# Patient Record
Sex: FEMALE | Race: WHITE | NOT HISPANIC OR LATINO
[De-identification: names, ages, dates, MRNs, and addresses within clinical notes are randomized per-mention and may not be internally consistent; named-entity substitution may affect disease eponyms.]

---

## 2020-04-03 ENCOUNTER — TRANSCRIPTION ENCOUNTER (OUTPATIENT)
Age: 70
End: 2020-04-03

## 2023-04-06 ENCOUNTER — NON-APPOINTMENT (OUTPATIENT)
Age: 73
End: 2023-04-06

## 2023-04-06 ENCOUNTER — FORM ENCOUNTER (OUTPATIENT)
Age: 73
End: 2023-04-06

## 2023-04-07 ENCOUNTER — INPATIENT (INPATIENT)
Facility: HOSPITAL | Age: 73
LOS: 18 days | Discharge: HOME | DRG: 744 | End: 2023-04-26
Attending: INTERNAL MEDICINE | Admitting: INTERNAL MEDICINE
Payer: MEDICARE

## 2023-04-07 ENCOUNTER — FORM ENCOUNTER (OUTPATIENT)
Age: 73
End: 2023-04-07

## 2023-04-07 VITALS — HEIGHT: 65 IN | WEIGHT: 225.09 LBS

## 2023-04-07 DIAGNOSIS — R18.8 OTHER ASCITES: ICD-10-CM

## 2023-04-07 LAB
ALBUMIN SERPL ELPH-MCNC: 2.3 G/DL — LOW (ref 3.3–5)
ALP SERPL-CCNC: 159 U/L — HIGH (ref 40–120)
ALT FLD-CCNC: 21 U/L — SIGNIFICANT CHANGE UP (ref 12–78)
ANION GAP SERPL CALC-SCNC: 7 MMOL/L — SIGNIFICANT CHANGE UP (ref 5–17)
APPEARANCE UR: ABNORMAL
APTT BLD: 25.6 SEC — LOW (ref 27.5–35.5)
AST SERPL-CCNC: 33 U/L — SIGNIFICANT CHANGE UP (ref 15–37)
BACTERIA # UR AUTO: ABNORMAL
BASOPHILS # BLD AUTO: 0 K/UL — SIGNIFICANT CHANGE UP (ref 0–0.2)
BASOPHILS NFR BLD AUTO: 0 % — SIGNIFICANT CHANGE UP (ref 0–2)
BILIRUB SERPL-MCNC: 2.1 MG/DL — HIGH (ref 0.2–1.2)
BILIRUB UR-MCNC: ABNORMAL
BUN SERPL-MCNC: 21 MG/DL — SIGNIFICANT CHANGE UP (ref 7–23)
CALCIUM SERPL-MCNC: 9.2 MG/DL — SIGNIFICANT CHANGE UP (ref 8.5–10.1)
CHLORIDE SERPL-SCNC: 96 MMOL/L — SIGNIFICANT CHANGE UP (ref 96–108)
CO2 SERPL-SCNC: 26 MMOL/L — SIGNIFICANT CHANGE UP (ref 22–31)
COD CRY URNS QL: ABNORMAL
COLOR SPEC: ABNORMAL
COMMENT - URINE: SIGNIFICANT CHANGE UP
CREAT SERPL-MCNC: 0.91 MG/DL — SIGNIFICANT CHANGE UP (ref 0.5–1.3)
DIFF PNL FLD: ABNORMAL
EGFR: 67 ML/MIN/1.73M2 — SIGNIFICANT CHANGE UP
EOSINOPHIL # BLD AUTO: 0 K/UL — SIGNIFICANT CHANGE UP (ref 0–0.5)
EOSINOPHIL NFR BLD AUTO: 0 % — SIGNIFICANT CHANGE UP (ref 0–6)
EPI CELLS # UR: SIGNIFICANT CHANGE UP
FLUAV AG NPH QL: SIGNIFICANT CHANGE UP
FLUBV AG NPH QL: SIGNIFICANT CHANGE UP
GLUCOSE SERPL-MCNC: 106 MG/DL — HIGH (ref 70–99)
GLUCOSE UR QL: NEGATIVE — SIGNIFICANT CHANGE UP
GRAN CASTS # UR COMP ASSIST: ABNORMAL /LPF
HCT VFR BLD CALC: 35.9 % — SIGNIFICANT CHANGE UP (ref 34.5–45)
HGB BLD-MCNC: 10.9 G/DL — LOW (ref 11.5–15.5)
HYALINE CASTS # UR AUTO: ABNORMAL /LPF
INR BLD: 1.54 RATIO — HIGH (ref 0.88–1.16)
KETONES UR-MCNC: ABNORMAL
LEUKOCYTE ESTERASE UR-ACNC: ABNORMAL
LYMPHOCYTES # BLD AUTO: 14 % — SIGNIFICANT CHANGE UP (ref 13–44)
LYMPHOCYTES # BLD AUTO: 2.11 K/UL — SIGNIFICANT CHANGE UP (ref 1–3.3)
MAGNESIUM SERPL-MCNC: 2.2 MG/DL — SIGNIFICANT CHANGE UP (ref 1.6–2.6)
MANUAL SMEAR VERIFICATION: SIGNIFICANT CHANGE UP
MCHC RBC-ENTMCNC: 22.3 PG — LOW (ref 27–34)
MCHC RBC-ENTMCNC: 30.4 GM/DL — LOW (ref 32–36)
MCV RBC AUTO: 73.4 FL — LOW (ref 80–100)
MONOCYTES # BLD AUTO: 1.66 K/UL — HIGH (ref 0–0.9)
MONOCYTES NFR BLD AUTO: 11 % — SIGNIFICANT CHANGE UP (ref 2–14)
NEUTROPHILS # BLD AUTO: 11.33 K/UL — HIGH (ref 1.8–7.4)
NEUTROPHILS NFR BLD AUTO: 75 % — SIGNIFICANT CHANGE UP (ref 43–77)
NITRITE UR-MCNC: POSITIVE
NRBC # BLD: 0 /100 — SIGNIFICANT CHANGE UP (ref 0–0)
NRBC # BLD: SIGNIFICANT CHANGE UP /100 WBCS (ref 0–0)
NT-PROBNP SERPL-SCNC: 421 PG/ML — HIGH (ref 0–125)
PH UR: 5 — SIGNIFICANT CHANGE UP (ref 5–8)
PLAT MORPH BLD: NORMAL — SIGNIFICANT CHANGE UP
PLATELET # BLD AUTO: 581 K/UL — HIGH (ref 150–400)
POTASSIUM SERPL-MCNC: 4.2 MMOL/L — SIGNIFICANT CHANGE UP (ref 3.5–5.3)
POTASSIUM SERPL-SCNC: 4.2 MMOL/L — SIGNIFICANT CHANGE UP (ref 3.5–5.3)
PROT SERPL-MCNC: 8 GM/DL — SIGNIFICANT CHANGE UP (ref 6–8.3)
PROT UR-MCNC: 30 MG/DL
PROTHROM AB SERPL-ACNC: 17.9 SEC — HIGH (ref 10.5–13.4)
RBC # BLD: 4.89 M/UL — SIGNIFICANT CHANGE UP (ref 3.8–5.2)
RBC # FLD: 17.4 % — HIGH (ref 10.3–14.5)
RBC BLD AUTO: NORMAL — SIGNIFICANT CHANGE UP
RBC CASTS # UR COMP ASSIST: SIGNIFICANT CHANGE UP /HPF (ref 0–4)
RSV RNA NPH QL NAA+NON-PROBE: SIGNIFICANT CHANGE UP
SARS-COV-2 RNA SPEC QL NAA+PROBE: SIGNIFICANT CHANGE UP
SODIUM SERPL-SCNC: 129 MMOL/L — LOW (ref 135–145)
SP GR SPEC: 1.02 — SIGNIFICANT CHANGE UP (ref 1.01–1.02)
TROPONIN I, HIGH SENSITIVITY RESULT: 7.27 NG/L — SIGNIFICANT CHANGE UP
UROBILINOGEN FLD QL: 8
WBC # BLD: 15.1 K/UL — HIGH (ref 3.8–10.5)
WBC # FLD AUTO: 15.1 K/UL — HIGH (ref 3.8–10.5)
WBC UR QL: SIGNIFICANT CHANGE UP /HPF (ref 0–5)

## 2023-04-07 PROCEDURE — 86706 HEP B SURFACE ANTIBODY: CPT

## 2023-04-07 PROCEDURE — C9399: CPT

## 2023-04-07 PROCEDURE — 89051 BODY FLUID CELL COUNT: CPT

## 2023-04-07 PROCEDURE — 88341 IMHCHEM/IMCYTCHM EA ADD ANTB: CPT

## 2023-04-07 PROCEDURE — 82570 ASSAY OF URINE CREATININE: CPT

## 2023-04-07 PROCEDURE — 86709 HEPATITIS A IGM ANTIBODY: CPT

## 2023-04-07 PROCEDURE — 85027 COMPLETE CBC AUTOMATED: CPT

## 2023-04-07 PROCEDURE — 74177 CT ABD & PELVIS W/CONTRAST: CPT

## 2023-04-07 PROCEDURE — 87350 HEPATITIS BE AG IA: CPT

## 2023-04-07 PROCEDURE — 83615 LACTATE (LD) (LDH) ENZYME: CPT

## 2023-04-07 PROCEDURE — 82728 ASSAY OF FERRITIN: CPT

## 2023-04-07 PROCEDURE — 84550 ASSAY OF BLOOD/URIC ACID: CPT

## 2023-04-07 PROCEDURE — 93306 TTE W/DOPPLER COMPLETE: CPT

## 2023-04-07 PROCEDURE — 76856 US EXAM PELVIC COMPLETE: CPT

## 2023-04-07 PROCEDURE — 80053 COMPREHEN METABOLIC PANEL: CPT

## 2023-04-07 PROCEDURE — 84156 ASSAY OF PROTEIN URINE: CPT

## 2023-04-07 PROCEDURE — 88360 TUMOR IMMUNOHISTOCHEM/MANUAL: CPT

## 2023-04-07 PROCEDURE — 93970 EXTREMITY STUDY: CPT

## 2023-04-07 PROCEDURE — 82945 GLUCOSE OTHER FLUID: CPT

## 2023-04-07 PROCEDURE — 86301 IMMUNOASSAY TUMOR CA 19-9: CPT

## 2023-04-07 PROCEDURE — 99222 1ST HOSP IP/OBS MODERATE 55: CPT

## 2023-04-07 PROCEDURE — 71045 X-RAY EXAM CHEST 1 VIEW: CPT | Mod: 26

## 2023-04-07 PROCEDURE — 87340 HEPATITIS B SURFACE AG IA: CPT

## 2023-04-07 PROCEDURE — 97162 PT EVAL MOD COMPLEX 30 MIN: CPT | Mod: GP

## 2023-04-07 PROCEDURE — 76705 ECHO EXAM OF ABDOMEN: CPT

## 2023-04-07 PROCEDURE — 86304 IMMUNOASSAY TUMOR CA 125: CPT

## 2023-04-07 PROCEDURE — 84157 ASSAY OF PROTEIN OTHER: CPT

## 2023-04-07 PROCEDURE — 84443 ASSAY THYROID STIM HORMONE: CPT

## 2023-04-07 PROCEDURE — 88108 CYTOPATH CONCENTRATE TECH: CPT

## 2023-04-07 PROCEDURE — 82105 ALPHA-FETOPROTEIN SERUM: CPT

## 2023-04-07 PROCEDURE — 93971 EXTREMITY STUDY: CPT | Mod: RT

## 2023-04-07 PROCEDURE — 83550 IRON BINDING TEST: CPT

## 2023-04-07 PROCEDURE — 99285 EMERGENCY DEPT VISIT HI MDM: CPT

## 2023-04-07 PROCEDURE — 86900 BLOOD TYPING SEROLOGIC ABO: CPT

## 2023-04-07 PROCEDURE — 85610 PROTHROMBIN TIME: CPT

## 2023-04-07 PROCEDURE — 83540 ASSAY OF IRON: CPT

## 2023-04-07 PROCEDURE — 87070 CULTURE OTHR SPECIMN AEROBIC: CPT

## 2023-04-07 PROCEDURE — 81001 URINALYSIS AUTO W/SCOPE: CPT

## 2023-04-07 PROCEDURE — 80076 HEPATIC FUNCTION PANEL: CPT

## 2023-04-07 PROCEDURE — 83036 HEMOGLOBIN GLYCOSYLATED A1C: CPT

## 2023-04-07 PROCEDURE — 87075 CULTR BACTERIA EXCEPT BLOOD: CPT

## 2023-04-07 PROCEDURE — 85025 COMPLETE CBC W/AUTO DIFF WBC: CPT

## 2023-04-07 PROCEDURE — 93975 VASCULAR STUDY: CPT

## 2023-04-07 PROCEDURE — 36415 COLL VENOUS BLD VENIPUNCTURE: CPT

## 2023-04-07 PROCEDURE — 82042 OTHER SOURCE ALBUMIN QUAN EA: CPT

## 2023-04-07 PROCEDURE — 88342 IMHCHEM/IMCYTCHM 1ST ANTB: CPT

## 2023-04-07 PROCEDURE — 80048 BASIC METABOLIC PNL TOTAL CA: CPT

## 2023-04-07 PROCEDURE — 86707 HEPATITIS BE ANTIBODY: CPT

## 2023-04-07 PROCEDURE — 83735 ASSAY OF MAGNESIUM: CPT

## 2023-04-07 PROCEDURE — C1729: CPT

## 2023-04-07 PROCEDURE — 86708 HEPATITIS A ANTIBODY: CPT

## 2023-04-07 PROCEDURE — 86803 HEPATITIS C AB TEST: CPT

## 2023-04-07 PROCEDURE — 86850 RBC ANTIBODY SCREEN: CPT

## 2023-04-07 PROCEDURE — 49083 ABD PARACENTESIS W/IMAGING: CPT

## 2023-04-07 PROCEDURE — 86704 HEP B CORE ANTIBODY TOTAL: CPT

## 2023-04-07 PROCEDURE — 86901 BLOOD TYPING SEROLOGIC RH(D): CPT

## 2023-04-07 PROCEDURE — 71250 CT THORAX DX C-: CPT

## 2023-04-07 PROCEDURE — 82378 CARCINOEMBRYONIC ANTIGEN: CPT

## 2023-04-07 PROCEDURE — 87102 FUNGUS ISOLATION CULTURE: CPT

## 2023-04-07 PROCEDURE — 93005 ELECTROCARDIOGRAM TRACING: CPT

## 2023-04-07 PROCEDURE — 88305 TISSUE EXAM BY PATHOLOGIST: CPT

## 2023-04-07 PROCEDURE — 93010 ELECTROCARDIOGRAM REPORT: CPT

## 2023-04-07 PROCEDURE — 83930 ASSAY OF BLOOD OSMOLALITY: CPT

## 2023-04-07 PROCEDURE — 85379 FIBRIN DEGRADATION QUANT: CPT

## 2023-04-07 PROCEDURE — 85730 THROMBOPLASTIN TIME PARTIAL: CPT

## 2023-04-07 PROCEDURE — 87635 SARS-COV-2 COVID-19 AMP PRB: CPT

## 2023-04-07 PROCEDURE — 83935 ASSAY OF URINE OSMOLALITY: CPT

## 2023-04-07 PROCEDURE — 97116 GAIT TRAINING THERAPY: CPT | Mod: GP

## 2023-04-07 PROCEDURE — 84300 ASSAY OF URINE SODIUM: CPT

## 2023-04-07 PROCEDURE — 74177 CT ABD & PELVIS W/CONTRAST: CPT | Mod: 26,MA

## 2023-04-07 PROCEDURE — 84466 ASSAY OF TRANSFERRIN: CPT

## 2023-04-07 RX ORDER — LANOLIN ALCOHOL/MO/W.PET/CERES
3 CREAM (GRAM) TOPICAL AT BEDTIME
Refills: 0 | Status: DISCONTINUED | OUTPATIENT
Start: 2023-04-07 | End: 2023-04-26

## 2023-04-07 RX ORDER — CEFTRIAXONE 500 MG/1
1000 INJECTION, POWDER, FOR SOLUTION INTRAMUSCULAR; INTRAVENOUS ONCE
Refills: 0 | Status: DISCONTINUED | OUTPATIENT
Start: 2023-04-07 | End: 2023-04-07

## 2023-04-07 RX ORDER — CEFTRIAXONE 500 MG/1
1000 INJECTION, POWDER, FOR SOLUTION INTRAMUSCULAR; INTRAVENOUS EVERY 24 HOURS
Refills: 0 | Status: DISCONTINUED | OUTPATIENT
Start: 2023-04-07 | End: 2023-04-11

## 2023-04-07 RX ORDER — CEFTRIAXONE 500 MG/1
1000 INJECTION, POWDER, FOR SOLUTION INTRAMUSCULAR; INTRAVENOUS ONCE
Refills: 0 | Status: COMPLETED | OUTPATIENT
Start: 2023-04-07 | End: 2023-04-07

## 2023-04-07 RX ORDER — FUROSEMIDE 40 MG
40 TABLET ORAL ONCE
Refills: 0 | Status: COMPLETED | OUTPATIENT
Start: 2023-04-07 | End: 2023-04-08

## 2023-04-07 RX ORDER — ONDANSETRON 8 MG/1
4 TABLET, FILM COATED ORAL EVERY 8 HOURS
Refills: 0 | Status: DISCONTINUED | OUTPATIENT
Start: 2023-04-07 | End: 2023-04-26

## 2023-04-07 RX ORDER — ACETAMINOPHEN 500 MG
650 TABLET ORAL EVERY 6 HOURS
Refills: 0 | Status: DISCONTINUED | OUTPATIENT
Start: 2023-04-07 | End: 2023-04-26

## 2023-04-07 RX ADMIN — CEFTRIAXONE 1000 MILLIGRAM(S): 500 INJECTION, POWDER, FOR SOLUTION INTRAMUSCULAR; INTRAVENOUS at 19:33

## 2023-04-07 NOTE — ED ADULT TRIAGE NOTE - CHIEF COMPLAINT QUOTE
Pt presents to ED c/o VINES. pt states she gets very SOB when she walks. +lower leg swelling. pt also states she has decreased appetite can only drink/eat small amounts at a time. increased lethargy

## 2023-04-07 NOTE — ED ADULT NURSE NOTE - OBJECTIVE STATEMENT
Pt is a 73yr old female, A&OX4, c/o VINES, b/l lower extremity swelling, abdominal distention, vomiting, no appetite, and fatigue x 1 week. No hx of abdominal surgeries. Resp. even and unlabored. Denies chest pain, HA, dizziness, fevers, and cough. Labs sent. EKG completed. Placed on cardiac monitor. In NAD.

## 2023-04-07 NOTE — ED ADULT NURSE REASSESSMENT NOTE - NS ED NURSE REASSESS COMMENT FT1
Bladder scan shows 1589mL. Dr. Lucio made aware. Awaiting indwelling dos santos catheter order.
Pt initial output after dos santos catheter placement was 200mL despite bladder scan amount. Dr. Lucio made aware.

## 2023-04-07 NOTE — H&P ADULT - NSHPREVIEWOFSYSTEMS_GEN_ALL_CORE
Gen: No fever, chills, weakness  ENT: No visual changes or throat pain  Neck: No pain or stiffness  Respiratory: No cough or wheezing  Cardiovascular: No chest pain or palpitations  Gastrointestinal: No abdominal pain, ++nausea,++ vomiting, No constipation, or diarrhea  Hematologic: No easy bleeding or bruising  Neurologic: No numbness or focal weakness  Psych: No depression or insomnia  Skin: No rash or itching

## 2023-04-07 NOTE — H&P ADULT - HISTORY OF PRESENT ILLNESS
73 year old Female presented to the ED complaining of Dyspnea on exertion for more than 6 weeks. Patient started to have some shortness of breath with exertion and mild abdominal swelling more than 6 weeks ago. Then she went to [place] with these symptoms to visit. She came back couple of weeks ago. But her abdomen continue to swell and also got significant edema.  She also has decreased appetite and nausea, vomiting for few days. Last BM, 2 days ago. She has no fever or diarrhea.

## 2023-04-07 NOTE — ED PROVIDER NOTE - MUSCULOSKELETAL, MLM
Spine appears normal, range of motion is not limited, no muscle or joint tenderness. 2+ edema b/l LEs.

## 2023-04-07 NOTE — H&P ADULT - NSHPPHYSICALEXAM_GEN_ALL_CORE
T(C): 36.7 (04-07-23 @ 22:30), Max: 36.8 (04-07-23 @ 14:11)  HR: 89 (04-07-23 @ 22:30) (74 - 89)  BP: 142/65 (04-07-23 @ 22:30) (142/65 - 156/70)  RR: 20 (04-07-23 @ 22:30) (18 - 20)  SpO2: 98% (04-07-23 @ 22:30) (96% - 100%)    CONSTITUTIONAL: Well groomed, no apparent distress  EYES: PERRLA and symmetric, EOMI, No conjunctival or scleral injection, non-icteric  ENMT: Oral mucosa with moist membranes. Normal dentition; no pharyngeal injection or exudates             NECK: Supple, symmetric and without tracheal deviation   RESP: No respiratory distress, no use of accessory muscles; CTA b/l, no WRR  CV: RRR, +S1S2, no MRG; no JVD; +++peripheral edema  GI: Very distended abdomen, +Ascites, No abdominal tenderness.   LYMPH: No cervical LAD or tenderness;   MSK: Normal ROM without pain, no spinal tenderness, normal muscle strength/tone  SKIN: No rashes or ulcers noted; no subcutaneous nodules or induration palpable  NEURO: CN II-XII intact; normal reflexes in upper and lower extremities, sensation intact in upper and lower extremities b/l to light touch   PSYCH: Appropriate insight/judgment; A+O x 3, mood and affect appropriate, recent/remote memory intact

## 2023-04-07 NOTE — H&P ADULT - ASSESSMENT
A/P:    1.  Dyspnea on exertion  Ascites  Leg edema  -VINES most likely due to significant abdominal distension  -will give one  dose of IV lasix today  -follow CT abd report  -follow leg US report  --follow US pelvis report  -IR consult for diagnostic and therapeutic paracentesis  -follow GI consult  -follow Echo report    2.  UTI  -on IV Ceftriaxone  -follow cultures    3.  SCD for DVT ppx    4.  Code status: Full code.

## 2023-04-07 NOTE — ED STATDOCS - PROGRESS NOTE DETAILS
Jemima Wong for attending Dr. Thapa: 74 y/o female presents to the ED c/o VINES x6 weeks. +decreased appetite. Denies abd pain. No other complaints at this time. Will send pt to main ED for further evaluation.

## 2023-04-07 NOTE — PHARMACOTHERAPY INTERVENTION NOTE - COMMENTS
Medication history complete, reviewed medications with patient and confirmed with doctor first med hx. Patient is not on meds.

## 2023-04-07 NOTE — ED PROVIDER NOTE - OBJECTIVE STATEMENT
73 year old female with no PMHx presents to the ED complaining of VINES s/p coming back from [place] 10 days ago. notes decreased appetite and nausea, vomiting x a few days. Last BM, 2 days ago. no fever or diarrhea. also notes legs swollen.

## 2023-04-07 NOTE — ED PROVIDER NOTE - CLINICAL SUMMARY MEDICAL DECISION MAKING FREE TEXT BOX
Pt with vomiting and leg swelling, r/o obstruction vs ascites vs CHF vs urinary retention. Plan: labs, CT imaging, bladder scan, and admit.

## 2023-04-08 ENCOUNTER — FORM ENCOUNTER (OUTPATIENT)
Age: 73
End: 2023-04-08

## 2023-04-08 DIAGNOSIS — R60.1 GENERALIZED EDEMA: ICD-10-CM

## 2023-04-08 LAB
ADD ON TEST-SPECIMEN IN LAB: SIGNIFICANT CHANGE UP
ANION GAP SERPL CALC-SCNC: 8 MMOL/L — SIGNIFICANT CHANGE UP (ref 5–17)
BUN SERPL-MCNC: 18 MG/DL — SIGNIFICANT CHANGE UP (ref 7–23)
CALCIUM SERPL-MCNC: 8.9 MG/DL — SIGNIFICANT CHANGE UP (ref 8.5–10.1)
CHLORIDE SERPL-SCNC: 98 MMOL/L — SIGNIFICANT CHANGE UP (ref 96–108)
CO2 SERPL-SCNC: 25 MMOL/L — SIGNIFICANT CHANGE UP (ref 22–31)
CREAT SERPL-MCNC: 0.7 MG/DL — SIGNIFICANT CHANGE UP (ref 0.5–1.3)
CULTURE RESULTS: NO GROWTH — SIGNIFICANT CHANGE UP
EGFR: 91 ML/MIN/1.73M2 — SIGNIFICANT CHANGE UP
GLUCOSE SERPL-MCNC: 92 MG/DL — SIGNIFICANT CHANGE UP (ref 70–99)
HCT VFR BLD CALC: 32.7 % — LOW (ref 34.5–45)
HCV AB S/CO SERPL IA: 0.12 S/CO — SIGNIFICANT CHANGE UP (ref 0–0.99)
HCV AB SERPL-IMP: SIGNIFICANT CHANGE UP
HGB BLD-MCNC: 10.1 G/DL — LOW (ref 11.5–15.5)
INR BLD: 1.49 RATIO — HIGH (ref 0.88–1.16)
LDH SERPL L TO P-CCNC: 123 U/L — SIGNIFICANT CHANGE UP (ref 84–241)
MCHC RBC-ENTMCNC: 22.4 PG — LOW (ref 27–34)
MCHC RBC-ENTMCNC: 30.9 GM/DL — LOW (ref 32–36)
MCV RBC AUTO: 72.7 FL — LOW (ref 80–100)
PLATELET # BLD AUTO: 561 K/UL — HIGH (ref 150–400)
POTASSIUM SERPL-MCNC: 3.6 MMOL/L — SIGNIFICANT CHANGE UP (ref 3.5–5.3)
POTASSIUM SERPL-SCNC: 3.6 MMOL/L — SIGNIFICANT CHANGE UP (ref 3.5–5.3)
PROTHROM AB SERPL-ACNC: 17.4 SEC — HIGH (ref 10.5–13.4)
RBC # BLD: 4.5 M/UL — SIGNIFICANT CHANGE UP (ref 3.8–5.2)
RBC # FLD: 17.4 % — HIGH (ref 10.3–14.5)
SODIUM SERPL-SCNC: 131 MMOL/L — LOW (ref 135–145)
SPECIMEN SOURCE: SIGNIFICANT CHANGE UP
TSH SERPL-MCNC: 1.91 UU/ML — SIGNIFICANT CHANGE UP (ref 0.34–4.82)
WBC # BLD: 12.22 K/UL — HIGH (ref 3.8–10.5)
WBC # FLD AUTO: 12.22 K/UL — HIGH (ref 3.8–10.5)

## 2023-04-08 PROCEDURE — 93970 EXTREMITY STUDY: CPT | Mod: 26

## 2023-04-08 PROCEDURE — 76856 US EXAM PELVIC COMPLETE: CPT | Mod: 26

## 2023-04-08 PROCEDURE — 99233 SBSQ HOSP IP/OBS HIGH 50: CPT

## 2023-04-08 PROCEDURE — 99223 1ST HOSP IP/OBS HIGH 75: CPT

## 2023-04-08 PROCEDURE — 93306 TTE W/DOPPLER COMPLETE: CPT | Mod: 26

## 2023-04-08 RX ADMIN — Medication 40 MILLIGRAM(S): at 05:34

## 2023-04-08 RX ADMIN — CEFTRIAXONE 1000 MILLIGRAM(S): 500 INJECTION, POWDER, FOR SOLUTION INTRAMUSCULAR; INTRAVENOUS at 05:35

## 2023-04-08 NOTE — CONSULT NOTE ADULT - PROBLEM SELECTOR RECOMMENDATION 9
Pt. with anasarca, Echo with preserved LVEF, no effusions, showing large amounts of fluid in abdomen   pt. with distended abdomen due to abdominal and pelvic ascites, elevated LFTs and low albumin noted, Echo normal with results as above, CTA shows abdominal ascites and fibroid uterus, B/l LE US - no DVT, normal troponin, mildly elevated [id number]=[id number], no signs of CHF, UA suspicious for UTI, findings not suspicious for HF - pt. with no personal and/or family cardiac history, check EKG and D-Dimer although no DVT on US - recent travel to [place], GI consult recs noted - paracentesis under IR< hepatitis panel, pt. not aware of uterine fibroids, have not seen a GYN a very long time   Recommendation: tele for 24 hrs - if no events and EKG normal can dc, will followup D-Dimer to r/o PE although pt with dyspnea on exertion likely due to ascites, denies dyspnea at rest

## 2023-04-08 NOTE — CONSULT NOTE ADULT - SUBJECTIVE AND OBJECTIVE BOX
CHIEF COMPLAINT: Patient is a 73y old  Female who presents with a chief complaint of Abdominal distension and Ascites (08 Apr 2023 13:40)      HPI:  73 year old Female presented to the ED complaining of Dyspnea on exertion for more than 6 weeks. Patient started to have some shortness of breath with exertion and mild abdominal swelling more than 6 weeks ago. Then she went to [place] with these symptoms to visit. She came back couple of weeks ago. But her abdomen continue to swell and also got significant edema.  She also has decreased appetite and nausea, vomiting for few days. Last BM, 2 days ago. She has no fever or diarrhea.  (07 Apr 2023 23:06)      PAST MEDICAL / SURGICAL HISTORY:  PAST MEDICAL & SURGICAL HISTORY:  No pertinent past medical history      No significant past surgical history          SOCIAL HISTORY:   Alcohol: Denied  Smoking: Nonsmoker  Drug Use: Denied  Marital Status:     FAMILY HISTORY: FAMILY HISTORY:  No pertinent family history in first degree relatives        MEDICATIONS  (STANDING):  cefTRIAXone Injectable. 1000 milliGRAM(s) IV Push every 24 hours    MEDICATIONS  (PRN):  acetaminophen     Tablet .. 650 milliGRAM(s) Oral every 6 hours PRN Temp greater or equal to 38C (100.4F), Mild Pain (1 - 3)  aluminum hydroxide/magnesium hydroxide/simethicone Suspension 30 milliLiter(s) Oral every 4 hours PRN Dyspepsia  melatonin 3 milliGRAM(s) Oral at bedtime PRN Insomnia  ondansetron Injectable 4 milliGRAM(s) IV Push every 8 hours PRN Nausea and/or Vomiting      Allergies    No Known Allergies    Intolerances        REVIEW OF SYSTEMS:  CONSTITUTIONAL: No weakness, fevers or chills  Eyes: No visual changes  NECK: No pain or stiffness  RESPIRATORY: No cough, wheezing, hemoptysis; No shortness of breath  CARDIOVASCULAR: No chest pain or palpitations  GASTROINTESTINAL: No abdominal pain. No nausea, vomiting, or hematemesis; No diarrhea or constipation. No melena or hematochezia.  GENITOURINARY: No dysuria, frequency or hematuria  NEUROLOGICAL: No numbness.  SKIN: No itching or rash  All other review of systems is negative unless indicated above    VITAL SIGNS:   Vital Signs Last 24 Hrs  T(C): 36.9 (08 Apr 2023 08:05), Max: 36.9 (08 Apr 2023 08:05)  T(F): 98.4 (08 Apr 2023 08:05), Max: 98.4 (08 Apr 2023 08:05)  HR: 84 (08 Apr 2023 08:05) (74 - 89)  BP: 144/65 (08 Apr 2023 08:05) (142/65 - 156/70)  BP(mean): 96 (07 Apr 2023 19:05) (96 - 103)  RR: 18 (08 Apr 2023 08:05) (18 - 20)  SpO2: 96% (08 Apr 2023 08:05) (96% - 100%)    Parameters below as of 08 Apr 2023 08:05  Patient On (Oxygen Delivery Method): room air        I&O's Summary      PHYSICAL EXAM:  Constitutional: NAD, awake and alert  HEENT:  EOMI,  Pupils round, No oral cyanosis.  Pulmonary: Non-labored, breath sounds are clear bilaterally, No wheezing, rales or rhonchi  Cardiovascular: S1 and S2, regular rate and rhythm, no Murmurs, gallops or rubs  Gastrointestinal: Bowel Sounds present, soft, nontender.   Lymph: No peripheral edema. No cervical lymphadenopathy.  Neurological: Alert, no focal deficits  Skin: No rashes.  Psych:  Mood & affect appropriate    LABS:                        10.1   12.22 )-----------( 561      ( 08 Apr 2023 07:07 )             32.7                         10.9   15.10 )-----------( 581      ( 07 Apr 2023 15:08 )             35.9     08 Apr 2023 07:07    131    |  98     |  18     ----------------------------<  92     3.6     |  25     |  0.70   07 Apr 2023 15:08    129    |  96     |  21     ----------------------------<  106    4.2     |  26     |  0.91     Ca    8.9        08 Apr 2023 07:07  Ca    9.2        07 Apr 2023 15:08  Mg     2.2       07 Apr 2023 15:08    TPro  8.0    /  Alb  2.3    /  TBili  2.1    /  DBili  x      /  AST  33     /  ALT  21     /  AlkPhos  159    07 Apr 2023 15:08    PT/INR - ( 08 Apr 2023 07:07 )   PT: 17.4 sec;   INR: 1.49 ratio         PTT - ( 07 Apr 2023 15:08 )  PTT:25.6 sec        04-08 @ 07:07  TSH: 1.91       CHIEF COMPLAINT: Patient is a 73y old  Female who presents with a chief complaint of Abdominal distension and Ascites (08 Apr 2023 13:40)      HPI:  73 year old Female presented to the ED complaining of Dyspnea on exertion for more than 6 weeks. Patient started to have some shortness of breath with exertion and mild abdominal swelling more than 6 weeks ago. Then she went to [place] with these symptoms to visit. She came back couple of weeks ago. But her abdomen continue to swell and also got significant edema.  She also has decreased appetite and nausea, vomiting for few days. Last BM, 2 days ago. She has no fever or diarrhea.  (07 Apr 2023 23:06)    Cardiology consulted to evaluate for anasarca.       PAST MEDICAL / SURGICAL HISTORY:  PAST MEDICAL & SURGICAL HISTORY:  No pertinent past medical history      No significant past surgical history    SOCIAL HISTORY:   Alcohol: Denied  Smoking: Nonsmoker  Drug Use: Denied  Marital Status:     FAMILY HISTORY: FAMILY HISTORY:  No pertinent family history in first degree relatives    MEDICATIONS  (STANDING):  cefTRIAXone Injectable. 1000 milliGRAM(s) IV Push every 24 hours      MEDICATIONS  (PRN):  acetaminophen     Tablet .. 650 milliGRAM(s) Oral every 6 hours PRN Temp greater or equal to 38C (100.4F), Mild Pain (1 - 3)  aluminum hydroxide/magnesium hydroxide/simethicone Suspension 30 milliLiter(s) Oral every 4 hours PRN Dyspepsia  melatonin 3 milliGRAM(s) Oral at bedtime PRN Insomnia  ondansetron Injectable 4 milliGRAM(s) IV Push every 8 hours PRN Nausea and/or Vomiting      Allergies    No Known Allergies    Intolerances        REVIEW OF SYSTEMS:  CONSTITUTIONAL: No weakness, fevers or chills  Eyes: No visual changes  NECK: No pain or stiffness  RESPIRATORY: No cough, wheezing, hemoptysis; + shortness of breath omn exertion   CARDIOVASCULAR: No chest pain or palpitations  GASTROINTESTINAL: + abdominal pain. + nausea, +vomiting  No melena or hematochezia.  GENITOURINARY: No dysuria, frequency or hematuria  NEUROLOGICAL: No numbness.  SKIN: No itching or rash  All other review of systems is negative unless indicated above    VITAL SIGNS:   Vital Signs Last 24 Hrs  T(C): 36.9 (08 Apr 2023 08:05), Max: 36.9 (08 Apr 2023 08:05)  T(F): 98.4 (08 Apr 2023 08:05), Max: 98.4 (08 Apr 2023 08:05)  HR: 84 (08 Apr 2023 08:05) (74 - 89)  BP: 144/65 (08 Apr 2023 08:05) (142/65 - 156/70)  BP(mean): 96 (07 Apr 2023 19:05) (96 - 103)  RR: 18 (08 Apr 2023 08:05) (18 - 20)  SpO2: 96% (08 Apr 2023 08:05) (96% - 100%)    Parameters below as of 08 Apr 2023 08:05  Patient On (Oxygen Delivery Method): room air    I&O's Summary      PHYSICAL EXAM:  Constitutional: NAD, awake and alert  HEENT:  EOMI,  Pupils round, No oral cyanosis.  Pulmonary: Non-labored, breath sounds are clear bilaterally, No wheezing, rales or rhonchi  Cardiovascular: S1 and S2, regular rate and rhythm, no Murmurs, gallops or rubs  Gastrointestinal: Bowel Sounds present, distended   Lymph: No peripheral edema. No cervical lymphadenopathy.  Neurological: Alert, no focal deficits  Skin: No rashes.  [id number]: +2 B/L pedal edema   Psych:  Mood & affect appropriate    LABS:                        10.1   12.22 )-----------( 561      ( 08 Apr 2023 07:07 )             32.7                         10.9   15.10 )-----------( 581      ( 07 Apr 2023 15:08 )             35.9     08 Apr 2023 07:07    131    |  98     |  18     ----------------------------<  92     3.6     |  25     |  0.70   07 Apr 2023 15:08    129    |  96     |  21     ----------------------------<  106    4.2     |  26     |  0.91     Ca    8.9        08 Apr 2023 07:07  Ca    9.2        07 Apr 2023 15:08  Mg     2.2       07 Apr 2023 15:08    TPro  8.0    /  Alb  2.3    /  TBili  2.1    /  DBili  x      /  AST  33     /  ALT  21     /  AlkPhos  159    07 Apr 2023 15:08    PT/INR - ( 08 Apr 2023 07:07 )   PT: 17.4 sec;   INR: 1.49 ratio         PTT - ( 07 Apr 2023 15:08 )  PTT:25.6 sec        04-08 @ 07:07  TSH: 1.91    Radiology: reviewed    < from: TTE Echo Complete w/o Contrast w/ Doppler (04.08.23 @ 09:39) >   Impression     Summary     The mitral valve was well visualized.   The mitral valve leaflets appear thickened.   Trace mitral regurgitation is present.   EA reversal of the mitral inflow consistent with reduced compliance of   the   left ventricle.   The aortic valve is well visualized, appears mildly calcified. Valve   opening seems to be normal.   The tricuspid valve leaflets are thin and pliable; valve motion is   normal.   Trace tricuspid valve regurgitation is present.   The left ventricle is normal in size, wall thickness, wall motion and   contractility as seen in limited views.   Estimated left ventricular ejection fraction is 65-70 %.   Normal appearing right ventricle structure and function.   Mild dilatation of the ascending aortic segment.   The IVC not seen due to limited subcostal window.   There is a large amount of fluid seen in the abdominal region.   No evidence of pericardial effusion.     Signature     ----------------------------------------------------------------   Electronically signed by Tonny Perry MD(Interpreting   physician) on 04/08/2023 10:15 AM    < end of copied text >  < from: CT Abdomen and Pelvis w/ IV Cont (04.07.23 @ 17:11) >    ACC: [id number] EXAM:  CT ABDOMEN AND PELVIS IC   ORDERED BY: AKHIL SWIFT     PROCEDURE DATE:  04/07/2023          INTERPRETATION:  CLINICAL INFORMATION: Vomiting and abdominal pain with   shortness of breath. Recent travel. Decreased appetite. Swollen lower   extremities. Abdominal distention on physical exam    COMPARISON: None.    CONTRAST/COMPLICATIONS:  IV Contrast: Omnipaque 350  90 cc administered   10 cc discarded  Oral Contrast: NONE  Complications: None reported at time of study completion    PROCEDURE:  CT of the Abdomen and Pelvis was performed.  Sagittal and coronal reformats were performed.    FINDINGS:  LOWER CHEST: Within normal limits.    LIVER: Within normal limits.  BILE DUCTS: Normal caliber.  GALLBLADDER: Contracted  SPLEEN: Within normal limits.  PANCREAS: Atrophic  ADRENALS: Within normal limits.  KIDNEYS/URETERS: Within normal limits.    BLADDER: Mosley catheter.  REPRODUCTIVE ORGANS: Fibroid uterus    BOWEL: No bowel obstruction. Appendix is normal. Diverticulosis without   diverticulitis. Nonspecific thickened loops of jejunum in the left upper   quadrant  PERITONEUM: A very large amount of abdominal and pelvic ascites causing   centralization of bowel loops  VESSELS: Atherosclerotic changes.    RETROPERITONEUM/LYMPH NODES: No lymphadenopathy.  ABDOMINAL WALL: Within normal limits.  BONES: Degenerative changes. DISH is noted in the thoracic spine    IMPRESSION:  Very large amount of abdominal and pelvic ascites  Uterine fibroids    < end of copied text >  < from: US Duplex Venous Lower Ext Complete, Bilateral (04.08.23 @ 01:00) >    IMPRESSION:  No evidence of deep venous thrombosis in either lower extremity.    < end of copied text >  < from: Xray Chest 1 View- PORTABLE-Urgent (04.07.23 @ 15:02) >    ACC: [id number] EXAM:  XR CHEST PORTABLE URGENT 1V   ORDERED BY: NAKIA CAMARGO     PROCEDURE DATE:  04/07/2023          INTERPRETATION:  AP chest on April 7, 2023 at 2:51 PM. Patient has chest   pain.    COMPARISON: None available.    Heart suggest normal size. Lungs are grossly clear.    IMPRESSION: No acute finding.    < end of copied text >

## 2023-04-08 NOTE — CONSULT NOTE ADULT - ASSESSMENT
1. Large volume ascites. Needs tap to assess SAAG  2. Poor PO intake likely from [place] from tense ascites    Recommendation  1. IR consult for diagnostic/therapeutic paracentesis with cell count, albumin, protein and gram stain/culture  2. Check hepatitis serologies  3. Check iron studies  4. diet as tolerated

## 2023-04-08 NOTE — PROGRESS NOTE ADULT - SUBJECTIVE AND OBJECTIVE BOX
Chief Complaint: Anasarca, exertional dyspnea    Interval Hx: Patient reports slight improvement. Swelling is similar but she is able to tolerate PO now whereas upon presentation her abdominal swelling got so bad she was having trouble eating. No significant abdominal pain. No fever or chills. No new complaints.     ROS: Multi system review is comprehensively negative x 10 systems except as above    Vitals:  T(F): 98.4 (08 Apr 2023 08:05), Max: 98.4 (08 Apr 2023 08:05)  HR: 84 (08 Apr 2023 08:05) (74 - 89)  BP: 144/65 (08 Apr 2023 08:05) (142/65 - 156/70)  RR: 18 (08 Apr 2023 08:05) (18 - 20)  SpO2: 96% (08 Apr 2023 08:05) (96% - 100%) on room air    Exam:  Gen: No acute distress  HEENT: NCAT PERRL EOMI MMM clear oropharynx  Neck: Supple, no JVD, no thyromegaly  Chest: Normal resp effort at rest, trace end expiratory wheeze, slightly diminished breath sounds throughout  CVS: s1 s2 normal, RRR  Abd: +BS, soft, nontender, markedly distended, +ascites  Ext: +B/L LE edema, no tenderness, normal cap refill, no clubbing  Skin: Warm, dry  Mood: calm, pleasant  Neuro: A+OX3, no deficits, no asterixis    Labs:                    10.1   12.22 )--------( 561                  32.7       131  |  98  |  18  --------------------<  92  3.6   |  25  |  0.70    Ca 8.9  Mg 2.2    TPro  8.0  /  Alb  2.3  /  TBili  2.1  /  DBili  x   /  AST  33  /  ALT  21  /  AlkPhos  159    PT: 17.4 sec;   INR: 1.49 ratio    PTT: 25.6 sec    Troponin negative   ProBNP 421   TSH WNL       HCV Ab non reactive    Urinalysis 4/7: Essence, slightly tubid, trace ketone, mod bili, prot 30, N +, trace LE, RBC 0-2, WBC 0-2, bact few    Micro:  COVID19, flu, RSV PCR negative    Imaging:  US venous duplex B/L LEs 4/8: No evidence of deep venous thrombosis in either lower extremity.    CT abd/pelvis W/ 4/7: Very large amount of abdominal and pelvic ascites. Uterine fibroids.    CXR 4/7: Heart suggest normal size. Lungs are grossly clear.    Cardiac Testing:  TTE 4/8: Trace mitral regurgitation is present. EA reversal of the mitral inflow consistent with reduced compliance of the left ventricle. The aortic valve is well visualized, appears mildly calcified. Valve opening seems to be normal. The tricuspid valve leaflets are thin and pliable; valve motion is normal. Trace tricuspid valve regurgitation is present. The left ventricle is normal in size, wall thickness, wall motion and contractility as seen in limited views. Estimated left ventricular ejection fraction is 65-70 %. Normal appearing right ventricle structure and function. Mild dilatation of the ascending aortic segment. The IVC not seen due to limited subcostal window. There is a large amount of fluid seen in the abdominal region. No evidence of pericardial effusion.    EKG 4/7: Rate 85, NSR, L axis, LVH, no acute ischemic changes    Meds:  MEDICATIONS  (STANDING):  cefTRIAXone Injectable. 1000 milliGRAM(s) IV Push every 24 hours    MEDICATIONS  (PRN):  acetaminophen     Tablet .. 650 milliGRAM(s) Oral every 6 hours PRN Temp greater or equal to 38C (100.4F), Mild Pain (1 - 3)  aluminum hydroxide/magnesium hydroxide/simethicone Suspension 30 milliLiter(s) Oral every 4 hours PRN Dyspepsia  melatonin 3 milliGRAM(s) Oral at bedtime PRN Insomnia  ondansetron Injectable 4 milliGRAM(s) IV Push every 8 hours PRN Nausea and/or Vomiting

## 2023-04-08 NOTE — PROGRESS NOTE ADULT - ASSESSMENT
73 year old woman who no known PMHx, has not seen a doctor in many years, has not suffered any recent illness as far as she knows, takes no medications, only on tumeric for arthritis in her knee, presented for further evaluation of several weeks of progressively worsening abdominal distension and increased abdominal girth, associated decreased ability to tolerate PO, and symptom of exertional dyspnea. Denied fever, chills, change in bowel habits, blood in stool or constipation. Denies chronic alcohol use, illicit drugs or family history of GI disease. In the ED, vitals WNL, exam notable for distended abdomen, +BS, ascites. WBC 15. Hgb 10.9 Plt 581. INR 1.49. Na 129. Cr WNL. AST/ALT WNL. T bili 2.1. Alk phos 159. alb 2.1. Trop mininally elevated. . CT A/P showing large volume ascites and non-specific jejunal loop distension otherwise no overt GI pathology. Uterine fibroids present. COVID, flu RSV negative. UA suggestive of possible infection. Started on ceftriaxone and triaged to 3E.     Ascites, Anasarca, Exertional dyspnea  Etiology unclear. Appreciate input from Cardiology. Reviewed echo.  Normal EF normal LA no pulmonary hypertension no LVH. Doubt systolic or diastolic dysfunction is present. Appreciate input from Nephrology and GI as well.  - IR consult for diagnostic/therapeutic paracentesis with cell count, albumin, protein and gram stain/culture  - Check hepatitis serologies  - Check iron studies  - Tumor markers  - UPCR   73 year old woman who no known PMHx, has not seen a doctor in many years, has not suffered any recent illness as far as she knows, takes no medications, only on tumeric for arthritis in her knee, presented for further evaluation of several weeks of progressively worsening abdominal distension and increased abdominal girth, associated decreased ability to tolerate PO, and symptom of exertional dyspnea. Denied fever, chills, change in bowel habits, blood in stool or constipation. Denies chronic alcohol use, illicit drugs or family history of GI disease. In the ED, vitals WNL, exam notable for distended abdomen, +BS, ascites. WBC 15. Hgb 10.9 Plt 581. INR 1.49. Na 129. Cr WNL. AST/ALT WNL. T bili 2.1. Alk phos 159. alb 2.1. Trop mininally elevated. . CT A/P showing large volume ascites and non-specific jejunal loop distension otherwise no overt GI pathology. Uterine fibroids present. COVID, flu RSV negative. UA suggestive of possible infection. Started on ceftriaxone and triaged to 3E.     Ascites, Anasarca, Exertional dyspnea  Etiology unclear. Appreciate input from Cardiology. Reviewed echo.  Normal EF normal LA no pulmonary hypertension no LVH. Doubt systolic or diastolic dysfunction is present. Appreciate input from GI as well.  - IR consult for diagnostic/therapeutic paracentesis with cell count, albumin, protein and gram stain/culture  - Check hepatitis serologies  - Check iron studies  - Tumor markers  - UPCR   73 year old woman who no known PMHx, has not seen a doctor in many years, has not suffered any recent illness as far as she knows, takes no medications, only on tumeric for arthritis in her knee, presented for further evaluation of several weeks of progressively worsening abdominal distension and increased abdominal girth, associated decreased ability to tolerate PO, and symptom of exertional dyspnea. Denied fever, chills, change in bowel habits, blood in stool or constipation. Denies chronic alcohol use, illicit drugs or family history of GI disease. In the ED, vitals WNL, exam notable for distended abdomen, +BS, ascites. WBC 15. Hgb 10.9 Plt 581. INR 1.49. Na 129. Cr WNL. AST/ALT WNL. T bili 2.1. Alk phos 159. alb 2.1. Trop mininally elevated. . CT A/P showing large volume ascites and non-specific jejunal loop distension otherwise no overt GI pathology. Uterine fibroids present. COVID, flu RSV negative. UA suggestive of possible infection. Started on ceftriaxone and triaged to 3E.     Ascites, Anasarca, Exertional dyspnea  Etiology unclear. Appreciate input from Cardiology. Reviewed echo.  Normal EF normal LA no pulmonary hypertension no LVH. Doubt systolic or diastolic dysfunction is present. Appreciate input from GI as well.  - IR consult for diagnostic/therapeutic paracentesis with cell count, albumin, protein and gram stain/culture  - Check hepatitis serologies  - Check iron studies  - Tumor markers  - UPCR    Abnormal UA, unable to rule out UTI  Afebrile. Mosley in place here for Is and Os monitoring ? Retention ? Bit unclear from chart and patient is unsure  - F/u Ucx  - Continue ceftriaxone  - Plan to Dc [facility] for TOV soon

## 2023-04-09 ENCOUNTER — FORM ENCOUNTER (OUTPATIENT)
Age: 73
End: 2023-04-09

## 2023-04-09 LAB
A1C WITH ESTIMATED AVERAGE GLUCOSE RESULT: 5.6 % — SIGNIFICANT CHANGE UP (ref 4–5.6)
AFP-TM SERPL-MCNC: <1.8 NG/ML — SIGNIFICANT CHANGE UP
ALBUMIN SERPL ELPH-MCNC: 1.9 G/DL — LOW (ref 3.3–5)
ALP SERPL-CCNC: 130 U/L — HIGH (ref 40–120)
ALT FLD-CCNC: 15 U/L — SIGNIFICANT CHANGE UP (ref 12–78)
ANION GAP SERPL CALC-SCNC: 8 MMOL/L — SIGNIFICANT CHANGE UP (ref 5–17)
AST SERPL-CCNC: 19 U/L — SIGNIFICANT CHANGE UP (ref 15–37)
BASOPHILS # BLD AUTO: 0.04 K/UL — SIGNIFICANT CHANGE UP (ref 0–0.2)
BASOPHILS NFR BLD AUTO: 0.3 % — SIGNIFICANT CHANGE UP (ref 0–2)
BILIRUB DIRECT SERPL-MCNC: 0.5 MG/DL — HIGH (ref 0–0.3)
BILIRUB INDIRECT FLD-MCNC: 0.4 MG/DL — SIGNIFICANT CHANGE UP (ref 0.2–1)
BILIRUB SERPL-MCNC: 0.9 MG/DL — SIGNIFICANT CHANGE UP (ref 0.2–1.2)
BLD GP AB SCN SERPL QL: SIGNIFICANT CHANGE UP
BUN SERPL-MCNC: 16 MG/DL — SIGNIFICANT CHANGE UP (ref 7–23)
CALCIUM SERPL-MCNC: 8.6 MG/DL — SIGNIFICANT CHANGE UP (ref 8.5–10.1)
CANCER AG125 SERPL-ACNC: 113 U/ML — HIGH
CANCER AG19-9 SERPL-ACNC: 125 U/ML — HIGH
CEA SERPL-MCNC: 2.7 NG/ML — SIGNIFICANT CHANGE UP (ref 0–3.8)
CHLORIDE SERPL-SCNC: 97 MMOL/L — SIGNIFICANT CHANGE UP (ref 96–108)
CO2 SERPL-SCNC: 26 MMOL/L — SIGNIFICANT CHANGE UP (ref 22–31)
CREAT SERPL-MCNC: 0.59 MG/DL — SIGNIFICANT CHANGE UP (ref 0.5–1.3)
D DIMER BLD IA.RAPID-MCNC: 1243 NG/ML DDU — HIGH
EGFR: 95 ML/MIN/1.73M2 — SIGNIFICANT CHANGE UP
EOSINOPHIL # BLD AUTO: 0.14 K/UL — SIGNIFICANT CHANGE UP (ref 0–0.5)
EOSINOPHIL NFR BLD AUTO: 1 % — SIGNIFICANT CHANGE UP (ref 0–6)
ESTIMATED AVERAGE GLUCOSE: 114 MG/DL — SIGNIFICANT CHANGE UP (ref 68–114)
FERRITIN SERPL-MCNC: 320 NG/ML — HIGH (ref 15–150)
GLUCOSE SERPL-MCNC: 99 MG/DL — SIGNIFICANT CHANGE UP (ref 70–99)
HAV IGG SER QL IA: SIGNIFICANT CHANGE UP
HAV IGM SER-ACNC: SIGNIFICANT CHANGE UP
HBV CORE AB SER-ACNC: SIGNIFICANT CHANGE UP
HBV E AB SER-ACNC: SIGNIFICANT CHANGE UP
HBV E AG SER-ACNC: SIGNIFICANT CHANGE UP
HBV SURFACE AB SER-ACNC: SIGNIFICANT CHANGE UP
HBV SURFACE AG SER-ACNC: SIGNIFICANT CHANGE UP
HCT VFR BLD CALC: 33.4 % — LOW (ref 34.5–45)
HGB BLD-MCNC: 10.3 G/DL — LOW (ref 11.5–15.5)
IMM GRANULOCYTES NFR BLD AUTO: 0.5 % — SIGNIFICANT CHANGE UP (ref 0–0.9)
IRON SATN MFR SERPL: 12 UG/DL — LOW (ref 30–160)
IRON SATN MFR SERPL: 7 % — LOW (ref 14–50)
LYMPHOCYTES # BLD AUTO: 1.31 K/UL — SIGNIFICANT CHANGE UP (ref 1–3.3)
LYMPHOCYTES # BLD AUTO: 8.9 % — LOW (ref 13–44)
MCHC RBC-ENTMCNC: 22.6 PG — LOW (ref 27–34)
MCHC RBC-ENTMCNC: 30.8 GM/DL — LOW (ref 32–36)
MCV RBC AUTO: 73.2 FL — LOW (ref 80–100)
MONOCYTES # BLD AUTO: 1.99 K/UL — HIGH (ref 0–0.9)
MONOCYTES NFR BLD AUTO: 13.6 % — SIGNIFICANT CHANGE UP (ref 2–14)
NEUTROPHILS # BLD AUTO: 11.11 K/UL — HIGH (ref 1.8–7.4)
NEUTROPHILS NFR BLD AUTO: 75.7 % — SIGNIFICANT CHANGE UP (ref 43–77)
PLATELET # BLD AUTO: 571 K/UL — HIGH (ref 150–400)
POTASSIUM SERPL-MCNC: 3.5 MMOL/L — SIGNIFICANT CHANGE UP (ref 3.5–5.3)
POTASSIUM SERPL-SCNC: 3.5 MMOL/L — SIGNIFICANT CHANGE UP (ref 3.5–5.3)
PROT SERPL-MCNC: 6.7 GM/DL — SIGNIFICANT CHANGE UP (ref 6–8.3)
RBC # BLD: 4.56 M/UL — SIGNIFICANT CHANGE UP (ref 3.8–5.2)
RBC # FLD: 17.5 % — HIGH (ref 10.3–14.5)
SODIUM SERPL-SCNC: 131 MMOL/L — LOW (ref 135–145)
TIBC SERPL-MCNC: 180 UG/DL — LOW (ref 220–430)
TRANSFERRIN SERPL-MCNC: 137 MG/DL — LOW (ref 200–360)
UIBC SERPL-MCNC: 168 UG/DL — SIGNIFICANT CHANGE UP (ref 110–370)
WBC # BLD: 14.66 K/UL — HIGH (ref 3.8–10.5)
WBC # FLD AUTO: 14.66 K/UL — HIGH (ref 3.8–10.5)

## 2023-04-09 PROCEDURE — 99233 SBSQ HOSP IP/OBS HIGH 50: CPT

## 2023-04-09 PROCEDURE — 93975 VASCULAR STUDY: CPT | Mod: 26

## 2023-04-09 PROCEDURE — 99223 1ST HOSP IP/OBS HIGH 75: CPT

## 2023-04-09 PROCEDURE — 93010 ELECTROCARDIOGRAM REPORT: CPT

## 2023-04-09 RX ORDER — SODIUM CHLORIDE 9 MG/ML
1000 INJECTION INTRAMUSCULAR; INTRAVENOUS; SUBCUTANEOUS
Refills: 0 | Status: DISCONTINUED | OUTPATIENT
Start: 2023-04-09 | End: 2023-04-10

## 2023-04-09 RX ORDER — HEPARIN SODIUM 5000 [USP'U]/ML
5000 INJECTION INTRAVENOUS; SUBCUTANEOUS EVERY 8 HOURS
Refills: 0 | Status: DISCONTINUED | OUTPATIENT
Start: 2023-04-09 | End: 2023-04-16

## 2023-04-09 RX ADMIN — HEPARIN SODIUM 5000 UNIT(S): 5000 INJECTION INTRAVENOUS; SUBCUTANEOUS at 21:42

## 2023-04-09 RX ADMIN — CEFTRIAXONE 1000 MILLIGRAM(S): 500 INJECTION, POWDER, FOR SOLUTION INTRAMUSCULAR; INTRAVENOUS at 06:37

## 2023-04-09 NOTE — CONSULT NOTE ADULT - ATTENDING COMMENTS
In summary, this is a 72yo G0 with large ascites and thickened endometrial lining concerning for endometrial disease. Cardiac and hepatic source of ascites low suspicion. Additionally she endorses 1+y of postmenopausal bleeding. She declined exam or attempt at endometrial biopsy without anesthesia. Plan to add on for exam under anesthesia, diagnostic hysteroscopy, endometrial curettage. We are also able to perform paracentesis at time of surgery as well. Please obtain medical clearance and ensure active type and screen. Pt with poor health literacy, consider  consult as well    Discussed with Dr Jones

## 2023-04-09 NOTE — PROGRESS NOTE ADULT - SUBJECTIVE AND OBJECTIVE BOX
Chief Complaint: Anasarca, exertional dyspnea    Interval Hx: Patient relatively unchanged since admission. Has marked abdominal distension with large volume of ascites. She has some mild dyspnea due to the abdominal girth but no hypoxia. She had diminished Po intake due to the swelling. No emesis. No nausea. She had no significant abdominal pain. No fever or rigors. She has mild B/L LE swelling. No calf tenderness. No redness. No other complaints. No uterine bleeding.  reports slight improvement. Swelling is similar but she is able to tolerate PO now whereas upon presentation her abdominal swelling got so bad she was having trouble eating. No significant abdominal pain. No fever or chills. No new complaints.     ROS: Multi system review is comprehensively negative x 10 systems except as above    Vitals:  T(F): 98.4 (08 Apr 2023 08:05), Max: 98.4 (08 Apr 2023 08:05)  HR: 84 (08 Apr 2023 08:05) (74 - 89)  BP: 144/65 (08 Apr 2023 08:05) (142/65 - 156/70)  RR: 18 (08 Apr 2023 08:05) (18 - 20)  SpO2: 96% (08 Apr 2023 08:05) (96% - 100%) on room air    Exam:  Gen: No acute distress  HEENT: NCAT PERRL EOMI MMM clear oropharynx  Neck: Supple, no JVD, no thyromegaly  Chest: Normal resp effort at rest, trace end expiratory wheeze, slightly diminished breath sounds throughout  CVS: s1 s2 normal, RRR  Abd: +BS, soft, nontender, markedly distended, +ascites  Ext: +B/L LE edema, no tenderness, normal cap refill, no clubbing  Skin: Warm, dry  Mood: calm, pleasant  Neuro: A+OX3, no deficits, no asterixis    Labs:                    10.1   12.22 )--------( 561                  32.7       131  |  98  |  18  --------------------<  92  3.6   |  25  |  0.70    Ca 8.9  Mg 2.2    TPro  8.0  /  Alb  2.3  /  TBili  2.1  /  DBili  x   /  AST  33  /  ALT  21  /  AlkPhos  159    PT: 17.4 sec;   INR: 1.49 ratio    PTT: 25.6 sec    Troponin negative   ProBNP 421   TSH WNL       HCV Ab non reactive    Urinalysis 4/7: Essence, slightly tubid, trace ketone, mod bili, prot 30, N +, trace LE, RBC 0-2, WBC 0-2, bact few    Micro:  COVID19, flu, RSV PCR negative    Imaging:  US venous duplex B/L LEs 4/8: No evidence of deep venous thrombosis in either lower extremity.    CT abd/pelvis W/ 4/7: Very large amount of abdominal and pelvic ascites. Uterine fibroids.    CXR 4/7: Heart suggest normal size. Lungs are grossly clear.    Cardiac Testing:  TTE 4/8: Trace mitral regurgitation is present. EA reversal of the mitral inflow consistent with reduced compliance of the left ventricle. The aortic valve is well visualized, appears mildly calcified. Valve opening seems to be normal. The tricuspid valve leaflets are thin and pliable; valve motion is normal. Trace tricuspid valve regurgitation is present. The left ventricle is normal in size, wall thickness, wall motion and contractility as seen in limited views. Estimated left ventricular ejection fraction is 65-70 %. Normal appearing right ventricle structure and function. Mild dilatation of the ascending aortic segment. The IVC not seen due to limited subcostal window. There is a large amount of fluid seen in the abdominal region. No evidence of pericardial effusion.    EKG 4/7: Rate 85, NSR, L axis, LVH, no acute ischemic changes    Meds:  MEDICATIONS  (STANDING):  cefTRIAXone Injectable. 1000 milliGRAM(s) IV Push every 24 hours    MEDICATIONS  (PRN):  acetaminophen     Tablet .. 650 milliGRAM(s) Oral every 6 hours PRN Temp greater or equal to 38C (100.4F), Mild Pain (1 - 3)  aluminum hydroxide/magnesium hydroxide/simethicone Suspension 30 milliLiter(s) Oral every 4 hours PRN Dyspepsia  melatonin 3 milliGRAM(s) Oral at bedtime PRN Insomnia  ondansetron Injectable 4 milliGRAM(s) IV Push every 8 hours PRN Nausea and/or Vomiting           Chief Complaint: Anasarca, exertional dyspnea    Interval Hx: Patient relatively unchanged since admission. Has marked abdominal distension with large volume of ascites. She has some mild dyspnea due to the abdominal girth but no hypoxia. She had diminished PO intake due to the swelling. No emesis. No nausea. She had no significant abdominal pain. No fever or rigors. She has mild B/L LE swelling. No calf tenderness. No redness. No other acute complaints though on ROS she reports intermittent vaginal spotting for few  months. Appreciate input from GYN/GYN ONC, plan for hysteroscopic D+C and EMBx plus paracentesis tomorrow.     ROS: Multi system review is comprehensively negative x 10 systems except as above    Vitals:  T(F): 98.6 (09 Apr 2023 07:07), Max: 98.9 (08 Apr 2023 20:38)  HR: 86 (09 Apr 2023 07:07) (86 - 95)  BP: 110/89 (09 Apr 2023 07:07) (110/89 - 153/87)  RR: 18 (09 Apr 2023 07:07) (18 - 18)  SpO2: 97% (09 Apr 2023 08:59) (96% - 97%) on room air    Exam:  Gen: No acute distress  HEENT: NCAT PERRL EOMI MMM clear oropharynx  Neck: Supple, no JVD, no thyromegaly  Chest: Normal resp effort at rest, slightly diminished breath sounds throughout  CVS: s1 s2 normal, RRR  Abd: +BS, soft, nontender, markedly distended, +ascites  Ext: +B/L LE edema, no tenderness, normal cap refill, no clubbing  Skin: Warm, dry  Mood: calm, pleasant  Neuro: A+OX3, no deficits, no asterixis    Labs:                    10.1   14.7  )--------( 571                  33.4       MCV 73  RDW 17.5   Iron 12  Iron Sat 7%  TIBC 180  Ferritin 320  Transferrin 137      131  |  97  |  26  --------------------<  99  3.5   |  26  |  0.56    Ca 8.9  Mg 2.2    TPro  6.7  /  Alb  1.9  /  TBili  0.9  /  DBili  x   /  AST  19  /  ALT  15  /  AlkPhos  130    PT: 17.4 sec;   INR: 1.49 ratio    PTT: 25.6 sec    Troponin negative   ProBNP 421   DDimer 1243    TSH WNL       HepA neg  HepB neg, non immune either  HCV Ab non reactive    AFP tumor marker <1.8, neg  CEA 2.7, neg   113, elevated  CA 19-9 125, elevated    Urinalysis 4/7: Essence, slightly tubid, trace ketone, mod bili, prot 30, N +, trace LE, RBC 0-2, WBC 0-2, bact few    Micro:  COVID19, flu, RSV PCR negative    Imaging:  US pelvic 4/8: Limited pelvic ultrasound. The endometrium is incompletely assessed on this examination. The endometrium is either severely severely thickened to 6 cm or is distended with complex material to 6 cm.  There is a 2.5 x 1.6 x 1.7 cm shadowing echogenic focus with shadowing along the periphery of the endometrium, which may represent a calcified intramural versus submucosal fibroid, versus an endometrial lesion. The ovaries are not visualized. Large volume pelvic ascites.    US venous duplex B/L LEs 4/8: No evidence of deep venous thrombosis in either lower extremity.    CT abd/pelvis W/ 4/7: Very large amount of abdominal and pelvic ascites. Uterine fibroids.    CXR 4/7: Heart suggest normal size. Lungs are grossly clear.    Cardiac Testing:  TTE 4/8: Trace mitral regurgitation is present. EA reversal of the mitral inflow consistent with reduced compliance of the left ventricle. The aortic valve is well visualized, appears mildly calcified. Valve opening seems to be normal. The tricuspid valve leaflets are thin and pliable; valve motion is normal. Trace tricuspid valve regurgitation is present. The left ventricle is normal in size, wall thickness, wall motion and contractility as seen in limited views. Estimated left ventricular ejection fraction is 65-70 %. Normal appearing right ventricle structure and function. Mild dilatation of the ascending aortic segment. The IVC not seen due to limited subcostal window. There is a large amount of fluid seen in the abdominal region. No evidence of pericardial effusion.    EKG 4/7: Rate 85, NSR, L axis, LVH, no acute ischemic changes    Meds:  MEDICATIONS  (STANDING):  cefTRIAXone Injectable. 1000 milliGRAM(s) IV Push every 24 hours  sodium chloride 0.9%. 1000 milliLiter(s) (80 mL/Hr) IV Continuous <Continuous>    MEDICATIONS  (PRN):  acetaminophen     Tablet .. 650 milliGRAM(s) Oral every 6 hours PRN Temp greater or equal to 38C (100.4F), Mild Pain (1 - 3)  aluminum hydroxide/magnesium hydroxide/simethicone Suspension 30 milliLiter(s) Oral every 4 hours PRN Dyspepsia  melatonin 3 milliGRAM(s) Oral at bedtime PRN Insomnia  ondansetron Injectable 4 milliGRAM(s) IV Push every 8 hours PRN Nausea and/or Vomiting

## 2023-04-09 NOTE — PROGRESS NOTE ADULT - ASSESSMENT
73 year old woman who no known PMHx, has not seen a doctor in many years, has not suffered any recent illness as far as she knows, takes no medications, only on tumeric for arthritis in her knee, presented for further evaluation of several weeks of progressively worsening abdominal distension and increased abdominal girth, associated decreased ability to tolerate PO, and symptom of exertional dyspnea. Denied fever, chills, change in bowel habits, blood in stool or constipation. Denies chronic alcohol use, illicit drugs or family history of GI disease. In the ED, vitals WNL, exam notable for distended abdomen, +BS, ascites. WBC 15. Hgb 10.9 Plt 581. INR 1.49. Na 129. Cr WNL. AST/ALT WNL. T bili 2.1. Alk phos 159. alb 2.1. Trop mininally elevated. . CT A/P showing large volume ascites and non-specific jejunal loop distension otherwise no overt GI pathology. Uterine fibroids present. COVID, flu RSV negative. UA suggestive of possible infection. Started on ceftriaxone and triaged to 3E.     Ascites, Anasarca, Exertional dyspnea  Etiology unclear. Appreciate input from Cardiology. Reviewed echo.  Normal EF normal LA no pulmonary hypertension no LVH. Doubt systolic or diastolic dysfunction is present. Appreciate input from GI as well.  - IR consult for diagnostic/therapeutic paracentesis with cell count, albumin, protein and gram stain/culture  - Check hepatitis serologies  - Check iron studies  - Tumor markers  - UPCR    Abnormal UA, unable to rule out UTI  Afebrile. Mosley in place here for Is and Os monitoring ? Retention ? Bit unclear from chart and patient is unsure  - F/u Ucx  - Continue ceftriaxone  - Plan to Dc [facility] for TOV soon 73 year old woman who no known PMHx, has not seen a doctor in many years, has not suffered any recent illness as far as she knows, takes no medications, only on tumeric for arthritis in her knee, presented for further evaluation of several weeks of progressively worsening abdominal distension and increased abdominal girth, associated decreased ability to tolerate PO, and symptom of exertional dyspnea. Denied fever, chills, change in bowel habits, blood in stool or constipation. Denies chronic alcohol use, illicit drugs or family history of GI disease. In the ED, vitals WNL, exam notable for distended abdomen, +BS, ascites. WBC 15. Hgb 10.9 Plt 581. INR 1.49. Na 129. Cr WNL. AST/ALT WNL. T bili 2.1. Alk phos 159. alb 2.1. Trop mininally elevated. . CT A/P showing large volume ascites and non-specific jejunal loop distension otherwise no overt GI pathology. Uterine fibroids present. COVID, flu RSV negative. UA suggestive of possible infection. Started on ceftriaxone and triaged to 3E.     Ascites, Anasarca, Exertional dyspnea  Etiology unclear. Appreciate input from Cardiology. Reviewed echo.  Normal EF normal LA no pulmonary hypertension no LVH. Doubt systolic or diastolic dysfunction is present. Appreciate input from GI as well. Does  not appear to have liver ailment. Pelvis US done. Concern for endometrial thickening. Sent tumor markers. CA 19-9 and  elevated. Consulted GYN. Now planned for hystoscopic D+C, EMBx and paracentesis in OR tomorrow.   - Patient is in acceptable medical condition for planned procedure without the need for further cardiac testing. No angina. EKG without ischemic changes. Echo unremarkable.   - D+C, EMBx, paracentesis tomorrow    Abnormal UA, unable to rule out UTI  Afebrile. Mosley in place here for Is and Os monitoring ? Retention ? Bit unclear from chart and patient is unsure. Urine culture negative.   - On ceftriaxone, day 3 today, plan to stop thereafter  - Plan to Dc [facility] for TOV soon as well

## 2023-04-09 NOTE — CONSULT NOTE ADULT - ASSESSMENT
A/P:  A/P: Patient is a 74yo G0 LMP 59yo who is admitted for ascites work up. GYN ONC consulted for incidental finding within the uterus. Patient endorses intermittent vaginal spotting for the past couple of months. EMB at bedside versus EUA, D&C and hysteroscopy in OR recommended at this time given the TAUS findings and history of vaginal spotting. Ascites can be malignant in nature. Recommend cytology be performed during paracentesis. Further recommendations pending.     Discussed with Dr. Krishnan.  A/P: Patient is a 72yo G0 LMP 59yo who is admitted for ascites work up. GYN ONC consulted for incidental finding within the uterus. Patient endorses intermittent vaginal spotting for the past couple of months. EMB at bedside versus EUA, D&C and hysteroscopy in OR recommended at this time given the TAUS findings and history of vaginal spotting. Patient opted for  EUA, D&C and hysteroscopy. Added on for tomorrow with Dr. Jones. Paracentesis to be performed in OR as well. Pending medical clearance.     Discussed with Dr. Krishnan.

## 2023-04-09 NOTE — CONSULT NOTE ADULT - SUBJECTIVE AND OBJECTIVE BOX
GYNECOLOGIC ONCOLOGY CONSULT NOTE    Patient is a 72yo G0 LMP 59yo who is admitted for ascites work up. GYN consulted for incidental finding within the uterus. Patient endorses intermittent vaginal spotting for the last couple of months. A few weeks ago she noticed her abdomen became slightly distended. Within the last week she had sudden and rapid growth of her abdomen and decreased appetite. She denies changes in bowel and urinary habits, fevers, chills, nausea, vomiting, current vaginal bleeding. No other complaints at this time.     Last PAP smear: years ago, normal   Last mammo: years ago, normal   Last colonoscopy: never     POB: G0  PGYN: -fibroids, -cysts, denies STD hx, denies abnormal PAP smears   PMH: denies  PSH: ankle surgery   SH: Denies EtOH, tobacco and illicit drug use; lives at home alone, able to attend ADLs, strong support from sister   FH: no history of GYN or GI malignancy in her family   Meds: tumeric tablets   All: keflex (stomach upset)    OBJECTIVE:     VITALS:  T(F): 98.6 (23 @ 07:07), Max: 98.9 (23 @ 20:38)  HR: 86 (23 @ 07:07) (86 - 95)  BP: 110/89 (23 @ 07:07) (110/89 - 153/87)  RR: 18 (23 @ 07:07) (18 - 18)  SpO2: 97% (23 @ 08:59) (96% - 97%)    I&O's Summary    2023 07:01  -  2023 07:00  --------------------------------------------------------  IN: 0 mL / OUT: 1725 mL / NET: -1725 mL      MEDICATIONS  (STANDING):  cefTRIAXone Injectable. 1000 milliGRAM(s) IV Push every 24 hours    MEDICATIONS  (PRN):  acetaminophen     Tablet .. 650 milliGRAM(s) Oral every 6 hours PRN Temp greater or equal to 38C (100.4F), Mild Pain (1 - 3)  aluminum hydroxide/magnesium hydroxide/simethicone Suspension 30 milliLiter(s) Oral every 4 hours PRN Dyspepsia  melatonin 3 milliGRAM(s) Oral at bedtime PRN Insomnia  ondansetron Injectable 4 milliGRAM(s) IV Push every 8 hours PRN Nausea and/or Vomiting      Physical Exam:  Constitutional: NAD  Pulmonary: clear to auscultation bilaterally   Cardiovascular: Regular rate and rhythm   Abdomen: hard, grossly distended, dull to percussion, diffusely tender   Extremities: no lower extremity edema or calve tenderness, Aasd's sign negative.  Pelvic: Declines     LABS:                        10.3   14.66 )-----------( 571      ( 2023 05:32 )             33.4     04-09    131<L>  |  97  |  16  ----------------------------<  99  3.5   |  26  |  0.59    Ca    8.6      2023 05:32  Mg     2.2     04-07    TPro  6.7  /  Alb  1.9<L>  /  TBili  0.9  /  DBili  0.5<H>  /  AST  19  /  ALT  15  /  AlkPhos  130<H>  04-09    PT/INR - ( 2023 07:07 )   PT: 17.4 sec;   INR: 1.49 ratio         PTT - ( 2023 15:08 )  PTT:25.6 sec  Urinalysis Basic - ( 2023 16:45 )    Color: Essence / Appearance: Slightly Turbid / S.025 / pH: x  < from: US Pelvis Complete (US Pelvis Complete .) (23 @ 10:29) >    < end of copied text >  Gluc: x / Ketone: Trace  / Bili: Moderate / Urobili: 8   Blood: x / Protein: 30 mg/dL / Nitrite: Positive   Leuk Esterase: Trace / RBC: 0-2 /HPF / WBC 0-2 /HPF   Sq Epi: x / Non Sq Epi: x / Bacteria: Few    RADIOLOGY & ADDITIONAL TESTS:    < from: US Pelvis Complete (US Pelvis Complete .) (23 @ 10:29) >  INTERPRETATION:  CLINICAL INFORMATION: Postmenopausal patient with   ascites.  Rule out pelvic malignancy.    LMP: Postmenopausal.    COMPARISON: None available.    TECHNIQUE:  Transabdominal pelvic sonogram only.    FINDINGS:  Uterus: 12.5 cm x 8.3 cm x 10.4 cm. The endometrium is incompletely   assessed on this examination.  The endometrium is either severely   severely thickened to 6 cm or is distended with complex material to 6 cm.    There is a 2.5 x 1.6 x 1.7 cm shadowing echogenic focus with shadowing   along the periphery of endometrium.      Right ovary: Not visualized.  Left ovary: Not visualized.    Fluid: Large volume pelvic ascites    IMPRESSION:  Limited pelvic ultrasound.    The endometrium is incompletely assessed on this examination.  The   endometrium is either severely severely thickened to 6 cm or is distended   with complex material to 6 cm.  There is a 2.5 x1.6 x 1.7 cm shadowing   echogenic focus with shadowing along the periphery of the endometrium,   which may represent a calcified intramural versus submucosal fibroid,   versus an endometrial lesion.    The ovaries are not visualized.    Large volumepelvic ascites.    Pelvic MRI may be obtained for further evaluation.    < end of copied text >    < from: CT Abdomen and Pelvis w/ IV Cont (23 @ 17:11) >  INTERPRETATION:  CLINICAL INFORMATION: Vomiting and abdominal pain with   shortness of breath. Recent travel. Decreased appetite. Swollen lower   extremities. Abdominal distention on physical exam    COMPARISON: None.    CONTRAST/COMPLICATIONS:  IV Contrast: Omnipaque 350  90 cc administered   10 cc discarded  Oral Contrast: NONE  Complications: None reported at time of study completion    PROCEDURE:  CT of the Abdomen and Pelvis was performed.  Sagittal and coronal reformats were performed.    FINDINGS:  LOWER CHEST: Within normal limits.    LIVER: Within normal limits.  BILE DUCTS: Normal caliber.  GALLBLADDER: Contracted  SPLEEN: Within normal limits.  PANCREAS: Atrophic  ADRENALS: Within normal limits.  KIDNEYS/URETERS: Within normal limits.    BLADDER: Mosley catheter.  REPRODUCTIVE ORGANS: Fibroid uterus    BOWEL: No bowel obstruction. Appendix is normal. Diverticulosis without   diverticulitis. Nonspecific thickened loops of jejunum in the left upper   quadrant  PERITONEUM: A very large amount of abdominal and pelvic ascites causing   centralization of bowel loops  VESSELS: Atherosclerotic changes.    RETROPERITONEUM/LYMPH NODES: No lymphadenopathy.  ABDOMINAL WALL: Within normal limits.  BONES: Degenerative changes. DISH is noted in the thoracic spine    IMPRESSION:  Very large amount of abdominal and pelvic ascites  Uterine fibroids    < end of copied text >

## 2023-04-10 ENCOUNTER — RESULT REVIEW (OUTPATIENT)
Age: 73
End: 2023-04-10

## 2023-04-10 ENCOUNTER — TRANSCRIPTION ENCOUNTER (OUTPATIENT)
Age: 73
End: 2023-04-10

## 2023-04-10 LAB
ABO RH CONFIRMATION: SIGNIFICANT CHANGE UP
ADD ON TEST-SPECIMEN IN LAB: SIGNIFICANT CHANGE UP
ALBUMIN FLD-MCNC: 2.2 G/DL — SIGNIFICANT CHANGE UP
ANION GAP SERPL CALC-SCNC: 6 MMOL/L — SIGNIFICANT CHANGE UP (ref 5–17)
APPEARANCE UR: CLEAR — SIGNIFICANT CHANGE UP
APTT BLD: 29.7 SEC — SIGNIFICANT CHANGE UP (ref 27.5–35.5)
B PERT IGG+IGM PNL SER: ABNORMAL
BACTERIA # UR AUTO: NEGATIVE — SIGNIFICANT CHANGE UP
BASOPHILS # BLD AUTO: 0.04 K/UL — SIGNIFICANT CHANGE UP (ref 0–0.2)
BASOPHILS NFR BLD AUTO: 0.2 % — SIGNIFICANT CHANGE UP (ref 0–2)
BILIRUB UR-MCNC: NEGATIVE — SIGNIFICANT CHANGE UP
BUN SERPL-MCNC: 14 MG/DL — SIGNIFICANT CHANGE UP (ref 7–23)
CALCIUM SERPL-MCNC: 8.8 MG/DL — SIGNIFICANT CHANGE UP (ref 8.5–10.1)
CHLORIDE SERPL-SCNC: 98 MMOL/L — SIGNIFICANT CHANGE UP (ref 96–108)
CO2 SERPL-SCNC: 26 MMOL/L — SIGNIFICANT CHANGE UP (ref 22–31)
COLOR FLD: SIGNIFICANT CHANGE UP
COLOR SPEC: YELLOW — SIGNIFICANT CHANGE UP
CREAT SERPL-MCNC: 0.51 MG/DL — SIGNIFICANT CHANGE UP (ref 0.5–1.3)
DIFF PNL FLD: ABNORMAL
EGFR: 98 ML/MIN/1.73M2 — SIGNIFICANT CHANGE UP
EOSINOPHIL # BLD AUTO: 0.1 K/UL — SIGNIFICANT CHANGE UP (ref 0–0.5)
EOSINOPHIL # FLD: 1 % — SIGNIFICANT CHANGE UP
EOSINOPHIL NFR BLD AUTO: 0.6 % — SIGNIFICANT CHANGE UP (ref 0–6)
EPI CELLS # UR: SIGNIFICANT CHANGE UP
FLUID INTAKE SUBSTANCE CLASS: SIGNIFICANT CHANGE UP
GLUCOSE FLD-MCNC: 8 MG/DL — SIGNIFICANT CHANGE UP
GLUCOSE SERPL-MCNC: 87 MG/DL — SIGNIFICANT CHANGE UP (ref 70–99)
GLUCOSE UR QL: NEGATIVE — SIGNIFICANT CHANGE UP
GRAM STN FLD: SIGNIFICANT CHANGE UP
GRAN CASTS # UR COMP ASSIST: ABNORMAL /LPF
HCT VFR BLD CALC: 34.3 % — LOW (ref 34.5–45)
HGB BLD-MCNC: 10.7 G/DL — LOW (ref 11.5–15.5)
IMM GRANULOCYTES NFR BLD AUTO: 0.5 % — SIGNIFICANT CHANGE UP (ref 0–0.9)
INR BLD: 1.58 RATIO — HIGH (ref 0.88–1.16)
KETONES UR-MCNC: ABNORMAL
LDH SERPL L TO P-CCNC: 1465 U/L — SIGNIFICANT CHANGE UP
LEUKOCYTE ESTERASE UR-ACNC: NEGATIVE — SIGNIFICANT CHANGE UP
LYMPHOCYTES # BLD AUTO: 1.07 K/UL — SIGNIFICANT CHANGE UP (ref 1–3.3)
LYMPHOCYTES # BLD AUTO: 6.5 % — LOW (ref 13–44)
LYMPHOCYTES # FLD: 2 % — SIGNIFICANT CHANGE UP
MCHC RBC-ENTMCNC: 22.8 PG — LOW (ref 27–34)
MCHC RBC-ENTMCNC: 31.2 GM/DL — LOW (ref 32–36)
MCV RBC AUTO: 73 FL — LOW (ref 80–100)
MONOCYTES # BLD AUTO: 1.85 K/UL — HIGH (ref 0–0.9)
MONOCYTES NFR BLD AUTO: 11.2 % — SIGNIFICANT CHANGE UP (ref 2–14)
MONOS+MACROS # FLD: 24 % — SIGNIFICANT CHANGE UP
NEUTROPHILS # BLD AUTO: 13.33 K/UL — HIGH (ref 1.8–7.4)
NEUTROPHILS NFR BLD AUTO: 81 % — HIGH (ref 43–77)
NEUTROPHILS-BODY FLUID: 73 % — SIGNIFICANT CHANGE UP
NITRITE UR-MCNC: NEGATIVE — SIGNIFICANT CHANGE UP
PH UR: 6 — SIGNIFICANT CHANGE UP (ref 5–8)
PLATELET # BLD AUTO: 601 K/UL — HIGH (ref 150–400)
POTASSIUM SERPL-MCNC: 4.3 MMOL/L — SIGNIFICANT CHANGE UP (ref 3.5–5.3)
POTASSIUM SERPL-SCNC: 4.3 MMOL/L — SIGNIFICANT CHANGE UP (ref 3.5–5.3)
PROT FLD-MCNC: 4.7 G/DL — SIGNIFICANT CHANGE UP
PROT UR-MCNC: 30 MG/DL
PROTHROM AB SERPL-ACNC: 18.4 SEC — HIGH (ref 10.5–13.4)
RBC # BLD: 4.7 M/UL — SIGNIFICANT CHANGE UP (ref 3.8–5.2)
RBC # FLD: 17.6 % — HIGH (ref 10.3–14.5)
RBC CASTS # UR COMP ASSIST: ABNORMAL /HPF (ref 0–4)
RCV VOL RI: HIGH /UL (ref 0–0)
SODIUM SERPL-SCNC: 130 MMOL/L — LOW (ref 135–145)
SP GR SPEC: 1.01 — SIGNIFICANT CHANGE UP (ref 1.01–1.02)
SPECIMEN SOURCE: SIGNIFICANT CHANGE UP
TOTAL NUCLEATED CELL COUNT, BODY FLUID: 1857 /UL — SIGNIFICANT CHANGE UP
TUBE TYPE: SIGNIFICANT CHANGE UP
URATE SERPL-MCNC: 6.5 MG/DL — SIGNIFICANT CHANGE UP (ref 2.5–7)
UROBILINOGEN FLD QL: NEGATIVE — SIGNIFICANT CHANGE UP
WBC # BLD: 16.48 K/UL — HIGH (ref 3.8–10.5)
WBC # FLD AUTO: 16.48 K/UL — HIGH (ref 3.8–10.5)
WBC UR QL: SIGNIFICANT CHANGE UP /HPF (ref 0–5)

## 2023-04-10 PROCEDURE — 88342 IMHCHEM/IMCYTCHM 1ST ANTB: CPT | Mod: 26,59

## 2023-04-10 PROCEDURE — 49083 ABD PARACENTESIS W/IMAGING: CPT

## 2023-04-10 PROCEDURE — 88360 TUMOR IMMUNOHISTOCHEM/MANUAL: CPT | Mod: 26

## 2023-04-10 PROCEDURE — 88108 CYTOPATH CONCENTRATE TECH: CPT | Mod: 26

## 2023-04-10 PROCEDURE — 58558 HYSTEROSCOPY BIOPSY: CPT

## 2023-04-10 PROCEDURE — 99233 SBSQ HOSP IP/OBS HIGH 50: CPT

## 2023-04-10 PROCEDURE — 88305 TISSUE EXAM BY PATHOLOGIST: CPT | Mod: 26

## 2023-04-10 PROCEDURE — 88341 IMHCHEM/IMCYTCHM EA ADD ANTB: CPT | Mod: 26,59

## 2023-04-10 PROCEDURE — 71250 CT THORAX DX C-: CPT | Mod: 26

## 2023-04-10 RX ORDER — FAMOTIDINE 10 MG/ML
20 INJECTION INTRAVENOUS ONCE
Refills: 0 | Status: COMPLETED | OUTPATIENT
Start: 2023-04-10 | End: 2023-04-10

## 2023-04-10 RX ADMIN — SODIUM CHLORIDE 80 MILLILITER(S): 9 INJECTION INTRAMUSCULAR; INTRAVENOUS; SUBCUTANEOUS at 05:34

## 2023-04-10 RX ADMIN — HEPARIN SODIUM 5000 UNIT(S): 5000 INJECTION INTRAVENOUS; SUBCUTANEOUS at 22:38

## 2023-04-10 RX ADMIN — FAMOTIDINE 20 MILLIGRAM(S): 10 INJECTION INTRAVENOUS at 12:38

## 2023-04-10 RX ADMIN — CEFTRIAXONE 1000 MILLIGRAM(S): 500 INJECTION, POWDER, FOR SOLUTION INTRAMUSCULAR; INTRAVENOUS at 05:29

## 2023-04-10 RX ADMIN — HEPARIN SODIUM 5000 UNIT(S): 5000 INJECTION INTRAVENOUS; SUBCUTANEOUS at 05:29

## 2023-04-10 RX ADMIN — Medication 650 MILLIGRAM(S): at 17:03

## 2023-04-10 NOTE — PROGRESS NOTE ADULT - ASSESSMENT
1. Tense ascites of unclear etiology    Recommendation  1. Paracentesis today during procedure with cell count, gram stain, culture, albumin, protein, test for malignancy

## 2023-04-10 NOTE — CONSULT NOTE ADULT - SUBJECTIVE AND OBJECTIVE BOX
NEPHROLOGY CONSULT  HPI:  73 year old Female presented to the ED complaining of Dyspnea on exertion for more than 6 weeks. Patient started to have some shortness of breath with exertion and mild abdominal swelling more than 6 weeks ago. Then she went to [place] with these symptoms to visit. She came back couple of weeks ago. But her abdomen continue to swell and also got significant edema.  She also has decreased appetite and nausea, vomiting for few days. Last BM, 2 days ago. She has no fever or diarrhea.  (07 Apr 2023 23:06)    Nephrology:  Above info from chart  Patient w abd distention, ongoing for sometime, noted to have massive ascites and oliguria on imaging  Bladder is compressed in the lower pelvis, around dos santos  Creat 0.5 range  No cirrhosis on imaging      PAST MEDICAL & SURGICAL HISTORY:  No pertinent past medical history      No significant past surgical history          FAMILY HISTORY:  No pertinent family history in first degree relatives      MEDICATIONS  (STANDING):  cefTRIAXone Injectable. 1000 milliGRAM(s) IV Push every 24 hours  heparin   Injectable 5000 Unit(s) SubCutaneous every 8 hours    MEDICATIONS  (PRN):  acetaminophen     Tablet .. 650 milliGRAM(s) Oral every 6 hours PRN Temp greater or equal to 38C (100.4F), Mild Pain (1 - 3)  aluminum hydroxide/magnesium hydroxide/simethicone Suspension 30 milliLiter(s) Oral every 4 hours PRN Dyspepsia  melatonin 3 milliGRAM(s) Oral at bedtime PRN Insomnia  ondansetron Injectable 4 milliGRAM(s) IV Push every 8 hours PRN Nausea and/or Vomiting        Allergies    No Known Allergies    Intolerances        I&O's Summary    10 Apr 2023 07:01  -  11 Apr 2023 07:00  --------------------------------------------------------  IN: 250 mL / OUT: 470 mL / NET: -220 mL          REVIEW OF SYSTEMS:    CONSTITUTIONAL:  As per HPI.  CONSTITUTIONAL: No weakness, fevers or chills  EYES/ENT: No visual changes;  No vertigo or throat pain   NECK: No pain or stiffness  CARDIOVASCULAR: No chest pain or palpitations  GASTROINTESTINAL:++ distention  GENITOURINARY: No dysuria, frequency or hematuria  NEUROLOGICAL: No numbness or weakness  SKIN: No itching, burning, rashes, or lesions   All other review of systems is negative unless indicated above      T(F): 98.6 (09 Apr 2023 07:07), Max: 98.9 (08 Apr 2023 20:38)  HR: 86 (09 Apr 2023 07:07) (86 - 95)  BP: 110/89 (09 Apr 2023 07:07) (110/89 - 153/87)  RR: 18 (09 Apr 2023 07:07) (18 - 18)  SpO2: 97% (09 Apr 2023 08:59) (96% - 97%) on room ai    Daily     Daily     I&O's Summary      PHYSICAL EXAM:    General:  Alert, No acute distress.    Neuro:  Alert and oriented to person, place, and time. Able to communicate  well.      HEENT:  No JVD, no masses, Eyes anicteric, ,    Cardiovascular:  Regular rate and rhythm, with normal S1 and S2.    Lungs:  clear. no rales, no wheezing, .    Abdomen:  ++ distention, ascites    Skin:  Warm, dry    Extremities:  warm, + edema      LABS:                        10.3   14.66 )-----------( 571      ( 09 Apr 2023 05:32 )             33.4         131    |  97     |  16     ----------------------------<  99        09 Apr 2023 05:32  3.5     |  26     |  0.59     131    |  98     |  18     ----------------------------<  92        08 Apr 2023 07:07  3.6     |  25     |  0.70       Ca    8.6        09 Apr 2023 05:32  Ca    8.9        08 Apr 2023 07:07      Mg     2.2       07 Apr 2023 15:08

## 2023-04-10 NOTE — PROGRESS NOTE ADULT - SUBJECTIVE AND OBJECTIVE BOX
Chief Complaint: Anasarca, exertional dyspnea    Interval Hx: Patient reports feeling a bit improved today having received large volume paracentesis this AM by IR, 13,800cc murky dark brown ascites. Drainage had to be halted for safety reasons to avoid large volume depletion. There was still residual large volume ascites. Repeat paracentesis is planned for some time later this week for therapeutic affect. She is reporting some mild back discomfort and still has persisting but improved abdominal distension. Abdomen is now soft, nontender. She otherwise has no complaints. No longer dyspneic. No nausea. No significant abdominal pain. No fever or rigors. She is awaiting hysteroscopic D+C and EMBx this afteroon with Gyn-Onc.     ROS: Multi system review is comprehensively negative x 10 systems except as above    Vitals:  T(F): 99 (10 Apr 2023 15:30), Max: 99 (10 Apr 2023 15:30)  HR: 70 (10 Apr 2023 15:30) (70 - 92)  BP: 150/55 (10 Apr 2023 15:30) (119/55 - 150/55)  RR: 20 (10 Apr 2023 15:30) (16 - 23)  SpO2: 99% (10 Apr 2023 15:30) (93% - 100%) on 2L/min via NC    Exam:  Gen: No acute distress  HEENT: NCAT PERRL EOMI MMM clear oropharynx  Neck: Supple, no JVD, no thyromegaly  Chest: Normal resp effort at rest, slightly diminished breath sounds throughout  CVS: s1 s2 normal, RRR  Abd: +BS, soft, nontender, markedly distended, +ascites  : Indwelling Mosley, placed this admission for strict Is and Os  Ext: +B/L LE edema, no tenderness, normal cap refill, no clubbing  Skin: Warm, dry  Mood: calm, pleasant  Neuro: A+OX3, no deficits, no asterixis    Labs:                    10.7   16.48 )--------( 601                  34.3       MCV 73  RDW 17.5   Iron 12  Iron Sat 7%  TIBC 180  Ferritin 320  Transferrin 137      130  |  98  |  14  --------------------<  87  4.3   |  26  |  0.51    Ca 8.8  Mg 2.2    TPro  6.7  /  Alb  1.9  /  TBili  0.9  /  DBili  x   /  AST  19  /  ALT  15  /  AlkPhos  130    PT: 17.4 sec;   INR: 1.49 ratio    PTT: 25.6 sec    Troponin negative   ProBNP 421   DDimer 1243    TSH WNL       HepA neg  HepB neg, non immune either  HCV Ab non reactive    AFP tumor marker <1.8, neg  CEA 2.7, neg   113, elevated  CA 19-9 125, elevated    Urinalysis 4/7: Essence, slightly turbid, trace ketone, mod bili, prot 30, N +, trace LE, RBC 0-2, WBC 0-2, bact few    Peritoneal fluid albumin, protein, glucose, LDH in process 4/10  Peritoneal fluid cell count in process 4/10    Micro:  Pleural fluid culture 4/10: In process  Urine culture 4/7: Negative  COVID19, flu, RSV PCR 4/7: Negative    Imaging:  CT chest 4/10: No malignancy in the chest    US pelvic 4/8: Limited pelvic ultrasound. The endometrium is incompletely assessed on this examination. The endometrium is either severely thickened to 6 cm or is distended with complex material to 6 cm.  There is a 2.5 x 1.6 x 1.7 cm shadowing echogenic focus with shadowing along the periphery of the endometrium, which may represent a calcified intramural versus submucosal fibroid, versus an endometrial lesion. The ovaries are not visualized. Large volume pelvic ascites.    US venous duplex B/L LEs 4/8: No evidence of deep venous thrombosis in either lower extremity.    CT abd/pelvis W/ 4/7: Very large amount of abdominal and pelvic ascites. Uterine fibroids.    CXR 4/7: Heart suggest normal size. Lungs are grossly clear.    Cardiac Testing:  TTE 4/8: Trace mitral regurgitation is present. EA reversal of the mitral inflow consistent with reduced compliance of the left ventricle. The aortic valve is well visualized, appears mildly calcified. Valve opening seems to be normal. The tricuspid valve leaflets are thin and pliable; valve motion is normal. Trace tricuspid valve regurgitation is present. The left ventricle is normal in size, wall thickness, wall motion and contractility as seen in limited views. Estimated left ventricular ejection fraction is 65-70 %. Normal appearing right ventricle structure and function. Mild dilatation of the ascending aortic segment. The IVC not seen due to limited subcostal window. There is a large amount of fluid seen in the abdominal region. No evidence of pericardial effusion.    EKG 4/7: Rate 85, NSR, L axis, LVH, no acute ischemic changes    Procedures:  Hysteroscopic D+C of uterus 4/10: Uterus sounded to 10 wks size. Cervix grossly normal, endocervical curetting obtained. Hysteroscopy performed and thickened, fluffy endometrium observed, obscuring view of ostia. Adnexa non palpable. right abdominal mass palpated on exam. Fluids deficit 150cc normal saline.    Paracentesis 4/10: Large volume paracentesis, 13,800cc murky dark brown ascites removed    Meds:  MEDICATIONS  (STANDING):  cefTRIAXone Injectable. 1000 milliGRAM(s) IV Push every 24 hours  heparin   Injectable 5000 Unit(s) SubCutaneous every 8 hours    MEDICATIONS  (PRN):  acetaminophen     Tablet .. 650 milliGRAM(s) Oral every 6 hours PRN Temp greater or equal to 38C (100.4F), Mild Pain (1 - 3)  aluminum hydroxide/magnesium hydroxide/simethicone Suspension 30 milliLiter(s) Oral every 4 hours PRN Dyspepsia  melatonin 3 milliGRAM(s) Oral at bedtime PRN Insomnia  ondansetron Injectable 4 milliGRAM(s) IV Push every 8 hours PRN Nausea and/or Vomiting   Chief Complaint: Anasarca, exertional dyspnea    Interval Hx: Patient reports feeling a bit improved today having received large volume paracentesis this AM by IR, 13,800cc murky dark brown ascites drained. Drainage had to be halted for safety reasons to avoid large volume depletion. There was still residual large volume ascites. Repeat paracentesis is planned for some time later this week for therapeutic affect. She is reporting some mild back discomfort and still has persisting but improved abdominal distension. Abdomen is now soft, nontender. She otherwise has no complaints. No longer dyspneic. No nausea. No significant abdominal pain. No fever or rigors. She is awaiting hysteroscopic D+C and EMBx this afteroon with Gyn-Onc.     ROS: Multi system review is comprehensively negative x 10 systems except as above    Vitals:  T(F): 99 (10 Apr 2023 15:30), Max: 99 (10 Apr 2023 15:30)  HR: 70 (10 Apr 2023 15:30) (70 - 92)  BP: 150/55 (10 Apr 2023 15:30) (119/55 - 150/55)  RR: 20 (10 Apr 2023 15:30) (16 - 23)  SpO2: 99% (10 Apr 2023 15:30) (93% - 100%) on 2L/min via NC    Exam:  Gen: No acute distress  HEENT: NCAT PERRL EOMI MMM clear oropharynx  Neck: Supple, no JVD, no thyromegaly  Chest: Normal resp effort at rest, slightly diminished breath sounds throughout  CVS: s1 s2 normal, RRR  Abd: +BS, soft, nontender, markedly distended, +ascites  : Indwelling Mosley, placed this admission for strict Is and Os  Ext: +B/L LE edema, no tenderness, normal cap refill, no clubbing  Skin: Warm, dry  Mood: calm, pleasant  Neuro: A+OX3, no deficits, no asterixis    Labs:                    10.7   16.48 )--------( 601                  34.3       MCV 73  RDW 17.5   Iron 12  Iron Sat 7%  TIBC 180  Ferritin 320  Transferrin 137      130  |  98  |  14  --------------------<  87  4.3   |  26  |  0.51    Ca 8.8  Mg 2.2    TPro  6.7  /  Alb  1.9  /  TBili  0.9  /  DBili  x   /  AST  19  /  ALT  15  /  AlkPhos  130    PT: 17.4 sec;   INR: 1.49 ratio    PTT: 25.6 sec    Troponin negative   ProBNP 421   DDimer 1243    TSH WNL       HepA neg  HepB neg, non immune either  HCV Ab non reactive    AFP tumor marker <1.8, neg  CEA 2.7, neg   113, elevated  CA 19-9 125, elevated    Urinalysis 4/7: Essence, slightly turbid, trace ketone, mod bili, prot 30, N +, trace LE, RBC 0-2, WBC 0-2, bact few    Peritoneal fluid albumin, protein, glucose, LDH in process 4/10  Peritoneal fluid cell count in process 4/10    Micro:  Pleural fluid culture 4/10: In process  Urine culture 4/7: Negative  COVID19, flu, RSV PCR 4/7: Negative    Imaging:  CT chest 4/10: No malignancy in the chest    US pelvic 4/8: Limited pelvic ultrasound. The endometrium is incompletely assessed on this examination. The endometrium is either severely thickened to 6 cm or is distended with complex material to 6 cm.  There is a 2.5 x 1.6 x 1.7 cm shadowing echogenic focus with shadowing along the periphery of the endometrium, which may represent a calcified intramural versus submucosal fibroid, versus an endometrial lesion. The ovaries are not visualized. Large volume pelvic ascites.    US venous duplex B/L LEs 4/8: No evidence of deep venous thrombosis in either lower extremity.    CT abd/pelvis W/ 4/7: Very large amount of abdominal and pelvic ascites. Uterine fibroids.    CXR 4/7: Heart suggest normal size. Lungs are grossly clear.    Cardiac Testing:  TTE 4/8: Trace mitral regurgitation is present. EA reversal of the mitral inflow consistent with reduced compliance of the left ventricle. The aortic valve is well visualized, appears mildly calcified. Valve opening seems to be normal. The tricuspid valve leaflets are thin and pliable; valve motion is normal. Trace tricuspid valve regurgitation is present. The left ventricle is normal in size, wall thickness, wall motion and contractility as seen in limited views. Estimated left ventricular ejection fraction is 65-70 %. Normal appearing right ventricle structure and function. Mild dilatation of the ascending aortic segment. The IVC not seen due to limited subcostal window. There is a large amount of fluid seen in the abdominal region. No evidence of pericardial effusion.    EKG 4/7: Rate 85, NSR, L axis, LVH, no acute ischemic changes    Procedures:  Hysteroscopic D+C of uterus 4/10: Uterus sounded to 10 wks size. Cervix grossly normal, endocervical curetting obtained. Hysteroscopy performed and thickened, fluffy endometrium observed, obscuring view of ostia. Adnexa non palpable. right abdominal mass palpated on exam. Fluids deficit 150cc normal saline.    Paracentesis 4/10: Large volume paracentesis, 13,800cc murky dark brown ascites removed    Meds:  MEDICATIONS  (STANDING):  cefTRIAXone Injectable. 1000 milliGRAM(s) IV Push every 24 hours  heparin   Injectable 5000 Unit(s) SubCutaneous every 8 hours    MEDICATIONS  (PRN):  acetaminophen     Tablet .. 650 milliGRAM(s) Oral every 6 hours PRN Temp greater or equal to 38C (100.4F), Mild Pain (1 - 3)  aluminum hydroxide/magnesium hydroxide/simethicone Suspension 30 milliLiter(s) Oral every 4 hours PRN Dyspepsia  melatonin 3 milliGRAM(s) Oral at bedtime PRN Insomnia  ondansetron Injectable 4 milliGRAM(s) IV Push every 8 hours PRN Nausea and/or Vomiting   Chief Complaint: Anasarca, exertional dyspnea    Interval Hx: Patient reports feeling a bit improved today having received large volume paracentesis this AM by IR, 13,800cc murky dark brown ascites drained. Drainage had to be halted for safety reasons to avoid large volume depletion. There was still residual large volume ascites. Repeat paracentesis is planned for some time later this week for therapeutic affect. She is reporting some mild back discomfort and still has persisting but improved abdominal distension. Abdomen is now soft, nontender. She otherwise has no complaints. No longer dyspneic. No nausea. No significant abdominal pain. No fever or rigors. She is awaiting hysteroscopic D+C and EMBx this afteroon with Gyn-Onc.     ROS: Multi system review is comprehensively negative x 10 systems except as above    Vitals:  T(F): 99 (10 Apr 2023 15:30), Max: 99 (10 Apr 2023 15:30)  HR: 70 (10 Apr 2023 15:30) (70 - 92)  BP: 150/55 (10 Apr 2023 15:30) (119/55 - 150/55)  RR: 20 (10 Apr 2023 15:30) (16 - 23)  SpO2: 99% (10 Apr 2023 15:30) (93% - 100%) on 2L/min via NC    Exam:  Gen: No acute distress  HEENT: NCAT PERRL EOMI MMM clear oropharynx  Neck: Supple, no JVD, no thyromegaly  Chest: Normal resp effort at rest, slightly diminished breath sounds throughout  CVS: s1 s2 normal, RRR  Abd: +BS, soft, nontender, markedly distended, +ascites  : Indwelling Mosley, placed this admission for strict Is and Os  Ext: +B/L LE edema, no tenderness, normal cap refill, no clubbing  Skin: Warm, dry  Mood: calm, pleasant  Neuro: A+OX3, no deficits, no asterixis    Labs:                    10.7   16.48 )--------( 601                  34.3       MCV 73  RDW 17.5   Iron 12  Iron Sat 7%  TIBC 180  Ferritin 320  Transferrin 137      130  |  98  |  14  --------------------<  87  4.3   |  26  |  0.51    Ca 8.8  Mg 2.2    TPro  6.7  /  Alb  1.9  /  TBili  0.9  /  DBili  x   /  AST  19  /  ALT  15  /  AlkPhos  130    PT: 17.4 sec;   INR: 1.49 ratio    PTT: 25.6 sec    Troponin negative   ProBNP 421   DDimer 1243    TSH WNL       HepA neg  HepB neg, non immune either  HCV Ab non reactive    AFP tumor marker <1.8, neg  CEA 2.7, neg   113, elevated  CA 19-9 125, elevated    Urinalysis 4/7: Essence, slightly turbid, trace ketone, mod bili, prot 30, N +, trace LE, RBC 0-2, WBC 0-2, bact few    Peritoneal fluid albumin, protein, glucose, LDH in process 4/10  Peritoneal fluid cell count in process 4/10    Micro:  Peritoneal fluid culture 4/10: In process  Urine culture 4/7: Negative  COVID19, flu, RSV PCR 4/7: Negative    Imaging:  CT chest 4/10: No malignancy in the chest    US pelvic 4/8: Limited pelvic ultrasound. The endometrium is incompletely assessed on this examination. The endometrium is either severely thickened to 6 cm or is distended with complex material to 6 cm.  There is a 2.5 x 1.6 x 1.7 cm shadowing echogenic focus with shadowing along the periphery of the endometrium, which may represent a calcified intramural versus submucosal fibroid, versus an endometrial lesion. The ovaries are not visualized. Large volume pelvic ascites.    US venous duplex B/L LEs 4/8: No evidence of deep venous thrombosis in either lower extremity.    CT abd/pelvis W/ 4/7: Very large amount of abdominal and pelvic ascites. Uterine fibroids.    CXR 4/7: Heart suggest normal size. Lungs are grossly clear.    Cardiac Testing:  TTE 4/8: Trace mitral regurgitation is present. EA reversal of the mitral inflow consistent with reduced compliance of the left ventricle. The aortic valve is well visualized, appears mildly calcified. Valve opening seems to be normal. The tricuspid valve leaflets are thin and pliable; valve motion is normal. Trace tricuspid valve regurgitation is present. The left ventricle is normal in size, wall thickness, wall motion and contractility as seen in limited views. Estimated left ventricular ejection fraction is 65-70 %. Normal appearing right ventricle structure and function. Mild dilatation of the ascending aortic segment. The IVC not seen due to limited subcostal window. There is a large amount of fluid seen in the abdominal region. No evidence of pericardial effusion.    EKG 4/7: Rate 85, NSR, L axis, LVH, no acute ischemic changes    Procedures:  Hysteroscopic D+C of uterus 4/10: Uterus sounded to 10 wks size. Cervix grossly normal, endocervical curetting obtained. Hysteroscopy performed and thickened, fluffy endometrium observed, obscuring view of ostia. Adnexa non palpable. right abdominal mass palpated on exam. Fluids deficit 150cc normal saline.    Paracentesis 4/10: Large volume paracentesis, 13,800cc murky dark brown ascites removed    Meds:  MEDICATIONS  (STANDING):  cefTRIAXone Injectable. 1000 milliGRAM(s) IV Push every 24 hours  heparin   Injectable 5000 Unit(s) SubCutaneous every 8 hours    MEDICATIONS  (PRN):  acetaminophen     Tablet .. 650 milliGRAM(s) Oral every 6 hours PRN Temp greater or equal to 38C (100.4F), Mild Pain (1 - 3)  aluminum hydroxide/magnesium hydroxide/simethicone Suspension 30 milliLiter(s) Oral every 4 hours PRN Dyspepsia  melatonin 3 milliGRAM(s) Oral at bedtime PRN Insomnia  ondansetron Injectable 4 milliGRAM(s) IV Push every 8 hours PRN Nausea and/or Vomiting   Chief Complaint: Anasarca, exertional dyspnea    Interval Hx: Patient reports feeling a bit improved today having received large volume paracentesis this AM by IR, 13,800cc murky dark brown ascites drained. Drainage had to be halted for safety reasons to avoid large volume depletion. There was still residual large volume ascites. Repeat paracentesis is planned for some time later this week for therapeutic affect. She is reporting some mild back discomfort and still has persisting but improved abdominal distension. Abdomen is now soft, nontender. She otherwise has no complaints. No longer dyspneic. No nausea. No significant abdominal pain. No fever or rigors. She is awaiting hysteroscopic D+C and EMBx this afternoon with Gyn-Onc.     ROS: Multi system review is comprehensively negative x 10 systems except as above    Vitals:  T(F): 99 (10 Apr 2023 15:30), Max: 99 (10 Apr 2023 15:30)  HR: 70 (10 Apr 2023 15:30) (70 - 92)  BP: 150/55 (10 Apr 2023 15:30) (119/55 - 150/55)  RR: 20 (10 Apr 2023 15:30) (16 - 23)  SpO2: 99% (10 Apr 2023 15:30) (93% - 100%) on 2L/min via NC    Exam:  Gen: No acute distress  HEENT: NCAT PERRL EOMI MMM clear oropharynx  Neck: Supple, no JVD, no thyromegaly  Chest: Normal resp effort at rest, slightly diminished breath sounds throughout  CVS: s1 s2 normal, RRR  Abd: +BS, soft, nontender, markedly distended, +ascites  : Indwelling Mosley, placed this admission for strict Is and Os  Ext: +B/L LE edema, no tenderness, normal cap refill, no clubbing  Skin: Warm, dry  Mood: calm, pleasant  Neuro: A+OX3, no deficits, no asterixis    Labs:                    10.7   16.48 )--------( 601                  34.3       MCV 73  RDW 17.5   Iron 12  Iron Sat 7%  TIBC 180  Ferritin 320  Transferrin 137      130  |  98  |  14  --------------------<  87  4.3   |  26  |  0.51    Ca 8.8  Mg 2.2    TPro  6.7  /  Alb  1.9  /  TBili  0.9  /  DBili  x   /  AST  19  /  ALT  15  /  AlkPhos  130    PT: 17.4 sec;   INR: 1.49 ratio    PTT: 25.6 sec    Troponin negative   ProBNP 421   DDimer 1243    TSH WNL       HepA neg  HepB neg, non immune either  HCV Ab non reactive    AFP tumor marker <1.8, neg  CEA 2.7, neg   113, elevated  CA 19-9 125, elevated    Urinalysis 4/7: Essence, slightly turbid, trace ketone, mod bili, prot 30, N +, trace LE, RBC 0-2, WBC 0-2, bact few    Peritoneal fluid albumin, protein, glucose, LDH in process 4/10  Peritoneal fluid cell count in process 4/10    Micro:  Peritoneal fluid culture 4/10: In process  Urine culture 4/7: Negative  COVID19, flu, RSV PCR 4/7: Negative    Imaging:  CT chest 4/10: No malignancy in the chest    US pelvic 4/8: Limited pelvic ultrasound. The endometrium is incompletely assessed on this examination. The endometrium is either severely thickened to 6 cm or is distended with complex material to 6 cm.  There is a 2.5 x 1.6 x 1.7 cm shadowing echogenic focus with shadowing along the periphery of the endometrium, which may represent a calcified intramural versus submucosal fibroid, versus an endometrial lesion. The ovaries are not visualized. Large volume pelvic ascites.    US venous duplex B/L LEs 4/8: No evidence of deep venous thrombosis in either lower extremity.    CT abd/pelvis W/ 4/7: Very large amount of abdominal and pelvic ascites. Uterine fibroids.    CXR 4/7: Heart suggest normal size. Lungs are grossly clear.    Cardiac Testing:  TTE 4/8: Trace mitral regurgitation is present. EA reversal of the mitral inflow consistent with reduced compliance of the left ventricle. The aortic valve is well visualized, appears mildly calcified. Valve opening seems to be normal. The tricuspid valve leaflets are thin and pliable; valve motion is normal. Trace tricuspid valve regurgitation is present. The left ventricle is normal in size, wall thickness, wall motion and contractility as seen in limited views. Estimated left ventricular ejection fraction is 65-70 %. Normal appearing right ventricle structure and function. Mild dilatation of the ascending aortic segment. The IVC not seen due to limited subcostal window. There is a large amount of fluid seen in the abdominal region. No evidence of pericardial effusion.    EKG 4/7: Rate 85, NSR, L axis, LVH, no acute ischemic changes    Procedures:  Hysteroscopic D+C of uterus 4/10: Uterus sounded to 10 wks size. Cervix grossly normal, endocervical curetting obtained. Hysteroscopy performed and thickened, fluffy endometrium observed, obscuring view of ostia. Adnexa non palpable. right abdominal mass palpated on exam. Fluids deficit 150cc normal saline.    Paracentesis 4/10: Large volume paracentesis, 13,800cc murky dark brown ascites removed    Meds:  MEDICATIONS  (STANDING):  cefTRIAXone Injectable. 1000 milliGRAM(s) IV Push every 24 hours  heparin   Injectable 5000 Unit(s) SubCutaneous every 8 hours    MEDICATIONS  (PRN):  acetaminophen     Tablet .. 650 milliGRAM(s) Oral every 6 hours PRN Temp greater or equal to 38C (100.4F), Mild Pain (1 - 3)  aluminum hydroxide/magnesium hydroxide/simethicone Suspension 30 milliLiter(s) Oral every 4 hours PRN Dyspepsia  melatonin 3 milliGRAM(s) Oral at bedtime PRN Insomnia  ondansetron Injectable 4 milliGRAM(s) IV Push every 8 hours PRN Nausea and/or Vomiting   Chief Complaint: Abdominal distension with large-volume ascites    Interval Hx: Patient reports feeling a bit improved today having received large volume paracentesis this AM by IR, 13,800cc murky dark brown ascites drained. Drainage had to be halted for safety reasons to avoid large volume depletion. There was still residual large volume ascites. Repeat paracentesis is planned for some time later this week for therapeutic affect. She is reporting some mild back discomfort and still has persisting but improved abdominal distension. Abdomen is now soft, nontender. She otherwise has no complaints. No longer dyspneic. No nausea. No significant abdominal pain. No fever or rigors. She is awaiting hysteroscopic D+C and EMBx this afternoon with Gyn-Onc.     ROS: Multi system review is comprehensively negative x 10 systems except as above    Vitals:  T(F): 99 (10 Apr 2023 15:30), Max: 99 (10 Apr 2023 15:30)  HR: 70 (10 Apr 2023 15:30) (70 - 92)  BP: 150/55 (10 Apr 2023 15:30) (119/55 - 150/55)  RR: 20 (10 Apr 2023 15:30) (16 - 23)  SpO2: 99% (10 Apr 2023 15:30) (93% - 100%) on 2L/min via NC    Exam:  Gen: No acute distress  HEENT: NCAT PERRL EOMI MMM clear oropharynx  Neck: Supple, no JVD, no thyromegaly  Chest: Normal resp effort at rest, slightly diminished breath sounds throughout  CVS: s1 s2 normal, RRR  Abd: +BS, soft, nontender, markedly distended, +ascites  : Indwelling Mosley, placed this admission for strict Is and Os  Ext: +B/L LE edema, no tenderness, normal cap refill, no clubbing  Skin: Warm, dry  Mood: calm, pleasant  Neuro: A+OX3, no deficits, no asterixis    Labs:                    10.7   16.48 )--------( 601                  34.3       MCV 73  RDW 17.5   Iron 12  Iron Sat 7%  TIBC 180  Ferritin 320  Transferrin 137      130  |  98  |  14  --------------------<  87  4.3   |  26  |  0.51    Ca 8.8  Mg 2.2    TPro  6.7  /  Alb  1.9  /  TBili  0.9  /  DBili  x   /  AST  19  /  ALT  15  /  AlkPhos  130    PT: 17.4 sec;   INR: 1.49 ratio    PTT: 25.6 sec    Troponin negative   ProBNP 421   DDimer 1243    TSH WNL       UPCR 0.5  Urinalysis 4/7: Essence, slightly turbid, trace ketone, mod bili, prot 30, N +, trace LE, RBC 0-2, WBC 0-2, bact few    HepA neg  HepB neg, non immune either  HCV Ab non reactive    AFP tumor marker <1.8, neg  CEA 2.7, neg   113, elevated  CA 19-9 125, elevated    Peritoneal fluid chem 4/10: albumin 2.2, protein 4.7, glucose 8, LDH 1465  SAAG: Low gradient, portal HTN unlikely  Peritoneal fluid cell count 4/10: Turbid, brown, Total nucleated cells 1857 (ANC > 250), ,000     Micro:  Peritoneal fluid culture 4/10: Cheryl's stain negative, culture in-process  Urine culture 4/7: Negative  COVID19, flu, RSV PCR 4/7: Negative    Imaging:  CT chest 4/10: No malignancy in the chest    US pelvic 4/8: Limited pelvic ultrasound. The endometrium is incompletely assessed on this examination. The endometrium is either severely thickened to 6 cm or is distended with complex material to 6 cm.  There is a 2.5 x 1.6 x 1.7 cm shadowing echogenic focus with shadowing along the periphery of the endometrium, which may represent a calcified intramural versus submucosal fibroid, versus an endometrial lesion. The ovaries are not visualized. Large volume pelvic ascites.    US venous duplex B/L LEs 4/8: No evidence of deep venous thrombosis in either lower extremity.    CT abd/pelvis W/ 4/7: Very large amount of abdominal and pelvic ascites. Uterine fibroids.    CXR 4/7: Heart suggest normal size. Lungs are grossly clear.    Cardiac Testing:  TTE 4/8: Trace mitral regurgitation is present. EA reversal of the mitral inflow consistent with reduced compliance of the left ventricle. The aortic valve is well visualized, appears mildly calcified. Valve opening seems to be normal. The tricuspid valve leaflets are thin and pliable; valve motion is normal. Trace tricuspid valve regurgitation is present. The left ventricle is normal in size, wall thickness, wall motion and contractility as seen in limited views. Estimated left ventricular ejection fraction is 65-70 %. Normal appearing right ventricle structure and function. Mild dilatation of the ascending aortic segment. The IVC not seen due to limited subcostal window. There is a large amount of fluid seen in the abdominal region. No evidence of pericardial effusion.    EKG 4/7: Rate 85, NSR, L axis, LVH, no acute ischemic changes    Procedures:  Hysteroscopic D+C of uterus 4/10: Uterus sounded to 10 wks size. Cervix grossly normal, endocervical curetting obtained. Hysteroscopy performed and thickened, fluffy endometrium observed, obscuring view of ostia. Adnexa non palpable. right abdominal mass palpated on exam. Fluids deficit 150cc normal saline.    Paracentesis 4/10: Large volume paracentesis, 13,800cc murky dark brown ascites removed    Meds:  MEDICATIONS  (STANDING):  cefTRIAXone Injectable. 1000 milliGRAM(s) IV Push every 24 hours  heparin   Injectable 5000 Unit(s) SubCutaneous every 8 hours    MEDICATIONS  (PRN):  acetaminophen     Tablet .. 650 milliGRAM(s) Oral every 6 hours PRN Temp greater or equal to 38C (100.4F), Mild Pain (1 - 3)  aluminum hydroxide/magnesium hydroxide/simethicone Suspension 30 milliLiter(s) Oral every 4 hours PRN Dyspepsia  melatonin 3 milliGRAM(s) Oral at bedtime PRN Insomnia  ondansetron Injectable 4 milliGRAM(s) IV Push every 8 hours PRN Nausea and/or Vomiting

## 2023-04-10 NOTE — BRIEF OPERATIVE NOTE - NSICDXBRIEFPREOP_GEN_ALL_CORE_FT
PRE-OP DIAGNOSIS:  Abnormal uterine bleeding (AUB) 10-Apr-2023 14:57:42  Luis Paz  Thickened endometrium 10-Apr-2023 14:57:39  Luis Paz

## 2023-04-10 NOTE — PROGRESS NOTE ADULT - ASSESSMENT
73 year old woman who no known PMHx, has not seen a doctor in many years, has not suffered any recent illness as far as she knows, takes no medications, only on tumeric for arthritis in her knee, presented for further evaluation of several weeks of progressively worsening abdominal distension and increased abdominal girth, associated decreased ability to tolerate PO, and symptom of exertional dyspnea. Denied fever, chills, change in bowel habits, blood in stool or constipation. Denies chronic alcohol use, illicit drugs or family history of GI disease. In the ED, vitals WNL, exam notable for distended abdomen, +BS, ascites. WBC 15. Hgb 10.9 Plt 581. INR 1.49. Na 129. Cr WNL. AST/ALT WNL. T bili 2.1. Alk phos 159. alb 2.1. Trop mininally elevated. . CT A/P showing large volume ascites and non-specific jejunal loop distension otherwise no overt GI pathology. Uterine fibroids present. COVID, flu RSV negative. UA suggestive of possible infection. Started on ceftriaxone and triaged to 3E.     Large volume ascites   Etiology unclear but now appears as though could be related to a pelvic organ malignancy or primary peritoneal carcinomatosis. Appreciate input from Cardiology. Reviewed echo. Normal EF normal LA no pulmonary hypertension no LVH. Doubt systolic or diastolic dysfunction is present. Appreciate input from GI as well. Does not appear to have liver ailment. Pelvis US done. Concern for endometrial thickening. Sent tumor markers. CA 19-9 and  elevated. Consulted GYN. Underwent paracentesis today with removal of 13,800cc murky dark brown ascites, sent for chemistry analysis, cell count, Gram's stain, culture and cytology. Then, this afternoon, hystoscopic D+C, EMBx by Gyn-Onc. CT chest negative for malignancy.   - F/u in process peritoneal fluid studies  - F/u in process endometrial currettage / bx  - Monitor for post-procedure bleeding  - Will leave ceftriaxone on board for now given peritoneal fluid appearance pending Gram's stain and culture  - Remove Mosley (placed for Is and Os but can now be DC'd)  - Anticipate need for an additional therapeutic paracentesis in 48 hrs as per NAA Pratt    Abnormal UA  Afebrile. Urine culture negative.   - Had 3 days of empiric ceftriaxone, no need for further antibiotics from urinary standpoint but on antibiotics pending peritoneal fluid studies   73 year old woman who no known PMHx, has not seen a doctor in many years, has not suffered any recent illness as far as she knows, takes no medications, only on tumeric for arthritis in her knee, presented for further evaluation of several weeks of progressively worsening abdominal distension and increased abdominal girth, associated decreased ability to tolerate PO, and symptom of exertional dyspnea. Denied fever, chills, change in bowel habits, blood in stool or constipation. Denies chronic alcohol use, illicit drugs or family history of GI disease. In the ED, vitals WNL, exam notable for distended abdomen, +BS, ascites. WBC 15. Hgb 10.9 Plt 581. INR 1.49. Na 129. Cr WNL. AST/ALT WNL. T bili 2.1. Alk phos 159. alb 2.1. Trop mininally elevated. . CT A/P showing large volume ascites and non-specific jejunal loop distension otherwise no overt GI pathology. Uterine fibroids present. COVID, flu RSV negative. UA suggestive of possible infection. Started on ceftriaxone and triaged to 3E.     Large volume ascites   Etiology unclear but now appears as though could be related to a pelvic organ malignancy or primary peritoneal carcinomatosis. Appreciate input from Cardiology. Reviewed echo. Normal EF normal LA no pulmonary hypertension no LVH. Doubt systolic or diastolic dysfunction is present. Appreciate input from GI as well. Does not appear to have liver ailment. Pelvis US done. Concern for endometrial thickening. Sent tumor markers. CA 19-9 and  elevated. Consulted GYN. Underwent paracentesis today with removal of 13,800cc murky dark brown ascites, sent for chemistry analysis, cell count, Gram's stain, culture and cytology. Then, this afternoon, hysteroscopic D+C, EMBx by Gyn-Onc. CT chest negative for malignancy.   - F/u in process peritoneal fluid studies  - F/u in process endometrial currettage / bx  - Monitor for post-procedure bleeding  - Will leave ceftriaxone on board for now given peritoneal fluid appearance pending Gram's stain and culture  - Remove Mosley (placed for Is and Os but can now be DC'd)  - Anticipate need for an additional therapeutic paracentesis in 48 hrs as per ANA Pratt    Abnormal UA  Afebrile. Urine culture negative.   - Had 3 days of empiric ceftriaxone, no need for further antibiotics from urinary standpoint but on antibiotics pending peritoneal fluid studies   73 year old woman who no known PMHx, has not seen a doctor in many years, has not suffered any recent illness as far as she knows, takes no medications, only on tumeric for arthritis in her knee, presented for further evaluation of several weeks of progressively worsening abdominal distension and increased abdominal girth, associated decreased ability to tolerate PO, and symptom of exertional dyspnea. Denied fever, chills, change in bowel habits, blood in stool or constipation. Denies chronic alcohol use, illicit drugs or family history of GI disease/malignancy or GYN disease/malignancy. In the ED, vitals WNL, exam notable for distended abdomen w/ fairly tense ascites. WBC 15. Hgb 10.9 Plt 581. INR 1.49. Na 129. Cr WNL. AST/ALT WNL. T bili 2.1. Alk phos 159. alb 2.1. Trop mininally elevated. . CT A/P showed large volume ascites and non-specific jejunal loop distension, otherwise no overt GI pathology. Uterine fibroids present. COVID19, flu and RSV PCR negative. UA suggestive of possible infection. Started on ceftriaxone and admitted to Medicine.    Large volume ascites   Etiology unclear but now appears as though could be related to a pelvic organ malignancy or primary peritoneal carcinomatosis. Appreciate input from Cardiology. Reviewed echo done earlier this admission. Normal EF, normal LA, no pulmonary hypertension, no LVH. Doubt systolic or diastolic dysfunction is present. Appreciate input from GI as well, does not appear as though she has a significant liver ailment. Pelvis US done, concern for endometrial thickening. Sent tumor markers. CA 19-9 and  elevated. Consulted GYN. Underwent paracentesis by IR today with removal of 13,800cc murky dark brown ascites, sent for chemistry analysis, cell count, Gram's stain, culture and cytology. Then, this afternoon, hysteroscopic D+C, EMBx by Gyn-Onc. CT chest negative for malignancy.   - F/u in process peritoneal fluid studies  - F/u in process endometrial curettage / bx  - Monitor for post-procedure bleeding  - Will leave ceftriaxone on board for now given peritoneal fluid appearance pending Gram's stain and culture  - Remove Mosley (placed for Is and Os but can now be DC'd)  - Anticipate need for an additional therapeutic paracentesis in 48 hrs as per ANA Pratt    Abnormal UA  Afebrile. Urine culture negative.   - Had 3 days of empiric ceftriaxone, no need for further antibiotics from urinary standpoint but on antibiotics pending peritoneal fluid studies   73 year old woman who no known PMHx, has not seen a doctor in many years, has not suffered any recent illness as far as she knows, takes no medications, only on tumeric for arthritis in her knee, presented for further evaluation of several weeks of progressively worsening abdominal distension and increased abdominal girth, associated decreased ability to tolerate PO, and symptom of exertional dyspnea. Denied fever, chills, change in bowel habits, blood in stool or constipation. Denies chronic alcohol use, illicit drugs or family history of GI disease/malignancy or GYN disease/malignancy. In the ED, vitals WNL, exam notable for distended abdomen w/ fairly tense ascites. WBC 15. Hgb 10.9 Plt 581. INR 1.49. Na 129. Cr WNL. AST/ALT WNL. T bili 2.1. Alk phos 159. alb 2.1. Trop mininally elevated. . CT A/P showed large volume ascites and non-specific jejunal loop distension, otherwise no overt GI pathology. Uterine fibroids present. COVID19, flu and RSV PCR negative. UA suggestive of possible infection. Started on ceftriaxone and admitted to Medicine.    Large volume ascites   Etiology unclear but now appears as though could be related to a pelvic organ malignancy or primary peritoneal carcinomatosis. Appreciate input from Cardiology. Reviewed echo done earlier this admission. Normal EF, normal LA, no LVH, no pHTN. Doubt systolic or diastolic dysfunction is present. Appreciate input from GI as well, does not appear as though she has a significant liver ailment. Pelvis US done, concern for endometrial thickening. Sent tumor markers. CA 19-9 and  elevated. Consulted GYN. Underwent paracentesis by IR today with removal of 13,800cc murky dark brown ascites. SAAG is low gradient, making portal HTN etiology unlikely. Fluid ANC >250 but no fever, no abdominal pain, no encephalopathy. Fluid Gram's stain negative, culture in process. She does have leukocytosis. On ceftriaxone for now, initially started for abnormal UA, now will keep on for ? SBP. This afternoon, hysteroscopic D+C, EMBx by Gyn-Onc was performed. CT chest negative for malignancy.   - F/u in process peritoneal fluid studies  - F/u in process endometrial curettage / bx  - Monitor for post-procedure bleeding  - Will leave ceftriaxone on board for now given peritoneal fluid appearance, fluid cell ct studies, and pending culture, could treat ? SBP for 5-7 days  - Anticipate need for an additional therapeutic paracentesis in 48 hrs as per ANA Pratt  - Ordered to remove Mosley (placed here for Is/Os but can now be DC'd)    Abnormal UA  Afebrile. Urine culture negative.   - Had 3 days of empiric ceftriaxone, no need for further antibiotics from urinary standpoint but on antibiotics pending peritoneal fluid studies

## 2023-04-10 NOTE — CONSULT NOTE ADULT - ASSESSMENT
73 year old Female presented to the ED complaining of Dyspnea on exertion for more than 6 weeks. Patient started to have some shortness of breath with exertion and mild abdominal swelling more than 6 weeks ago. Then she went to [place] with these symptoms to visit. She came back couple of weeks ago. But her abdomen continue to swell and also got significant edema.  She also has decreased appetite and nausea, vomiting for few days. Last BM, 2 days ago. She has no fever or diarrhea.  (07 Apr 2023 23:06)    Nephrology:  Above info from chart  Patient w abd distention, ongoing for sometime, noted to have massive ascites and oliguria on imaging  Bladder is compressed in the lower pelvis, around dos santos  Creat 0.5 range  No cirrhosis on imaging  Will start gentle hydration and follow I/ O

## 2023-04-10 NOTE — PROGRESS NOTE ADULT - SUBJECTIVE AND OBJECTIVE BOX
NEPHROLOGY INTERVAL HPI/OVERNIGHT EVENTS:  04-10-23 @ 12:22    4/10 pt s/p paracentesis with 13L removed.  feels better, planned for uterine bx later today/    HPI:  73 year old Female presented to the ED complaining of Dyspnea on exertion for more than 6 weeks. Patient started to have some shortness of breath with exertion and mild abdominal swelling more than 6 weeks ago. Then she went to [place] with these symptoms to visit. She came back couple of weeks ago. But her abdomen continue to swell and also got significant edema.  She also has decreased appetite and nausea, vomiting for few days. Last BM, 2 days ago. She has no fever or diarrhea.  (2023 23:06)      Patient is a 73y old  Female who presents with a chief complaint of Abdominal distension and Ascites (10 Apr 2023 12:18)      MEDICATIONS  (STANDING):  cefTRIAXone Injectable. 1000 milliGRAM(s) IV Push every 24 hours  heparin   Injectable 5000 Unit(s) SubCutaneous every 8 hours  sodium chloride 0.9%. 1000 milliLiter(s) (80 mL/Hr) IV Continuous <Continuous>    MEDICATIONS  (PRN):  acetaminophen     Tablet .. 650 milliGRAM(s) Oral every 6 hours PRN Temp greater or equal to 38C (100.4F), Mild Pain (1 - 3)  aluminum hydroxide/magnesium hydroxide/simethicone Suspension 30 milliLiter(s) Oral every 4 hours PRN Dyspepsia  melatonin 3 milliGRAM(s) Oral at bedtime PRN Insomnia  ondansetron Injectable 4 milliGRAM(s) IV Push every 8 hours PRN Nausea and/or Vomiting      Allergies    No Known Allergies    Intolerances        I&O's Detail    2023 07:01  -  10 Apr 2023 07:00  --------------------------------------------------------  IN:  Total IN: 0 mL    OUT:    Indwelling Catheter - Urethral (mL): 675 mL  Total OUT: 675 mL    Total NET: -675 mL            Vital Signs Last 24 Hrs  T(C): 36.7 (10 Apr 2023 08:02), Max: 36.9 (2023 20:11)  T(F): 98.1 (10 Apr 2023 08:02), Max: 98.4 (2023 20:11)  HR: 80 (10 Apr 2023 08:02) (80 - 92)  BP: 136/56 (10 Apr 2023 08:02) (133/58 - 136/56)  BP(mean): --  RR: 18 (10 Apr 2023 08:02) (18 - 18)  SpO2: 93% (10 Apr 2023 08:02) (93% - 97%)    Parameters below as of 10 Apr 2023 08:02  Patient On (Oxygen Delivery Method): room air      Daily     Daily Weight in k.9 (10 Apr 2023 05:29)    PHYSICAL EXAM:  General: alert. awake Ox3  HEENT: MMM  CV: s1s2 rrr  LUNGS: B/L CTA  EXT: anasarca     LABS:                        10.7   16.48 )-----------( 601      ( 10 Apr 2023 07:13 )             34.3     04-10    130<L>  |  98  |  14  ----------------------------<  87  4.3   |  26  |  0.51    Ca    8.8      10 Apr 2023 07:13    TPro  6.7  /  Alb  1.9<L>  /  TBili  0.9  /  DBili  0.5<H>  /  AST  19  /  ALT  15  /  AlkPhos  130<H>  04-09    PT/INR - ( 10 Apr 2023 07:13 )   PT: 18.4 sec;   INR: 1.58 ratio         PTT - ( 10 Apr 2023 07:13 )  PTT:29.7 sec

## 2023-04-10 NOTE — PROGRESS NOTE ADULT - SUBJECTIVE AND OBJECTIVE BOX
HD#3    73y admitted for ascites workup s/p gyn consult for incidental finding within uterus in setting of intermittent postmenopausal vaginal bleeding. She has no complaints this AM other than a dry mouth secondary to being NPO in preparation for gyn procedure. She thinks the IV fluids have been helping her immensely, as she states she feels her skin is more moist and smooth and that she is able to take deeper breaths. She denies any HA, CP, SOB, abdominal pain.     OBJECTIVE:   Vital Signs Last 24 Hrs  T(C): 36.7 (10 Apr 2023 08:02), Max: 36.9 (09 Apr 2023 20:11)  T(F): 98.1 (10 Apr 2023 08:02), Max: 98.4 (09 Apr 2023 20:11)  HR: 80 (10 Apr 2023 08:02) (80 - 92)  BP: 136/56 (10 Apr 2023 08:02) (133/58 - 136/56)  RR: 18 (10 Apr 2023 08:02) (18 - 18)  SpO2: 93% (10 Apr 2023 08:02) (93% - 97%)    Parameters below as of 10 Apr 2023 08:02  Patient On (Oxygen Delivery Method): room air    Physical Exam:  Constitutional: NAD  Pulmonary: clear to auscultation bilaterally   Cardiovascular: Regular rate and rhythm   Abdomen: hard, grossly distended, dull to percussion, diffusely tender   Extremities: + lower extremity edema bilaterally, Asad's sign negative.    LABS:                        10.7   16.48 )-----------( 601      ( 10 Apr 2023 07:13 )             34.3     04-10    130<L>  |  98  |  14  ----------------------------<  87  4.3   |  26  |  0.51    Ca    8.8      10 Apr 2023 07:13    TPro  6.7  /  Alb  1.9<L>  /  TBili  0.9  /  DBili  0.5<H>  /  AST  19  /  ALT  15  /  AlkPhos  130<H>  04-09    Urinalysis (04.07.23 @ 16:45)    Glucose Qualitative, Urine: Negative   Blood, Urine: Trace   pH Urine: 5.0   Color: Essence   Urine Appearance: Slightly Turbid   Bilirubin: Moderate   Ketone - Urine: Trace   Specific Gravity: 1.025   Protein, Urine: 30 mg/dL   Urobilinogen: 8   Nitrite: Positive   Leukocyte Esterase Concentration: Trace    RADIOLOGY & ADDITIONAL TESTS:    < from: US Pelvis Complete (US Pelvis Complete .) (04.08.23 @ 10:29) >  INTERPRETATION:  CLINICAL INFORMATION: Postmenopausal patient with   ascites.  Rule out pelvic malignancy.    LMP: Postmenopausal.    COMPARISON: None available.    TECHNIQUE:  Transabdominal pelvic sonogram only.    FINDINGS:  Uterus: 12.5 cm x 8.3 cm x 10.4 cm. The endometrium is incompletely   assessed on this examination.  The endometrium is either severely   severely thickened to 6 cm or is distended with complex material to 6 cm.    There is a 2.5 x 1.6 x 1.7 cm shadowing echogenic focus with shadowing   along the periphery of endometrium.      Right ovary: Not visualized.  Left ovary: Not visualized.    Fluid: Large volume pelvic ascites    IMPRESSION:  Limited pelvic ultrasound.    The endometrium is incompletely assessed on this examination.  The   endometrium is either severely severely thickened to 6 cm or is distended   with complex material to 6 cm.  There is a 2.5 x1.6 x 1.7 cm shadowing   echogenic focus with shadowing along the periphery of the endometrium,   which may represent a calcified intramural versus submucosal fibroid,   versus an endometrial lesion.    The ovaries are not visualized.    Large volumepelvic ascites.    Pelvic MRI may be obtained for further evaluation.    < end of copied text >    < from: CT Abdomen and Pelvis w/ IV Cont (04.07.23 @ 17:11) >  INTERPRETATION:  CLINICAL INFORMATION: Vomiting and abdominal pain with   shortness of breath. Recent travel. Decreased appetite. Swollen lower   extremities. Abdominal distention on physical exam    COMPARISON: None.    CONTRAST/COMPLICATIONS:  IV Contrast: Omnipaque 350  90 cc administered   10 cc discarded  Oral Contrast: NONE  Complications: None reported at time of study completion    PROCEDURE:  CT of the Abdomen and Pelvis was performed.  Sagittal and coronal reformats were performed.    FINDINGS:  LOWER CHEST: Within normal limits.    LIVER: Within normal limits.  BILE DUCTS: Normal caliber.  GALLBLADDER: Contracted  SPLEEN: Within normal limits.  PANCREAS: Atrophic  ADRENALS: Within normal limits.  KIDNEYS/URETERS: Within normal limits.    BLADDER: Mosley catheter.  REPRODUCTIVE ORGANS: Fibroid uterus    BOWEL: No bowel obstruction. Appendix is normal. Diverticulosis without   diverticulitis. Nonspecific thickened loops of jejunum in the left upper   quadrant  PERITONEUM: A very large amount of abdominal and pelvic ascites causing   centralization of bowel loops  VESSELS: Atherosclerotic changes.    RETROPERITONEUM/LYMPH NODES: No lymphadenopathy.  ABDOMINAL WALL: Within normal limits.  BONES: Degenerative changes. DISH is noted in the thoracic spine    IMPRESSION:  Very large amount of abdominal and pelvic ascites  Uterine fibroids    < end of copied text >

## 2023-04-10 NOTE — BRIEF OPERATIVE NOTE - OPERATION/FINDINGS
Uterus sounded to 10 wks size. Cervix grossly normal, endocervical curetting obtained. Hysteroscopy performed and thickened, fluffy endometrium observed, obscuring view of ostia. Adnexa non palpable. right abdominal mass palpated on exam. Fluids deficit 150cc normal saline.

## 2023-04-10 NOTE — PROGRESS NOTE ADULT - ASSESSMENT
A/P: 73y admitted for ascites workup s/p gyn consult for incidental finding within uterus in setting of intermittent postmenopausal vaginal bleeding. Plan for EUA, D&C hysteroscopy, and abdominal paracentesis later today. Cleared by medicine for planned procedure.    Neuro: Denies pain, Continue current pain regimen.  CV: No history of cardiovascular disease. Blood pressure well controlled.  Pulm: Saturating 93-97% on room air.  GI: No active disease. NPO in anticipation of gyn procedure today.  : Voiding spontaneously  Heme: Hgb stable 10.7 this AM  ID: Afebrile, on ceftriaxone for UTI  Endo: No active disease.   FEN: IVF at 80. Will discontinue IVF when tolerating PO. Hyponatremic to 130, replete as per primary team.   Skin: No active disease.   Psych: No active disease.   DVT ppx: Ambulation encouraged, SCDs when in bed, hold next dose of heparin due to planned surgery.  Dispo: Cleared for gyn procedure EUA, D&C, hysteroscopy, paracentesis today.

## 2023-04-10 NOTE — PROGRESS NOTE ADULT - ASSESSMENT
74 yo with ascites and anasarca leading to hyponatremia.  UA with minimal proteinuria     REC  - monitor response to large volume paracentesis   - for now dc ivf   - fu labs and urine studies ordered today   - no need for FR     sister, Breanne updated at bedside via phone

## 2023-04-10 NOTE — PROGRESS NOTE ADULT - SUBJECTIVE AND OBJECTIVE BOX
Patient is a 73y old  Female who presents with a chief complaint of Abdominal distension and Ascites (10 Apr 2023 09:00)    Subective: Patient seen and examined at bedside. No overnight events. Scheduled for D/C and paracentesis in OR with OB/GYN today. Denies nausea, vomiting, fever, or chills.       PAST MEDICAL & SURGICAL HISTORY:  No pertinent past medical history      No significant past surgical history          MEDICATIONS  (STANDING):  cefTRIAXone Injectable. 1000 milliGRAM(s) IV Push every 24 hours  heparin   Injectable 5000 Unit(s) SubCutaneous every 8 hours  sodium chloride 0.9%. 1000 milliLiter(s) (80 mL/Hr) IV Continuous <Continuous>    MEDICATIONS  (PRN):  acetaminophen     Tablet .. 650 milliGRAM(s) Oral every 6 hours PRN Temp greater or equal to 38C (100.4F), Mild Pain (1 - 3)  aluminum hydroxide/magnesium hydroxide/simethicone Suspension 30 milliLiter(s) Oral every 4 hours PRN Dyspepsia  melatonin 3 milliGRAM(s) Oral at bedtime PRN Insomnia  ondansetron Injectable 4 milliGRAM(s) IV Push every 8 hours PRN Nausea and/or Vomiting      REVIEW OF SYSTEMS:    RESPIRATORY: No shortness of breath  CARDIOVASCULAR: No chest pain  All other review of systems is negative unless indicated above.    Vital Signs Last 24 Hrs  T(C): 36.7 (10 Apr 2023 08:02), Max: 36.9 (09 Apr 2023 20:11)  T(F): 98.1 (10 Apr 2023 08:02), Max: 98.4 (09 Apr 2023 20:11)  HR: 80 (10 Apr 2023 08:02) (80 - 92)  BP: 136/56 (10 Apr 2023 08:02) (133/58 - 136/56)  BP(mean): --  RR: 18 (10 Apr 2023 08:02) (18 - 18)  SpO2: 93% (10 Apr 2023 08:02) (93% - 97%)    Parameters below as of 10 Apr 2023 08:02  Patient On (Oxygen Delivery Method): room air        PHYSICAL EXAM:    Constitutional: NAD  Respiratory: CTAB  Cardiovascular: S1 and S2, RRR  Gastrointestinal: BS+, tense abdominal distension  Extremities: Le edema  Psychiatric: Normal mood, normal affect    LABS:                        10.7   16.48 )-----------( 601      ( 10 Apr 2023 07:13 )             34.3     04-10    130<L>  |  98  |  14  ----------------------------<  87  4.3   |  26  |  0.51    Ca    8.8      10 Apr 2023 07:13    TPro  6.7  /  Alb  1.9<L>  /  TBili  0.9  /  DBili  0.5<H>  /  AST  19  /  ALT  15  /  AlkPhos  130<H>  04-09    PT/INR - ( 10 Apr 2023 07:13 )   PT: 18.4 sec;   INR: 1.58 ratio         PTT - ( 10 Apr 2023 07:13 )  PTT:29.7 sec  LIVER FUNCTIONS - ( 09 Apr 2023 05:32 )  Alb: 1.9 g/dL / Pro: 6.7 gm/dL / ALK PHOS: 130 U/L / ALT: 15 U/L / AST: 19 U/L / GGT: x             RADIOLOGY & ADDITIONAL STUDIES:

## 2023-04-10 NOTE — BRIEF OPERATIVE NOTE - NSICDXBRIEFPOSTOP_GEN_ALL_CORE_FT
POST-OP DIAGNOSIS:  Thickened endometrium 10-Apr-2023 14:57:26  Luis Paz  Abnormal uterine bleeding (AUB) 10-Apr-2023 14:57:36  Luis Paz

## 2023-04-11 ENCOUNTER — NON-APPOINTMENT (OUTPATIENT)
Age: 73
End: 2023-04-11

## 2023-04-11 LAB
ALBUMIN SERPL ELPH-MCNC: 2.1 G/DL — LOW (ref 3.3–5)
ALP SERPL-CCNC: 115 U/L — SIGNIFICANT CHANGE UP (ref 40–120)
ALT FLD-CCNC: 12 U/L — SIGNIFICANT CHANGE UP (ref 12–78)
ANION GAP SERPL CALC-SCNC: 7 MMOL/L — SIGNIFICANT CHANGE UP (ref 5–17)
AST SERPL-CCNC: 16 U/L — SIGNIFICANT CHANGE UP (ref 15–37)
BASOPHILS # BLD AUTO: 0 K/UL — SIGNIFICANT CHANGE UP (ref 0–0.2)
BASOPHILS NFR BLD AUTO: 0 % — SIGNIFICANT CHANGE UP (ref 0–2)
BILIRUB DIRECT SERPL-MCNC: 0.4 MG/DL — HIGH (ref 0–0.3)
BILIRUB INDIRECT FLD-MCNC: 0.3 MG/DL — SIGNIFICANT CHANGE UP (ref 0.2–1)
BILIRUB SERPL-MCNC: 0.7 MG/DL — SIGNIFICANT CHANGE UP (ref 0.2–1.2)
BUN SERPL-MCNC: 12 MG/DL — SIGNIFICANT CHANGE UP (ref 7–23)
CALCIUM SERPL-MCNC: 8.8 MG/DL — SIGNIFICANT CHANGE UP (ref 8.5–10.1)
CHLORIDE SERPL-SCNC: 101 MMOL/L — SIGNIFICANT CHANGE UP (ref 96–108)
CO2 SERPL-SCNC: 26 MMOL/L — SIGNIFICANT CHANGE UP (ref 22–31)
CREAT ?TM UR-MCNC: 55 MG/DL — SIGNIFICANT CHANGE UP
CREAT ?TM UR-MCNC: 73 MG/DL — SIGNIFICANT CHANGE UP
CREAT SERPL-MCNC: 0.6 MG/DL — SIGNIFICANT CHANGE UP (ref 0.5–1.3)
EGFR: 95 ML/MIN/1.73M2 — SIGNIFICANT CHANGE UP
EOSINOPHIL # BLD AUTO: 0 K/UL — SIGNIFICANT CHANGE UP (ref 0–0.5)
EOSINOPHIL NFR BLD AUTO: 0 % — SIGNIFICANT CHANGE UP (ref 0–6)
GLUCOSE SERPL-MCNC: 205 MG/DL — HIGH (ref 70–99)
HCT VFR BLD CALC: 33 % — LOW (ref 34.5–45)
HGB BLD-MCNC: 10.3 G/DL — LOW (ref 11.5–15.5)
LYMPHOCYTES # BLD AUTO: 0.75 K/UL — LOW (ref 1–3.3)
LYMPHOCYTES # BLD AUTO: 3 % — LOW (ref 13–44)
MCHC RBC-ENTMCNC: 22.6 PG — LOW (ref 27–34)
MCHC RBC-ENTMCNC: 31.2 GM/DL — LOW (ref 32–36)
MCV RBC AUTO: 72.4 FL — LOW (ref 80–100)
MONOCYTES # BLD AUTO: 3.24 K/UL — HIGH (ref 0–0.9)
MONOCYTES NFR BLD AUTO: 13 % — SIGNIFICANT CHANGE UP (ref 2–14)
NEUTROPHILS # BLD AUTO: 20.95 K/UL — HIGH (ref 1.8–7.4)
NEUTROPHILS NFR BLD AUTO: 84 % — HIGH (ref 43–77)
NRBC # BLD: SIGNIFICANT CHANGE UP /100 WBCS (ref 0–0)
OSMOLALITY SERPL: 280 MOSMOL/KG — SIGNIFICANT CHANGE UP (ref 280–301)
OSMOLALITY UR: 364 MOSM/KG — SIGNIFICANT CHANGE UP (ref 50–1200)
PLATELET # BLD AUTO: 667 K/UL — HIGH (ref 150–400)
POTASSIUM SERPL-MCNC: 3.4 MMOL/L — LOW (ref 3.5–5.3)
POTASSIUM SERPL-SCNC: 3.4 MMOL/L — LOW (ref 3.5–5.3)
PROT ?TM UR-MCNC: 34 MG/DL — HIGH (ref 0–12)
PROT SERPL-MCNC: 6.5 GM/DL — SIGNIFICANT CHANGE UP (ref 6–8.3)
PROT/CREAT UR-RTO: 0.5 RATIO — HIGH (ref 0–0.2)
RBC # BLD: 4.56 M/UL — SIGNIFICANT CHANGE UP (ref 3.8–5.2)
RBC # FLD: 17.5 % — HIGH (ref 10.3–14.5)
SODIUM SERPL-SCNC: 134 MMOL/L — LOW (ref 135–145)
SODIUM UR-SCNC: <20 MMOL/L — SIGNIFICANT CHANGE UP
SODIUM UR-SCNC: <20 MMOL/L — SIGNIFICANT CHANGE UP
WBC # BLD: 24.94 K/UL — HIGH (ref 3.8–10.5)
WBC # FLD AUTO: 24.94 K/UL — HIGH (ref 3.8–10.5)

## 2023-04-11 PROCEDURE — 99231 SBSQ HOSP IP/OBS SF/LOW 25: CPT | Mod: GC

## 2023-04-11 PROCEDURE — 99233 SBSQ HOSP IP/OBS HIGH 50: CPT

## 2023-04-11 RX ORDER — POTASSIUM CHLORIDE 20 MEQ
40 PACKET (EA) ORAL ONCE
Refills: 0 | Status: COMPLETED | OUTPATIENT
Start: 2023-04-11 | End: 2023-04-11

## 2023-04-11 RX ORDER — CEFTRIAXONE 500 MG/1
2000 INJECTION, POWDER, FOR SOLUTION INTRAMUSCULAR; INTRAVENOUS EVERY 12 HOURS
Refills: 0 | Status: DISCONTINUED | OUTPATIENT
Start: 2023-04-11 | End: 2023-04-11

## 2023-04-11 RX ORDER — CEFTRIAXONE 500 MG/1
2000 INJECTION, POWDER, FOR SOLUTION INTRAMUSCULAR; INTRAVENOUS EVERY 12 HOURS
Refills: 0 | Status: COMPLETED | OUTPATIENT
Start: 2023-04-11 | End: 2023-04-16

## 2023-04-11 RX ADMIN — CEFTRIAXONE 2000 MILLIGRAM(S): 500 INJECTION, POWDER, FOR SOLUTION INTRAMUSCULAR; INTRAVENOUS at 23:03

## 2023-04-11 RX ADMIN — Medication 40 MILLIEQUIVALENT(S): at 13:58

## 2023-04-11 RX ADMIN — HEPARIN SODIUM 5000 UNIT(S): 5000 INJECTION INTRAVENOUS; SUBCUTANEOUS at 13:58

## 2023-04-11 RX ADMIN — HEPARIN SODIUM 5000 UNIT(S): 5000 INJECTION INTRAVENOUS; SUBCUTANEOUS at 06:07

## 2023-04-11 RX ADMIN — CEFTRIAXONE 1000 MILLIGRAM(S): 500 INJECTION, POWDER, FOR SOLUTION INTRAMUSCULAR; INTRAVENOUS at 06:07

## 2023-04-11 RX ADMIN — HEPARIN SODIUM 5000 UNIT(S): 5000 INJECTION INTRAVENOUS; SUBCUTANEOUS at 23:00

## 2023-04-11 NOTE — PHYSICAL THERAPY INITIAL EVALUATION ADULT - NSPTDISCHREC_GEN_A_CORE
pt expresses desire to recuperate at her sister's or good friends home on DC/Sub-acute Rehab/Home PT

## 2023-04-11 NOTE — PROGRESS NOTE ADULT - ASSESSMENT
73 year old woman who no known PMHx, has not seen a doctor in many years, has not suffered any recent illness as far as she knows, takes no medications, only on tumeric for arthritis in her knee, presented for further evaluation of several weeks of progressively worsening abdominal distension and increased abdominal girth, associated decreased ability to tolerate PO, and symptom of exertional dyspnea. Denied fever, chills, change in bowel habits, blood in stool or constipation. Denies chronic alcohol use, illicit drugs or family history of GI disease/malignancy or GYN disease/malignancy. In the ED, vitals WNL, exam notable for distended abdomen w/ fairly tense ascites. WBC 15. Hgb 10.9 Plt 581. INR 1.49. Na 129. Cr WNL. AST/ALT WNL. T bili 2.1. Alk phos 159. alb 2.1. Trop mininally elevated. . CT A/P showed large volume ascites and non-specific jejunal loop distension, otherwise no overt GI pathology. Uterine fibroids present. COVID19, flu and RSV PCR negative. UA suggestive of possible infection. Started on ceftriaxone and admitted to Medicine.    Large volume ascites, c/f SBP, endometrial thickening  Etiology unclear but now appears as though could be related to a pelvic organ malignancy or primary peritoneal carcinomatosis. Appreciate input from Cardiology. Reviewed echo done earlier this admission. Normal EF, normal LA, no LVH, no pHTN. Doubt systolic or diastolic dysfunction is present. Appreciate input from GI as well, does not appear as though she has a significant liver ailment. Pelvis US done, concern for endometrial thickening. Sent tumor markers. CA 19-9 and  elevated. Consulted GYN. Underwent paracentesis by IR today with removal of 13,800cc murky dark brown ascites. SAAG is low gradient, making portal HTN etiology unlikely. Fluid ANC >250 but no fever, no abdominal pain, no encephalopathy, but is c/w SBP  -f/u ascites cx  -SBP tx with CTX 2g iv BID x 5d  -s/p hysteroscopic D+C, EMBx by Gyn-Onc, done 4/10/23, will need close outpt f/u  -CT chest negative for malignancy.   - F/u in process peritoneal fluid studies  - F/u in process endometrial curettage / bx  - Anticipate need for an additional therapeutic paracentesis in 48 hrs as per IR Willie Pratt    #PT eval    #DVT ppx- SQH

## 2023-04-11 NOTE — PHYSICAL THERAPY INITIAL EVALUATION ADULT - LEVEL OF INDEPENDENCE: SIT/SUPINE, REHAB EVAL
goes sit to R sidelying and then rolls on to back to adjust position in bed ,returns to R sidelying propped with pillows at her discretion (has specific way she wants pillows arranged)/contact guard

## 2023-04-11 NOTE — PHYSICAL THERAPY INITIAL EVALUATION ADULT - PATIENT/FAMILY/SIGNIFICANT OTHER GOALS STATEMENT, PT EVAL
pt does not tolerate lying on her back and usually sleeps side-lying , prefers a straight back chair positioned upright at 90 degrees of hip FLEXION

## 2023-04-11 NOTE — PHYSICAL THERAPY INITIAL EVALUATION ADULT - PLANNED THERAPY INTERVENTIONS, PT EVAL
resistance band provided for use in bed for arm strengthening , offered COLD PACKs for Rx of back pain but pt declined offer/bed mobility training/gait training/strengthening/transfer training

## 2023-04-11 NOTE — PHYSICAL THERAPY INITIAL EVALUATION ADULT - DID THE PATIENT HAVE SURGERY?
Hysteroscopy D&C; Paracentesis @ IR 13,800cc brown murky ascites EUA/ hysteroscopy ,D&C; Paracentesis @ IR 13,800cc brown murky ascites/yes EUA/ hysteroscopy ,D&C with endometrial biopsy 4/10; Paracentesis @ IR 13,800cc brown murky ascites i/yes

## 2023-04-11 NOTE — PHYSICAL THERAPY INITIAL EVALUATION ADULT - RISK REDUCTION/PREVENTION, PT EVAL
Subjective:87yMale gross hematuria    Acevedo:    Vital Signs Last 24 Hrs  T(C): 36.4 (18 May 2020 11:11), Max: 36.7 (18 May 2020 05:00)  T(F): 97.6 (18 May 2020 11:11), Max: 98 (18 May 2020 05:00)  HR: 68 (18 May 2020 12:31) (50 - 71)  BP: 142/61 (18 May 2020 12:31) (99/45 - 172/70)  BP(mean): --  RR: 17 (18 May 2020 11:11) (17 - 18)  SpO2: 98% (18 May 2020 11:11) (98% - 100%)  I&O's Detail    17 May 2020 07:01  -  18 May 2020 07:00  --------------------------------------------------------  IN:    Continuous Bladder Irrigation: 37271 mL  Total IN: 65586 mL    OUT:    Continuous Bladder Irrigation: 78293 mL    Voided: 320 mL  Total OUT: 93566 mL    Total NET: -5820 mL      18 May 2020 07:01  -  18 May 2020 15:36  --------------------------------------------------------  IN:    Continuous Bladder Irrigation: 15112 mL  Total IN: 83048 mL    OUT:    Continuous Bladder Irrigation: 69224 mL  Total OUT: 76741 mL    Total NET: -850 mL          Labs:                        9.2    15.55 )-----------( 227      ( 18 May 2020 12:35 )             27.9     05-18    139  |  108  |  39<H>  ----------------------------<  125<H>  4.7   |  24  |  1.59<H>    Ca    8.9      18 May 2020 07:04          Physical Exam:nad  Abdomen:soft nt   acevedo clear mod cbi secondary impairments

## 2023-04-11 NOTE — PHYSICAL THERAPY INITIAL EVALUATION ADULT - ACTIVE RANGE OF MOTION EXAMINATION, REHAB EVAL
bernadette. upper extremity Active ROM was WNL (within normal limits)/bilateral  lower extremity Active ROM was WFL (within functional limits)

## 2023-04-11 NOTE — PROGRESS NOTE ADULT - ASSESSMENT
1. Ascites. SAAG < 1.1 not indicative of portal HTN. PMN >250 consistent with SBP    Recommendation  1. Follow up gram stain, culture from ascites  2. Recommend 5 day course of ceftriaxone 2 g IV BID for SBP  3. Consider ID consultation  4. No interventions planned at this time

## 2023-04-11 NOTE — PROGRESS NOTE ADULT - SUBJECTIVE AND OBJECTIVE BOX
HOSPITALIST ATTENDING PROGRESS NOTE    Chart and meds reviewed.  Patient seen and examined.    CC: abdnl distension    Subjective: Still distended. Denies f/c.    All other systems reviewed and found to be negative with the exception of what has been described above.    MEDICATIONS  (STANDING):  cefTRIAXone Injectable. 2000 milliGRAM(s) IV Push every 12 hours  heparin   Injectable 5000 Unit(s) SubCutaneous every 8 hours    MEDICATIONS  (PRN):  acetaminophen     Tablet .. 650 milliGRAM(s) Oral every 6 hours PRN Temp greater or equal to 38C (100.4F), Mild Pain (1 - 3)  aluminum hydroxide/magnesium hydroxide/simethicone Suspension 30 milliLiter(s) Oral every 4 hours PRN Dyspepsia  melatonin 3 milliGRAM(s) Oral at bedtime PRN Insomnia  ondansetron Injectable 4 milliGRAM(s) IV Push every 8 hours PRN Nausea and/or Vomiting      VITALS:  T(F): 97.5 (23 @ 15:48), Max: 98.1 (04-10-23 @ 23:41)  HR: 92 (23 @ 15:48) (80 - 92)  BP: 126/51 (23 @ 15:48) (126/51 - 152/68)  RR: 18 (23 @ 15:48) (18 - 18)  SpO2: 97% (23 @ 15:48) (97% - 98%)  Wt(kg): --    I&O's Summary    10 Apr 2023 07:01  -  2023 07:00  --------------------------------------------------------  IN: 250 mL / OUT: 470 mL / NET: -220 mL        CAPILLARY BLOOD GLUCOSE          PHYSICAL EXAM:  Gen: NAD  HEENT:  pupils equal and reactive, EOMI, no oropharyngeal lesions, erythema, exudates, oral thrush  NECK:   supple, no carotid bruits, no palpable lymph nodes, no thyromegaly  CV:  +S1, +S2, regular, no murmurs or rubs  RESP:   lungs clear to auscultation bilaterally, no wheezing, rales, rhonchi, good air entry bilaterally  BREAST:  not examined  GI:  abdomen soft, non-tender, +distended, normal BS, no bruits, no abdominal masses, no palpable masses  RECTAL:  not examined  :  not examined  MSK:   normal muscle tone, no atrophy, no rigidity, no contractions  EXT:  no clubbing, no cyanosis, no edema, no calf pain, swelling or erythema  VASCULAR:  pulses equal and symmetric in the upper and lower extremities  NEURO:  AAOX3, no focal neurological deficits, follows all commands, able to move extremities spontaneously  SKIN:  no ulcers, lesions or rashes    LABS:                            10.3   24.94 )-----------( 667      ( 2023 08:56 )             33.0     04-11    134<L>  |  101  |  12  ----------------------------<  205<H>  3.4<L>   |  26  |  0.60    Ca    8.8      2023 08:56    TPro  6.5  /  Alb  2.1<L>  /  TBili  0.7  /  DBili  0.4<H>  /  AST  16  /  ALT  12  /  AlkPhos  115  04-11        LIVER FUNCTIONS - ( 2023 08:56 )  Alb: 2.1 g/dL / Pro: 6.5 gm/dL / ALK PHOS: 115 U/L / ALT: 12 U/L / AST: 16 U/L / GGT: x           PT/INR - ( 10 Apr 2023 07:13 )   PT: 18.4 sec;   INR: 1.58 ratio         PTT - ( 10 Apr 2023 07:13 )  PTT:29.7 sec  Urinalysis Basic - ( 10 Apr 2023 17:00 )    Color: Yellow / Appearance: Clear / S.015 / pH: x  Gluc: x / Ketone: Moderate  / Bili: Negative / Urobili: Negative   Blood: x / Protein: 30 mg/dL / Nitrite: Negative   Leuk Esterase: Negative / RBC: 25-50 /HPF / WBC 3-5 /HPF   Sq Epi: x / Non Sq Epi: x / Bacteria: Negative    CULTURES:  HepA neg  HepB neg, non immune either  HCV Ab non reactive    AFP tumor marker <1.8, neg  CEA 2.7, neg   113, elevated  CA 19-9 125, elevated    Peritoneal fluid chem 4/10: albumin 2.2, protein 4.7, glucose 8, LDH 1465  SAAG: Low gradient, portal HTN unlikely  Peritoneal fluid cell count 4/10: Turbid, brown, Total nucleated cells 1857 (ANC > 250), ,000     Micro:  Peritoneal fluid culture 4/10: Gram's stain negative, culture in-process  Urine culture : Negative  COVID19, flu, RSV PCR : Negative    Imaging:  CT chest 4/10: No malignancy in the chest    US pelvic : Limited pelvic ultrasound. The endometrium is incompletely assessed on this examination. The endometrium is either severely thickened to 6 cm or is distended with complex material to 6 cm.  There is a 2.5 x 1.6 x 1.7 cm shadowing echogenic focus with shadowing along the periphery of the endometrium, which may represent a calcified intramural versus submucosal fibroid, versus an endometrial lesion. The ovaries are not visualized. Large volume pelvic ascites.    US venous duplex B/L LEs : No evidence of deep venous thrombosis in either lower extremity.    CT abd/pelvis W/ : Very large amount of abdominal and pelvic ascites. Uterine fibroids.    CXR : Heart suggest normal size. Lungs are grossly clear.    Cardiac Testing:  TTE : Trace mitral regurgitation is present. EA reversal of the mitral inflow consistent with reduced compliance of the left ventricle. The aortic valve is well visualized, appears mildly calcified. Valve opening seems to be normal. The tricuspid valve leaflets are thin and pliable; valve motion is normal. Trace tricuspid valve regurgitation is present. The left ventricle is normal in size, wall thickness, wall motion and contractility as seen in limited views. Estimated left ventricular ejection fraction is 65-70 %. Normal appearing right ventricle structure and function. Mild dilatation of the ascending aortic segment. The IVC not seen due to limited subcostal window. There is a large amount of fluid seen in the abdominal region. No evidence of pericardial effusion.    EKG : Rate 85, NSR, L axis, LVH, no acute ischemic changes    Procedures:  Hysteroscopic D+C of uterus 4/10: Uterus sounded to 10 wks size. Cervix grossly normal, endocervical curetting obtained. Hysteroscopy performed and thickened, fluffy endometrium observed, obscuring view of ostia. Adnexa non palpable. right abdominal mass palpated on exam. Fluids deficit 150cc normal saline.    Paracentesis 4/10: Large volume paracentesis, 13,800cc murky dark brown ascites removed

## 2023-04-11 NOTE — PHYSICAL THERAPY INITIAL EVALUATION ADULT - MARITAL STATUS
pt resides [facility] pt resides [facility]; nearest relative is a sister residing in [place],she also mentions 2 close friends who are retired nurses/Single pt resides [facility]; nearest relative is a sister Breanne  residing in [place],she also mentions 2 close friends who are retired nurses/Single

## 2023-04-11 NOTE — PHYSICAL THERAPY INITIAL EVALUATION ADULT - MUSCLE TONE ASSESSMENT, REHAB EVAL
pt c/o flabby arms and decreased muscle tone upper body/bilateral upper extremities/mildly decreased tone

## 2023-04-11 NOTE — PHYSICAL THERAPY INITIAL EVALUATION ADULT - ADDITIONAL COMMENTS
pt owns a refurbished rollator she uses PRN to get up OOB pt owns a refurbished rollator she uses PRN to get up out of bed due to chronic back pain

## 2023-04-11 NOTE — PROGRESS NOTE ADULT - SUBJECTIVE AND OBJECTIVE BOX
HD#4, POD#1    73y admitted for ascites workup s/p gyn consult for incidental finding within uterus in setting of intermittent postmenopausal vaginal bleeding now POD#1 s/p EUA, hysteroscopy D&C. Pt reports cramping immediately after procedure that has since resolved. She states going through 4 pads overnight, dark red, not fully saturated. Tolerating PO, passing flatus, ambulating to bathroom, voiding. No gyn complaints.    OBJECTIVE:   Vital Signs Last 24 Hrs  T(C): 36.3 (11 Apr 2023 07:19), Max: 37.2 (10 Apr 2023 15:30)  T(F): 97.3 (11 Apr 2023 07:19), Max: 99 (10 Apr 2023 15:30)  HR: 86 (11 Apr 2023 07:19) (70 - 86)  BP: 145/60 (11 Apr 2023 07:19) (119/55 - 152/68)  RR: 18 (11 Apr 2023 07:19) (16 - 23)  SpO2: 98% (11 Apr 2023 07:19) (97% - 100%)    Parameters below as of 11 Apr 2023 07:19  Patient On (Oxygen Delivery Method): nasal cannula  O2 Flow (L/min): 2      Physical Exam:  Constitutional: NAD  Resp: breathing comfortably on RA, no increased respiratory effort  VE: pt declined vaginal assessment  Extremities: + lower extremity edema bilaterally, Asad's sign negative.    LABS:                        10.7   16.48 )-----------( 601      ( 10 Apr 2023 07:13 )             34.3     04-10    130<L>  |  98  |  14  ----------------------------<  87  4.3   |  26  |  0.51    Ca    8.8      10 Apr 2023 07:13    TPro  6.7  /  Alb  1.9<L>  /  TBili  0.9  /  DBili  0.5<H>  /  AST  19  /  ALT  15  /  AlkPhos  130<H>  04-09    Urinalysis (04.07.23 @ 16:45)    Glucose Qualitative, Urine: Negative   Blood, Urine: Trace   pH Urine: 5.0   Color: Essence   Urine Appearance: Slightly Turbid   Bilirubin: Moderate   Ketone - Urine: Trace   Specific Gravity: 1.025   Protein, Urine: 30 mg/dL   Urobilinogen: 8   Nitrite: Positive   Leukocyte Esterase Concentration: Trace    RADIOLOGY & ADDITIONAL TESTS:    < from: US Pelvis Complete (US Pelvis Complete .) (04.08.23 @ 10:29) >  INTERPRETATION:  CLINICAL INFORMATION: Postmenopausal patient with   ascites.  Rule out pelvic malignancy.    LMP: Postmenopausal.    COMPARISON: None available.    TECHNIQUE:  Transabdominal pelvic sonogram only.    FINDINGS:  Uterus: 12.5 cm x 8.3 cm x 10.4 cm. The endometrium is incompletely   assessed on this examination.  The endometrium is either severely   severely thickened to 6 cm or is distended with complex material to 6 cm.    There is a 2.5 x 1.6 x 1.7 cm shadowing echogenic focus with shadowing   along the periphery of endometrium.      Right ovary: Not visualized.  Left ovary: Not visualized.    Fluid: Large volume pelvic ascites    IMPRESSION:  Limited pelvic ultrasound.    The endometrium is incompletely assessed on this examination.  The   endometrium is either severely severely thickened to 6 cm or is distended   with complex material to 6 cm.  There is a 2.5 x1.6 x 1.7 cm shadowing   echogenic focus with shadowing along the periphery of the endometrium,   which may represent a calcified intramural versus submucosal fibroid,   versus an endometrial lesion.    The ovaries are not visualized.    Large volumepelvic ascites.    Pelvic MRI may be obtained for further evaluation.    < end of copied text >    < from: CT Abdomen and Pelvis w/ IV Cont (04.07.23 @ 17:11) >  INTERPRETATION:  CLINICAL INFORMATION: Vomiting and abdominal pain with   shortness of breath. Recent travel. Decreased appetite. Swollen lower   extremities. Abdominal distention on physical exam    COMPARISON: None.    CONTRAST/COMPLICATIONS:  IV Contrast: Omnipaque 350  90 cc administered   10 cc discarded  Oral Contrast: NONE  Complications: None reported at time of study completion    PROCEDURE:  CT of the Abdomen and Pelvis was performed.  Sagittal and coronal reformats were performed.    FINDINGS:  LOWER CHEST: Within normal limits.    LIVER: Within normal limits.  BILE DUCTS: Normal caliber.  GALLBLADDER: Contracted  SPLEEN: Within normal limits.  PANCREAS: Atrophic  ADRENALS: Within normal limits.  KIDNEYS/URETERS: Within normal limits.    BLADDER: Mosley catheter.  REPRODUCTIVE ORGANS: Fibroid uterus    BOWEL: No bowel obstruction. Appendix is normal. Diverticulosis without   diverticulitis. Nonspecific thickened loops of jejunum in the left upper   quadrant  PERITONEUM: A very large amount of abdominal and pelvic ascites causing   centralization of bowel loops  VESSELS: Atherosclerotic changes.    RETROPERITONEUM/LYMPH NODES: No lymphadenopathy.  ABDOMINAL WALL: Within normal limits.  BONES: Degenerative changes. DISH is noted in the thoracic spine    IMPRESSION:  Very large amount of abdominal and pelvic ascites  Uterine fibroids    < end of copied text >

## 2023-04-11 NOTE — PHYSICAL THERAPY INITIAL EVALUATION ADULT - GENERAL OBSERVATIONS, REHAB EVAL
resting semi-L sidelying supported with 3-4 pillows close to upper SR ,awake,alert, Ox4,initially reluctant to mobilize and does not want to sit in chair due to back pain ,using lumbar roll fashioned from a bath-blanket to support lordosis in bed when supine or sidelying

## 2023-04-11 NOTE — PROGRESS NOTE ADULT - ASSESSMENT
A/P: 73y admitted for ascites workup s/p gyn consult for incidental finding within uterus in setting of intermittent postmenopausal vaginal bleeding now POD#1 s/p EUA, hysteroscopy D&C.     Neuro: Denies pain, Continue current pain regimen.  CV: No history of cardiovascular disease. Blood pressure well controlled.  Pulm: Saturating 98% on room air.  GI: No active disease. Tolerating regular diet, passing flatus  : Voiding spontaneously  Heme: Hgb stable 10.7 preop. AM labs pending  ID: Afebrile, on ceftriaxone for UTI  Endo: No active disease.   FEN: Tolerating PO. AM labs pending  Skin: No active disease.   Psych: No active disease.   DVT ppx: Ambulation encouraged, SCDs when in bed, heparin ordered  Dispo: continue inpatient management as per primary team, gyn onc to follow up with results

## 2023-04-11 NOTE — PHYSICAL THERAPY INITIAL EVALUATION ADULT - PERTINENT HX OF CURRENT PROBLEM, REHAB EVAL
increasing abdominal distention ,girth ,mild dyspnea on exertion, decreased appetite increasing abdominal distention ,girth ,mild dyspnea on exertion, decreased appetite admitted for ascites workup ,s/p gyn consult for incidental finding within uterus in setting of intermittent postmenopausal vaginal bleeding now POD#1 s/p EUA, hysteroscopy D&C

## 2023-04-11 NOTE — PHYSICAL THERAPY INITIAL EVALUATION ADULT - IMPAIRMENTS FOUND, PT EVAL
increased abdominal distention/girth, may need repeat paracentesis per MD notes ,mild BLE edema/aerobic capacity/endurance/anthropometric characteristics/muscle strength

## 2023-04-11 NOTE — PHYSICAL THERAPY INITIAL EVALUATION ADULT - LEVEL OF INDEPENDENCE: SUPINE/SIT, REHAB EVAL
pt states she normally pushes self up using her night table next to bed/minimum assist (75% patients effort)/moderate assist (50% patients effort)

## 2023-04-11 NOTE — PHYSICAL THERAPY INITIAL EVALUATION ADULT - STANDING BALANCE: DYNAMIC, REHAB EVAL
RW , pt able to stop and bend fwd to get item out of travel bag without loss of balance ,supporting self with single L hand at RW/good balance

## 2023-04-11 NOTE — PROGRESS NOTE ADULT - SUBJECTIVE AND OBJECTIVE BOX
Patient is a 73y old  Female who presents with a chief complaint of Abdominal distension and Ascites (11 Apr 2023 08:01)      Subective: Seen and examined at bedside. S/P D/C and paracentesis with 59330ot of fluid removed. Tolerating diet and passing flatus.       PAST MEDICAL & SURGICAL HISTORY:  No pertinent past medical history      No significant past surgical history          MEDICATIONS  (STANDING):  cefTRIAXone Injectable. 1000 milliGRAM(s) IV Push every 24 hours  heparin   Injectable 5000 Unit(s) SubCutaneous every 8 hours  potassium chloride    Tablet ER 40 milliEquivalent(s) Oral once    MEDICATIONS  (PRN):  acetaminophen     Tablet .. 650 milliGRAM(s) Oral every 6 hours PRN Temp greater or equal to 38C (100.4F), Mild Pain (1 - 3)  aluminum hydroxide/magnesium hydroxide/simethicone Suspension 30 milliLiter(s) Oral every 4 hours PRN Dyspepsia  melatonin 3 milliGRAM(s) Oral at bedtime PRN Insomnia  ondansetron Injectable 4 milliGRAM(s) IV Push every 8 hours PRN Nausea and/or Vomiting      REVIEW OF SYSTEMS:    RESPIRATORY: No shortness of breath  CARDIOVASCULAR: No chest pain  All other review of systems is negative unless indicated above.    Vital Signs Last 24 Hrs  T(C): 36.3 (11 Apr 2023 07:19), Max: 37.2 (10 Apr 2023 15:30)  T(F): 97.3 (11 Apr 2023 07:19), Max: 99 (10 Apr 2023 15:30)  HR: 86 (11 Apr 2023 07:19) (70 - 86)  BP: 145/60 (11 Apr 2023 07:19) (119/55 - 152/68)  BP(mean): --  RR: 18 (11 Apr 2023 07:19) (16 - 23)  SpO2: 98% (11 Apr 2023 07:19) (97% - 100%)    Parameters below as of 11 Apr 2023 07:19  Patient On (Oxygen Delivery Method): nasal cannula  O2 Flow (L/min): 2      PHYSICAL EXAM:    Constitutional: NAD  Respiratory: CTAB  Cardiovascular: S1 and S2, RRR  Gastrointestinal: BS+, soft, decreased distension, NT  Extremities: No peripheral edema  Psychiatric: Normal mood, normal affect    LABS:                        10.3   24.94 )-----------( 667      ( 11 Apr 2023 08:56 )             33.0     04-11    134<L>  |  101  |  12  ----------------------------<  205<H>  3.4<L>   |  26  |  0.60    Ca    8.8      11 Apr 2023 08:56    TPro  6.5  /  Alb  2.1<L>  /  TBili  0.7  /  DBili  0.4<H>  /  AST  16  /  ALT  12  /  AlkPhos  115  04-11    PT/INR - ( 10 Apr 2023 07:13 )   PT: 18.4 sec;   INR: 1.58 ratio         PTT - ( 10 Apr 2023 07:13 )  PTT:29.7 sec  LIVER FUNCTIONS - ( 11 Apr 2023 08:56 )  Alb: 2.1 g/dL / Pro: 6.5 gm/dL / ALK PHOS: 115 U/L / ALT: 12 U/L / AST: 16 U/L / GGT: x             RADIOLOGY & ADDITIONAL STUDIES:

## 2023-04-11 NOTE — PHYSICAL THERAPY INITIAL EVALUATION ADULT - DIAGNOSIS, PT EVAL
abdominal/pelvic ascites ,R abdominal mass, r/o pelvic organ CA (ie-endometrial)  vs peritoneal carcinomatosis significant abdominal/pelvic ascites ,central uterine mass,r/o fibroids, R abdominal mass, r/o pelvic organ CA (ie-endometrial)  vs peritoneal carcinomatosis; s/p hysteroscopic D&C with endometrial bx 4/10 c/w endometrial CA (final path PENDING) Chronic lower back pain

## 2023-04-12 ENCOUNTER — FORM ENCOUNTER (OUTPATIENT)
Age: 73
End: 2023-04-12

## 2023-04-12 ENCOUNTER — APPOINTMENT (OUTPATIENT)
Dept: CARDIOLOGY | Facility: CLINIC | Age: 73
End: 2023-04-12

## 2023-04-12 LAB
ANION GAP SERPL CALC-SCNC: 4 MMOL/L — LOW (ref 5–17)
BUN SERPL-MCNC: 9 MG/DL — SIGNIFICANT CHANGE UP (ref 7–23)
CALCIUM SERPL-MCNC: 8.8 MG/DL — SIGNIFICANT CHANGE UP (ref 8.5–10.1)
CHLORIDE SERPL-SCNC: 98 MMOL/L — SIGNIFICANT CHANGE UP (ref 96–108)
CO2 SERPL-SCNC: 30 MMOL/L — SIGNIFICANT CHANGE UP (ref 22–31)
CREAT SERPL-MCNC: 0.57 MG/DL — SIGNIFICANT CHANGE UP (ref 0.5–1.3)
EGFR: 96 ML/MIN/1.73M2 — SIGNIFICANT CHANGE UP
GLUCOSE SERPL-MCNC: 136 MG/DL — HIGH (ref 70–99)
HCT VFR BLD CALC: 32.3 % — LOW (ref 34.5–45)
HGB BLD-MCNC: 9.8 G/DL — LOW (ref 11.5–15.5)
MAGNESIUM SERPL-MCNC: 2 MG/DL — SIGNIFICANT CHANGE UP (ref 1.6–2.6)
MCHC RBC-ENTMCNC: 22.3 PG — LOW (ref 27–34)
MCHC RBC-ENTMCNC: 30.3 GM/DL — LOW (ref 32–36)
MCV RBC AUTO: 73.6 FL — LOW (ref 80–100)
PLATELET # BLD AUTO: 598 K/UL — HIGH (ref 150–400)
POTASSIUM SERPL-MCNC: 4.1 MMOL/L — SIGNIFICANT CHANGE UP (ref 3.5–5.3)
POTASSIUM SERPL-SCNC: 4.1 MMOL/L — SIGNIFICANT CHANGE UP (ref 3.5–5.3)
RBC # BLD: 4.39 M/UL — SIGNIFICANT CHANGE UP (ref 3.8–5.2)
RBC # FLD: 17.7 % — HIGH (ref 10.3–14.5)
SODIUM SERPL-SCNC: 132 MMOL/L — LOW (ref 135–145)
WBC # BLD: 20.94 K/UL — HIGH (ref 3.8–10.5)
WBC # FLD AUTO: 20.94 K/UL — HIGH (ref 3.8–10.5)

## 2023-04-12 PROCEDURE — 99233 SBSQ HOSP IP/OBS HIGH 50: CPT

## 2023-04-12 PROCEDURE — 99232 SBSQ HOSP IP/OBS MODERATE 35: CPT | Mod: GC

## 2023-04-12 RX ADMIN — CEFTRIAXONE 2000 MILLIGRAM(S): 500 INJECTION, POWDER, FOR SOLUTION INTRAMUSCULAR; INTRAVENOUS at 21:46

## 2023-04-12 RX ADMIN — HEPARIN SODIUM 5000 UNIT(S): 5000 INJECTION INTRAVENOUS; SUBCUTANEOUS at 13:12

## 2023-04-12 RX ADMIN — Medication 600 MILLIGRAM(S): at 21:45

## 2023-04-12 RX ADMIN — HEPARIN SODIUM 5000 UNIT(S): 5000 INJECTION INTRAVENOUS; SUBCUTANEOUS at 05:36

## 2023-04-12 RX ADMIN — Medication 600 MILLIGRAM(S): at 17:24

## 2023-04-12 RX ADMIN — CEFTRIAXONE 2000 MILLIGRAM(S): 500 INJECTION, POWDER, FOR SOLUTION INTRAMUSCULAR; INTRAVENOUS at 09:41

## 2023-04-12 RX ADMIN — HEPARIN SODIUM 5000 UNIT(S): 5000 INJECTION INTRAVENOUS; SUBCUTANEOUS at 21:47

## 2023-04-12 NOTE — PROGRESS NOTE ADULT - SUBJECTIVE AND OBJECTIVE BOX
HOSPITALIST ATTENDING PROGRESS NOTE    Chart and meds reviewed.  Patient seen and examined.    CC: abdnl distension    Subjective: Some back pain, no SOB, CP.     All other systems reviewed and found to be negative with the exception of what has been described above.    MEDICATIONS  (STANDING):  cefTRIAXone Injectable. 2000 milliGRAM(s) IV Push every 12 hours  guaiFENesin  milliGRAM(s) Oral every 12 hours  heparin   Injectable 5000 Unit(s) SubCutaneous every 8 hours    MEDICATIONS  (PRN):  acetaminophen     Tablet .. 650 milliGRAM(s) Oral every 6 hours PRN Temp greater or equal to 38C (100.4F), Mild Pain (1 - 3)  aluminum hydroxide/magnesium hydroxide/simethicone Suspension 30 milliLiter(s) Oral every 4 hours PRN Dyspepsia  melatonin 3 milliGRAM(s) Oral at bedtime PRN Insomnia  ondansetron Injectable 4 milliGRAM(s) IV Push every 8 hours PRN Nausea and/or Vomiting      VITALS:  T(F): 98.2 (23 @ 07:47), Max: 98.2 (23 @ 23:45)  HR: 82 (23 @ 07:47) (82 - 90)  BP: 127/67 (23 @ 07:47) (120/45 - 127/67)  RR: 18 (23 @ 07:47) (18 - 18)  SpO2: 98% (23 @ 07:47) (97% - 98%)  Wt(kg): --    I&O's Summary    2023 07:  -  2023 07:00  --------------------------------------------------------  IN: 0 mL / OUT: 650 mL / NET: -650 mL    :  -  2023 16:27  --------------------------------------------------------  IN: 360 mL / OUT: 0 mL / NET: 360 mL        CAPILLARY BLOOD GLUCOSE          PHYSICAL EXAM:  Gen: NAD  HEENT:  pupils equal and reactive, EOMI, no oropharyngeal lesions, erythema, exudates, oral thrush  NECK:   supple, no carotid bruits, no palpable lymph nodes, no thyromegaly  CV:  +S1, +S2, regular, no murmurs or rubs  RESP:   lungs clear to auscultation bilaterally, no wheezing, rales, rhonchi, good air entry bilaterally  BREAST:  not examined  GI:  abdomen soft, non-tender, +distended, normal BS, no bruits, no abdominal masses, no palpable masses  RECTAL:  not examined  :  not examined  MSK:   normal muscle tone, no atrophy, no rigidity, no contractions  EXT:  no clubbing, no cyanosis, no edema, no calf pain, swelling or erythema  VASCULAR:  pulses equal and symmetric in the upper and lower extremities  NEURO:  AAOX3, no focal neurological deficits, follows all commands, able to move extremities spontaneously  SKIN:  no ulcers, lesions or rashes    LABS:                            9.8    20.94 )-----------( 598      ( 2023 09:04 )             32.3     04-12    132<L>  |  98  |  9   ----------------------------<  136<H>  4.1   |  30  |  0.57    Ca    8.8      2023 09:04  Mg     2.0     04-12    TPro  6.5  /  Alb  2.1<L>  /  TBili  0.7  /  DBili  0.4<H>  /  AST  16  /  ALT  12  /  AlkPhos  115  04-11        LIVER FUNCTIONS - ( 2023 08:56 )  Alb: 2.1 g/dL / Pro: 6.5 gm/dL / ALK PHOS: 115 U/L / ALT: 12 U/L / AST: 16 U/L / GGT: x             Urinalysis Basic - ( 10 Apr 2023 17:00 )    Color: Yellow / Appearance: Clear / S.015 / pH: x  Gluc: x / Ketone: Moderate  / Bili: Negative / Urobili: Negative   Blood: x / Protein: 30 mg/dL / Nitrite: Negative   Leuk Esterase: Negative / RBC: 25-50 /HPF / WBC 3-5 /HPF   Sq Epi: x / Non Sq Epi: x / Bacteria: Negative    CULTURES:  HepA neg  HepB neg, non immune either  HCV Ab non reactive    AFP tumor marker <1.8, neg  CEA 2.7, neg   113, elevated  CA 19-9 125, elevated    Peritoneal fluid chem 4/10: albumin 2.2, protein 4.7, glucose 8, LDH 1465  SAAG: Low gradient, portal HTN unlikely  Peritoneal fluid cell count 4/10: Turbid, brown, Total nucleated cells 1857 (ANC > 250), ,000     Micro:  Peritoneal fluid culture 4/10: Gram's stain negative, NG  Urine culture : Negative  COVID19, flu, RSV PCR : Negative    Imaging:  CT chest 4/10: No malignancy in the chest    US pelvic : Limited pelvic ultrasound. The endometrium is incompletely assessed on this examination. The endometrium is either severely thickened to 6 cm or is distended with complex material to 6 cm.  There is a 2.5 x 1.6 x 1.7 cm shadowing echogenic focus with shadowing along the periphery of the endometrium, which may represent a calcified intramural versus submucosal fibroid, versus an endometrial lesion. The ovaries are not visualized. Large volume pelvic ascites.    US venous duplex B/L LEs : No evidence of deep venous thrombosis in either lower extremity.    CT abd/pelvis W/ : Very large amount of abdominal and pelvic ascites. Uterine fibroids.    CXR : Heart suggest normal size. Lungs are grossly clear.    Cardiac Testing:  TTE : Trace mitral regurgitation is present. EA reversal of the mitral inflow consistent with reduced compliance of the left ventricle. The aortic valve is well visualized, appears mildly calcified. Valve opening seems to be normal. The tricuspid valve leaflets are thin and pliable; valve motion is normal. Trace tricuspid valve regurgitation is present. The left ventricle is normal in size, wall thickness, wall motion and contractility as seen in limited views. Estimated left ventricular ejection fraction is 65-70 %. Normal appearing right ventricle structure and function. Mild dilatation of the ascending aortic segment. The IVC not seen due to limited subcostal window. There is a large amount of fluid seen in the abdominal region. No evidence of pericardial effusion.    EKG : Rate 85, NSR, L axis, LVH, no acute ischemic changes    Procedures:  Hysteroscopic D+C of uterus 4/10: Uterus sounded to 10 wks size. Cervix grossly normal, endocervical curetting obtained. Hysteroscopy performed and thickened, fluffy endometrium observed, obscuring view of ostia. Adnexa non palpable. right abdominal mass palpated on exam. Fluids deficit 150cc normal saline.    Paracentesis 4/10: Large volume paracentesis, 13,800cc murky dark brown ascites removed

## 2023-04-12 NOTE — PROGRESS NOTE ADULT - ASSESSMENT
A/P: 73y admitted for ascites workup s/p gyn consult for incidental finding within uterus in setting of intermittent postmenopausal vaginal bleeding now POD#2 s/p EUA, hysteroscopy D&C.     Neuro: Denies pain, Continue current pain regimen.  CV: No history of cardiovascular disease. Blood pressure well controlled.  Pulm: Saturating 97% with NC.  GI: No active disease. Tolerating regular diet, passing flatus  : Voiding spontaneously  Heme: Hgb stable 10.7 >10.3. AM labs pending  ID: Afebrile, on ceftriaxone for SBP  Endo: No active disease.   FEN: Tolerating PO. AM labs pending  Skin: No active disease.   Psych: No active disease.   DVT ppx: Ambulation encouraged, SCDs when in bed, heparin ordered  Dispo: continue inpatient management as per primary team, gyn onc to follow up with results

## 2023-04-13 ENCOUNTER — FORM ENCOUNTER (OUTPATIENT)
Age: 73
End: 2023-04-13

## 2023-04-13 LAB
ANION GAP SERPL CALC-SCNC: 6 MMOL/L — SIGNIFICANT CHANGE UP (ref 5–17)
BUN SERPL-MCNC: 9 MG/DL — SIGNIFICANT CHANGE UP (ref 7–23)
CALCIUM SERPL-MCNC: 8.7 MG/DL — SIGNIFICANT CHANGE UP (ref 8.5–10.1)
CHLORIDE SERPL-SCNC: 99 MMOL/L — SIGNIFICANT CHANGE UP (ref 96–108)
CO2 SERPL-SCNC: 28 MMOL/L — SIGNIFICANT CHANGE UP (ref 22–31)
CREAT SERPL-MCNC: 0.44 MG/DL — LOW (ref 0.5–1.3)
EGFR: 102 ML/MIN/1.73M2 — SIGNIFICANT CHANGE UP
GLUCOSE SERPL-MCNC: 105 MG/DL — HIGH (ref 70–99)
HCT VFR BLD CALC: 32.4 % — LOW (ref 34.5–45)
HGB BLD-MCNC: 9.7 G/DL — LOW (ref 11.5–15.5)
MCHC RBC-ENTMCNC: 22.1 PG — LOW (ref 27–34)
MCHC RBC-ENTMCNC: 29.9 GM/DL — LOW (ref 32–36)
MCV RBC AUTO: 73.8 FL — LOW (ref 80–100)
PLATELET # BLD AUTO: 631 K/UL — HIGH (ref 150–400)
POTASSIUM SERPL-MCNC: 4.1 MMOL/L — SIGNIFICANT CHANGE UP (ref 3.5–5.3)
POTASSIUM SERPL-SCNC: 4.1 MMOL/L — SIGNIFICANT CHANGE UP (ref 3.5–5.3)
RBC # BLD: 4.39 M/UL — SIGNIFICANT CHANGE UP (ref 3.8–5.2)
RBC # FLD: 17.9 % — HIGH (ref 10.3–14.5)
SARS-COV-2 RNA SPEC QL NAA+PROBE: SIGNIFICANT CHANGE UP
SODIUM SERPL-SCNC: 133 MMOL/L — LOW (ref 135–145)
WBC # BLD: 23.75 K/UL — HIGH (ref 3.8–10.5)
WBC # FLD AUTO: 23.75 K/UL — HIGH (ref 3.8–10.5)

## 2023-04-13 PROCEDURE — 99233 SBSQ HOSP IP/OBS HIGH 50: CPT

## 2023-04-13 PROCEDURE — 49083 ABD PARACENTESIS W/IMAGING: CPT

## 2023-04-13 PROCEDURE — 99232 SBSQ HOSP IP/OBS MODERATE 35: CPT

## 2023-04-13 RX ORDER — FAMOTIDINE 10 MG/ML
20 INJECTION INTRAVENOUS DAILY
Refills: 0 | Status: DISCONTINUED | OUTPATIENT
Start: 2023-04-13 | End: 2023-04-26

## 2023-04-13 RX ADMIN — HEPARIN SODIUM 5000 UNIT(S): 5000 INJECTION INTRAVENOUS; SUBCUTANEOUS at 13:28

## 2023-04-13 RX ADMIN — Medication 650 MILLIGRAM(S): at 13:31

## 2023-04-13 RX ADMIN — CEFTRIAXONE 2000 MILLIGRAM(S): 500 INJECTION, POWDER, FOR SOLUTION INTRAMUSCULAR; INTRAVENOUS at 13:29

## 2023-04-13 RX ADMIN — HEPARIN SODIUM 5000 UNIT(S): 5000 INJECTION INTRAVENOUS; SUBCUTANEOUS at 21:23

## 2023-04-13 RX ADMIN — HEPARIN SODIUM 5000 UNIT(S): 5000 INJECTION INTRAVENOUS; SUBCUTANEOUS at 06:29

## 2023-04-13 RX ADMIN — FAMOTIDINE 20 MILLIGRAM(S): 10 INJECTION INTRAVENOUS at 19:47

## 2023-04-13 RX ADMIN — CEFTRIAXONE 2000 MILLIGRAM(S): 500 INJECTION, POWDER, FOR SOLUTION INTRAMUSCULAR; INTRAVENOUS at 21:23

## 2023-04-13 RX ADMIN — Medication 650 MILLIGRAM(S): at 14:00

## 2023-04-13 RX ADMIN — Medication 600 MILLIGRAM(S): at 13:29

## 2023-04-13 NOTE — PROGRESS NOTE ADULT - ASSESSMENT
73 year old woman who no known PMHx, has not seen a doctor in many years, has not suffered any recent illness as far as she knows, takes no medications, only on tumeric for arthritis in her knee, presented for further evaluation of several weeks of progressively worsening abdominal distension and increased abdominal girth, associated decreased ability to tolerate PO, and symptom of exertional dyspnea. Denied fever, chills, change in bowel habits, blood in stool or constipation. Denies chronic alcohol use, illicit drugs or family history of GI disease/malignancy or GYN disease/malignancy. In the ED, vitals WNL, exam notable for distended abdomen w/ fairly tense ascites. WBC 15. Hgb 10.9 Plt 581. INR 1.49. Na 129. Cr WNL. AST/ALT WNL. T bili 2.1. Alk phos 159. alb 2.1. Trop mininally elevated. . CT A/P showed large volume ascites and non-specific jejunal loop distension, otherwise no overt GI pathology. Uterine fibroids present. COVID19, flu and RSV PCR negative. UA suggestive of possible infection. Started on ceftriaxone and admitted to Medicine.    Large volume ascites, c/f SBP, endometrial thickening  Etiology unclear but now appears as though could be related to a pelvic organ malignancy or primary peritoneal carcinomatosis. Appreciate input from Cardiology. Reviewed echo done earlier this admission. Normal EF, normal LA, no LVH, no pHTN. Doubt systolic or diastolic dysfunction is present. Appreciate input from GI as well, does not appear as though she has a significant liver ailment. Pelvis US done, concern for endometrial thickening. Sent tumor markers. CA 19-9 and  elevated. Consulted GYN. Underwent paracentesis by IR x 2, 4/10 w 13.8L out and 4/13 w 5.5L out. SAAG is low gradient, making portal HTN etiology unlikely. Fluid ANC >250 but no fever, no abdominal pain, no encephalopathy, but is c/w SBP  -SBP tx with CTX 2g iv BID x 5d  -s/p hysteroscopic D+C, EMBx by Gyn-Onc, done 4/10/23, will need close outpt f/u  -CT chest negative for malignancy.   - F/u in process endometrial curettage / bx    #PT eval    #DVT ppx- SQH

## 2023-04-13 NOTE — PROGRESS NOTE ADULT - ASSESSMENT
Endometrial mass, prolonged PMB, & ascites    -Email communication received from Pathologist revealing Endometrial Cancer ([facility] pending such that final report anticipated on 4/14); sat with the patient to review this finding & recommendation to repeat the CT Abd/Pelvis with contrast now s/p > 15 liters of ascites drainage to re-evaluate possibility of peritoneal metastasis.  First ascites cytology negative for malignancy, however await repeat sample from today.  Recommend Med/Onc consult for chemotherapy consultation as may be better candidate for NACT followed by interval surgery.  Pleurx abdominal catheter following completion of treatment for SBP is anticipated.  Plan for meeting with the patient & her sister 4/14 or 4/15 to discuss the above.  The patient expressed an understanding & her questions were answered to her apparent satisfaction.    Shefali Jones MD  [phone]

## 2023-04-13 NOTE — PROGRESS NOTE ADULT - SUBJECTIVE AND OBJECTIVE BOX
HOSPITALIST ATTENDING PROGRESS NOTE    Chart and meds reviewed.  Patient seen and examined.    CC: abdnl distension    Subjective: Denies CP, some abdnl pain. No SOB.     All other systems reviewed and found to be negative with the exception of what has been described above.    MEDICATIONS  (STANDING):  cefTRIAXone Injectable. 2000 milliGRAM(s) IV Push every 12 hours  guaiFENesin  milliGRAM(s) Oral every 12 hours  heparin   Injectable 5000 Unit(s) SubCutaneous every 8 hours    MEDICATIONS  (PRN):  acetaminophen     Tablet .. 650 milliGRAM(s) Oral every 6 hours PRN Temp greater or equal to 38C (100.4F), Mild Pain (1 - 3)  aluminum hydroxide/magnesium hydroxide/simethicone Suspension 30 milliLiter(s) Oral every 4 hours PRN Dyspepsia  melatonin 3 milliGRAM(s) Oral at bedtime PRN Insomnia  ondansetron Injectable 4 milliGRAM(s) IV Push every 8 hours PRN Nausea and/or Vomiting      VITALS:  T(F): 97.9 (04-13-23 @ 15:58), Max: 98.8 (04-12-23 @ 23:03)  HR: 79 (04-13-23 @ 15:58) (76 - 86)  BP: 134/59 (04-13-23 @ 15:58) (117/63 - 134/59)  RR: 18 (04-13-23 @ 15:58) (18 - 18)  SpO2: 96% (04-13-23 @ 15:58) (96% - 100%)  Wt(kg): --    I&O's Summary    12 Apr 2023 07:01  -  13 Apr 2023 07:00  --------------------------------------------------------  IN: 360 mL / OUT: 330 mL / NET: 30 mL    13 Apr 2023 07:01  -  13 Apr 2023 16:40  --------------------------------------------------------  IN: 0 mL / OUT: 300 mL / NET: -300 mL        CAPILLARY BLOOD GLUCOSE          PHYSICAL EXAM:  Gen: NAD  HEENT:  pupils equal and reactive, EOMI, no oropharyngeal lesions, erythema, exudates, oral thrush  NECK:   supple, no carotid bruits, no palpable lymph nodes, no thyromegaly  CV:  +S1, +S2, regular, no murmurs or rubs  RESP:   lungs clear to auscultation bilaterally, no wheezing, rales, rhonchi, good air entry bilaterally  BREAST:  not examined  GI:  abdomen soft, non-tender, non-distended, normal BS, no bruits, no abdominal masses, no palpable masses  RECTAL:  not examined  :  not examined  MSK:   normal muscle tone, no atrophy, no rigidity, no contractions  EXT:  no clubbing, no cyanosis, no edema, no calf pain, swelling or erythema  VASCULAR:  pulses equal and symmetric in the upper and lower extremities  NEURO:  AAOX3, no focal neurological deficits, follows all commands, able to move extremities spontaneously  SKIN:  no ulcers, lesions or rashes    LABS:                            9.7    23.75 )-----------( 631      ( 13 Apr 2023 06:48 )             32.4     04-13    133<L>  |  99  |  9   ----------------------------<  105<H>  4.1   |  28  |  0.44<L>    Ca    8.7      13 Apr 2023 06:48  Mg     2.0     04-12    CULTURES:  HepA neg  HepB neg, non immune either  HCV Ab non reactive    AFP tumor marker <1.8, neg  CEA 2.7, neg   113, elevated  CA 19-9 125, elevated    Peritoneal fluid chem 4/10: albumin 2.2, protein 4.7, glucose 8, LDH 1465  SAAG: Low gradient, portal HTN unlikely  Peritoneal fluid cell count 4/10: Turbid, brown, Total nucleated cells 1857 (ANC > 250), ,000     Micro:  Peritoneal fluid culture 4/10: Gram's stain negative, NG  Urine culture 4/7: Negative  COVID19, flu, RSV PCR 4/7: Negative    Imaging:  CT chest 4/10: No malignancy in the chest    US pelvic 4/8: Limited pelvic ultrasound. The endometrium is incompletely assessed on this examination. The endometrium is either severely thickened to 6 cm or is distended with complex material to 6 cm.  There is a 2.5 x 1.6 x 1.7 cm shadowing echogenic focus with shadowing along the periphery of the endometrium, which may represent a calcified intramural versus submucosal fibroid, versus an endometrial lesion. The ovaries are not visualized. Large volume pelvic ascites.    US venous duplex B/L LEs 4/8: No evidence of deep venous thrombosis in either lower extremity.    CT abd/pelvis W/ 4/7: Very large amount of abdominal and pelvic ascites. Uterine fibroids.    CXR 4/7: Heart suggest normal size. Lungs are grossly clear.    Cardiac Testing:  TTE 4/8: Trace mitral regurgitation is present. EA reversal of the mitral inflow consistent with reduced compliance of the left ventricle. The aortic valve is well visualized, appears mildly calcified. Valve opening seems to be normal. The tricuspid valve leaflets are thin and pliable; valve motion is normal. Trace tricuspid valve regurgitation is present. The left ventricle is normal in size, wall thickness, wall motion and contractility as seen in limited views. Estimated left ventricular ejection fraction is 65-70 %. Normal appearing right ventricle structure and function. Mild dilatation of the ascending aortic segment. The IVC not seen due to limited subcostal window. There is a large amount of fluid seen in the abdominal region. No evidence of pericardial effusion.    EKG 4/7: Rate 85, NSR, L axis, LVH, no acute ischemic changes    Procedures:  Hysteroscopic D+C of uterus 4/10: Uterus sounded to 10 wks size. Cervix grossly normal, endocervical curetting obtained. Hysteroscopy performed and thickened, fluffy endometrium observed, obscuring view of ostia. Adnexa non palpable. right abdominal mass palpated on exam. Fluids deficit 150cc normal saline.    Paracentesis 4/10: Large volume paracentesis, 13,800cc murky dark brown ascites removed    Paracentesis 4/13: 5.5L out

## 2023-04-13 NOTE — PROGRESS NOTE ADULT - SUBJECTIVE AND OBJECTIVE BOX
LILIAN CASTILLO  73y  Female s/p HSC/D&C 4/10 for endometrial mass, longstanding PMB at which time the findings were suspicious for malignancy.    Subjective:  Tired as near her bedtime.  No abdominal pain and further relief from repeat paracentesis yielding > 5 Liters of ascites today.      04-12 @ 07:01  -  04-13 @ 07:00  --------------------------------------------------------  IN: 360 mL / OUT: 330 mL / NET: 30 mL    04-13 @ 07:01  -  04-13 @ 21:33  --------------------------------------------------------  IN: 0 mL / OUT: 300 mL / NET: -300 mL        Vital Signs Last 24 Hrs  T(C): 36.6 (13 Apr 2023 15:58), Max: 37.1 (12 Apr 2023 23:03)  T(F): 97.9 (13 Apr 2023 15:58), Max: 98.8 (12 Apr 2023 23:03)  HR: 79 (13 Apr 2023 15:58) (76 - 86)  BP: 134/59 (13 Apr 2023 15:58) (117/63 - 134/59)  BP(mean): --  RR: 18 (13 Apr 2023 15:58) (18 - 18)  SpO2: 96% (13 Apr 2023 15:58) (96% - 100%)    Parameters below as of 13 Apr 2023 15:58  Patient On (Oxygen Delivery Method): room air      PHYSICAL EXAM:      Constitutional: NAD, A&O x 3  Abd: soft distension, + BS, nontender    20 milliGRAM(s), Oral, daily (04-13 @ 16:56)  Basic Metabolic Panel: AM Sched. Collection: 14-Apr-2023 07:00 (04-13 @ 08:51)  Complete Blood Count: AM Sched. Collection: 14-Apr-2023 07:00 (04-13 @ 08:51)  COVID-19 PCR: Routine  Specimen Source: Nasopharyngeal (04-13 @ 03:18)

## 2023-04-14 LAB
ANION GAP SERPL CALC-SCNC: 5 MMOL/L — SIGNIFICANT CHANGE UP (ref 5–17)
BUN SERPL-MCNC: 8 MG/DL — SIGNIFICANT CHANGE UP (ref 7–23)
CALCIUM SERPL-MCNC: 8.7 MG/DL — SIGNIFICANT CHANGE UP (ref 8.5–10.1)
CHLORIDE SERPL-SCNC: 98 MMOL/L — SIGNIFICANT CHANGE UP (ref 96–108)
CO2 SERPL-SCNC: 29 MMOL/L — SIGNIFICANT CHANGE UP (ref 22–31)
CREAT SERPL-MCNC: 0.52 MG/DL — SIGNIFICANT CHANGE UP (ref 0.5–1.3)
EGFR: 98 ML/MIN/1.73M2 — SIGNIFICANT CHANGE UP
GLUCOSE SERPL-MCNC: 138 MG/DL — HIGH (ref 70–99)
HCT VFR BLD CALC: 34.1 % — LOW (ref 34.5–45)
HGB BLD-MCNC: 10.2 G/DL — LOW (ref 11.5–15.5)
MCHC RBC-ENTMCNC: 22 PG — LOW (ref 27–34)
MCHC RBC-ENTMCNC: 29.9 GM/DL — LOW (ref 32–36)
MCV RBC AUTO: 73.7 FL — LOW (ref 80–100)
PLATELET # BLD AUTO: 653 K/UL — HIGH (ref 150–400)
POTASSIUM SERPL-MCNC: 3.6 MMOL/L — SIGNIFICANT CHANGE UP (ref 3.5–5.3)
POTASSIUM SERPL-SCNC: 3.6 MMOL/L — SIGNIFICANT CHANGE UP (ref 3.5–5.3)
RBC # BLD: 4.63 M/UL — SIGNIFICANT CHANGE UP (ref 3.8–5.2)
RBC # FLD: 18.2 % — HIGH (ref 10.3–14.5)
SODIUM SERPL-SCNC: 132 MMOL/L — LOW (ref 135–145)
SURGICAL PATHOLOGY STUDY: SIGNIFICANT CHANGE UP
WBC # BLD: 15.37 K/UL — HIGH (ref 3.8–10.5)
WBC # FLD AUTO: 15.37 K/UL — HIGH (ref 3.8–10.5)

## 2023-04-14 PROCEDURE — 99223 1ST HOSP IP/OBS HIGH 75: CPT

## 2023-04-14 PROCEDURE — 99232 SBSQ HOSP IP/OBS MODERATE 35: CPT

## 2023-04-14 PROCEDURE — 74177 CT ABD & PELVIS W/CONTRAST: CPT | Mod: 26

## 2023-04-14 RX ADMIN — CEFTRIAXONE 2000 MILLIGRAM(S): 500 INJECTION, POWDER, FOR SOLUTION INTRAMUSCULAR; INTRAVENOUS at 21:30

## 2023-04-14 RX ADMIN — FAMOTIDINE 20 MILLIGRAM(S): 10 INJECTION INTRAVENOUS at 09:42

## 2023-04-14 RX ADMIN — HEPARIN SODIUM 5000 UNIT(S): 5000 INJECTION INTRAVENOUS; SUBCUTANEOUS at 21:30

## 2023-04-14 RX ADMIN — HEPARIN SODIUM 5000 UNIT(S): 5000 INJECTION INTRAVENOUS; SUBCUTANEOUS at 05:25

## 2023-04-14 RX ADMIN — Medication 600 MILLIGRAM(S): at 09:43

## 2023-04-14 RX ADMIN — Medication 600 MILLIGRAM(S): at 21:30

## 2023-04-14 RX ADMIN — HEPARIN SODIUM 5000 UNIT(S): 5000 INJECTION INTRAVENOUS; SUBCUTANEOUS at 13:07

## 2023-04-14 RX ADMIN — CEFTRIAXONE 2000 MILLIGRAM(S): 500 INJECTION, POWDER, FOR SOLUTION INTRAMUSCULAR; INTRAVENOUS at 11:05

## 2023-04-14 NOTE — PROGRESS NOTE ADULT - ASSESSMENT
73 year old woman who no known PMHx, has not seen a doctor in many years, has not suffered any recent illness as far as she knows, takes no medications, only on tumeric for arthritis in her knee, presented for further evaluation of several weeks of progressively worsening abdominal distension and increased abdominal girth, associated decreased ability to tolerate PO, and symptom of exertional dyspnea. Denied fever, chills, change in bowel habits, blood in stool or constipation. Denies chronic alcohol use, illicit drugs or family history of GI disease/malignancy or GYN disease/malignancy. In the ED, vitals WNL, exam notable for distended abdomen w/ fairly tense ascites. WBC 15. Hgb 10.9 Plt 581. INR 1.49. Na 129. Cr WNL. AST/ALT WNL. T bili 2.1. Alk phos 159. alb 2.1. Trop mininally elevated. . CT A/P showed large volume ascites and non-specific jejunal loop distension, otherwise no overt GI pathology. Uterine fibroids present. COVID19, flu and RSV PCR negative. UA suggestive of possible infection. Started on ceftriaxone and admitted to Medicine.    #Large volume ascites, c/f SBP, endometrial thickening, now with pathology showing endometrial cancer  -likely w malignant ascites c/b SBP  -SBP tx w CTX 2g iv BID x5d  -s/p hysteroscopic D+C, EMBx by Gyn-Onc, done 4/10/23, w path showing endometrial cancer  -f/u gyn onc and med onc recs  -will need f/u w GI as well    #PT eval    #DVT ppx- SQH    Abx thru 4/16, then likely medically ready for d/c pending onc plan

## 2023-04-14 NOTE — PROGRESS NOTE ADULT - SUBJECTIVE AND OBJECTIVE BOX
HOSPITALIST ATTENDING PROGRESS NOTE    Chart and meds reviewed.  Patient seen and examined.    CC: abdnl distension    Subjective: Denies CP, appetite and mobility improving.    All other systems reviewed and found to be negative with the exception of what has been described above.    MEDICATIONS  (STANDING):  cefTRIAXone Injectable. 2000 milliGRAM(s) IV Push every 12 hours  famotidine    Tablet 20 milliGRAM(s) Oral daily  guaiFENesin  milliGRAM(s) Oral every 12 hours  heparin   Injectable 5000 Unit(s) SubCutaneous every 8 hours    MEDICATIONS  (PRN):  acetaminophen     Tablet .. 650 milliGRAM(s) Oral every 6 hours PRN Temp greater or equal to 38C (100.4F), Mild Pain (1 - 3)  aluminum hydroxide/magnesium hydroxide/simethicone Suspension 30 milliLiter(s) Oral every 4 hours PRN Dyspepsia  melatonin 3 milliGRAM(s) Oral at bedtime PRN Insomnia  ondansetron Injectable 4 milliGRAM(s) IV Push every 8 hours PRN Nausea and/or Vomiting      VITALS:  T(F): 98.1 (04-14-23 @ 08:20), Max: 98.1 (04-13-23 @ 23:51)  HR: 79 (04-14-23 @ 10:40) (79 - 79)  BP: 118/68 (04-14-23 @ 10:40) (118/68 - 128/50)  RR: 17 (04-14-23 @ 08:20) (17 - 17)  SpO2: 96% (04-14-23 @ 08:20) (94% - 96%)  Wt(kg): --    I&O's Summary    13 Apr 2023 07:01  -  14 Apr 2023 07:00  --------------------------------------------------------  IN: 0 mL / OUT: 1000 mL / NET: -1000 mL    14 Apr 2023 07:01  -  14 Apr 2023 16:12  --------------------------------------------------------  IN: 0 mL / OUT: 275 mL / NET: -275 mL        CAPILLARY BLOOD GLUCOSE          PHYSICAL EXAM:  Gen: NAD  HEENT:  pupils equal and reactive, EOMI, no oropharyngeal lesions, erythema, exudates, oral thrush  NECK:   supple, no carotid bruits, no palpable lymph nodes, no thyromegaly  CV:  +S1, +S2, regular, no murmurs or rubs  RESP:   lungs clear to auscultation bilaterally, no wheezing, rales, rhonchi, good air entry bilaterally  BREAST:  not examined  GI:  abdomen soft, non-tender,  mildly distended, normal BS, no bruits, no abdominal masses, no palpable masses  RECTAL:  not examined  :  not examined  MSK:   normal muscle tone, no atrophy, no rigidity, no contractions  EXT:  no clubbing, no cyanosis, no edema, no calf pain, swelling or erythema  VASCULAR:  pulses equal and symmetric in the upper and lower extremities  NEURO:  AAOX3, no focal neurological deficits, follows all commands, able to move extremities spontaneously  SKIN:  no ulcers, lesions or rashes    LABS:                            10.2   15.37 )-----------( 653      ( 14 Apr 2023 09:48 )             34.1     04-14    132<L>  |  98  |  8   ----------------------------<  138<H>  3.6   |  29  |  0.52    Ca    8.7      14 Apr 2023 09:48    CULTURES:  HepA neg  HepB neg, non immune either  HCV Ab non reactive    AFP tumor marker <1.8, neg  CEA 2.7, neg   113, elevated  CA 19-9 125, elevated    Peritoneal fluid chem 4/10: albumin 2.2, protein 4.7, glucose 8, LDH 1465  SAAG: Low gradient, portal HTN unlikely  Peritoneal fluid cell count 4/10: Turbid, brown, Total nucleated cells 1857 (ANC > 250), ,000     Micro:  Peritoneal fluid culture 4/10: Gram's stain negative, NG  Urine culture 4/7: Negative  COVID19, flu, RSV PCR 4/7: Negative    Imaging:  CT chest 4/10: No malignancy in the chest    US pelvic 4/8: Limited pelvic ultrasound. The endometrium is incompletely assessed on this examination. The endometrium is either severely thickened to 6 cm or is distended with complex material to 6 cm.  There is a 2.5 x 1.6 x 1.7 cm shadowing echogenic focus with shadowing along the periphery of the endometrium, which may represent a calcified intramural versus submucosal fibroid, versus an endometrial lesion. The ovaries are not visualized. Large volume pelvic ascites.    US venous duplex B/L LEs 4/8: No evidence of deep venous thrombosis in either lower extremity.    CT abd/pelvis W/ 4/7: Very large amount of abdominal and pelvic ascites. Uterine fibroids.    CXR 4/7: Heart suggest normal size. Lungs are grossly clear.    Cardiac Testing:  TTE 4/8: Trace mitral regurgitation is present. EA reversal of the mitral inflow consistent with reduced compliance of the left ventricle. The aortic valve is well visualized, appears mildly calcified. Valve opening seems to be normal. The tricuspid valve leaflets are thin and pliable; valve motion is normal. Trace tricuspid valve regurgitation is present. The left ventricle is normal in size, wall thickness, wall motion and contractility as seen in limited views. Estimated left ventricular ejection fraction is 65-70 %. Normal appearing right ventricle structure and function. Mild dilatation of the ascending aortic segment. The IVC not seen due to limited subcostal window. There is a large amount of fluid seen in the abdominal region. No evidence of pericardial effusion.    EKG 4/7: Rate 85, NSR, L axis, LVH, no acute ischemic changes    Procedures:  Hysteroscopic D+C of uterus 4/10: Uterus sounded to 10 wks size. Cervix grossly normal, endocervical curetting obtained. Hysteroscopy performed and thickened, fluffy endometrium observed, obscuring view of ostia. Adnexa non palpable. right abdominal mass palpated on exam. Fluids deficit 150cc normal saline.    Paracentesis 4/10: Large volume paracentesis, 13,800cc murky dark brown ascites removed    Paracentesis 4/13: 5.5L out

## 2023-04-14 NOTE — CONSULT NOTE ADULT - ASSESSMENT
72 y/o F with no significant PMHx found to have significant abdominal ascites with uterine mass.      # Suspected Malignancy in setting of Uterine Mass with Ascites     - Initial CT imaging revealed significant amount of abdominal ascites  - S/p paracentesis with IR both 04/10 & 04/13 with approx 15L taken off ---> initial sample (04/10) negative for malignant cells ---> awaiting results from 04/13 sample  - Currently being treated for SBP with CTX 2g IV BID x 5 days  - S/p hysteroscopic D&C with EMBx by GynOnc 4/10; consistent with endometrial CA but pending final report  - CT chest negative for any potential malignancy  - GOC discussion/meeting with family and GynOnc 04/14 or 04/15  - Attending Dr Bryant spoke with patient this morning and discussed following up with Primary Heme/Onc Dr Ashley outpatient for treatment  - Attending Dr Ashley will be covering this weekend and can see inpatient  72 y/o F with no significant PMHx found to have significant abdominal ascites with uterine mass.      # Suspected Malignancy in setting of Uterine Mass with Ascites     - Initial CT imaging revealed significant amount of abdominal ascites  - S/p paracentesis with IR both 04/10 & 04/13 with approx 15L taken off ---> initial sample (04/10) negative for malignant cells ---> awaiting results from 04/13 sample  - Currently being treated for SBP with CTX 2g IV BID x 5 days  - S/p hysteroscopic D&C with EMBx by GynOnc 4/10; consistent with endometrial CA but pending final report  - CT chest negative for any potential malignancy  - GOC discussion/meeting with family and GynOnc 04/14 or 04/15  - Attending Dr Bryant spoke with patient this morning and discussed following up with Primary Heme/Onc Dr Ashley outpatient for treatment  - Attending Dr Ashley will be covering this weekend and can see inpatient       # Normocytic Anemia & Leukocytosis    - WBC count 15K, trending down  - Hgb 10.6 g/dL, stable  - Likely 2/2 suspected malignancy / SBP  - Continue supportive measures 72 y/o F with no significant PMHx found to have significant abdominal ascites with uterine mass.      # Suspected Malignancy in setting of Uterine Mass with Ascites     - Initial CT imaging revealed significant amount of abdominal ascites  - S/p paracentesis with IR both 04/10 & 04/13 with approx 15L taken off ---> initial sample (04/10) negative for malignant cells ---> awaiting results from 04/13 sample  - Currently being treated for SBP with CTX 2g IV BID x 5 days  - S/p hysteroscopic D&C with EMBx by GynOn 4/10; consistent with endometrial CA but pending final report  - CT chest negative for any potential malignancy  - [facility]  - Attending Dr Bryant spoke with patient this morning and discussed following up with Primary Heme/Onc Dr Ashley outpatient for treatment  - Attending Dr Ashley will be covering this weekend and can see inpatient       # Normocytic Anemia & Leukocytosis    - WBC count 15K, trending down  - Hgb 10.6 g/dL, stable  - Likely 2/2 suspected malignancy / SBP  - Continue supportive measures    Addendum Hem Onc Attending  Patient seen and examined today morning. Abd distention better, she is able to eat.  Pathology from endometrial bx pending. Repeat CT abd pelvis today AM reviewed.  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  Med Oncology- Dr Ashley will follow up / discuss with GYN Onc treatment  plan. Likely will need systemic therapy first before debulking surgery considered.  Anemia. low iron sat. Might benefit from iv iron.

## 2023-04-14 NOTE — CONSULT NOTE ADULT - NS ATTEND AMEND GEN_ALL_CORE FT
Per A/P in consult note
agree w/ above.  Reviewed echo.  Normal EF normal LA no pulmonary hypertension no LVH.  Doubt systolic or diastolic dysfunction is present.  Will sign off please call if questions.

## 2023-04-14 NOTE — CONSULT NOTE ADULT - SUBJECTIVE AND OBJECTIVE BOX
HPI:    72 y/o F presented to the ED c/o VINES with progressive abdominal distension for more than 6 weeks. She admitted that she traveled to and from [place] with these symptoms, recently returned a few weeks ago. She admitted to having decreased appetite with nausea and vomiting for the past few days but denied any recent fever, cough, chills, HA, vision/hearing changes, CP, palpitations, diarrhea, melena, BRBPR or any other acute c/o. (07 Apr 2023 23:06)      04/14/2023: Seen at bedside this morning after returning from CT      PAST MEDICAL & SURGICAL HISTORY:    No pertinent past medical history  No significant past surgical history        MEDICATIONS  (STANDING):    cefTRIAXone Injectable. 2000 milliGRAM(s) IV Push every 12 hours  famotidine    Tablet 20 milliGRAM(s) Oral daily  guaiFENesin  milliGRAM(s) Oral every 12 hours  heparin   Injectable 5000 Unit(s) SubCutaneous every 8 hours      MEDICATIONS  (PRN):    acetaminophen     Tablet .. 650 milliGRAM(s) Oral every 6 hours PRN Temp greater or equal to 38C (100.4F), Mild Pain (1 - 3)  aluminum hydroxide/magnesium hydroxide/simethicone Suspension 30 milliLiter(s) Oral every 4 hours PRN Dyspepsia  melatonin 3 milliGRAM(s) Oral at bedtime PRN Insomnia  ondansetron Injectable 4 milliGRAM(s) IV Push every 8 hours PRN Nausea and/or Vomiting      ALLERGIES:    No Known Allergies      FAMILY HISTORY:    No pertinent family history in first degree relatives        REVIEW OF SYSTEMS:    Constitutional, Eyes, ENT, Cardiovascular, Respiratory, Gastrointestinal, Genitourinary, Musculoskeletal, Integumentary, Neurological, Psychiatric, Endocrine, Heme/Lymph and Allergic/Immunologic review of systems are otherwise negative except as noted in HPI.       VITALS:    T(C): 36.7 (14 Apr 2023 08:20), Max: 36.7 (13 Apr 2023 23:51)  T(F): 98.1 (14 Apr 2023 08:20), Max: 98.1 (13 Apr 2023 23:51)  HR: 79 (14 Apr 2023 10:40) (79 - 79)  BP: 118/68 (14 Apr 2023 10:40) (118/68 - 134/59)  BP(mean): --  RR: 17 (14 Apr 2023 08:20) (17 - 18)  SpO2: 96% (14 Apr 2023 08:20) (94% - 96%)    Parameters below as of 14 Apr 2023 08:20  Patient On (Oxygen Delivery Method): room air        PHYSICAL:    Constitutional: no acute distress  Eyes: PERRL, EOMI  ENT: pharynx is unremarkable  Neck: supple without JVD  Pulmonary: clear to auscultation bilaterally, no dullness, no wheezing  Cardiac: RRR, normal S1S2  Vascular: no calf tenderness, venous stasis changes, varices  Abdomen: normoactive bowel sounds, soft and nontender, no hepatosplenomegaly or masses appreciated   Lymphatic: no peripheral adenopathy appreciated  Musculoskeletal: full range of motion and no deformities appreciated  Skin: normal appearance, no rash, nodules, vesicles, ulcers, erythema  Neurology: grossly intact  Psychiatric: affect appropriate      LABS:    CBC Full  -  ( 14 Apr 2023 09:48 )  WBC Count : 15.37 K/uL  RBC Count : 4.63 M/uL  Hemoglobin : 10.2 g/dL  Hematocrit : 34.1 %  Platelet Count - Automated : 653 K/uL  Mean Cell Volume : 73.7 fl  Mean Cell Hemoglobin : 22.0 pg  Mean Cell Hemoglobin Concentration : 29.9 gm/dL  Auto Neutrophil # : x  Auto Lymphocyte # : x  Auto Monocyte # : x  Auto Eosinophil # : x  Auto Basophil # : x  Auto Neutrophil % : x  Auto Lymphocyte % : x  Auto Monocyte % : x  Auto Eosinophil % : x  Auto Basophil % : x    04-14    132<L>  |  98  |  8   ----------------------------<  138<H>  3.6   |  29  |  0.52    Ca    8.7      14 Apr 2023 09:48        RADIOLOGY & ADDITIONAL STUDIES:      CT Abdomen and Pelvis w/ IV Cont (04.07.23 @ 17:11)      IMPRESSION:  Very large amount of abdominal and pelvic ascites  Uterine fibroids          US Duplex Venous Lower Ext Complete, Bilateral (04.08.23 @ 01:00)    IMPRESSION:  No evidence of deep venous thrombosis in either lower extremity.        CT Chest No Cont (04.10.23 @ 11:40)    IMPRESSION:  No malignancy in the chest.          CT Abdomen and Pelvis w/ IV Cont (04.14.23 @ 08:57)    IMPRESSION:  Interval decrease in the volume of ascites with scalloping of the right   heart border suggesting malignant ascites.  peritoneal enhancement, nonspecific. Correlate for symptoms of   peritonitis.    Stable size of a central heterogeneous uterine mass.       HPI:    72 y/o F presented to the ED c/o VINES with progressive abdominal distension for more than 6 weeks. She admitted that she traveled to and from [place] with these symptoms, recently returned a few weeks ago. She admitted to having decreased appetite with nausea and vomiting for the past few days but denied any recent fever, cough, chills, HA, vision/hearing changes, CP, palpitations, diarrhea, melena, BRBPR or any other acute c/o. (07 Apr 2023 23:06)      04/14/2023: Seen at bedside this morning after returning from CT      PAST MEDICAL & SURGICAL HISTORY:    No pertinent past medical history  No significant past surgical history        MEDICATIONS  (STANDING):    cefTRIAXone Injectable. 2000 milliGRAM(s) IV Push every 12 hours  famotidine    Tablet 20 milliGRAM(s) Oral daily  guaiFENesin  milliGRAM(s) Oral every 12 hours  heparin   Injectable 5000 Unit(s) SubCutaneous every 8 hours      MEDICATIONS  (PRN):    acetaminophen     Tablet .. 650 milliGRAM(s) Oral every 6 hours PRN Temp greater or equal to 38C (100.4F), Mild Pain (1 - 3)  aluminum hydroxide/magnesium hydroxide/simethicone Suspension 30 milliLiter(s) Oral every 4 hours PRN Dyspepsia  melatonin 3 milliGRAM(s) Oral at bedtime PRN Insomnia  ondansetron Injectable 4 milliGRAM(s) IV Push every 8 hours PRN Nausea and/or Vomiting      ALLERGIES:    No Known Allergies      FAMILY HISTORY:    No pertinent family history in first degree relatives        REVIEW OF SYSTEMS:    Constitutional, Eyes, ENT, Cardiovascular, Respiratory, Gastrointestinal, Genitourinary, Musculoskeletal, Integumentary, Neurological, Psychiatric, Endocrine, Heme/Lymph and Allergic/Immunologic review of systems are otherwise negative except as noted in HPI.       VITALS:    T(C): 36.7 (14 Apr 2023 08:20), Max: 36.7 (13 Apr 2023 23:51)  T(F): 98.1 (14 Apr 2023 08:20), Max: 98.1 (13 Apr 2023 23:51)  HR: 79 (14 Apr 2023 10:40) (79 - 79)  BP: 118/68 (14 Apr 2023 10:40) (118/68 - 134/59)  BP(mean): --  RR: 17 (14 Apr 2023 08:20) (17 - 18)  SpO2: 96% (14 Apr 2023 08:20) (94% - 96%)    Parameters below as of 14 Apr 2023 08:20  Patient On (Oxygen Delivery Method): room air        PHYSICAL:    Constitutional: no acute distress  Eyes: PERRL, EOMI  ENT: pharynx is unremarkable  Neck: supple without JVD  Pulmonary: clear to auscultation bilaterally, no dullness, no wheezing  Cardiac: RRR, normal S1S2  Vascular: no calf tenderness, venous stasis changes, varices  Abdomen: normoactive bowel sounds, soft and nontender, no hepatosplenomegaly or masses appreciated   Lymphatic: no peripheral adenopathy appreciated  Musculoskeletal: full range of motion and no deformities appreciated  Skin: normal appearance, no rash, nodules, vesicles, ulcers, erythema  Neurology: grossly intact  Psychiatric: affect appropriate      LABS:    CBC Full  -  ( 14 Apr 2023 09:48 )  WBC Count : 15.37 K/uL  RBC Count : 4.63 M/uL  Hemoglobin : 10.2 g/dL  Hematocrit : 34.1 %  Platelet Count - Automated : 653 K/uL  Mean Cell Volume : 73.7 fl  Mean Cell Hemoglobin : 22.0 pg  Mean Cell Hemoglobin Concentration : 29.9 gm/dL  Auto Neutrophil # : x  Auto Lymphocyte # : x  Auto Monocyte # : x  Auto Eosinophil # : x  Auto Basophil # : x  Auto Neutrophil % : x  Auto Lymphocyte % : x  Auto Monocyte % : x  Auto Eosinophil % : x  Auto Basophil % : x    04-14    132<L>  |  98  |  8   ----------------------------<  138<H>  3.6   |  29  |  0.52    Ca    8.7      14 Apr 2023 09:48    Ca 125  113  CEA  2.7  iron sat 7 %  Ferritin 320     RADIOLOGY & ADDITIONAL STUDIES:      CT Abdomen and Pelvis w/ IV Cont (04.07.23 @ 17:11)      IMPRESSION:  Very large amount of abdominal and pelvic ascites  Uterine fibroids          US Duplex Venous Lower Ext Complete, Bilateral (04.08.23 @ 01:00)    IMPRESSION:  No evidence of deep venous thrombosis in either lower extremity.        CT Chest No Cont (04.10.23 @ 11:40)    IMPRESSION:  No malignancy in the chest.          CT Abdomen and Pelvis w/ IV Cont (04.14.23 @ 08:57)    IMPRESSION:  Interval decrease in the volume of ascites with scalloping of the right   heart border suggesting malignant ascites.  peritoneal enhancement, nonspecific. Correlate for symptoms of   peritonitis.    Stable size of a central heterogeneous uterine mass.

## 2023-04-15 LAB
CULTURE RESULTS: NO GROWTH — SIGNIFICANT CHANGE UP
SPECIMEN SOURCE: SIGNIFICANT CHANGE UP

## 2023-04-15 PROCEDURE — 99221 1ST HOSP IP/OBS SF/LOW 40: CPT

## 2023-04-15 PROCEDURE — 99233 SBSQ HOSP IP/OBS HIGH 50: CPT | Mod: GC

## 2023-04-15 RX ADMIN — CEFTRIAXONE 2000 MILLIGRAM(S): 500 INJECTION, POWDER, FOR SOLUTION INTRAMUSCULAR; INTRAVENOUS at 22:21

## 2023-04-15 RX ADMIN — HEPARIN SODIUM 5000 UNIT(S): 5000 INJECTION INTRAVENOUS; SUBCUTANEOUS at 22:22

## 2023-04-15 RX ADMIN — Medication 600 MILLIGRAM(S): at 22:21

## 2023-04-15 RX ADMIN — CEFTRIAXONE 2000 MILLIGRAM(S): 500 INJECTION, POWDER, FOR SOLUTION INTRAMUSCULAR; INTRAVENOUS at 09:30

## 2023-04-15 RX ADMIN — FAMOTIDINE 20 MILLIGRAM(S): 10 INJECTION INTRAVENOUS at 09:30

## 2023-04-15 RX ADMIN — Medication 600 MILLIGRAM(S): at 09:30

## 2023-04-15 RX ADMIN — HEPARIN SODIUM 5000 UNIT(S): 5000 INJECTION INTRAVENOUS; SUBCUTANEOUS at 05:42

## 2023-04-15 RX ADMIN — HEPARIN SODIUM 5000 UNIT(S): 5000 INJECTION INTRAVENOUS; SUBCUTANEOUS at 14:41

## 2023-04-15 NOTE — PROGRESS NOTE ADULT - SUBJECTIVE AND OBJECTIVE BOX
73 year old Female presented to the ED complaining of Dyspnea on exertion for more than 6 weeks. Patient started to have some shortness of breath with exertion and mild abdominal swelling more than 6 weeks ago. Then she went to [place] with these symptoms to visit. She came back couple of weeks ago. But her abdomen continue to swell and also got significant edema.  She also has decreased appetite and nausea, vomiting for few days. Last BM, 2 days ago. She has no fever or diarrhea.       Subjective: Patient was seen and examined by me this morning at bedside. Pt reporting that she does not want to be seen by resident team. She would prefer to have a single hospitalist.     REVIEW OF SYSTEMS:  CONSTITUTIONAL: No weakness, fevers or chills  EYES/ENT: No visual changes;  No vertigo or throat pain   NECK: No pain or stiffness  RESPIRATORY: No cough, wheezing, hemoptysis; No shortness of breath  CARDIOVASCULAR: No chest pain or palpitations  GASTROINTESTINAL: Abdominal fullness   GENITOURINARY: No dysuria, frequency or hematuria  NEUROLOGICAL: No numbness or weakness  SKIN: No itching, burning, rashes, or lesions     PHYSICAL EXAM:  Vital Signs Last 24 Hrs  T(C): 36.7 (15 Apr 2023 08:15), Max: 36.7 (14 Apr 2023 15:32)  T(F): 98.1 (15 Apr 2023 08:15), Max: 98.1 (14 Apr 2023 15:32)  HR: 71 (15 Apr 2023 08:15) (69 - 81)  BP: 129/55 (15 Apr 2023 08:15) (126/55 - 129/55)  BP(mean): --  RR: 17 (15 Apr 2023 08:15) (17 - 18)  SpO2: 98% (15 Apr 2023 08:15) (98% - 98%)    Parameters below as of 15 Apr 2023 08:15  Patient On (Oxygen Delivery Method): room air    Constitutional: Pt sitting on chair  HEENT: EOMI, normal hearing, moist mucous membranes  Neck: Soft and supple, no JVD  Respiratory: CTABL, No wheezing, rales or rhonchi  Cardiovascular: S1S2+, RRR, no M/G/R  Gastrointestinal: BS+, soft, NT/ND, no guarding, no rebound  Extremities: No peripheral edema  Vascular: 2+ peripheral pulses  Neurological: AAOx3, no focal deficits  Musculoskeletal: 5/5 strength b/l upper and lower extremities  Skin: No rashes    MEDICATIONS:  MEDICATIONS  (STANDING):  cefTRIAXone Injectable. 2000 milliGRAM(s) IV Push every 12 hours  famotidine    Tablet 20 milliGRAM(s) Oral daily  guaiFENesin  milliGRAM(s) Oral every 12 hours  heparin   Injectable 5000 Unit(s) SubCutaneous every 8 hours    MEDICATIONS  (PRN):  acetaminophen     Tablet .. 650 milliGRAM(s) Oral every 6 hours PRN Temp greater or equal to 38C (100.4F), Mild Pain (1 - 3)  aluminum hydroxide/magnesium hydroxide/simethicone Suspension 30 milliLiter(s) Oral every 4 hours PRN Dyspepsia  melatonin 3 milliGRAM(s) Oral at bedtime PRN Insomnia  ondansetron Injectable 4 milliGRAM(s) IV Push every 8 hours PRN Nausea and/or Vomiting      LABS: All Labs Reviewed:                        10.2   15.37 )-----------( 653      ( 14 Apr 2023 09:48 )             34.1     04-14    132<L>  |  98  |  8   ----------------------------<  138<H>  3.6   |  29  |  0.52    Ca    8.7      14 Apr 2023 09:48            Blood Culture:   I&O's Summary    14 Apr 2023 07:01  -  15 Apr 2023 07:00  --------------------------------------------------------  IN: 0 mL / OUT: 275 mL / NET: -275 mL      CAPILLARY BLOOD GLUCOSE          RADIOLOGY/EKG:    < from: CT Abdomen and Pelvis w/ IV Cont (04.07.23 @ 17:11) >  Very large amount of abdominal and pelvic ascites  Uterine fibroids    < end of copied text >  < from: US Duplex Venous Lower Ext Complete, Bilateral (04.08.23 @ 01:00) >  No evidence of deep venous thrombosis in either lower extremity.    < end of copied text >  < from: US Pelvis Complete (US Pelvis Complete .) (04.08.23 @ 10:29) >  Limited pelvic ultrasound.    The endometrium is incompletely assessed on this examination.  The   endometrium is either severely severely thickened to 6 cm or is distended   with complex material to 6 cm.  There is a 2.5 x1.6 x 1.7 cm shadowing   echogenic focus with shadowing along the periphery of the endometrium,   which may represent a calcified intramural versus submucosal fibroid,   versus an endometrial lesion.    The ovaries are not visualized.    Large volumepelvic ascites.    Pelvic MRI may be obtained for further evaluation.    < end of copied text >  Successful aspiration of 13,800 CC murky dark brown ascites. Drainage had to be halted for safety reasons to avoid large volume depletion. There is still residual large volume ascites. Repeat paracentesis will need to be performed some time later this week for therapeutic affect.  Back to floor  < from: CT Chest No Cont (04.10.23 @ 11:40) >  No malignancy in the chest.    < end of copied text >  < from: CT Abdomen and Pelvis w/ IV Cont (04.14.23 @ 08:57) >  Interval decrease in the volume of ascites with scalloping of the right   heart border suggesting malignant ascites.  peritoneal enhancement, nonspecific. Correlate for symptoms of   peritonitis.    Stable size of a central heterogeneous uterine mass.      < end of copied text >     73 year old Female presented to the ED complaining of Dyspnea on exertion for more than 6 weeks. Patient started to have some shortness of breath with exertion and mild abdominal swelling more than 6 weeks ago. Then she went to [place] with these symptoms to visit. She came back couple of weeks ago. But her abdomen continue to swell and also got significant edema.  She also has decreased appetite and nausea, vomiting for few days. Last BM, 2 days ago. She has no fever or diarrhea.       Subjective: Patient was seen and examined by me this morning at bedside. Pt reporting that she does not want to be seen by resident team. She would prefer to have a single hospitalist.     REVIEW OF SYSTEMS:  CONSTITUTIONAL: No weakness, fevers or chills  EYES/ENT: No visual changes;  No vertigo or throat pain   NECK: No pain or stiffness  RESPIRATORY: No cough, wheezing, hemoptysis; No shortness of breath  CARDIOVASCULAR: No chest pain or palpitations  GASTROINTESTINAL: Abdominal fullness   GENITOURINARY: No dysuria, frequency or hematuria  NEUROLOGICAL: No numbness or weakness  SKIN: No itching, burning, rashes, or lesions     PHYSICAL EXAM:  Vital Signs Last 24 Hrs  T(C): 36.7 (15 Apr 2023 08:15), Max: 36.7 (14 Apr 2023 15:32)  T(F): 98.1 (15 Apr 2023 08:15), Max: 98.1 (14 Apr 2023 15:32)  HR: 71 (15 Apr 2023 08:15) (69 - 81)  BP: 129/55 (15 Apr 2023 08:15) (126/55 - 129/55)  BP(mean): --  RR: 17 (15 Apr 2023 08:15) (17 - 18)  SpO2: 98% (15 Apr 2023 08:15) (98% - 98%)    Parameters below as of 15 Apr 2023 08:15  Patient On (Oxygen Delivery Method): room air    Constitutional: Pt sitting on chair  HEENT: EOMI, normal hearing, moist mucous membranes  Neck: Soft and supple, no JVD  Respiratory: CTABL, No wheezing, rales or rhonchi  Cardiovascular: S1S2+, RRR, no M/G/R  Gastrointestinal: BS+, soft, distended, no rebound or guarding  Extremities: No peripheral edema  Vascular: 2+ peripheral pulses  Neurological: AAOx3, no focal deficits  Musculoskeletal: 5/5 strength b/l upper and lower extremities  Skin: No rashes    MEDICATIONS:  MEDICATIONS  (STANDING):  cefTRIAXone Injectable. 2000 milliGRAM(s) IV Push every 12 hours  famotidine    Tablet 20 milliGRAM(s) Oral daily  guaiFENesin  milliGRAM(s) Oral every 12 hours  heparin   Injectable 5000 Unit(s) SubCutaneous every 8 hours    MEDICATIONS  (PRN):  acetaminophen     Tablet .. 650 milliGRAM(s) Oral every 6 hours PRN Temp greater or equal to 38C (100.4F), Mild Pain (1 - 3)  aluminum hydroxide/magnesium hydroxide/simethicone Suspension 30 milliLiter(s) Oral every 4 hours PRN Dyspepsia  melatonin 3 milliGRAM(s) Oral at bedtime PRN Insomnia  ondansetron Injectable 4 milliGRAM(s) IV Push every 8 hours PRN Nausea and/or Vomiting      LABS: All Labs Reviewed:                        10.2   15.37 )-----------( 653      ( 14 Apr 2023 09:48 )             34.1     04-14    132<L>  |  98  |  8   ----------------------------<  138<H>  3.6   |  29  |  0.52    Ca    8.7      14 Apr 2023 09:48            Blood Culture:   I&O's Summary    14 Apr 2023 07:01  -  15 Apr 2023 07:00  --------------------------------------------------------  IN: 0 mL / OUT: 275 mL / NET: -275 mL      CAPILLARY BLOOD GLUCOSE          RADIOLOGY/EKG:    < from: CT Abdomen and Pelvis w/ IV Cont (04.07.23 @ 17:11) >  Very large amount of abdominal and pelvic ascites  Uterine fibroids    < end of copied text >  < from: US Duplex Venous Lower Ext Complete, Bilateral (04.08.23 @ 01:00) >  No evidence of deep venous thrombosis in either lower extremity.    < end of copied text >  < from: US Pelvis Complete (US Pelvis Complete .) (04.08.23 @ 10:29) >  Limited pelvic ultrasound.    The endometrium is incompletely assessed on this examination.  The   endometrium is either severely severely thickened to 6 cm or is distended   with complex material to 6 cm.  There is a 2.5 x1.6 x 1.7 cm shadowing   echogenic focus with shadowing along the periphery of the endometrium,   which may represent a calcified intramural versus submucosal fibroid,   versus an endometrial lesion.    The ovaries are not visualized.    Large volumepelvic ascites.    Pelvic MRI may be obtained for further evaluation.    < end of copied text >  Successful aspiration of 13,800 CC murky dark brown ascites. Drainage had to be halted for safety reasons to avoid large volume depletion. There is still residual large volume ascites. Repeat paracentesis will need to be performed some time later this week for therapeutic affect.  Back to floor  < from: CT Chest No Cont (04.10.23 @ 11:40) >  No malignancy in the chest.    < end of copied text >  < from: CT Abdomen and Pelvis w/ IV Cont (04.14.23 @ 08:57) >  Interval decrease in the volume of ascites with scalloping of the right   heart border suggesting malignant ascites.  peritoneal enhancement, nonspecific. Correlate for symptoms of   peritonitis.    Stable size of a central heterogeneous uterine mass.      < end of copied text >

## 2023-04-15 NOTE — PROGRESS NOTE ADULT - ATTENDING COMMENTS
Status post HSC/D&C with endometrium appearance c/w malignancy - adequate sample acquired for confirmatory histopathologic diagnosis.  Cytopath from therapeutic/diagnostic paracentesis is pending as well.  Continue care per Medicine team. Anticipate need for neoadjuvant chemotherapy rather than primary surgery; advise Med/Onc consult once path resulted.
Patient seen and evaluated at bedside. She is sleeping on her side due to issues of chronic back pain. She reports using same pad since this morning (extra large). She states the discharge is mostly blood with some small clots. Denies dizziness or shortness of breath, fevers or chills. She complains of mucous getting stuck in her throat. She is awaiting a second paracentesis for more residual fluid in peritoneal cavity. Vital signs and labs reviewed- ascites evaluation significant for SBP though no organisms yet seen on culture. Hgb remains stable . On exam abdomen is markedly distended with positive fluid wave. No calf tenderness. Pads in bathroom with minimal blood. Patient counseled that likely source of her ascites is from a problem in the uterus, but we will await until pathology returns to confirm. Please notify GYN Oncology if any change in bleeding patterns. Rush placed on pathology    Leda Krishnan PGY5
Plan for OR today for exam under anesthesia, hysteroscopy with D&C for endometrial sampling. Patient had paracentesis by IR earlier today with 13+L drained. medically cleared for procedure    Leda Krishnan PGY5
73 year old woman who no known PMHx, has not seen a doctor in many years, has not suffered any recent illness as far as she knows, takes no medications, only on tumeric for arthritis in her knee, presented for further evaluation of several weeks of progressively worsening abdominal distension and increased abdominal girth, associated decreased ability to tolerate PO, and symptom of exertional dyspnea.     #Large volume ascites, endometrial thickening, now with pathology showing endometrial cancer  -Tapped twice  - Paracentesis fluid cytopath negative for malignant cells  - ECC D+C donen 4/10 path showing endometrial adenocarcinoma   -Since ascitic fluid protein high, it is likely malignant ascites  -Prophylactically treat SBP w CTX 2g iv BID #4 (needs 5 days)  - Per gyn onc NACT (Carbo/Paclitaxel) followed by reassessment to determine candidacy for interval surgery, SUJATHA/BSO, & debulking as warranted  -will need f/u w GI

## 2023-04-15 NOTE — PROGRESS NOTE ADULT - SUBJECTIVE AND OBJECTIVE BOX
Meeting held with the patient & her sister Breanne Baker (health care proxy) to review the clinical findings, lab & imaging results including the diagnosis of advanced endometrial cancer for which I recommend NACT (Carbo/Paclitaxel) followed by reassessment to determine candidacy for interval surgery, SUJATHA/BSO, & debulking as warranted.  I reviewed her prolonged history of  uterine bleeding and relationship with extrauterine disease.  Anticipate sending pathology for NGS to evaluate for actionable mutations/targets.  Recommend insertion of abdominal Pleurx catheter until see decreased accumulation of ascites after onset of chemotherapy.  Patient & sister seeking meeting with case coordinator to review discharge planning to rehab facility.  Anticipate Med/Onc follow up.  The patient & her sister verbalized an understanding, and their questions were answered to their apparent satisfaction.  40 minutes spent in counseling.    Shefali Jones MD

## 2023-04-15 NOTE — PROGRESS NOTE ADULT - ASSESSMENT
73 year old woman who no known PMHx, has not seen a doctor in many years, has not suffered any recent illness as far as she knows, takes no medications, only on tumeric for arthritis in her knee, presented for further evaluation of several weeks of progressively worsening abdominal distension and increased abdominal girth, associated decreased ability to tolerate PO, and symptom of exertional dyspnea. Denied fever, chills, change in bowel habits, blood in stool or constipation. Denies chronic alcohol use, illicit drugs or family history of GI disease/malignancy or GYN disease/malignancy. In the ED, vitals WNL, exam notable for distended abdomen w/ fairly tense ascites. WBC 15. Hgb 10.9 Plt 581. INR 1.49. Na 129. Cr WNL. AST/ALT WNL. T bili 2.1. Alk phos 159. alb 2.1. Trop mininally elevated. . CT A/P showed large volume ascites and non-specific jejunal loop distension, otherwise no overt GI pathology. Uterine fibroids present. COVID19, flu and RSV PCR negative. UA suggestive of possible infection. Started on ceftriaxone and admitted to Medicine.    #Large volume ascites, c/f SBP, endometrial thickening, now with pathology showing endometrial cancer  -likely w malignant ascites c/b SBP  -SBP tx w CTX 2g iv BID x5d  -s/p hysteroscopic D+C, EMBx by Gyn-Onc, done 4/10/23, w path showing endometrial cancer  -f/u gyn onc and med onc recs  -will need f/u w GI as well    #PT eval    #DVT ppx- SQH    Abx thru 4/16, then likely medically ready for d/c pending onc plan   73 year old woman who no known PMHx, has not seen a doctor in many years, has not suffered any recent illness as far as she knows, takes no medications, only on tumeric for arthritis in her knee, presented for further evaluation of several weeks of progressively worsening abdominal distension and increased abdominal girth, associated decreased ability to tolerate PO, and symptom of exertional dyspnea. Denied fever, chills, change in bowel habits, blood in stool or constipation. Denies chronic alcohol use, illicit drugs or family history of GI disease/malignancy or GYN disease/malignancy. In the ED, vitals WNL, exam notable for distended abdomen w/ fairly tense ascites. WBC 15. Hgb 10.9 Plt 581. INR 1.49. Na 129. Cr WNL. AST/ALT WNL. T bili 2.1. Alk phos 159. alb 2.1. Trop mininally elevated. . CT A/P showed large volume ascites and non-specific jejunal loop distension, otherwise no overt GI pathology. Uterine fibroids present. COVID19, flu and RSV PCR negative. UA suggestive of possible infection. Started on ceftriaxone and admitted to Medicine.    #Large volume ascites, endometrial thickening, now with pathology showing endometrial cancer  -Tapped twice  - Paracentesis fluid cytopath negative for malignant cells  - ECC D+C donen 4/10 path showing endometrial adenocarcinoma   -Since ascitic fluid protein high, it is likely malignant ascites  -Prophylactically treat SBP w CTX 2g iv BID #4 (needs 5 days)  - Per gyn onc NACT (Carbo/Paclitaxel) followed by reassessment to determine candidacy for interval surgery, SUJATHA/BSO, & debulking as warranted  -will need f/u w GI    #PT eval appreciated    #DVT ppx- SQH    Abx thru 4/16, then likely medically ready for d/c pending onc plan      Case d/w Dr. Juan

## 2023-04-16 LAB
ALBUMIN SERPL ELPH-MCNC: 1.7 G/DL — LOW (ref 3.3–5)
ALP SERPL-CCNC: 343 U/L — HIGH (ref 40–120)
ALT FLD-CCNC: 34 U/L — SIGNIFICANT CHANGE UP (ref 12–78)
ANION GAP SERPL CALC-SCNC: 6 MMOL/L — SIGNIFICANT CHANGE UP (ref 5–17)
AST SERPL-CCNC: 61 U/L — HIGH (ref 15–37)
BILIRUB SERPL-MCNC: 0.3 MG/DL — SIGNIFICANT CHANGE UP (ref 0.2–1.2)
BUN SERPL-MCNC: 10 MG/DL — SIGNIFICANT CHANGE UP (ref 7–23)
CALCIUM SERPL-MCNC: 8.7 MG/DL — SIGNIFICANT CHANGE UP (ref 8.5–10.1)
CHLORIDE SERPL-SCNC: 101 MMOL/L — SIGNIFICANT CHANGE UP (ref 96–108)
CO2 SERPL-SCNC: 28 MMOL/L — SIGNIFICANT CHANGE UP (ref 22–31)
CREAT SERPL-MCNC: 0.42 MG/DL — LOW (ref 0.5–1.3)
EGFR: 103 ML/MIN/1.73M2 — SIGNIFICANT CHANGE UP
GLUCOSE SERPL-MCNC: 97 MG/DL — SIGNIFICANT CHANGE UP (ref 70–99)
HCT VFR BLD CALC: 32.3 % — LOW (ref 34.5–45)
HGB BLD-MCNC: 9.9 G/DL — LOW (ref 11.5–15.5)
MCHC RBC-ENTMCNC: 22.7 PG — LOW (ref 27–34)
MCHC RBC-ENTMCNC: 30.7 GM/DL — LOW (ref 32–36)
MCV RBC AUTO: 73.9 FL — LOW (ref 80–100)
PLATELET # BLD AUTO: 581 K/UL — HIGH (ref 150–400)
POTASSIUM SERPL-MCNC: 4.1 MMOL/L — SIGNIFICANT CHANGE UP (ref 3.5–5.3)
POTASSIUM SERPL-SCNC: 4.1 MMOL/L — SIGNIFICANT CHANGE UP (ref 3.5–5.3)
PROT SERPL-MCNC: 6.4 GM/DL — SIGNIFICANT CHANGE UP (ref 6–8.3)
RBC # BLD: 4.37 M/UL — SIGNIFICANT CHANGE UP (ref 3.8–5.2)
RBC # FLD: 18.5 % — HIGH (ref 10.3–14.5)
SODIUM SERPL-SCNC: 135 MMOL/L — SIGNIFICANT CHANGE UP (ref 135–145)
WBC # BLD: 15.98 K/UL — HIGH (ref 3.8–10.5)
WBC # FLD AUTO: 15.98 K/UL — HIGH (ref 3.8–10.5)

## 2023-04-16 PROCEDURE — 99233 SBSQ HOSP IP/OBS HIGH 50: CPT

## 2023-04-16 RX ORDER — ENOXAPARIN SODIUM 100 MG/ML
40 INJECTION SUBCUTANEOUS EVERY 12 HOURS
Refills: 0 | Status: DISCONTINUED | OUTPATIENT
Start: 2023-04-16 | End: 2023-04-26

## 2023-04-16 RX ORDER — IRON SUCROSE 20 MG/ML
100 INJECTION, SOLUTION INTRAVENOUS EVERY 24 HOURS
Refills: 0 | Status: COMPLETED | OUTPATIENT
Start: 2023-04-16 | End: 2023-04-20

## 2023-04-16 RX ADMIN — HEPARIN SODIUM 5000 UNIT(S): 5000 INJECTION INTRAVENOUS; SUBCUTANEOUS at 05:46

## 2023-04-16 RX ADMIN — HEPARIN SODIUM 5000 UNIT(S): 5000 INJECTION INTRAVENOUS; SUBCUTANEOUS at 14:53

## 2023-04-16 RX ADMIN — ENOXAPARIN SODIUM 40 MILLIGRAM(S): 100 INJECTION SUBCUTANEOUS at 21:26

## 2023-04-16 RX ADMIN — FAMOTIDINE 20 MILLIGRAM(S): 10 INJECTION INTRAVENOUS at 09:35

## 2023-04-16 RX ADMIN — Medication 600 MILLIGRAM(S): at 09:35

## 2023-04-16 RX ADMIN — CEFTRIAXONE 2000 MILLIGRAM(S): 500 INJECTION, POWDER, FOR SOLUTION INTRAMUSCULAR; INTRAVENOUS at 09:34

## 2023-04-16 RX ADMIN — Medication 600 MILLIGRAM(S): at 21:26

## 2023-04-16 RX ADMIN — IRON SUCROSE 100 MILLIGRAM(S): 20 INJECTION, SOLUTION INTRAVENOUS at 16:16

## 2023-04-16 NOTE — PROGRESS NOTE ADULT - ASSESSMENT
73 year old woman who no known PMHx, has not seen a doctor in many years, has not suffered any recent illness as far as she knows, takes no medications, only on tumeric for arthritis in her knee, presented for further evaluation of several weeks of progressively worsening abdominal distension and increased abdominal girth, associated decreased ability to tolerate PO, and symptom of exertional dyspnea. Denied fever, chills, change in bowel habits, blood in stool or constipation. Denies chronic alcohol use, illicit drugs or family history of GI disease/malignancy or GYN disease/malignancy. In the ED, vitals WNL, exam notable for distended abdomen w/ fairly tense ascites. WBC 15. Hgb 10.9 Plt 581. INR 1.49. Na 129. Cr WNL. AST/ALT WNL. T bili 2.1. Alk phos 159. alb 2.1. Trop mininally elevated. . CT A/P showed large volume ascites and non-specific jejunal loop distension, otherwise no overt GI pathology. Uterine fibroids present. COVID19, flu and RSV PCR negative. UA suggestive of possible infection. Started on ceftriaxone and admitted to Medicine.    #Large volume ascites, endometrial thickening, now with pathology showing endometrial cancer  -s/p paracentesis x2  -Paracentesis fluid cytopath negative for malignant cells  -ECC D+C donen 4/10 path showing endometrial adenocarcinoma   -Since ascitic fluid protein high, it is likely malignant ascites  -Prophylactically treat SBP w CTX 2g iv BID #4 (needs 5 days)  -Per gyn onc NACT (Carbo/Paclitaxel) followed by reassessment to determine candidacy for interval surgery, SUJATHA/BSO, & debulking as warranted. Abdominal PleurX  -will need f/u w GI    #PT eval appreciated    #DVT ppx- SQH    Abx thru 4/16, then likely medically ready for d/c pending onc plan       73 year old woman who no known PMHx, has not seen a doctor in many years, has not suffered any recent illness as far as she knows, takes no medications, only on tumeric for arthritis in her knee, presented for further evaluation of several weeks of progressively worsening abdominal distension and increased abdominal girth, associated decreased ability to tolerate PO, and symptom of exertional dyspnea. Denied fever, chills, change in bowel habits, blood in stool or constipation. Denies chronic alcohol use, illicit drugs or family history of GI disease/malignancy or GYN disease/malignancy. In the ED, vitals WNL, exam notable for distended abdomen w/ fairly tense ascites. WBC 15. Hgb 10.9 Plt 581. INR 1.49. Na 129. Cr WNL. AST/ALT WNL. T bili 2.1. Alk phos 159. alb 2.1. Trop mininally elevated. . CT A/P showed large volume ascites and non-specific jejunal loop distension, otherwise no overt GI pathology. Uterine fibroids present. COVID19, flu and RSV PCR negative. UA suggestive of possible infection. Started on ceftriaxone and admitted to Medicine.    #Large volume ascites, endometrial thickening, now with pathology showing endometrial cancer  -s/p paracentesis x2  -Paracentesis fluid cytopath negative for malignant cells  -ECC D+C donen 4/10 path showing endometrial adenocarcinoma   -Since ascitic fluid protein high, it is likely malignant ascites  -Prophylactically treat SBP w CTX 2g iv BID #5/5  -Per gyn onc NACT (Carbo/Paclitaxel) followed by reassessment to determine candidacy for interval surgery, SUJATHA/BSO, & debulking as warranted. Abdominal PleurX  -will need f/u w GI    #PT eval appreciated    #DVT ppx- SQH    Abx thru 4/16, then likely medically ready for d/c pending onc plan       73 year old woman who no known PMHx, has not seen a doctor in many years, has not suffered any recent illness as far as she knows, takes no medications, only on tumeric for arthritis in her knee, presented for further evaluation of several weeks of progressively worsening abdominal distension and increased abdominal girth, associated decreased ability to tolerate PO, and symptom of exertional dyspnea. Denied fever, chills, change in bowel habits, blood in stool or constipation. Denies chronic alcohol use, illicit drugs or family history of GI disease/malignancy or GYN disease/malignancy. In the ED, vitals WNL, exam notable for distended abdomen w/ fairly tense ascites. WBC 15. Hgb 10.9 Plt 581. INR 1.49. Na 129. Cr WNL. AST/ALT WNL. T bili 2.1. Alk phos 159. alb 2.1. Trop mininally elevated. . CT A/P showed large volume ascites and non-specific jejunal loop distension, otherwise no overt GI pathology. Uterine fibroids present. COVID19, flu and RSV PCR negative. UA suggestive of possible infection. Started on ceftriaxone and admitted to Medicine.    #Large volume ascites, endometrial thickening, Focally invasive endometrial adenocarcinoma   -S/p paracentesis with IR both 04/10 & 04/13 with approx. 15L taken off ---> initial sample (04/10) negative for malignant cells --->Negative for malignancy  -ECC D+C donen 4/10 path showing endometrial adenocarcinoma   -Since ascitic fluid protein high, it is likely malignant ascites  -Prophylactically treat SBP w CTX 2g iv BID #5/5  -Per gyn onc:NACT (Carbo/Paclitaxel) followed by reassessment to determine candidacy for interval surgery, SUJATHA/BSO, & debulking as warranted. Abdominal PleurX  -will need f/u w GI    #Normocytic Anemia and Leukocytosis   -currently on Venofer   -Heme/onc following     #PT eval appreciated    #DVT ppx- SQH    Abx thru 4/16, then likely medically ready for d/c pending onc plan

## 2023-04-16 NOTE — PROGRESS NOTE ADULT - ASSESSMENT
72 y/o F with no significant PMHx found to have significant abdominal ascites with uterine mass.      #   Focally invasive endometriod  adenocarcinoma with Ascites    - Initial CT imaging revealed significant amount of abdominal ascites  - S/p paracentesis with IR both 04/10 & 04/13 with approx. 15L taken off ---> initial sample (04/10) negative for malignant cells --->Negative for malignancy  - Currently being treated for SBP with CTX 2g IV BID x 5 days  - S/p hysteroscopic D&C with EM- Bx by Gyn/ Onc 4/10; consistent with Endometrioid adenocarcinoma of the endometrium, FIGO grade 2, focally invasive.  - CT chest negative for any potential malignancy  - GOC discussion/meeting with family and Gyn/ Onc 04/14 or 04/15  - Attending Dr Bryant spoke with patient and discussed following up with Primary Heme/Onc Dr Ashley outpatient for treatment  - To f/u with Dr Ashley will follow up- Likely will need systemic therapy first before debulking surgery considered.    # Normocytic Anemia & Leukocytosis    - WBC count 15K, trending down  - Hgb 9.9 g/dL   - Likely 2/2 suspected malignancy / SBP  - Low iron sat. Might benefit from IV iron.- ordered.  - Continue supportive measures

## 2023-04-16 NOTE — PROGRESS NOTE ADULT - SUBJECTIVE AND OBJECTIVE BOX
HPI:    74 y/o F presented to the ED c/o VINES with progressive abdominal distension for more than 6 weeks. She admitted that she traveled to and from [place] with these symptoms, recently returned a few weeks ago. She admitted to having decreased appetite with nausea and vomiting for the past few days but denied any recent fever, cough, chills, HA, vision/hearing changes, CP, palpitations, diarrhea, melena, BRBPR or any other acute c/o. (07 Apr 2023 23:06)      04/14/2023: Seen at bedside this morning after returning from CT    04/16/2023- Seen at bedside, OOB to bathroom ,reported having a BM today. Feels better,  with improvement in abdominal pain. c/o intermittent mild vaginal bleeding.    PAST MEDICAL & SURGICAL HISTORY:    No pertinent past medical history  No significant past surgical history      MEDICATIONS  (STANDING):    cefTRIAXone Injectable. 2000 milliGRAM(s) IV Push every 12 hours  famotidine    Tablet 20 milliGRAM(s) Oral daily  guaiFENesin  milliGRAM(s) Oral every 12 hours  heparin   Injectable 5000 Unit(s) SubCutaneous every 8 hours      MEDICATIONS  (PRN):    acetaminophen     Tablet .. 650 milliGRAM(s) Oral every 6 hours PRN Temp greater or equal to 38C (100.4F), Mild Pain (1 - 3)  aluminum hydroxide/magnesium hydroxide/simethicone Suspension 30 milliLiter(s) Oral every 4 hours PRN Dyspepsia  melatonin 3 milliGRAM(s) Oral at bedtime PRN Insomnia  ondansetron Injectable 4 milliGRAM(s) IV Push every 8 hours PRN Nausea and/or Vomiting      ALLERGIES:    No Known Allergies      FAMILY HISTORY:    No pertinent family history in first degree relatives        REVIEW OF SYSTEMS:    Constitutional, Eyes, ENT, Cardiovascular, Respiratory, Gastrointestinal, Genitourinary, Musculoskeletal, Integumentary, Neurological, Psychiatric, Endocrine, Heme/Lymph and Allergic/Immunologic review of systems are otherwise negative except as noted in HPI.       VITALS:    Vital Signs Last 24 Hrs  T(C): 36.6 (16 Apr 2023 07:53), Max: 36.7 (15 Apr 2023 16:27)  T(F): 97.9 (16 Apr 2023 07:53), Max: 98.1 (15 Apr 2023 16:27)  HR: 66 (16 Apr 2023 07:53) (66 - 81)  BP: 127/51 (16 Apr 2023 07:53) (125/55 - 128/65)  BP(mean): --  RR: 18 (16 Apr 2023 07:53) (17 - 18)  SpO2: 98% (16 Apr 2023 07:53) (98% - 99%)    Parameters below as of 16 Apr 2023 07:53  Patient On (Oxygen Delivery Method): room air      PHYSICAL:    Constitutional: no acute distress  Eyes: PERRL, EOMI  ENT: pharynx is unremarkable  Neck: supple without JVD  Pulmonary: clear to auscultation bilaterally, no dullness, no wheezing  Cardiac: RRR, normal S1S2  Vascular: no calf tenderness, venous stasis changes, varices  Abdomen:  obese, normoactive bowel sounds, soft and nontender, no hepatosplenomegaly or masses appreciated   Lymphatic: no peripheral adenopathy appreciated  Musculoskeletal: full range of motion and no deformities appreciated  Skin: normal appearance, no rash, nodules, vesicles, ulcers, erythema  Neurology: grossly intact  Psychiatric: affect appropriate      LABS:                          9.9    15.98 )-----------( 581      ( 16 Apr 2023 06:29 )             32.3       04-16    135  |  101  |  10  ----------------------------<  97  4.1   |  28  |  0.42<L>    Ca    8.7      16 Apr 2023 06:29    TPro  6.4  /  Alb  1.7<L>  /  TBili  0.3  /  DBili  x   /  AST  61<H>  /  ALT  34  /  AlkPhos  343<H>  04-16      RADIOLOGY & ADDITIONAL STUDIES:      CT Abdomen and Pelvis w/ IV Cont (04.07.23 @ 17:11)      IMPRESSION:  Very large amount of abdominal and pelvic ascites  Uterine fibroids          US Duplex Venous Lower Ext Complete, Bilateral (04.08.23 @ 01:00)    IMPRESSION:  No evidence of deep venous thrombosis in either lower extremity.        CT Chest No Cont (04.10.23 @ 11:40)    IMPRESSION:  No malignancy in the chest.          CT Abdomen and Pelvis w/ IV Cont (04.14.23 @ 08:57)    IMPRESSION:  Interval decrease in the volume of ascites with scalloping of the right   heart border suggesting malignant ascites.  peritoneal enhancement, nonspecific. Correlate for symptoms of   peritonitis.    Stable size of a central heterogeneous uterine mass.

## 2023-04-16 NOTE — PROGRESS NOTE ADULT - SUBJECTIVE AND OBJECTIVE BOX
73 year old Female presented to the ED complaining of Dyspnea on exertion for more than 6 weeks. Patient started to have some shortness of breath with exertion and mild abdominal swelling more than 6 weeks ago. Then she went to [place] with these symptoms to visit. She came back couple of weeks ago. But her abdomen continue to swell and also got significant edema.  She also has decreased appetite and nausea, vomiting for few days. Last BM, 2 days ago. She has no fever or diarrhea.       Subjective: Patient was seen and examined by me this morning at bedside. Pt reporting that she does not want to be seen by resident team. She would prefer to have a single hospitalist.     04/16: Pt seen and evaluated at bedside. Resting comfortable. Has no acute complaints. Vitals stable     REVIEW OF SYSTEMS:  CONSTITUTIONAL: No weakness, fevers or chills  EYES/ENT: No visual changes;  No vertigo or throat pain   NECK: No pain or stiffness  RESPIRATORY: No cough, wheezing, hemoptysis; No shortness of breath  CARDIOVASCULAR: No chest pain or palpitations  GASTROINTESTINAL: Abdominal fullness   GENITOURINARY: No dysuria, frequency or hematuria  NEUROLOGICAL: No numbness or weakness  SKIN: No itching, burning, rashes, or lesions     PHYSICAL EXAM:  Vital Signs Last 24 Hrs  T(C): 36.8 (16 Apr 2023 15:58), Max: 36.8 (16 Apr 2023 15:58)  T(F): 98.3 (16 Apr 2023 15:58), Max: 98.3 (16 Apr 2023 15:58)  HR: 84 (16 Apr 2023 15:58) (66 - 84)  BP: 120/53 (16 Apr 2023 15:58) (120/53 - 127/51)  RR: 17 (16 Apr 2023 15:58) (17 - 18)  SpO2: 97% (16 Apr 2023 15:58) (97% - 99%)    Parameters below as of 16 Apr 2023 07:53  Patient On (Oxygen Delivery Method): room air        Constitutional: Pt sitting on chair  HEENT: EOMI, normal hearing, moist mucous membranes  Neck: Soft and supple, no JVD  Respiratory: CTABL, No wheezing, rales or rhonchi  Cardiovascular: S1S2+, RRR, no M/G/R  Gastrointestinal: BS+, soft, distended, no rebound or guarding  Extremities: No peripheral edema  Vascular: 2+ peripheral pulses  Neurological: AAOx3, no focal deficits  Musculoskeletal: 5/5 strength b/l upper and lower extremities  Skin: No rashes    MEDICATIONS  (STANDING):  famotidine    Tablet 20 milliGRAM(s) Oral daily  guaiFENesin  milliGRAM(s) Oral every 12 hours  heparin   Injectable 5000 Unit(s) SubCutaneous every 8 hours  iron sucrose Injectable 100 milliGRAM(s) IV Push every 24 hours    MEDICATIONS  (PRN):  acetaminophen     Tablet .. 650 milliGRAM(s) Oral every 6 hours PRN Temp greater or equal to 38C (100.4F), Mild Pain (1 - 3)  aluminum hydroxide/magnesium hydroxide/simethicone Suspension 30 milliLiter(s) Oral every 4 hours PRN Dyspepsia  melatonin 3 milliGRAM(s) Oral at bedtime PRN Insomnia  ondansetron Injectable 4 milliGRAM(s) IV Push every 8 hours PRN Nausea and/or Vomiting      LABS: All Labs Reviewed:                        9.9    15.98 )-----------( 581      ( 16 Apr 2023 06:29 )             32.3   04-16    135  |  101  |  10  ----------------------------<  97  4.1   |  28  |  0.42<L>    Ca    8.7      16 Apr 2023 06:29    TPro  6.4  /  Alb  1.7<L>  /  TBili  0.3  /  DBili  x   /  AST  61<H>  /  ALT  34  /  AlkPhos  343<H>  04-16            Blood Culture:   I&O's Summary    14 Apr 2023 07:01  -  15 Apr 2023 07:00  --------------------------------------------------------  IN: 0 mL / OUT: 275 mL / NET: -275 mL      CAPILLARY BLOOD GLUCOSE          RADIOLOGY/EKG:    < from: CT Abdomen and Pelvis w/ IV Cont (04.07.23 @ 17:11) >  Very large amount of abdominal and pelvic ascites  Uterine fibroids    < end of copied text >  < from: US Duplex Venous Lower Ext Complete, Bilateral (04.08.23 @ 01:00) >  No evidence of deep venous thrombosis in either lower extremity.    < end of copied text >  < from: US Pelvis Complete (US Pelvis Complete .) (04.08.23 @ 10:29) >  Limited pelvic ultrasound.    The endometrium is incompletely assessed on this examination.  The   endometrium is either severely severely thickened to 6 cm or is distended   with complex material to 6 cm.  There is a 2.5 x1.6 x 1.7 cm shadowing   echogenic focus with shadowing along the periphery of the endometrium,   which may represent a calcified intramural versus submucosal fibroid,   versus an endometrial lesion.    The ovaries are not visualized.    Large volumepelvic ascites.    Pelvic MRI may be obtained for further evaluation.    < end of copied text >  Successful aspiration of 13,800 CC murky dark brown ascites. Drainage had to be halted for safety reasons to avoid large volume depletion. There is still residual large volume ascites. Repeat paracentesis will need to be performed some time later this week for therapeutic affect.  Back to floor  < from: CT Chest No Cont (04.10.23 @ 11:40) >  No malignancy in the chest.    < end of copied text >  < from: CT Abdomen and Pelvis w/ IV Cont (04.14.23 @ 08:57) >  Interval decrease in the volume of ascites with scalloping of the right   heart border suggesting malignant ascites.  peritoneal enhancement, nonspecific. Correlate for symptoms of   peritonitis.    Stable size of a central heterogeneous uterine mass.      < end of copied text >

## 2023-04-17 PROCEDURE — 99233 SBSQ HOSP IP/OBS HIGH 50: CPT

## 2023-04-17 RX ADMIN — ENOXAPARIN SODIUM 40 MILLIGRAM(S): 100 INJECTION SUBCUTANEOUS at 09:15

## 2023-04-17 RX ADMIN — Medication 600 MILLIGRAM(S): at 21:37

## 2023-04-17 RX ADMIN — IRON SUCROSE 100 MILLIGRAM(S): 20 INJECTION, SOLUTION INTRAVENOUS at 16:41

## 2023-04-17 RX ADMIN — FAMOTIDINE 20 MILLIGRAM(S): 10 INJECTION INTRAVENOUS at 09:15

## 2023-04-17 RX ADMIN — Medication 600 MILLIGRAM(S): at 09:15

## 2023-04-17 RX ADMIN — ENOXAPARIN SODIUM 40 MILLIGRAM(S): 100 INJECTION SUBCUTANEOUS at 21:37

## 2023-04-17 NOTE — PROGRESS NOTE ADULT - SUBJECTIVE AND OBJECTIVE BOX
73 year old Female presented to the ED complaining of Dyspnea on exertion for more than 6 weeks. Patient started to have some shortness of breath with exertion and mild abdominal swelling more than 6 weeks ago. Then she went to [place] with these symptoms to visit. She came back couple of weeks ago. But her abdomen continue to swell and also got significant edema.  She also has decreased appetite and nausea, vomiting for few days. Last BM, 2 days ago. She has no fever or diarrhea.       Subjective: Patient was seen and examined by me this morning at bedside. Pt reporting that she does not want to be seen by resident team. She would prefer to have a single hospitalist.     04/16: Pt seen and evaluated at bedside. Resting comfortable. Has no acute complaints. Vitals stable     4/17: Pt seen and evaluated at bedside. Resting comfortable. Has no acute complaints. Reports abdomen feels soft     REVIEW OF SYSTEMS:  CONSTITUTIONAL: No weakness, fevers or chills  EYES/ENT: No visual changes;  No vertigo or throat pain   NECK: No pain or stiffness  RESPIRATORY: No cough, wheezing, hemoptysis; No shortness of breath  CARDIOVASCULAR: No chest pain or palpitations  GASTROINTESTINAL: Abdominal fullness   GENITOURINARY: No dysuria, frequency or hematuria  NEUROLOGICAL: No numbness or weakness  SKIN: No itching, burning, rashes, or lesions     PHYSICAL EXAM:  Vital Signs Last 24 Hrs  T(C): 36.6 (17 Apr 2023 07:34), Max: 36.8 (16 Apr 2023 15:58)  T(F): 97.9 (17 Apr 2023 07:34), Max: 98.3 (16 Apr 2023 15:58)  HR: 73 (17 Apr 2023 07:34) (73 - 84)  BP: 121/58 (17 Apr 2023 07:34) (120/53 - 126/54)  BP(mean): --  RR: 17 (17 Apr 2023 07:34) (17 - 18)  SpO2: 100% (17 Apr 2023 07:34) (97% - 100%)    Parameters below as of 17 Apr 2023 07:34  Patient On (Oxygen Delivery Method): room air      Constitutional: Pt laying in bed  HEENT: EOMI, normal hearing, moist mucous membranes  Neck: Soft and supple, no JVD  Respiratory: CTABL, No wheezing, rales or rhonchi  Cardiovascular: S1S2+, RRR, no M/G/R  Gastrointestinal: BS+, soft, distended, no rebound or guarding  Extremities: No peripheral edema  Vascular: 2+ peripheral pulses  Neurological: AAOx3, no focal deficits  Musculoskeletal: 5/5 strength b/l upper and lower extremities  Skin: No rashes    MEDICATIONS  (STANDING):  famotidine    Tablet 20 milliGRAM(s) Oral daily  guaiFENesin  milliGRAM(s) Oral every 12 hours  heparin   Injectable 5000 Unit(s) SubCutaneous every 8 hours  iron sucrose Injectable 100 milliGRAM(s) IV Push every 24 hours    MEDICATIONS  (PRN):  acetaminophen     Tablet .. 650 milliGRAM(s) Oral every 6 hours PRN Temp greater or equal to 38C (100.4F), Mild Pain (1 - 3)  aluminum hydroxide/magnesium hydroxide/simethicone Suspension 30 milliLiter(s) Oral every 4 hours PRN Dyspepsia  melatonin 3 milliGRAM(s) Oral at bedtime PRN Insomnia  ondansetron Injectable 4 milliGRAM(s) IV Push every 8 hours PRN Nausea and/or Vomiting      LABS: All Labs Reviewed:                        9.9    15.98 )-----------( 581      ( 16 Apr 2023 06:29 )             32.3   04-16    135  |  101  |  10  ----------------------------<  97  4.1   |  28  |  0.42<L>    Ca    8.7      16 Apr 2023 06:29    TPro  6.4  /  Alb  1.7<L>  /  TBili  0.3  /  DBili  x   /  AST  61<H>  /  ALT  34  /  AlkPhos  343<H>  04-16            Blood Culture:   I&O's Summary    14 Apr 2023 07:01  -  15 Apr 2023 07:00  --------------------------------------------------------  IN: 0 mL / OUT: 275 mL / NET: -275 mL      CAPILLARY BLOOD GLUCOSE          RADIOLOGY/EKG:    < from: CT Abdomen and Pelvis w/ IV Cont (04.07.23 @ 17:11) >  Very large amount of abdominal and pelvic ascites  Uterine fibroids    < end of copied text >  < from: US Duplex Venous Lower Ext Complete, Bilateral (04.08.23 @ 01:00) >  No evidence of deep venous thrombosis in either lower extremity.    < end of copied text >  < from: US Pelvis Complete (US Pelvis Complete .) (04.08.23 @ 10:29) >  Limited pelvic ultrasound.    The endometrium is incompletely assessed on this examination.  The   endometrium is either severely severely thickened to 6 cm or is distended   with complex material to 6 cm.  There is a 2.5 x1.6 x 1.7 cm shadowing   echogenic focus with shadowing along the periphery of the endometrium,   which may represent a calcified intramural versus submucosal fibroid,   versus an endometrial lesion.    The ovaries are not visualized.    Large volumepelvic ascites.    Pelvic MRI may be obtained for further evaluation.    < end of copied text >  Successful aspiration of 13,800 CC murky dark brown ascites. Drainage had to be halted for safety reasons to avoid large volume depletion. There is still residual large volume ascites. Repeat paracentesis will need to be performed some time later this week for therapeutic affect.  Back to floor  < from: CT Chest No Cont (04.10.23 @ 11:40) >  No malignancy in the chest.    < end of copied text >  < from: CT Abdomen and Pelvis w/ IV Cont (04.14.23 @ 08:57) >  Interval decrease in the volume of ascites with scalloping of the right   heart border suggesting malignant ascites.  peritoneal enhancement, nonspecific. Correlate for symptoms of   peritonitis.    Stable size of a central heterogeneous uterine mass.      < end of copied text >

## 2023-04-17 NOTE — PROGRESS NOTE ADULT - ASSESSMENT
73 year old woman who no known PMHx, has not seen a doctor in many years, has not suffered any recent illness as far as she knows, takes no medications, only on tumeric for arthritis in her knee, presented for further evaluation of several weeks of progressively worsening abdominal distension and increased abdominal girth, associated decreased ability to tolerate PO, and symptom of exertional dyspnea. Denied fever, chills, change in bowel habits, blood in stool or constipation. Denies chronic alcohol use, illicit drugs or family history of GI disease/malignancy or GYN disease/malignancy. In the ED, vitals WNL, exam notable for distended abdomen w/ fairly tense ascites. WBC 15. Hgb 10.9 Plt 581. INR 1.49. Na 129. Cr WNL. AST/ALT WNL. T bili 2.1. Alk phos 159. alb 2.1. Trop mininally elevated. . CT A/P showed large volume ascites and non-specific jejunal loop distension, otherwise no overt GI pathology. Uterine fibroids present. COVID19, flu and RSV PCR negative. UA suggestive of possible infection. Started on ceftriaxone and admitted to Medicine.    #Large volume ascites, endometrial thickening, Focally invasive endometrial adenocarcinoma   -S/p paracentesis with IR both 04/10 & 04/13 with approx. 15L taken off ---> initial sample (04/10) negative for malignant cells --->Negative for malignancy  -ECC D+C donen 4/10 path showing endometrial adenocarcinoma   -Since ascitic fluid protein high, it is likely malignant ascites  -Prophylactically treat SBP w CTX 2g iv BID #5/5  -Per gyn onc:NACT (Carbo/Paclitaxel) followed by reassessment to determine candidacy for interval surgery, SUJATHA/BSO, & debulking as warranted. Abdominal PleurX  -will need f/u w GI  -IR is to see pt regarding Abdominal PleurX placement    #Normocytic Anemia and Leukocytosis   -currently on Venofer   -Heme/onc following     #PT eval appreciated    #DVT ppx- SQH    Abx thru 4/16, then likely medically ready for d/c pending onc plan

## 2023-04-18 ENCOUNTER — FORM ENCOUNTER (OUTPATIENT)
Age: 73
End: 2023-04-18

## 2023-04-18 PROCEDURE — 99232 SBSQ HOSP IP/OBS MODERATE 35: CPT

## 2023-04-18 PROCEDURE — 99233 SBSQ HOSP IP/OBS HIGH 50: CPT

## 2023-04-18 RX ADMIN — Medication 600 MILLIGRAM(S): at 21:16

## 2023-04-18 RX ADMIN — Medication 600 MILLIGRAM(S): at 09:32

## 2023-04-18 RX ADMIN — FAMOTIDINE 20 MILLIGRAM(S): 10 INJECTION INTRAVENOUS at 09:32

## 2023-04-18 RX ADMIN — ENOXAPARIN SODIUM 40 MILLIGRAM(S): 100 INJECTION SUBCUTANEOUS at 21:16

## 2023-04-18 RX ADMIN — IRON SUCROSE 100 MILLIGRAM(S): 20 INJECTION, SOLUTION INTRAVENOUS at 18:08

## 2023-04-18 RX ADMIN — ENOXAPARIN SODIUM 40 MILLIGRAM(S): 100 INJECTION SUBCUTANEOUS at 09:32

## 2023-04-18 NOTE — PROGRESS NOTE ADULT - ASSESSMENT
72 y/o F with no significant PMHx found to have significant abdominal ascites with uterine mass.      #   Focally invasive endometrioid  adenocarcinoma with Ascites    - Initial CT imaging revealed significant amount of abdominal ascites  - S/p paracentesis with IR both 04/10 & 04/13 with approx. 15L taken off ---> initial sample (04/10) negative for malignant cells --->Negative for malignancy  - Currently being treated for SBP with CTX 2g IV BID x 5 days  - S/p hysteroscopic D&C with EM- Bx by Gyn/ Onc 4/10; consistent with Endometrioid adenocarcinoma of the endometrium, FIGO grade 2, focally invasive.  - CT chest negative for any potential malignancy  - GOC discussion/meeting with family and Gyn/ Onc 04/14 or 04/15  - Attending Dr Bryant spoke with patient and discussed following up with Primary Heme/Onc Dr Ashley outpatient for treatment  -Scheduled for abdominal indwelling Pleurx placement today-4/18  - To f/u with Dr Ashley will follow up- Likely will need systemic therapy first before debulking surgery considered.    # Normocytic Anemia & Leukocytosis    - WBC count 15K, trending down  - Hgb 9.9 g/dL   - Likely 2/2 suspected malignancy / SBP  - Low iron sat. Might benefit from IV iron.- ordered.  - Continue supportive measures        74 y/o F with no significant PMHx found to have significant abdominal ascites with uterine mass.      #   Focally invasive endometrioid adenocarcinoma with ascites    - Initial CT imaging revealed significant amount of abdominal ascites  - S/p paracentesis with IR both 04/10 & 04/13 with approx. 15L taken off ---> initial sample (04/10) negative for malignant cells --->Negative for malignancy  - Currently being treated for SBP with CTX 2g IV BID x 5 days  - S/p hysteroscopic D&C with EM- Bx by Gyn/ Onc 4/10; consistent with Endometrioid adenocarcinoma of the endometrium, FIGO grade 2, focally invasive.  - CT chest negative for any potential malignancy  - GOC discussion/meeting with family and Gyn/ Onc 04/14 or 04/15  - Attending Dr Bryant spoke with patient and discussed following up with Primary Heme/Onc Dr Ashley outpatient for treatment  -Scheduled for abdominal indwelling Pleurx placement today-4/18  - To f/u with Dr Ashley will follow up- Likely will need systemic therapy first before debulking surgery considered.    # Normocytic Anemia & Leukocytosis    - WBC count 15K, trending down  - Hgb 9.9 g/dL   - Likely 2/2 suspected malignancy / SBP  - Low iron sat. Might benefit from IV iron.- ordered.  - Continue supportive measures

## 2023-04-18 NOTE — PROGRESS NOTE ADULT - SUBJECTIVE AND OBJECTIVE BOX
73 year old Female presented to the ED complaining of Dyspnea on exertion for more than 6 weeks. Patient started to have some shortness of breath with exertion and mild abdominal swelling more than 6 weeks ago. Then she went to [place] with these symptoms to visit. She came back couple of weeks ago. But her abdomen continue to swell and also got significant edema.  She also has decreased appetite and nausea, vomiting for few days. Last BM, 2 days ago. She has no fever or diarrhea.       Subjective: Patient was seen and examined by me this morning at bedside. Pt reporting that she does not want to be seen by resident team. She would prefer to have a single hospitalist.     04/16: Pt seen and evaluated at bedside. Resting comfortable. Has no acute complaints. Vitals stable     4/17: Pt seen and evaluated at bedside. Resting comfortable. Has no acute complaints. Reports abdomen feels soft     4/18: No changes. No acute complaints    REVIEW OF SYSTEMS:  CONSTITUTIONAL: No weakness, fevers or chills  EYES/ENT: No visual changes;  No vertigo or throat pain   NECK: No pain or stiffness  RESPIRATORY: No cough, wheezing, hemoptysis; No shortness of breath  CARDIOVASCULAR: No chest pain or palpitations  GASTROINTESTINAL: Abdominal fullness   GENITOURINARY: No dysuria, frequency or hematuria  NEUROLOGICAL: No numbness or weakness  SKIN: No itching, burning, rashes, or lesions     PHYSICAL EXAM:  Vital Signs Last 24 Hrs  T(C): 36.6 (18 Apr 2023 08:20), Max: 36.7 (17 Apr 2023 23:41)  T(F): 97.9 (18 Apr 2023 08:20), Max: 98.1 (17 Apr 2023 23:41)  HR: 68 (18 Apr 2023 08:20) (68 - 91)  BP: 123/62 (18 Apr 2023 08:20) (113/46 - 123/69)  BP(mean): --  RR: 17 (18 Apr 2023 08:20) (17 - 18)  SpO2: 100% (18 Apr 2023 08:20) (96% - 100%)    Parameters below as of 18 Apr 2023 08:20  Patient On (Oxygen Delivery Method): room air      Constitutional: Pt laying in bed  HEENT: EOMI, normal hearing, moist mucous membranes  Neck: Soft and supple, no JVD  Respiratory: CTABL, No wheezing, rales or rhonchi  Cardiovascular: S1S2+, RRR, no M/G/R  Gastrointestinal: BS+, soft, distended, no rebound or guarding  Extremities: No peripheral edema  Vascular: 2+ peripheral pulses  Neurological: AAOx3, no focal deficits  Musculoskeletal: 5/5 strength b/l upper and lower extremities  Skin: No rashes    MEDICATIONS  (STANDING):  famotidine    Tablet 20 milliGRAM(s) Oral daily  guaiFENesin  milliGRAM(s) Oral every 12 hours  heparin   Injectable 5000 Unit(s) SubCutaneous every 8 hours  iron sucrose Injectable 100 milliGRAM(s) IV Push every 24 hours    MEDICATIONS  (PRN):  acetaminophen     Tablet .. 650 milliGRAM(s) Oral every 6 hours PRN Temp greater or equal to 38C (100.4F), Mild Pain (1 - 3)  aluminum hydroxide/magnesium hydroxide/simethicone Suspension 30 milliLiter(s) Oral every 4 hours PRN Dyspepsia  melatonin 3 milliGRAM(s) Oral at bedtime PRN Insomnia  ondansetron Injectable 4 milliGRAM(s) IV Push every 8 hours PRN Nausea and/or Vomiting      LABS: All Labs Reviewed:                        9.9    15.98 )-----------( 581      ( 16 Apr 2023 06:29 )             32.3   04-16    135  |  101  |  10  ----------------------------<  97  4.1   |  28  |  0.42<L>    Ca    8.7      16 Apr 2023 06:29    TPro  6.4  /  Alb  1.7<L>  /  TBili  0.3  /  DBili  x   /  AST  61<H>  /  ALT  34  /  AlkPhos  343<H>  04-16            Blood Culture:   I&O's Summary    14 Apr 2023 07:01  -  15 Apr 2023 07:00  --------------------------------------------------------  IN: 0 mL / OUT: 275 mL / NET: -275 mL      CAPILLARY BLOOD GLUCOSE          RADIOLOGY/EKG:    < from: CT Abdomen and Pelvis w/ IV Cont (04.07.23 @ 17:11) >  Very large amount of abdominal and pelvic ascites  Uterine fibroids    < end of copied text >  < from: US Duplex Venous Lower Ext Complete, Bilateral (04.08.23 @ 01:00) >  No evidence of deep venous thrombosis in either lower extremity.    < end of copied text >  < from: US Pelvis Complete (US Pelvis Complete .) (04.08.23 @ 10:29) >  Limited pelvic ultrasound.    The endometrium is incompletely assessed on this examination.  The   endometrium is either severely severely thickened to 6 cm or is distended   with complex material to 6 cm.  There is a 2.5 x1.6 x 1.7 cm shadowing   echogenic focus with shadowing along the periphery of the endometrium,   which may represent a calcified intramural versus submucosal fibroid,   versus an endometrial lesion.    The ovaries are not visualized.    Large volumepelvic ascites.    Pelvic MRI may be obtained for further evaluation.    < end of copied text >  Successful aspiration of 13,800 CC murky dark brown ascites. Drainage had to be halted for safety reasons to avoid large volume depletion. There is still residual large volume ascites. Repeat paracentesis will need to be performed some time later this week for therapeutic affect.  Back to floor  < from: CT Chest No Cont (04.10.23 @ 11:40) >  No malignancy in the chest.    < end of copied text >  < from: CT Abdomen and Pelvis w/ IV Cont (04.14.23 @ 08:57) >  Interval decrease in the volume of ascites with scalloping of the right   heart border suggesting malignant ascites.  peritoneal enhancement, nonspecific. Correlate for symptoms of   peritonitis.    Stable size of a central heterogeneous uterine mass.      < end of copied text >

## 2023-04-18 NOTE — PROGRESS NOTE ADULT - SUBJECTIVE AND OBJECTIVE BOX
HPI:    72 y/o F presented to the ED c/o VINES with progressive abdominal distension for more than 6 weeks. She admitted that she traveled to and from [place] with these symptoms, recently returned a few weeks ago. She admitted to having decreased appetite with nausea and vomiting for the past few days but denied any recent fever, cough, chills, HA, vision/hearing changes, CP, palpitations, diarrhea, melena, BRBPR or any other acute c/o. (07 Apr 2023 23:06)      04/14/2023: Seen at bedside this morning after returning from CT    04/16/2023- Seen at bedside, OOB to bathroom ,reported having a BM today. Feels better,  with improvement in abdominal pain. c/o intermittent mild vaginal bleeding.    04/18/2023- Seen at bedside, in no acute distress. Awaiting Pleurx catheter placement, scheduled today.     PAST MEDICAL & SURGICAL HISTORY:    No pertinent past medical history  No significant past surgical history      MEDICATIONS  (STANDING):    cefTRIAXone Injectable. 2000 milliGRAM(s) IV Push every 12 hours  famotidine    Tablet 20 milliGRAM(s) Oral daily  guaiFENesin  milliGRAM(s) Oral every 12 hours  heparin   Injectable 5000 Unit(s) SubCutaneous every 8 hours      MEDICATIONS  (PRN):    acetaminophen     Tablet .. 650 milliGRAM(s) Oral every 6 hours PRN Temp greater or equal to 38C (100.4F), Mild Pain (1 - 3)  aluminum hydroxide/magnesium hydroxide/simethicone Suspension 30 milliLiter(s) Oral every 4 hours PRN Dyspepsia  melatonin 3 milliGRAM(s) Oral at bedtime PRN Insomnia  ondansetron Injectable 4 milliGRAM(s) IV Push every 8 hours PRN Nausea and/or Vomiting      ALLERGIES:    No Known Allergies      FAMILY HISTORY:    No pertinent family history in first degree relatives        REVIEW OF SYSTEMS:    Constitutional, Eyes, ENT, Cardiovascular, Respiratory, Gastrointestinal, Genitourinary, Musculoskeletal, Integumentary, Neurological, Psychiatric, Endocrine, Heme/Lymph and Allergic/Immunologic review of systems are otherwise negative except as noted in HPI.       VITALS:    Vital Signs Last 24 Hrs  T(C): 36.6 (18 Apr 2023 08:20), Max: 36.7 (17 Apr 2023 23:41)  T(F): 97.9 (18 Apr 2023 08:20), Max: 98.1 (17 Apr 2023 23:41)  HR: 68 (18 Apr 2023 08:20) (68 - 91)  BP: 123/62 (18 Apr 2023 08:20) (113/46 - 123/69)  BP(mean): --  RR: 17 (18 Apr 2023 08:20) (17 - 18)  SpO2: 100% (18 Apr 2023 08:20) (96% - 100%)    Parameters below as of 18 Apr 2023 08:20  Patient On (Oxygen Delivery Method): room air      PHYSICAL:    Constitutional: no acute distress  Eyes: PERRL, EOMI  ENT: pharynx is unremarkable  Neck: supple without JVD  Pulmonary: clear to auscultation bilaterally, no dullness, no wheezing  Cardiac: RRR, normal S1S2  Vascular: no calf tenderness, venous stasis changes, varices  Abdomen:  obese, normoactive bowel sounds, distended, but soft and nontender, no hepatosplenomegaly or masses appreciated   Lymphatic: no peripheral adenopathy appreciated  Musculoskeletal: full range of motion and no deformities appreciated  Skin: normal appearance, no rash, nodules, vesicles, ulcers, erythema  Neurology: grossly intact  Psychiatric: affect appropriate      LABS:                          9.9    15.98 )-----------( 581      ( 16 Apr 2023 06:29 )             32.3       04-16    135  |  101  |  10  ----------------------------<  97  4.1   |  28  |  0.42<L>    Ca    8.7      16 Apr 2023 06:29    TPro  6.4  /  Alb  1.7<L>  /  TBili  0.3  /  DBili  x   /  AST  61<H>  /  ALT  34  /  AlkPhos  343<H>  04-16      RADIOLOGY & ADDITIONAL STUDIES:      CT Abdomen and Pelvis w/ IV Cont (04.07.23 @ 17:11)      IMPRESSION:  Very large amount of abdominal and pelvic ascites  Uterine fibroids          US Duplex Venous Lower Ext Complete, Bilateral (04.08.23 @ 01:00)    IMPRESSION:  No evidence of deep venous thrombosis in either lower extremity.        CT Chest No Cont (04.10.23 @ 11:40)    IMPRESSION:  No malignancy in the chest.          CT Abdomen and Pelvis w/ IV Cont (04.14.23 @ 08:57)    IMPRESSION:  Interval decrease in the volume of ascites with scalloping of the right   heart border suggesting malignant ascites.  peritoneal enhancement, nonspecific. Correlate for symptoms of   peritonitis.    Stable size of a central heterogeneous uterine mass.

## 2023-04-19 LAB
ALBUMIN SERPL ELPH-MCNC: 1.8 G/DL — LOW (ref 3.3–5)
ALP SERPL-CCNC: 267 U/L — HIGH (ref 40–120)
ALT FLD-CCNC: 28 U/L — SIGNIFICANT CHANGE UP (ref 12–78)
ANION GAP SERPL CALC-SCNC: 1 MMOL/L — LOW (ref 5–17)
ANISOCYTOSIS BLD QL: SIGNIFICANT CHANGE UP
APTT BLD: 30.3 SEC — SIGNIFICANT CHANGE UP (ref 27.5–35.5)
AST SERPL-CCNC: 28 U/L — SIGNIFICANT CHANGE UP (ref 15–37)
BASOPHILS # BLD AUTO: 0 K/UL — SIGNIFICANT CHANGE UP (ref 0–0.2)
BASOPHILS NFR BLD AUTO: 0 % — SIGNIFICANT CHANGE UP (ref 0–2)
BILIRUB SERPL-MCNC: 0.4 MG/DL — SIGNIFICANT CHANGE UP (ref 0.2–1.2)
BUN SERPL-MCNC: 9 MG/DL — SIGNIFICANT CHANGE UP (ref 7–23)
CALCIUM SERPL-MCNC: 9.1 MG/DL — SIGNIFICANT CHANGE UP (ref 8.5–10.1)
CHLORIDE SERPL-SCNC: 103 MMOL/L — SIGNIFICANT CHANGE UP (ref 96–108)
CO2 SERPL-SCNC: 33 MMOL/L — HIGH (ref 22–31)
CREAT SERPL-MCNC: 0.59 MG/DL — SIGNIFICANT CHANGE UP (ref 0.5–1.3)
EGFR: 95 ML/MIN/1.73M2 — SIGNIFICANT CHANGE UP
EOSINOPHIL # BLD AUTO: 0.22 K/UL — SIGNIFICANT CHANGE UP (ref 0–0.5)
EOSINOPHIL NFR BLD AUTO: 2 % — SIGNIFICANT CHANGE UP (ref 0–6)
GLUCOSE SERPL-MCNC: 122 MG/DL — HIGH (ref 70–99)
HCT VFR BLD CALC: 34.5 % — SIGNIFICANT CHANGE UP (ref 34.5–45)
HGB BLD-MCNC: 10.2 G/DL — LOW (ref 11.5–15.5)
INR BLD: 1.23 RATIO — HIGH (ref 0.88–1.16)
LYMPHOCYTES # BLD AUTO: 2.46 K/UL — SIGNIFICANT CHANGE UP (ref 1–3.3)
LYMPHOCYTES # BLD AUTO: 22 % — SIGNIFICANT CHANGE UP (ref 13–44)
MANUAL SMEAR VERIFICATION: SIGNIFICANT CHANGE UP
MCHC RBC-ENTMCNC: 22.1 PG — LOW (ref 27–34)
MCHC RBC-ENTMCNC: 29.6 GM/DL — LOW (ref 32–36)
MCV RBC AUTO: 74.8 FL — LOW (ref 80–100)
MICROCYTES BLD QL: SIGNIFICANT CHANGE UP
MONOCYTES # BLD AUTO: 0.67 K/UL — SIGNIFICANT CHANGE UP (ref 0–0.9)
MONOCYTES NFR BLD AUTO: 6 % — SIGNIFICANT CHANGE UP (ref 2–14)
NEUTROPHILS # BLD AUTO: 7.83 K/UL — HIGH (ref 1.8–7.4)
NEUTROPHILS NFR BLD AUTO: 70 % — SIGNIFICANT CHANGE UP (ref 43–77)
NRBC # BLD: 0 /100 — SIGNIFICANT CHANGE UP (ref 0–0)
NRBC # BLD: SIGNIFICANT CHANGE UP /100 WBCS (ref 0–0)
OVALOCYTES BLD QL SMEAR: SLIGHT — SIGNIFICANT CHANGE UP
PLAT MORPH BLD: NORMAL — SIGNIFICANT CHANGE UP
PLATELET # BLD AUTO: 616 K/UL — HIGH (ref 150–400)
PLATELET COUNT - ESTIMATE: ABNORMAL
POIKILOCYTOSIS BLD QL AUTO: SLIGHT — SIGNIFICANT CHANGE UP
POTASSIUM SERPL-MCNC: 4.2 MMOL/L — SIGNIFICANT CHANGE UP (ref 3.5–5.3)
POTASSIUM SERPL-SCNC: 4.2 MMOL/L — SIGNIFICANT CHANGE UP (ref 3.5–5.3)
PROT SERPL-MCNC: 6.9 GM/DL — SIGNIFICANT CHANGE UP (ref 6–8.3)
PROTHROM AB SERPL-ACNC: 14.3 SEC — HIGH (ref 10.5–13.4)
RBC # BLD: 4.61 M/UL — SIGNIFICANT CHANGE UP (ref 3.8–5.2)
RBC # FLD: 19 % — HIGH (ref 10.3–14.5)
RBC BLD AUTO: ABNORMAL
SODIUM SERPL-SCNC: 137 MMOL/L — SIGNIFICANT CHANGE UP (ref 135–145)
WBC # BLD: 11.19 K/UL — HIGH (ref 3.8–10.5)
WBC # FLD AUTO: 11.19 K/UL — HIGH (ref 3.8–10.5)

## 2023-04-19 PROCEDURE — 76705 ECHO EXAM OF ABDOMEN: CPT | Mod: 26

## 2023-04-19 PROCEDURE — 99232 SBSQ HOSP IP/OBS MODERATE 35: CPT

## 2023-04-19 RX ADMIN — ENOXAPARIN SODIUM 40 MILLIGRAM(S): 100 INJECTION SUBCUTANEOUS at 21:11

## 2023-04-19 RX ADMIN — IRON SUCROSE 100 MILLIGRAM(S): 20 INJECTION, SOLUTION INTRAVENOUS at 17:34

## 2023-04-19 RX ADMIN — FAMOTIDINE 20 MILLIGRAM(S): 10 INJECTION INTRAVENOUS at 17:33

## 2023-04-19 RX ADMIN — Medication 600 MILLIGRAM(S): at 21:11

## 2023-04-19 RX ADMIN — ENOXAPARIN SODIUM 40 MILLIGRAM(S): 100 INJECTION SUBCUTANEOUS at 17:34

## 2023-04-19 NOTE — PROGRESS NOTE ADULT - SUBJECTIVE AND OBJECTIVE BOX
73 year old Female presented to the ED complaining of Dyspnea on exertion for more than 6 weeks. Patient started to have some shortness of breath with exertion and mild abdominal swelling more than 6 weeks ago. Then she went to [place] with these symptoms to visit. She came back couple of weeks ago. But her abdomen continue to swell and also got significant edema.  She also has decreased appetite and nausea, vomiting for few days. Last BM, 2 days ago. She has no fever or diarrhea.       Subjective: Patient was seen and examined by me this morning at bedside. Pt reporting that she does not want to be seen by resident team. She would prefer to have a single hospitalist.     04/16: Pt seen and evaluated at bedside. Resting comfortable. Has no acute complaints. Vitals stable     4/17: Pt seen and evaluated at bedside. Resting comfortable. Has no acute complaints. Reports abdomen feels soft     4/18: No changes. No acute complaints    4/19: Pt seen and evaluated at bedside. Resting comfortable. Has no acute complaints. Reports abdomen feels soft     REVIEW OF SYSTEMS:  CONSTITUTIONAL: No weakness, fevers or chills  EYES/ENT: No visual changes;  No vertigo or throat pain   NECK: No pain or stiffness  RESPIRATORY: No cough, wheezing, hemoptysis; No shortness of breath  CARDIOVASCULAR: No chest pain or palpitations  GASTROINTESTINAL: Abdominal fullness   GENITOURINARY: No dysuria, frequency or hematuria  NEUROLOGICAL: No numbness or weakness  SKIN: No itching, burning, rashes, or lesions     PHYSICAL EXAM:  Vital Signs Last 24 Hrs  T(C): 36.8 (19 Apr 2023 07:56), Max: 36.8 (19 Apr 2023 07:56)  T(F): 98.2 (19 Apr 2023 07:56), Max: 98.2 (19 Apr 2023 07:56)  HR: 74 (19 Apr 2023 07:56) (74 - 82)  BP: 99/60 (19 Apr 2023 07:56) (99/60 - 122/60)  BP(mean): --  RR: 17 (19 Apr 2023 07:56) (17 - 18)  SpO2: 100% (19 Apr 2023 07:56) (98% - 100%)    Parameters below as of 19 Apr 2023 07:56  Patient On (Oxygen Delivery Method): room air      Constitutional: Pt laying in bed  HEENT: EOMI, normal hearing, moist mucous membranes  Neck: Soft and supple, no JVD  Respiratory: CTABL, No wheezing, rales or rhonchi  Cardiovascular: S1S2+, RRR, no M/G/R  Gastrointestinal: BS+, soft, distended, no rebound or guarding  Extremities: No peripheral edema  Vascular: 2+ peripheral pulses  Neurological: AAOx3, no focal deficits  Musculoskeletal: 5/5 strength b/l upper and lower extremities  Skin: No rashes    MEDICATIONS  (STANDING):  famotidine    Tablet 20 milliGRAM(s) Oral daily  guaiFENesin  milliGRAM(s) Oral every 12 hours  heparin   Injectable 5000 Unit(s) SubCutaneous every 8 hours  iron sucrose Injectable 100 milliGRAM(s) IV Push every 24 hours    MEDICATIONS  (PRN):  acetaminophen     Tablet .. 650 milliGRAM(s) Oral every 6 hours PRN Temp greater or equal to 38C (100.4F), Mild Pain (1 - 3)  aluminum hydroxide/magnesium hydroxide/simethicone Suspension 30 milliLiter(s) Oral every 4 hours PRN Dyspepsia  melatonin 3 milliGRAM(s) Oral at bedtime PRN Insomnia  ondansetron Injectable 4 milliGRAM(s) IV Push every 8 hours PRN Nausea and/or Vomiting      LABS: All Labs Reviewed:                                   10.2   11.19 )-----------( 616      ( 19 Apr 2023 06:52 )             34.5     04-19    137  |  103  |  9   ----------------------------<  122<H>  4.2   |  33<H>  |  0.59    Ca    9.1      19 Apr 2023 06:52    TPro  6.9  /  Alb  1.8<L>  /  TBili  0.4  /  DBili  x   /  AST  28  /  ALT  28  /  AlkPhos  267<H>  04-19          Blood Culture:   I&O's Summary    14 Apr 2023 07:01  -  15 Apr 2023 07:00  --------------------------------------------------------  IN: 0 mL / OUT: 275 mL / NET: -275 mL      CAPILLARY BLOOD GLUCOSE          RADIOLOGY/EKG:    < from: CT Abdomen and Pelvis w/ IV Cont (04.07.23 @ 17:11) >  Very large amount of abdominal and pelvic ascites  Uterine fibroids    < end of copied text >  < from: US Duplex Venous Lower Ext Complete, Bilateral (04.08.23 @ 01:00) >  No evidence of deep venous thrombosis in either lower extremity.    < end of copied text >  < from: US Pelvis Complete (US Pelvis Complete .) (04.08.23 @ 10:29) >  Limited pelvic ultrasound.    The endometrium is incompletely assessed on this examination.  The   endometrium is either severely severely thickened to 6 cm or is distended   with complex material to 6 cm.  There is a 2.5 x1.6 x 1.7 cm shadowing   echogenic focus with shadowing along the periphery of the endometrium,   which may represent a calcified intramural versus submucosal fibroid,   versus an endometrial lesion.    The ovaries are not visualized.    Large volumepelvic ascites.    Pelvic MRI may be obtained for further evaluation.    < end of copied text >  Successful aspiration of 13,800 CC murky dark brown ascites. Drainage had to be halted for safety reasons to avoid large volume depletion. There is still residual large volume ascites. Repeat paracentesis will need to be performed some time later this week for therapeutic affect.  Back to floor  < from: CT Chest No Cont (04.10.23 @ 11:40) >  No malignancy in the chest.    < end of copied text >  < from: CT Abdomen and Pelvis w/ IV Cont (04.14.23 @ 08:57) >  Interval decrease in the volume of ascites with scalloping of the right   heart border suggesting malignant ascites.  peritoneal enhancement, nonspecific. Correlate for symptoms of   peritonitis.    Stable size of a central heterogeneous uterine mass.      < end of copied text >

## 2023-04-19 NOTE — DIETITIAN NUTRITION RISK NOTIFICATION - MUSCLE MASS (LOSS OF MUSCLE)
Patient called stating she had seen a Neurologist yesterday and hd her blood pressure taken.  Patient sated that one of he numbers was 59 and that MD seemed concerned about it and told her to contact her PCP.    Patient is asking if Dr Lara would review this information in her chart and call her to advise if her blood pressure reading was ok, should she be concerned, should she take (or stop taking) medication, etc.    Patient can be reached at [phone] (home)     Temples.../Clavicles.../Shoulders...

## 2023-04-19 NOTE — DIETITIAN INITIAL EVALUATION ADULT - ETIOLOGY
r/t decreased ability to meet increased nutrient needs 2/2 pain, poor appetite, poss malignant ascites

## 2023-04-19 NOTE — DIETITIAN INITIAL EVALUATION ADULT - OTHER INFO
73 year old Female presented to the ED complaining of Dyspnea on exertion for more than 6 weeks. Patient started to have some shortness of breath with exertion and mild abdominal swelling more than 6 weeks ago. Her abdomen continue to swell and also got significant edema.  She also has decreased appetite and nausea, vomiting for few days. Last BM x 2 days ago.    Upon RD assessment, pt reports of improved appetite since admission. Trying to consume small frequent meals throughout day. As per hospitalist note "S/p paracentesis with IR both 04/10 & 04/13 with approx. 15L taken off. Initial sample (04/10) negative for malignant cells. ECC D+C done on 4/10 pathology showing endometrial adenocarcinoma, since ascitic fluid protein high, it is likely malignant ascites." Discussed in IDR, plan for pleurx catheter placement today. UBW stated at ~225# however endorses wt gain 2/2 ascites. Admit wt doc'd at ~224#. Bed scale wt of 180# taken by RD on 4/19 however moderate edema is skewing current wt appearance/ masking losses. 44# wt loss/ 19.64% x 1.5 weeks (significant wt loss 2/2 significant fluid removal). NFPE reveals moderate to severe muscle/fat wasting. Educated pt on importance of protein-rich foods - pt receptive. Will trial Ensure + HP shake BID to optimize nutritional needs (provides 350 kcal, 20g protein/ shake) - pt receptive. See recommendations below.

## 2023-04-19 NOTE — DIETITIAN INITIAL EVALUATION ADULT - PERTINENT MEDS FT
MEDICATIONS  (STANDING):  enoxaparin Injectable 40 milliGRAM(s) SubCutaneous every 12 hours  famotidine    Tablet 20 milliGRAM(s) Oral daily  guaiFENesin  milliGRAM(s) Oral every 12 hours  iron sucrose Injectable 100 milliGRAM(s) IV Push every 24 hours    MEDICATIONS  (PRN):  acetaminophen     Tablet .. 650 milliGRAM(s) Oral every 6 hours PRN Temp greater or equal to 38C (100.4F), Mild Pain (1 - 3)  aluminum hydroxide/magnesium hydroxide/simethicone Suspension 30 milliLiter(s) Oral every 4 hours PRN Dyspepsia  melatonin 3 milliGRAM(s) Oral at bedtime PRN Insomnia  ondansetron Injectable 4 milliGRAM(s) IV Push every 8 hours PRN Nausea and/or Vomiting

## 2023-04-19 NOTE — PROGRESS NOTE ADULT - ASSESSMENT
73 year old woman who no known PMHx, has not seen a doctor in many years, has not suffered any recent illness as far as she knows, takes no medications, only on tumeric for arthritis in her knee, presented for further evaluation of several weeks of progressively worsening abdominal distension and increased abdominal girth, associated decreased ability to tolerate PO, and symptom of exertional dyspnea. Denied fever, chills, change in bowel habits, blood in stool or constipation. Denies chronic alcohol use, illicit drugs or family history of GI disease/malignancy or GYN disease/malignancy. In the ED, vitals WNL, exam notable for distended abdomen w/ fairly tense ascites. WBC 15. Hgb 10.9 Plt 581. INR 1.49. Na 129. Cr WNL. AST/ALT WNL. T bili 2.1. Alk phos 159. alb 2.1. Trop mininally elevated. . CT A/P showed large volume ascites and non-specific jejunal loop distension, otherwise no overt GI pathology. Uterine fibroids present. COVID19, flu and RSV PCR negative. UA suggestive of possible infection. Started on ceftriaxone and admitted to Medicine.    #Large volume ascites, endometrial thickening, Focally invasive endometrial adenocarcinoma   -S/p paracentesis with IR both 04/10 & 04/13 with approx. 15L taken off ---> initial sample (04/10) negative for malignant cells --->Negative for malignancy  -ECC D+C donen 4/10 path showing endometrial adenocarcinoma   -Since ascitic fluid protein high, it is likely malignant ascites  -Prophylactically treat SBP w CTX 2g iv BID #5/5  -Per gyn onc:NACT (Carbo/Paclitaxel) followed by reassessment to determine candidacy for interval surgery, SUJATHA/BSO, & debulking as warranted. Abdominal PleurX  -will need f/u w GI  -abd US today to assess ascites and if requires another paracentesis     #Normocytic Anemia and Leukocytosis   -currently on Venofer   -Heme/onc following     #PT eval appreciated    #DVT ppx- SQH    Abx thru 4/16, then likely medically ready for d/c pending onc plan

## 2023-04-19 NOTE — DIETITIAN INITIAL EVALUATION ADULT - PERTINENT LABORATORY DATA
04-19    137  |  103  |  9   ----------------------------<  122<H>  4.2   |  33<H>  |  0.59    Ca    9.1      19 Apr 2023 06:52    TPro  6.9  /  Alb  1.8<L>  /  TBili  0.4  /  DBili  x   /  AST  28  /  ALT  28  /  AlkPhos  267<H>  04-19  A1C with Estimated Average Glucose Result: 5.6 % (04-08-23 @ 07:07)  Iron Total, Serum: 12 ug/dL (04-09-23 @ 05:32)

## 2023-04-19 NOTE — DIETITIAN INITIAL EVALUATION ADULT - ADD RECOMMEND
1) C/w regular diet to maximize caloric and protein intake  2) Provide Ensure + HP shake BID to optimize nutritional needs (provides 350 kcal, 20g protein/ shake)   3) Encourage protein-rich foods and maximize food preferences  4) Monitor bowel movements, if no BM for >3 days, consider implementing bowel regimen.  5) MVI w/ minerals daily to ensure 100% RDA met  6) Consider adding thiamine 100 mg daily 2/2 poor PO intake/ malnutrition  7) Monitor daily wt to track/trend changes; monitor I/Os   8) Confirm goals of care regarding nutrition support   RD will continue to monitor PO intake, labs, hydration, and wt prn.

## 2023-04-20 ENCOUNTER — FORM ENCOUNTER (OUTPATIENT)
Age: 73
End: 2023-04-20

## 2023-04-20 DIAGNOSIS — R18.8 OTHER ASCITES: ICD-10-CM

## 2023-04-20 PROCEDURE — 99232 SBSQ HOSP IP/OBS MODERATE 35: CPT

## 2023-04-20 PROCEDURE — 99221 1ST HOSP IP/OBS SF/LOW 40: CPT

## 2023-04-20 RX ADMIN — Medication 600 MILLIGRAM(S): at 10:10

## 2023-04-20 RX ADMIN — ENOXAPARIN SODIUM 40 MILLIGRAM(S): 100 INJECTION SUBCUTANEOUS at 10:10

## 2023-04-20 RX ADMIN — IRON SUCROSE 100 MILLIGRAM(S): 20 INJECTION, SOLUTION INTRAVENOUS at 16:30

## 2023-04-20 RX ADMIN — ENOXAPARIN SODIUM 40 MILLIGRAM(S): 100 INJECTION SUBCUTANEOUS at 22:11

## 2023-04-20 RX ADMIN — Medication 600 MILLIGRAM(S): at 22:11

## 2023-04-20 RX ADMIN — FAMOTIDINE 20 MILLIGRAM(S): 10 INJECTION INTRAVENOUS at 10:10

## 2023-04-20 NOTE — PROGRESS NOTE ADULT - SUBJECTIVE AND OBJECTIVE BOX
73 year old Female presented to the ED complaining of Dyspnea on exertion for more than 6 weeks. Patient started to have some shortness of breath with exertion and mild abdominal swelling more than 6 weeks ago. Then she went to [place] with these symptoms to visit. She came back couple of weeks ago. But her abdomen continue to swell and also got significant edema.  She also has decreased appetite and nausea, vomiting for few days. Last BM, 2 days ago. She has no fever or diarrhea.       Subjective: Patient was seen and examined by me this morning at bedside. Pt reporting that she does not want to be seen by resident team. She would prefer to have a single hospitalist.     04/16: Pt seen and evaluated at bedside. Resting comfortable. Has no acute complaints. Vitals stable     4/17: Pt seen and evaluated at bedside. Resting comfortable. Has no acute complaints. Reports abdomen feels soft     4/18: No changes. No acute complaints    4/19: Pt seen and evaluated at bedside. Resting comfortable. Has no acute complaints. Reports abdomen feels soft     4/20: no complaints      PHYSICAL EXAM:    Daily     Daily     Vital Signs Last 24 Hrs  T(C): 36.5 (20 Apr 2023 08:32), Max: 36.7 (19 Apr 2023 23:18)  T(F): 97.7 (20 Apr 2023 08:32), Max: 98.1 (19 Apr 2023 23:18)  HR: 73 (20 Apr 2023 08:32) (72 - 73)  BP: 130/60 (20 Apr 2023 08:32) (113/46 - 130/60)  BP(mean): --  RR: 17 (20 Apr 2023 08:32) (17 - 18)  SpO2: 98% (20 Apr 2023 08:32) (98% - 100%)    Constitutional: Weak  appearing  HEENT: Atraumatic, ETELVINA, Normal, No congestion  Respiratory: Breath Sounds normal, no rhonchi/wheeze  Cardiovascular: N S1S2;   Gastrointestinal: Abdomen soft, non tender, Bowel Sounds present, Ascites +   Extremities: No edema, peripheral pulses present  Neurological: AAO x 3, no gross focal motor deficits  Skin: Non cellulitic, no rash, ulcers  Lymph Nodes: No lymphadenopathy noted  Back: No CVA tenderness   Musculoskeletal: non tender  Breasts: Deferred  Genitourinary: deferred  Rectal: Deferred    All Labs/EKG/Radiology/Meds reviewed    Lab Results:  CBC  CBC Full  -  ( 19 Apr 2023 06:52 )  WBC Count : 11.19 K/uL  RBC Count : 4.61 M/uL  Hemoglobin : 10.2 g/dL  Hematocrit : 34.5 %  Platelet Count - Automated : 616 K/uL  Mean Cell Volume : 74.8 fl  Mean Cell Hemoglobin : 22.1 pg  Mean Cell Hemoglobin Concentration : 29.6 gm/dL  Auto Neutrophil # : 7.83 K/uL  Auto Lymphocyte # : 2.46 K/uL  Auto Monocyte # : 0.67 K/uL  Auto Eosinophil # : 0.22 K/uL  Auto Basophil # : 0.00 K/uL  Auto Neutrophil % : 70.0 %  Auto Lymphocyte % : 22.0 %  Auto Monocyte % : 6.0 %  Auto Eosinophil % : 2.0 %  Auto Basophil % : 0.0 %    .		Differential:	[] Automated		[] Manual  Chemistry  04-19    137  |  103  |  9   ----------------------------<  122<H>  4.2   |  33<H>  |  0.59    Ca    9.1      19 Apr 2023 06:52    TPro  6.9  /  Alb  1.8<L>  /  TBili  0.4  /  DBili  x   /  AST  28  /  ALT  28  /  AlkPhos  267<H>  04-19    LIVER FUNCTIONS - ( 19 Apr 2023 06:52 )  Alb: 1.8 g/dL / Pro: 6.9 gm/dL / ALK PHOS: 267 U/L / ALT: 28 U/L / AST: 28 U/L / GGT: x           PT/INR - ( 19 Apr 2023 06:52 )   PT: 14.3 sec;   INR: 1.23 ratio         PTT - ( 19 Apr 2023 06:52 )  PTT:30.3 sec      MICROBIOLOGY/CULTURES:              RADIOLOGY/EKG:    < from: US Abdomen Limited (04.19.23 @ 14:41) >  IMPRESSION: Ascites as discussed with  multiloculated ascites in the left   lower quadrant similar to recent CT. Malignant ascites is a consideration      < end of copied text >    < from: CT Abdomen and Pelvis w/ IV Cont (04.07.23 @ 17:11) >  Very large amount of abdominal and pelvic ascites  Uterine fibroids    < end of copied text >  < from: US Duplex Venous Lower Ext Complete, Bilateral (04.08.23 @ 01:00) >  No evidence of deep venous thrombosis in either lower extremity.      < end of copied text >  < from: US Pelvis Complete (US Pelvis Complete .) (04.08.23 @ 10:29) >  Limited pelvic ultrasound.    The endometrium is incompletely assessed on this examination.  The   endometrium is either severely severely thickened to 6 cm or is distended   with complex material to 6 cm.  There is a 2.5 x1.6 x 1.7 cm shadowing   echogenic focus with shadowing along the periphery of the endometrium,   which may represent a calcified intramural versus submucosal fibroid,   versus an endometrial lesion.    The ovaries are not visualized.    Large volumepelvic ascites.    Pelvic MRI may be obtained for further evaluation.    < end of copied text >  Successful aspiration of 13,800 CC murky dark brown ascites. Drainage had to be halted for safety reasons to avoid large volume depletion. There is still residual large volume ascites. Repeat paracentesis will need to be performed some time later this week for therapeutic affect.  Back to floor  < from: CT Chest No Cont (04.10.23 @ 11:40) >  No malignancy in the chest.    < end of copied text >  < from: CT Abdomen and Pelvis w/ IV Cont (04.14.23 @ 08:57) >  Interval decrease in the volume of ascites with scalloping of the right   heart border suggesting malignant ascites.  peritoneal enhancement, nonspecific. Correlate for symptoms of   peritonitis.    Stable size of a central heterogeneous uterine mass.      < end of copied text >    MEDICATIONS  (STANDING):  enoxaparin Injectable 40 milliGRAM(s) SubCutaneous every 12 hours  famotidine    Tablet 20 milliGRAM(s) Oral daily  guaiFENesin  milliGRAM(s) Oral every 12 hours  iron sucrose Injectable 100 milliGRAM(s) IV Push every 24 hours    MEDICATIONS  (PRN):  acetaminophen     Tablet .. 650 milliGRAM(s) Oral every 6 hours PRN Temp greater or equal to 38C (100.4F), Mild Pain (1 - 3)  aluminum hydroxide/magnesium hydroxide/simethicone Suspension 30 milliLiter(s) Oral every 4 hours PRN Dyspepsia  melatonin 3 milliGRAM(s) Oral at bedtime PRN Insomnia  ondansetron Injectable 4 milliGRAM(s) IV Push every 8 hours PRN Nausea and/or Vomiting

## 2023-04-20 NOTE — CONSULT NOTE ADULT - SUBJECTIVE AND OBJECTIVE BOX
Chief Complaint:   Patient is a 73y old  Female who presents with a chief complaint of Abdominal distension and Ascites      HPI:  73 year old Female presented to the ED complaining of Dyspnea on exertion for more than 6 weeks. Patient started to have some shortness of breath with exertion and mild abdominal swelling more than 6 weeks ago. Then she went to [place] with these symptoms to visit. She came back couple of weeks ago. But her abdomen continue to swell and also got significant edema.  She also has decreased appetite and nausea, vomiting for few days. Pt admitted and found to have endometrial adenocarcinoma. Pt now with reaccumulation of ascites, referred to IR for placement of tunneled peritoneal drain.     Allergies  No Known Allergies      MEDICATIONS  (STANDING):  enoxaparin Injectable 40 milliGRAM(s) SubCutaneous every 12 hours  famotidine    Tablet 20 milliGRAM(s) Oral daily  guaiFENesin  milliGRAM(s) Oral every 12 hours  iron sucrose Injectable 100 milliGRAM(s) IV Push every 24 hours    MEDICATIONS  (PRN):  acetaminophen     Tablet .. 650 milliGRAM(s) Oral every 6 hours PRN Temp greater or equal to 38C (100.4F), Mild Pain (1 - 3)  aluminum hydroxide/magnesium hydroxide/simethicone Suspension 30 milliLiter(s) Oral every 4 hours PRN Dyspepsia  melatonin 3 milliGRAM(s) Oral at bedtime PRN Insomnia  ondansetron Injectable 4 milliGRAM(s) IV Push every 8 hours PRN Nausea and/or Vomiting      PAST MEDICAL & SURGICAL HISTORY:  No pertinent past medical history      No significant past surgical history          FAMILY HISTORY:  No pertinent family history in first degree relatives      Vital Signs Last 24 Hrs  T(C): 36.5 (20 Apr 2023 08:32), Max: 36.7 (19 Apr 2023 23:18)  T(F): 97.7 (20 Apr 2023 08:32), Max: 98.1 (19 Apr 2023 23:18)  HR: 73 (20 Apr 2023 08:32) (72 - 73)  BP: 130/60 (20 Apr 2023 08:32) (113/46 - 130/60)  BP(mean): --  RR: 17 (20 Apr 2023 08:32) (17 - 18)  SpO2: 98% (20 Apr 2023 08:32) (98% - 100%)    Parameters below as of 20 Apr 2023 08:32  Patient On (Oxygen Delivery Method): room air    CBC                        10.2   11.19 )-----------( 616      ( 19 Apr 2023 06:52 )             34.5       Chemistry  04-19    137  |  103  |  9   ----------------------------<  122<H>  4.2   |  33<H>  |  0.59    Ca    9.1      19 Apr 2023 06:52    TPro  6.9  /  Alb  1.8<L>  /  TBili  0.4  /  DBili  x   /  AST  28  /  ALT  28  /  AlkPhos  267<H>  04-19    Coags  PT/INR - ( 19 Apr 2023 06:52 )   PT: 14.3 sec;   INR: 1.23 ratio    PTT - ( 19 Apr 2023 06:52 )  PTT:30.3 sec

## 2023-04-20 NOTE — CONSULT NOTE ADULT - ASSESSMENT
72yo F with ascites referred to IR for abdominal pleurex  - Pt WBC was elevated, trending down  - Negative for SBP, but treated prophylacticaly for it

## 2023-04-20 NOTE — CONSULT NOTE ADULT - PROBLEM SELECTOR RECOMMENDATION 9
- Case reviewed with Dr. Irizarry, Dr. Weber. Would not currently recommend placement of tunneled peritoneal drain. Pt recently treated for SBP, although fluid study was negative. WBC trending down but remains elevated. Pt should also prove consistent followup before placing a long dwelling catheter with high risk of infection. Repeat US should some reaccumulation of ascites. Plan for repeat para tomorrow. - Case reviewed with Dr. Irizarry, Dr. Weber. Would not currently recommend placement of tunneled peritoneal drain. Pt recently treated for SBP, although fluid study was negative. WBC trending down but remains elevated. Pt should also prove consistent followup before placing a long dwelling catheter with high risk of infection. Repeat US showed some reaccumulation of ascites. Plan for repeat para tomorrow.

## 2023-04-20 NOTE — PROGRESS NOTE ADULT - ASSESSMENT
73 year old woman who no known PMHx, has not seen a doctor in many years, has not suffered any recent illness as far as she knows, takes no medications, only on tumeric for arthritis in her knee, presented for further evaluation of several weeks of progressively worsening abdominal distension and increased abdominal girth, associated decreased ability to tolerate PO, and symptom of exertional dyspnea. Denied fever, chills, change in bowel habits, blood in stool or constipation. Denies chronic alcohol use, illicit drugs or family history of GI disease/malignancy or GYN disease/malignancy. In the ED, vitals WNL, exam notable for distended abdomen w/ fairly tense ascites. WBC 15. Hgb 10.9 Plt 581. INR 1.49. Na 129. Cr WNL. AST/ALT WNL. T bili 2.1. Alk phos 159. alb 2.1. Trop mininally elevated. . CT A/P showed large volume ascites and non-specific jejunal loop distension, otherwise no overt GI pathology. Uterine fibroids present. COVID19, flu and RSV PCR negative. UA suggestive of possible infection. Started on ceftriaxone and admitted to Medicine.    #Large volume ascites, endometrial thickening, Focally invasive endometrial adenocarcinoma   -S/p paracentesis with IR both 04/10 & 04/13 with approx. 15L taken off ---> initial sample (04/10) negative for malignant cells --->Negative for malignancy  -ECC D+C donen 4/10 path showing endometrial adenocarcinoma   -Since ascitic fluid protein high, it is likely malignant ascites  -Prophylactically treat SBP w CTX 2g iv BID #5/5  -Per gyn onc:NACT (Carbo/Paclitaxel) followed by reassessment to determine candidacy for interval surgery, SUJATHA/BSO, & debulking as warranted. Abdominal PleurX  -will need f/u w GI  -abd US today to assess ascites and if requires another paracentesis   repeat paracentesis on 4/21; no abd Pleurx at this time.     #Normocytic Anemia and Leukocytosis   -currently on Venofer   -Heme/onc following     #PT eval appreciated    #DVT ppx- SQH    DISPO: repeat paracentesis on 4/21; no abd Pleurx at this time. d/c planning thereafter. Chemo and Sx as out pt.

## 2023-04-21 ENCOUNTER — RESULT REVIEW (OUTPATIENT)
Age: 73
End: 2023-04-21

## 2023-04-21 LAB
ALBUMIN FLD-MCNC: 1.3 G/DL — SIGNIFICANT CHANGE UP
B PERT IGG+IGM PNL SER: ABNORMAL
COLOR FLD: SIGNIFICANT CHANGE UP
EOSINOPHIL # FLD: 1 % — SIGNIFICANT CHANGE UP
FLUID INTAKE SUBSTANCE CLASS: SIGNIFICANT CHANGE UP
FOLATE+VIT B12 SERBLD-IMP: 0 % — SIGNIFICANT CHANGE UP
GLUCOSE FLD-MCNC: 45 MG/DL — SIGNIFICANT CHANGE UP
GRAM STN FLD: SIGNIFICANT CHANGE UP
LDH SERPL L TO P-CCNC: 3002 U/L — SIGNIFICANT CHANGE UP
LYMPHOCYTES # FLD: 12 % — SIGNIFICANT CHANGE UP
MESOTHL CELL # FLD: 0 % — SIGNIFICANT CHANGE UP
MONOS+MACROS # FLD: 11 % — SIGNIFICANT CHANGE UP
NEUTROPHILS-BODY FLUID: 76 % — SIGNIFICANT CHANGE UP
NRBC # FLD: 0 % — SIGNIFICANT CHANGE UP
OTHER CELLS FLD MANUAL: 0 % — SIGNIFICANT CHANGE UP
PROT FLD-MCNC: 3.2 G/DL — SIGNIFICANT CHANGE UP
RCV VOL RI: HIGH /UL (ref 0–0)
SPECIMEN SOURCE: SIGNIFICANT CHANGE UP
TOTAL NUCLEATED CELL COUNT, BODY FLUID: 2503 /UL — SIGNIFICANT CHANGE UP
TUBE TYPE: SIGNIFICANT CHANGE UP

## 2023-04-21 PROCEDURE — 88305 TISSUE EXAM BY PATHOLOGIST: CPT | Mod: 26

## 2023-04-21 PROCEDURE — 88108 CYTOPATH CONCENTRATE TECH: CPT | Mod: 26

## 2023-04-21 PROCEDURE — 99233 SBSQ HOSP IP/OBS HIGH 50: CPT

## 2023-04-21 PROCEDURE — 49083 ABD PARACENTESIS W/IMAGING: CPT

## 2023-04-21 RX ADMIN — Medication 600 MILLIGRAM(S): at 21:02

## 2023-04-21 RX ADMIN — ENOXAPARIN SODIUM 40 MILLIGRAM(S): 100 INJECTION SUBCUTANEOUS at 09:45

## 2023-04-21 RX ADMIN — Medication 600 MILLIGRAM(S): at 09:45

## 2023-04-21 RX ADMIN — FAMOTIDINE 20 MILLIGRAM(S): 10 INJECTION INTRAVENOUS at 09:45

## 2023-04-21 RX ADMIN — ENOXAPARIN SODIUM 40 MILLIGRAM(S): 100 INJECTION SUBCUTANEOUS at 21:02

## 2023-04-21 NOTE — PROCEDURE NOTE - PROCEDURE FINDINGS AND DETAILS
Successful paracentesis with aspiration of 5.5L of brown ascites. There is moderate loculation in the abdomen likely secondary to recent procedure.
Successful aspiration of 13,800 CC murky dark brown ascites. Drainage had to be halted for safety reasons to avoid large volume depletion. There is still residual large volume ascites. Repeat paracentesis will need to be performed some time later this week for therapeutic affect.
Successful aspiration of 1,900 CC of cloudy brown ascites

## 2023-04-21 NOTE — PROGRESS NOTE ADULT - SUBJECTIVE AND OBJECTIVE BOX
73 year old Female presented to the ED complaining of Dyspnea on exertion for more than 6 weeks. Patient started to have some shortness of breath with exertion and mild abdominal swelling more than 6 weeks ago. Then she went to [place] with these symptoms to visit. She came back couple of weeks ago. But her abdomen continue to swell and also got significant edema.  She also has decreased appetite and nausea, vomiting for few days. Last BM, 2 days ago. She has no fever or diarrhea.       Subjective: Patient was seen and examined by me this morning at bedside. Pt reporting that she does not want to be seen by resident team. She would prefer to have a single hospitalist.     04/16: Pt seen and evaluated at bedside. Resting comfortable. Has no acute complaints. Vitals stable     4/17: Pt seen and evaluated at bedside. Resting comfortable. Has no acute complaints. Reports abdomen feels soft     4/18: No changes. No acute complaints    4/19: Pt seen and evaluated at bedside. Resting comfortable. Has no acute complaints. Reports abdomen feels soft     4/20: no complaints    4/20: s/p repeat paracentesis today; 1.9 L out; f/u Cx  Spoke with Dr. Esquivel, Pat ; from [facility], for peer to peer for [employer] authorization. ; Pt to FAX today's note to her at   pt feels weak and tired    PHYSICAL EXAM:    Vital Signs Last 24 Hrs  T(C): 36.2 (20 Apr 2023 23:25), Max: 36.2 (20 Apr 2023 15:54)  T(F): 97.2 (20 Apr 2023 23:25), Max: 97.2 (20 Apr 2023 15:54)  HR: 74 (20 Apr 2023 23:25) (74 - 78)  BP: 112/58 (20 Apr 2023 23:25) (112/58 - 125/62)  BP(mean): --  RR: 18 (20 Apr 2023 23:25) (18 - 18)  SpO2: 98% (20 Apr 2023 23:25) (98% - 99%)    Parameters below as of 20 Apr 2023 23:25  Patient On (Oxygen Delivery Method): room air        Constitutional: Weak  appearing  HEENT: Atraumatic, ETELVINA, Normal, No congestion  Respiratory: Breath Sounds normal, no rhonchi/wheeze  Cardiovascular: N S1S2;   Gastrointestinal: Abdomen soft, non tender, Bowel Sounds present, Ascites +   Extremities: No edema, peripheral pulses present  Neurological: AAO x 3, no gross focal motor deficits  Skin: Non cellulitic, no rash, ulcers  Lymph Nodes: No lymphadenopathy noted  Back: No CVA tenderness   Musculoskeletal: non tender  Breasts: Deferred  Genitourinary: deferred  Rectal: Deferred    All Labs/EKG/Radiology/Meds reviewed    Lab Results:  CBC    .		Differential:	[] Automated		[] Manual  Chemistry                MICROBIOLOGY/CULTURES:              RADIOLOGY/EKG:    < from: US Abdomen Limited (04.19.23 @ 14:41) >  IMPRESSION: Ascites as discussed with  multiloculated ascites in the left   lower quadrant similar to recent CT. Malignant ascites is a consideration      < end of copied text >    < from: CT Abdomen and Pelvis w/ IV Cont (04.07.23 @ 17:11) >  Very large amount of abdominal and pelvic ascites  Uterine fibroids    < end of copied text >  < from: US Duplex Venous Lower Ext Complete, Bilateral (04.08.23 @ 01:00) >  No evidence of deep venous thrombosis in either lower extremity.      < end of copied text >  < from: US Pelvis Complete (US Pelvis Complete .) (04.08.23 @ 10:29) >  Limited pelvic ultrasound.    The endometrium is incompletely assessed on this examination.  The   endometrium is either severely severely thickened to 6 cm or is distended   with complex material to 6 cm.  There is a 2.5 x1.6 x 1.7 cm shadowing   echogenic focus with shadowing along the periphery of the endometrium,   which may represent a calcified intramural versus submucosal fibroid,   versus an endometrial lesion.    The ovaries are not visualized.    Large volumepelvic ascites.    Pelvic MRI may be obtained for further evaluation.    < end of copied text >  Successful aspiration of 13,800 CC murky dark brown ascites. Drainage had to be halted for safety reasons to avoid large volume depletion. There is still residual large volume ascites. Repeat paracentesis will need to be performed some time later this week for therapeutic affect.  Back to floor  < from: CT Chest No Cont (04.10.23 @ 11:40) >  No malignancy in the chest.    < end of copied text >  < from: CT Abdomen and Pelvis w/ IV Cont (04.14.23 @ 08:57) >  Interval decrease in the volume of ascites with scalloping of the right   heart border suggesting malignant ascites.  peritoneal enhancement, nonspecific. Correlate for symptoms of   peritonitis.    Stable size of a central heterogeneous uterine mass.      < end of copied text >    MEDICATIONS  (STANDING):  enoxaparin Injectable 40 milliGRAM(s) SubCutaneous every 12 hours  famotidine    Tablet 20 milliGRAM(s) Oral daily  guaiFENesin  milliGRAM(s) Oral every 12 hours    MEDICATIONS  (PRN):  acetaminophen     Tablet .. 650 milliGRAM(s) Oral every 6 hours PRN Temp greater or equal to 38C (100.4F), Mild Pain (1 - 3)  aluminum hydroxide/magnesium hydroxide/simethicone Suspension 30 milliLiter(s) Oral every 4 hours PRN Dyspepsia  melatonin 3 milliGRAM(s) Oral at bedtime PRN Insomnia  ondansetron Injectable 4 milliGRAM(s) IV Push every 8 hours PRN Nausea and/or Vomiting

## 2023-04-21 NOTE — PROGRESS NOTE ADULT - ASSESSMENT
73 year old woman who no known PMHx, has not seen a doctor in many years, has not suffered any recent illness as far as she knows, takes no medications, only on tumeric for arthritis in her knee, presented for further evaluation of several weeks of progressively worsening abdominal distension and increased abdominal girth, associated decreased ability to tolerate PO, and symptom of exertional dyspnea. Denied fever, chills, change in bowel habits, blood in stool or constipation. Denies chronic alcohol use, illicit drugs or family history of GI disease/malignancy or GYN disease/malignancy. In the ED, vitals WNL, exam notable for distended abdomen w/ fairly tense ascites. WBC 15. Hgb 10.9 Plt 581. INR 1.49. Na 129. Cr WNL. AST/ALT WNL. T bili 2.1. Alk phos 159. alb 2.1. Trop mininally elevated. . CT A/P showed large volume ascites and non-specific jejunal loop distension, otherwise no overt GI pathology. Uterine fibroids present. COVID19, flu and RSV PCR negative. UA suggestive of possible infection. Started on ceftriaxone and admitted to Medicine.    #Large volume ascites, endometrial thickening, Focally invasive endometrial adenocarcinoma   -S/p paracentesis with IR both 04/10 & 04/13 with approx. 15L taken off ---> initial sample (04/10) negative for malignant cells --->Negative for malignancy  -ECC D+C donen 4/10 path showing endometrial adenocarcinoma   -Since ascitic fluid protein high, it is likely malignant ascites  -Prophylactically treat SBP w CTX 2g iv BID #5/5  -Per gyn onc:NACT (Carbo/Paclitaxel) followed by reassessment to determine candidacy for interval surgery, SUJATHA/BSO, & debulking as warranted. Abdominal PleurX  -will need f/u w GI  -abd US today to assess ascites and if requires another paracentesis   s/p repeat paracentesis on 4/21, 1.9 L out; no abd Pleurx at this time.     #Normocytic Anemia and Leukocytosis   -currently on Venofer   -Heme/onc following     #PT eval appreciated    #DVT ppx- lovenox    DISPO: repeat paracentesis on 4/21; no abd Pleurx at this time. d/c planning thereafter. Chemo and Sx as out pt.

## 2023-04-22 PROCEDURE — 99232 SBSQ HOSP IP/OBS MODERATE 35: CPT

## 2023-04-22 PROCEDURE — 93971 EXTREMITY STUDY: CPT | Mod: 26,RT

## 2023-04-22 RX ADMIN — Medication 600 MILLIGRAM(S): at 09:11

## 2023-04-22 RX ADMIN — ENOXAPARIN SODIUM 40 MILLIGRAM(S): 100 INJECTION SUBCUTANEOUS at 21:22

## 2023-04-22 RX ADMIN — FAMOTIDINE 20 MILLIGRAM(S): 10 INJECTION INTRAVENOUS at 09:11

## 2023-04-22 RX ADMIN — ENOXAPARIN SODIUM 40 MILLIGRAM(S): 100 INJECTION SUBCUTANEOUS at 09:10

## 2023-04-22 RX ADMIN — Medication 600 MILLIGRAM(S): at 21:22

## 2023-04-22 NOTE — PROGRESS NOTE ADULT - ASSESSMENT
73 year old woman who no known PMHx, has not seen a doctor in many years, has not suffered any recent illness as far as she knows, takes no medications, only on tumeric for arthritis in her knee, presented for further evaluation of several weeks of progressively worsening abdominal distension and increased abdominal girth, associated decreased ability to tolerate PO, and symptom of exertional dyspnea. Denied fever, chills, change in bowel habits, blood in stool or constipation. Denies chronic alcohol use, illicit drugs or family history of GI disease/malignancy or GYN disease/malignancy. In the ED, vitals WNL, exam notable for distended abdomen w/ fairly tense ascites. WBC 15. Hgb 10.9 Plt 581. INR 1.49. Na 129. Cr WNL. AST/ALT WNL. T bili 2.1. Alk phos 159. alb 2.1. Trop mininally elevated. . CT A/P showed large volume ascites and non-specific jejunal loop distension, otherwise no overt GI pathology. Uterine fibroids present. COVID19, flu and RSV PCR negative. UA suggestive of possible infection. Started on ceftriaxone and admitted to Medicine.    #Large volume ascites, endometrial thickening, Focally invasive endometrial adenocarcinoma   -S/p paracentesis with IR both 04/10 & 04/13 with approx. 15L taken off ---> initial sample (04/10) negative for malignant cells --->Negative for malignancy  -ECC D+C donen 4/10 path showing endometrial adenocarcinoma   -Since ascitic fluid protein high, it is likely malignant ascites  -Prophylactically treat SBP w CTX 2g iv BID #5/5  -Per gyn onc:NACT (Carbo/Paclitaxel) followed by reassessment to determine candidacy for interval surgery, SUJATHA/BSO, & debulking as warranted. Abdominal PleurX  -will need f/u w GI  -abd US today to assess ascites and if requires another paracentesis   s/p repeat paracentesis on 4/21, 1.9 L out; no abd Pleurx at this time.     # Right arm Cephalic vein superficial thrombophlebitis: warm compresses  pain meds    #Normocytic Anemia and Leukocytosis   -currently on Venofer   -Heme/onc following     #PT eval appreciated    #DVT ppx- lovenox    DISPO: d/c planning to [employer]; await auth . Chemo and Sx as out pt.

## 2023-04-22 NOTE — PROGRESS NOTE ADULT - SUBJECTIVE AND OBJECTIVE BOX
73 year old Female presented to the ED complaining of Dyspnea on exertion for more than 6 weeks. Patient started to have some shortness of breath with exertion and mild abdominal swelling more than 6 weeks ago. Then she went to [place] with these symptoms to visit. She came back couple of weeks ago. But her abdomen continue to swell and also got significant edema.  She also has decreased appetite and nausea, vomiting for few days. Last BM, 2 days ago. She has no fever or diarrhea.       Subjective: Patient was seen and examined by me this morning at bedside. Pt reporting that she does not want to be seen by resident team. She would prefer to have a single hospitalist.     04/16: Pt seen and evaluated at bedside. Resting comfortable. Has no acute complaints. Vitals stable     4/17: Pt seen and evaluated at bedside. Resting comfortable. Has no acute complaints. Reports abdomen feels soft     4/18: No changes. No acute complaints    4/19: Pt seen and evaluated at bedside. Resting comfortable. Has no acute complaints. Reports abdomen feels soft     4/20: no complaints    4/21: s/p repeat paracentesis today; 1.9 L out; f/u Cx  Spoke with Dr. Esquivel, Pat ; from [facility], for peer to peer for SYLWIA authorization. ; Pt to FAX today's note to her at   pt feels weak and tired    4/22: c/o redness and swelling in right arm    PHYSICAL EXAM:    Vital Signs Last 24 Hrs  T(C): 36.8 (22 Apr 2023 08:17), Max: 36.8 (22 Apr 2023 08:17)  T(F): 98.2 (22 Apr 2023 08:17), Max: 98.2 (22 Apr 2023 08:17)  HR: 69 (22 Apr 2023 08:17) (69 - 77)  BP: 115/58 (22 Apr 2023 08:17) (115/58 - 120/58)  BP(mean): --  RR: 18 (22 Apr 2023 08:17) (17 - 18)  SpO2: 99% (22 Apr 2023 08:17) (99% - 100%)    Parameters below as of 22 Apr 2023 08:17  Patient On (Oxygen Delivery Method): room air          Constitutional: Weak  appearing  HEENT: Atraumatic, ETELVINA, Normal, No congestion  Respiratory: Breath Sounds normal, no rhonchi/wheeze  Cardiovascular: N S1S2;   Gastrointestinal: Abdomen soft, non tender, Bowel Sounds present, Ascites +   Extremities: No edema, peripheral pulses present, right antecubital fossa swelling, redness  Neurological: AAO x 3, no gross focal motor deficits  Skin: Non cellulitic, no rash, ulcers  Lymph Nodes: No lymphadenopathy noted  Back: No CVA tenderness   Musculoskeletal: non tender  Breasts: Deferred  Genitourinary: deferred  Rectal: Deferred    All Labs/EKG/Radiology/Meds reviewed        RADIOLOGY/EKG:    < from: US Duplex Venous Upper Ext Ltd, Right (04.22.23 @ 12:05) >  No evidence of right upper extremity deep venous thrombosis.    Superficial thrombophlebitis of the right cephalic vein.      < end of copied text >      < from: US Abdomen Limited (04.19.23 @ 14:41) >  IMPRESSION: Ascites as discussed with  multiloculated ascites in the left   lower quadrant similar to recent CT. Malignant ascites is a consideration      < end of copied text >    < from: CT Abdomen and Pelvis w/ IV Cont (04.07.23 @ 17:11) >  Very large amount of abdominal and pelvic ascites  Uterine fibroids    < end of copied text >  < from: US Duplex Venous Lower Ext Complete, Bilateral (04.08.23 @ 01:00) >  No evidence of deep venous thrombosis in either lower extremity.      < end of copied text >  < from: US Pelvis Complete (US Pelvis Complete .) (04.08.23 @ 10:29) >  Limited pelvic ultrasound.    The endometrium is incompletely assessed on this examination.  The   endometrium is either severely severely thickened to 6 cm or is distended   with complex material to 6 cm.  There is a 2.5 x1.6 x 1.7 cm shadowing   echogenic focus with shadowing along the periphery of the endometrium,   which may represent a calcified intramural versus submucosal fibroid,   versus an endometrial lesion.    The ovaries are not visualized.    Large volumepelvic ascites.    Pelvic MRI may be obtained for further evaluation.    < end of copied text >  Successful aspiration of 13,800 CC murky dark brown ascites. Drainage had to be halted for safety reasons to avoid large volume depletion. There is still residual large volume ascites. Repeat paracentesis will need to be performed some time later this week for therapeutic affect.  Back to floor  < from: CT Chest No Cont (04.10.23 @ 11:40) >  No malignancy in the chest.    < end of copied text >  < from: CT Abdomen and Pelvis w/ IV Cont (04.14.23 @ 08:57) >  Interval decrease in the volume of ascites with scalloping of the right   heart border suggesting malignant ascites.  peritoneal enhancement, nonspecific. Correlate for symptoms of   peritonitis.    Stable size of a central heterogeneous uterine mass.      < end of copied text >    MEDICATIONS  (STANDING):  enoxaparin Injectable 40 milliGRAM(s) SubCutaneous every 12 hours  famotidine    Tablet 20 milliGRAM(s) Oral daily  guaiFENesin  milliGRAM(s) Oral every 12 hours    MEDICATIONS  (PRN):  acetaminophen     Tablet .. 650 milliGRAM(s) Oral every 6 hours PRN Temp greater or equal to 38C (100.4F), Mild Pain (1 - 3)  aluminum hydroxide/magnesium hydroxide/simethicone Suspension 30 milliLiter(s) Oral every 4 hours PRN Dyspepsia  melatonin 3 milliGRAM(s) Oral at bedtime PRN Insomnia  ondansetron Injectable 4 milliGRAM(s) IV Push every 8 hours PRN Nausea and/or Vomiting

## 2023-04-23 PROCEDURE — 99232 SBSQ HOSP IP/OBS MODERATE 35: CPT

## 2023-04-23 RX ADMIN — Medication 600 MILLIGRAM(S): at 09:05

## 2023-04-23 RX ADMIN — FAMOTIDINE 20 MILLIGRAM(S): 10 INJECTION INTRAVENOUS at 09:06

## 2023-04-23 RX ADMIN — Medication 600 MILLIGRAM(S): at 21:27

## 2023-04-23 RX ADMIN — ENOXAPARIN SODIUM 40 MILLIGRAM(S): 100 INJECTION SUBCUTANEOUS at 21:27

## 2023-04-23 RX ADMIN — ENOXAPARIN SODIUM 40 MILLIGRAM(S): 100 INJECTION SUBCUTANEOUS at 09:06

## 2023-04-23 NOTE — PROGRESS NOTE ADULT - SUBJECTIVE AND OBJECTIVE BOX
73 year old Female presented to the ED complaining of Dyspnea on exertion for more than 6 weeks. Patient started to have some shortness of breath with exertion and mild abdominal swelling more than 6 weeks ago. Then she went to [place] with these symptoms to visit. She came back couple of weeks ago. But her abdomen continue to swell and also got significant edema.  She also has decreased appetite and nausea, vomiting for few days. Last BM, 2 days ago. She has no fever or diarrhea.       Subjective: Patient was seen and examined by me this morning at bedside. Pt reporting that she does not want to be seen by resident team. She would prefer to have a single hospitalist.     04/16: Pt seen and evaluated at bedside. Resting comfortable. Has no acute complaints. Vitals stable     4/17: Pt seen and evaluated at bedside. Resting comfortable. Has no acute complaints. Reports abdomen feels soft     4/18: No changes. No acute complaints    4/19: Pt seen and evaluated at bedside. Resting comfortable. Has no acute complaints. Reports abdomen feels soft     4/20: no complaints    4/21: s/p repeat paracentesis today; 1.9 L out; f/u Cx  Spoke with Dr. Esquivel, Pat ; from [facility], for peer to peer for SYLWIA authorization. ; Pt to FAX today's note to her at   pt feels weak and tired    4/22: c/o redness and swelling in right arm    4/23: right arm thrombophlebitis improving    PHYSICAL EXAM:    Vital Signs Last 24 Hrs  T(C): 36.7 (23 Apr 2023 08:23), Max: 36.7 (23 Apr 2023 08:23)  T(F): 98.1 (23 Apr 2023 08:23), Max: 98.1 (23 Apr 2023 08:23)  HR: 68 (23 Apr 2023 08:23) (68 - 73)  BP: 107/55 (23 Apr 2023 08:23) (107/55 - 125/51)  BP(mean): --  RR: 17 (23 Apr 2023 08:23) (17 - 18)  SpO2: 100% (23 Apr 2023 08:23) (99% - 100%)    Parameters below as of 23 Apr 2023 08:23  Patient On (Oxygen Delivery Method): room air      Constitutional: Weak  appearing  HEENT: Atraumatic, ETELVINA, Normal, No congestion  Respiratory: Breath Sounds normal, no rhonchi/wheeze  Cardiovascular: N S1S2;   Gastrointestinal: Abdomen soft, non tender, Bowel Sounds present, Ascites +   Extremities: No edema, peripheral pulses present, right antecubital fossa swelling, redness  Neurological: AAO x 3, no gross focal motor deficits  Skin: Non cellulitic, no rash, ulcers  Lymph Nodes: No lymphadenopathy noted  Back: No CVA tenderness   Musculoskeletal: non tender  Breasts: Deferred  Genitourinary: deferred  Rectal: Deferred    All Labs/EKG/Radiology/Meds reviewed        RADIOLOGY/EKG:    < from: US Duplex Venous Upper Ext Ltd, Right (04.22.23 @ 12:05) >  No evidence of right upper extremity deep venous thrombosis.    Superficial thrombophlebitis of the right cephalic vein.      < end of copied text >      < from: US Abdomen Limited (04.19.23 @ 14:41) >  IMPRESSION: Ascites as discussed with  multiloculated ascites in the left   lower quadrant similar to recent CT. Malignant ascites is a consideration      < end of copied text >    < from: CT Abdomen and Pelvis w/ IV Cont (04.07.23 @ 17:11) >  Very large amount of abdominal and pelvic ascites  Uterine fibroids    < end of copied text >  < from: US Duplex Venous Lower Ext Complete, Bilateral (04.08.23 @ 01:00) >  No evidence of deep venous thrombosis in either lower extremity.      < end of copied text >  < from: US Pelvis Complete (US Pelvis Complete .) (04.08.23 @ 10:29) >  Limited pelvic ultrasound.    The endometrium is incompletely assessed on this examination.  The   endometrium is either severely severely thickened to 6 cm or is distended   with complex material to 6 cm.  There is a 2.5 x1.6 x 1.7 cm shadowing   echogenic focus with shadowing along the periphery of the endometrium,   which may represent a calcified intramural versus submucosal fibroid,   versus an endometrial lesion.    The ovaries are not visualized.    Large volumepelvic ascites.    Pelvic MRI may be obtained for further evaluation.    < end of copied text >  Successful aspiration of 13,800 CC murky dark brown ascites. Drainage had to be halted for safety reasons to avoid large volume depletion. There is still residual large volume ascites. Repeat paracentesis will need to be performed some time later this week for therapeutic affect.  Back to floor  < from: CT Chest No Cont (04.10.23 @ 11:40) >  No malignancy in the chest.    < end of copied text >  < from: CT Abdomen and Pelvis w/ IV Cont (04.14.23 @ 08:57) >  Interval decrease in the volume of ascites with scalloping of the right   heart border suggesting malignant ascites.  peritoneal enhancement, nonspecific. Correlate for symptoms of   peritonitis.    Stable size of a central heterogeneous uterine mass.      < end of copied text >    MEDICATIONS  (STANDING):  enoxaparin Injectable 40 milliGRAM(s) SubCutaneous every 12 hours  famotidine    Tablet 20 milliGRAM(s) Oral daily  guaiFENesin  milliGRAM(s) Oral every 12 hours    MEDICATIONS  (PRN):  acetaminophen     Tablet .. 650 milliGRAM(s) Oral every 6 hours PRN Temp greater or equal to 38C (100.4F), Mild Pain (1 - 3)  aluminum hydroxide/magnesium hydroxide/simethicone Suspension 30 milliLiter(s) Oral every 4 hours PRN Dyspepsia  melatonin 3 milliGRAM(s) Oral at bedtime PRN Insomnia  ondansetron Injectable 4 milliGRAM(s) IV Push every 8 hours PRN Nausea and/or Vomiting

## 2023-04-23 NOTE — PROGRESS NOTE ADULT - ASSESSMENT
73 year old woman who no known PMHx, has not seen a doctor in many years, has not suffered any recent illness as far as she knows, takes no medications, only on tumeric for arthritis in her knee, presented for further evaluation of several weeks of progressively worsening abdominal distension and increased abdominal girth, associated decreased ability to tolerate PO, and symptom of exertional dyspnea. Denied fever, chills, change in bowel habits, blood in stool or constipation. Denies chronic alcohol use, illicit drugs or family history of GI disease/malignancy or GYN disease/malignancy. In the ED, vitals WNL, exam notable for distended abdomen w/ fairly tense ascites. WBC 15. Hgb 10.9 Plt 581. INR 1.49. Na 129. Cr WNL. AST/ALT WNL. T bili 2.1. Alk phos 159. alb 2.1. Trop mininally elevated. . CT A/P showed large volume ascites and non-specific jejunal loop distension, otherwise no overt GI pathology. Uterine fibroids present. COVID19, flu and RSV PCR negative. UA suggestive of possible infection. Started on ceftriaxone and admitted to Medicine.    #Large volume ascites, endometrial thickening, Focally invasive endometrial adenocarcinoma   -S/p paracentesis with IR both 04/10 & 04/13 with approx. 15L taken off ---> initial sample (04/10) negative for malignant cells --->Negative for malignancy  -ECC D+C donen 4/10 path showing endometrial adenocarcinoma   -Since ascitic fluid protein high, it is likely malignant ascites  -Prophylactically treat SBP w CTX 2g iv BID #5/5  -Per gyn onc:NACT (Carbo/Paclitaxel) followed by reassessment to determine candidacy for interval surgery, SUJATHA/BSO, & debulking as warranted. Abdominal PleurX  -will need f/u w GI  -abd US today to assess ascites and if requires another paracentesis   s/p repeat paracentesis on 4/21, 1.9 L out; no abd Pleurx at this time. cx neg  IR for ? paracentesis on 4/24      # Right arm Cephalic vein superficial thrombophlebitis: warm compresses  pain meds    #Normocytic Anemia and Leukocytosis   -currently on Venofer   -Heme/onc following     #PT eval appreciated    #DVT ppx- lovenox    DISPO: IR for ? paracentesis on 4/24  d/c planning to [facility]; await auth . Chemo and Sx as out pt.

## 2023-04-24 LAB
HCT VFR BLD CALC: 32.5 % — LOW (ref 34.5–45)
HGB BLD-MCNC: 9.8 G/DL — LOW (ref 11.5–15.5)
INR BLD: 1.2 RATIO — HIGH (ref 0.88–1.16)
MCHC RBC-ENTMCNC: 23.2 PG — LOW (ref 27–34)
MCHC RBC-ENTMCNC: 30.2 GM/DL — LOW (ref 32–36)
MCV RBC AUTO: 77 FL — LOW (ref 80–100)
PLATELET # BLD AUTO: 489 K/UL — HIGH (ref 150–400)
PROTHROM AB SERPL-ACNC: 14 SEC — HIGH (ref 10.5–13.4)
RBC # BLD: 4.22 M/UL — SIGNIFICANT CHANGE UP (ref 3.8–5.2)
RBC # FLD: 21.3 % — HIGH (ref 10.3–14.5)
WBC # BLD: 8.14 K/UL — SIGNIFICANT CHANGE UP (ref 3.8–10.5)
WBC # FLD AUTO: 8.14 K/UL — SIGNIFICANT CHANGE UP (ref 3.8–10.5)

## 2023-04-24 PROCEDURE — 99232 SBSQ HOSP IP/OBS MODERATE 35: CPT

## 2023-04-24 RX ADMIN — Medication 600 MILLIGRAM(S): at 21:57

## 2023-04-24 RX ADMIN — ENOXAPARIN SODIUM 40 MILLIGRAM(S): 100 INJECTION SUBCUTANEOUS at 21:58

## 2023-04-24 RX ADMIN — Medication 650 MILLIGRAM(S): at 21:57

## 2023-04-24 RX ADMIN — Medication 600 MILLIGRAM(S): at 09:19

## 2023-04-24 RX ADMIN — FAMOTIDINE 20 MILLIGRAM(S): 10 INJECTION INTRAVENOUS at 09:19

## 2023-04-24 NOTE — CONSULT NOTE ADULT - CONSULT REASON
ascites
Endometrial Mass with Ascites
oliguria
ascites r/o CHF
Endometrial thickness versus fibroid uterus
74yo F with ascites referred to IR for abdominal pleurex
74yo F with ascites referred to IR for repeat paracentesis

## 2023-04-24 NOTE — CONSULT NOTE ADULT - PROBLEM SELECTOR RECOMMENDATION 9
Pt not complaining of abdominal distension/pain. The last 8 day interval between drainages only resulted in 1,900CC of fluid accumulation. Recommend on holding off on para for now, as pt is without complaints and frequent taps can increase risk of SBP. Pt should return to IR as outpatient for paracentesis in several weeks. Pt provided IR contact information.

## 2023-04-24 NOTE — PROGRESS NOTE ADULT - SUBJECTIVE AND OBJECTIVE BOX
73 year old Female presented to the ED complaining of Dyspnea on exertion for more than 6 weeks. Patient started to have some shortness of breath with exertion and mild abdominal swelling more than 6 weeks ago. Then she went to [place] with these symptoms to visit. She came back couple of weeks ago. But her abdomen continue to swell and also got significant edema.  She also has decreased appetite and nausea, vomiting for few days. Last BM, 2 days ago. She has no fever or diarrhea.       Subjective: Patient was seen and examined by me this morning at bedside. Pt reporting that she does not want to be seen by resident team. She would prefer to have a single hospitalist.     04/16: Pt seen and evaluated at bedside. Resting comfortable. Has no acute complaints. Vitals stable     4/17: Pt seen and evaluated at bedside. Resting comfortable. Has no acute complaints. Reports abdomen feels soft     4/18: No changes. No acute complaints    4/19: Pt seen and evaluated at bedside. Resting comfortable. Has no acute complaints. Reports abdomen feels soft     4/20: no complaints    4/21: s/p repeat paracentesis today; 1.9 L out; f/u Cx  Spoke with Dr. Esquivel, Pat ; from [facility], for peer to peer for SYLWIA authorization. ; Pt to FAX today's note to her at   pt feels weak and tired    4/22: c/o redness and swelling in right arm    4/23: right arm thrombophlebitis improving    4/24: No paracentesis per IR  no complaints    PHYSICAL EXAM:    Vital Signs Last 24 Hrs  T(C): 36.7 (24 Apr 2023 08:25), Max: 36.7 (24 Apr 2023 08:25)  T(F): 98.1 (24 Apr 2023 08:25), Max: 98.1 (24 Apr 2023 08:25)  HR: 71 (24 Apr 2023 08:25) (69 - 75)  BP: 113/62 (24 Apr 2023 08:25) (108/60 - 125/47)  BP(mean): --  RR: 17 (24 Apr 2023 08:25) (17 - 18)  SpO2: 100% (24 Apr 2023 08:25) (99% - 100%)    Parameters below as of 24 Apr 2023 08:25  Patient On (Oxygen Delivery Method): room air          Constitutional: Weak  appearing  HEENT: Atraumatic, ETELVINA, Normal, No congestion  Respiratory: Breath Sounds normal, no rhonchi/wheeze  Cardiovascular: N S1S2;   Gastrointestinal: Abdomen soft, non tender, Bowel Sounds present, Ascites +   Extremities: No edema, peripheral pulses present, right antecubital fossa swelling, redness  Neurological: AAO x 3, no gross focal motor deficits  Skin: Non cellulitic, no rash, ulcers  Lymph Nodes: No lymphadenopathy noted  Back: No CVA tenderness   Musculoskeletal: non tender  Breasts: Deferred  Genitourinary: deferred  Rectal: Deferred    All Labs/EKG/Radiology/Meds reviewed    Lab Results:  CBC  CBC Full  -  ( 24 Apr 2023 06:34 )  WBC Count : 8.14 K/uL  RBC Count : 4.22 M/uL  Hemoglobin : 9.8 g/dL  Hematocrit : 32.5 %  Platelet Count - Automated : 489 K/uL  Mean Cell Volume : 77.0 fl  Mean Cell Hemoglobin : 23.2 pg  Mean Cell Hemoglobin Concentration : 30.2 gm/dL  Auto Neutrophil # : x  Auto Lymphocyte # : x  Auto Monocyte # : x  Auto Eosinophil # : x  Auto Basophil # : x  Auto Neutrophil % : x  Auto Lymphocyte % : x  Auto Monocyte % : x  Auto Eosinophil % : x  Auto Basophil % : x    .		Differential:	[] Automated		[] Manual  Chemistry          PT/INR - ( 24 Apr 2023 06:34 )   PT: 14.0 sec;   INR: 1.20 ratio               04-23-23 @ 07:01  -  04-24-23 @ 07:00  --------------------------------------------------------  IN: 0 mL / OUT: 1 mL / NET: -1 mL      MICROBIOLOGY/CULTURES:  Culture Results:   No growth (04-21 @ 09:02)  Culture Results:   Testing in progress (04-21 @ 09:02)              RADIOLOGY/EKG:    < from: US Duplex Venous Upper Ext Ltd, Right (04.22.23 @ 12:05) >  No evidence of right upper extremity deep venous thrombosis.    Superficial thrombophlebitis of the right cephalic vein.      < end of copied text >      < from: US Abdomen Limited (04.19.23 @ 14:41) >  IMPRESSION: Ascites as discussed with  multiloculated ascites in the left   lower quadrant similar to recent CT. Malignant ascites is a consideration      < end of copied text >    < from: CT Abdomen and Pelvis w/ IV Cont (04.07.23 @ 17:11) >  Very large amount of abdominal and pelvic ascites  Uterine fibroids    < end of copied text >  < from: US Duplex Venous Lower Ext Complete, Bilateral (04.08.23 @ 01:00) >  No evidence of deep venous thrombosis in either lower extremity.      < end of copied text >  < from: US Pelvis Complete (US Pelvis Complete .) (04.08.23 @ 10:29) >  Limited pelvic ultrasound.    The endometrium is incompletely assessed on this examination.  The   endometrium is either severely severely thickened to 6 cm or is distended   with complex material to 6 cm.  There is a 2.5 x1.6 x 1.7 cm shadowing   echogenic focus with shadowing along the periphery of the endometrium,   which may represent a calcified intramural versus submucosal fibroid,   versus an endometrial lesion.    The ovaries are not visualized.    Large volumepelvic ascites.    Pelvic MRI may be obtained for further evaluation.    < end of copied text >  Successful aspiration of 13,800 CC murky dark brown ascites. Drainage had to be halted for safety reasons to avoid large volume depletion. There is still residual large volume ascites. Repeat paracentesis will need to be performed some time later this week for therapeutic affect.  Back to floor  < from: CT Chest No Cont (04.10.23 @ 11:40) >  No malignancy in the chest.    < end of copied text >  < from: CT Abdomen and Pelvis w/ IV Cont (04.14.23 @ 08:57) >  Interval decrease in the volume of ascites with scalloping of the right   heart border suggesting malignant ascites.  peritoneal enhancement, nonspecific. Correlate for symptoms of   peritonitis.    Stable size of a central heterogeneous uterine mass.      < end of copied text >    MEDICATIONS  (STANDING):  enoxaparin Injectable 40 milliGRAM(s) SubCutaneous every 12 hours  famotidine    Tablet 20 milliGRAM(s) Oral daily  guaiFENesin  milliGRAM(s) Oral every 12 hours    MEDICATIONS  (PRN):  acetaminophen     Tablet .. 650 milliGRAM(s) Oral every 6 hours PRN Temp greater or equal to 38C (100.4F), Mild Pain (1 - 3)  aluminum hydroxide/magnesium hydroxide/simethicone Suspension 30 milliLiter(s) Oral every 4 hours PRN Dyspepsia  melatonin 3 milliGRAM(s) Oral at bedtime PRN Insomnia  ondansetron Injectable 4 milliGRAM(s) IV Push every 8 hours PRN Nausea and/or Vomiting

## 2023-04-24 NOTE — CONSULT NOTE ADULT - REASON FOR ADMISSION
Abdominal Distension and Ascites

## 2023-04-24 NOTE — CONSULT NOTE ADULT - SUBJECTIVE AND OBJECTIVE BOX
Chief complaint:  Patient is a 73y old  Female who presents with a chief complaint of Abdominal distension and Ascites      HPI:  73 year old Female presented to the ED complaining of Dyspnea on exertion for more than 6 weeks. Patient started to have some shortness of breath with exertion and mild abdominal swelling more than 6 weeks ago. Then she went to [place] with these symptoms to visit. She came back couple of weeks ago. But her abdomen continue to swell and also got significant edema. Pt now s/p IR paracentesis x3. Ir consulted for repeat paracentesis prior to rehab. Pt seen at bedside, denied current abdominal discomfort, SOB, NVD.     Allergies  No Known Allergies    MEDICATIONS  (STANDING):  enoxaparin Injectable 40 milliGRAM(s) SubCutaneous every 12 hours  famotidine    Tablet 20 milliGRAM(s) Oral daily  guaiFENesin  milliGRAM(s) Oral every 12 hours    MEDICATIONS  (PRN):  acetaminophen     Tablet .. 650 milliGRAM(s) Oral every 6 hours PRN Temp greater or equal to 38C (100.4F), Mild Pain (1 - 3)  aluminum hydroxide/magnesium hydroxide/simethicone Suspension 30 milliLiter(s) Oral every 4 hours PRN Dyspepsia  melatonin 3 milliGRAM(s) Oral at bedtime PRN Insomnia  ondansetron Injectable 4 milliGRAM(s) IV Push every 8 hours PRN Nausea and/or Vomiting      PAST MEDICAL & SURGICAL HISTORY:  No pertinent past medical history      No significant past surgical history          FAMILY HISTORY:  No pertinent family history in first degree relatives        Review of Systems:  As per HPI    Vital Signs Last 24 Hrs  T(C): 36.7 (24 Apr 2023 08:25), Max: 36.7 (24 Apr 2023 08:25)  T(F): 98.1 (24 Apr 2023 08:25), Max: 98.1 (24 Apr 2023 08:25)  HR: 71 (24 Apr 2023 08:25) (69 - 75)  BP: 113/62 (24 Apr 2023 08:25) (108/60 - 125/47)  BP(mean): --  RR: 17 (24 Apr 2023 08:25) (17 - 18)  SpO2: 100% (24 Apr 2023 08:25) (99% - 100%)    Parameters below as of 24 Apr 2023 08:25  Patient On (Oxygen Delivery Method): room air        GENERAL APPEARANCE: Well developed, in no acute distress.    LUNGS: Auscultation of the lungs revealed normal breath sounds    CARDIOVASCULAR: There was a regular rate and rhythm    ABDOMEN: Soft and nontender with normal bowel sounds.     NEUROLOGIC: Alert and oriented x 3. Normal affect.     CBC                        9.8    8.14  )-----------( 489      ( 24 Apr 2023 06:34 )             32.5       Chemistry          PT/INR - ( 24 Apr 2023 06:34 )   PT: 14.0 sec;   INR: 1.20 ratio

## 2023-04-24 NOTE — CONSULT NOTE ADULT - ASSESSMENT
74yo F with ascites referred to IR for repeat paracentesis  - Pt s/p the following drainage:                     4/10 for 13,800CC (incomplete drainage due to high volume, completed on nest session)                     4/13 for 5,300CC                     4/21 for 1,900CC

## 2023-04-24 NOTE — CONSULT NOTE ADULT - CONSULT REQUESTED DATE/TIME
08-Apr-2023 14:04
09-Apr-2023 13:35
08-Apr-2023 13:41
14-Apr-2023 11:02
20-Apr-2023 10:33
24-Apr-2023 12:26
09-Apr-2023 12:53

## 2023-04-24 NOTE — PROGRESS NOTE ADULT - ASSESSMENT
73 year old woman who no known PMHx, has not seen a doctor in many years, has not suffered any recent illness as far as she knows, takes no medications, only on tumeric for arthritis in her knee, presented for further evaluation of several weeks of progressively worsening abdominal distension and increased abdominal girth, associated decreased ability to tolerate PO, and symptom of exertional dyspnea. Denied fever, chills, change in bowel habits, blood in stool or constipation. Denies chronic alcohol use, illicit drugs or family history of GI disease/malignancy or GYN disease/malignancy. In the ED, vitals WNL, exam notable for distended abdomen w/ fairly tense ascites. WBC 15. Hgb 10.9 Plt 581. INR 1.49. Na 129. Cr WNL. AST/ALT WNL. T bili 2.1. Alk phos 159. alb 2.1. Trop mininally elevated. . CT A/P showed large volume ascites and non-specific jejunal loop distension, otherwise no overt GI pathology. Uterine fibroids present. COVID19, flu and RSV PCR negative. UA suggestive of possible infection. Started on ceftriaxone and admitted to Medicine.    #Large volume ascites, endometrial thickening, Focally invasive endometrial adenocarcinoma   -S/p paracentesis with IR both 04/10 & 04/13 with approx. 15L taken off ---> initial sample (04/10) negative for malignant cells --->Negative for malignancy  -ECC D+C done 4/10 path showing endometrial adenocarcinoma   -Since ascitic fluid protein high, it is likely malignant ascites  -Prophylactically treat SBP w CTX 2g iv BID #5/5  -Per gyn onc:NACT (Carbo/Paclitaxel) followed by reassessment to determine candidacy for interval surgery, SUJATHA/BSO, & debulking as warranted. Abdominal PleurX  -will need f/u w GI  -abd US today to assess ascites and if requires another paracentesis   s/p repeat paracentesis on 4/21, 1.9 L out; no abd Pleurx at this time. cx neg  IR for  paracentesis on 4/24 called, spoke with WILLIAM Tapia. No paracentesis at this time. f/u as out pt       # Right arm Cephalic vein superficial thrombophlebitis: warm compresses  pain meds    #Normocytic Anemia and Leukocytosis   -currently on Venofer   -Heme/onc following     #PT eval appreciated    #DVT ppx- lovenox    DISPO:     d/c planning to [facility]; await auth . Chemo and Sx as out pt.

## 2023-04-25 PROCEDURE — 99232 SBSQ HOSP IP/OBS MODERATE 35: CPT

## 2023-04-25 RX ADMIN — Medication 600 MILLIGRAM(S): at 21:04

## 2023-04-25 RX ADMIN — FAMOTIDINE 20 MILLIGRAM(S): 10 INJECTION INTRAVENOUS at 09:37

## 2023-04-25 RX ADMIN — Medication 600 MILLIGRAM(S): at 09:36

## 2023-04-25 RX ADMIN — ENOXAPARIN SODIUM 40 MILLIGRAM(S): 100 INJECTION SUBCUTANEOUS at 21:04

## 2023-04-25 RX ADMIN — ENOXAPARIN SODIUM 40 MILLIGRAM(S): 100 INJECTION SUBCUTANEOUS at 09:36

## 2023-04-25 NOTE — PROGRESS NOTE ADULT - PROVIDER SPECIALTY LIST ADULT
Hospitalist
Family Medicine
Gastroenterology
Heme/Onc
Hospitalist
Hospitalist
Gastroenterology
Gyn Onc
Hospitalist
Gyn Onc
Heme/Onc
Hospitalist
Nephrology
Hospitalist
Hospitalist
Heme/Onc

## 2023-04-25 NOTE — PROGRESS NOTE ADULT - NUTRITIONAL ASSESSMENT
This patient has been assessed with a concern for Malnutrition and has been determined to have a diagnosis/diagnoses of Severe protein-calorie malnutrition.    This patient is being managed with:   Diet Regular-  Entered: Apr 10 2023  4:31PM  

## 2023-04-25 NOTE — PROGRESS NOTE ADULT - SUBJECTIVE AND OBJECTIVE BOX
73 year old Female presented to the ED complaining of Dyspnea on exertion for more than 6 weeks. Patient started to have some shortness of breath with exertion and mild abdominal swelling more than 6 weeks ago. Then she went to [place] with these symptoms to visit. She came back couple of weeks ago. But her abdomen continue to swell and also got significant edema.  She also has decreased appetite and nausea, vomiting for few days. Last BM, 2 days ago. She has no fever or diarrhea.       Subjective: Patient was seen and examined by me this morning at bedside. Pt reporting that she does not want to be seen by resident team. She would prefer to have a single hospitalist.     04/16: Pt seen and evaluated at bedside. Resting comfortable. Has no acute complaints. Vitals stable     4/17: Pt seen and evaluated at bedside. Resting comfortable. Has no acute complaints. Reports abdomen feels soft     4/18: No changes. No acute complaints    4/19: Pt seen and evaluated at bedside. Resting comfortable. Has no acute complaints. Reports abdomen feels soft     4/20: no complaints    4/21: s/p repeat paracentesis today; 1.9 L out; f/u Cx  Spoke with Dr. Esquivel, Pat ; from [facility], for peer to peer for [facility] authorization. ; Pt to FAX today's note to her at   pt feels weak and tired    4/22: c/o redness and swelling in right arm    4/23: right arm thrombophlebitis improving    4/24: No paracentesis per IR  no complaints    4/25: no new complaints  feels weak  await auth for [facility]    PHYSICAL EXAM:    Vital Signs Last 24 Hrs  T(C): 37.8 (25 Apr 2023 07:44), Max: 37.8 (25 Apr 2023 07:44)  T(F): 100 (25 Apr 2023 07:44), Max: 100 (25 Apr 2023 07:44)  HR: 64 (25 Apr 2023 07:44) (64 - 77)  BP: 121/48 (25 Apr 2023 07:44) (107/44 - 121/48)  BP(mean): --  RR: 18 (25 Apr 2023 07:44) (18 - 18)  SpO2: 100% (25 Apr 2023 07:44) (99% - 100%)    Parameters below as of 25 Apr 2023 07:44  Patient On (Oxygen Delivery Method): room air          Constitutional: Weak  appearing  HEENT: Atraumatic, ETELVINA, Normal, No congestion  Respiratory: Breath Sounds normal, no rhonchi/wheeze  Cardiovascular: N S1S2;   Gastrointestinal: Abdomen soft, non tender, Bowel Sounds present, Ascites +   Extremities: No edema, peripheral pulses present, right antecubital fossa swelling, redness  Neurological: AAO x 3, no gross focal motor deficits  Skin: Non cellulitic, no rash, ulcers  Lymph Nodes: No lymphadenopathy noted  Back: No CVA tenderness   Musculoskeletal: non tender  Breasts: Deferred  Genitourinary: deferred  Rectal: Deferred    All Labs/EKG/Radiology/Meds reviewed    Lab Results:  CBC  CBC Full  -  ( 24 Apr 2023 06:34 )  WBC Count : 8.14 K/uL  RBC Count : 4.22 M/uL  Hemoglobin : 9.8 g/dL  Hematocrit : 32.5 %  Platelet Count - Automated : 489 K/uL  Mean Cell Volume : 77.0 fl  Mean Cell Hemoglobin : 23.2 pg  Mean Cell Hemoglobin Concentration : 30.2 gm/dL  Auto Neutrophil # : x  Auto Lymphocyte # : x  Auto Monocyte # : x  Auto Eosinophil # : x  Auto Basophil # : x  Auto Neutrophil % : x  Auto Lymphocyte % : x  Auto Monocyte % : x  Auto Eosinophil % : x  Auto Basophil % : x    .		Differential:	[] Automated		[] Manual  Chemistry          PT/INR - ( 24 Apr 2023 06:34 )   PT: 14.0 sec;   INR: 1.20 ratio               04-23-23 @ 07:01  -  04-24-23 @ 07:00  --------------------------------------------------------  IN: 0 mL / OUT: 1 mL / NET: -1 mL      MICROBIOLOGY/CULTURES:  Culture Results:   No growth (04-21 @ 09:02)  Culture Results:   Testing in progress (04-21 @ 09:02)              RADIOLOGY/EKG:    < from: US Duplex Venous Upper Ext Ltd, Right (04.22.23 @ 12:05) >  No evidence of right upper extremity deep venous thrombosis.    Superficial thrombophlebitis of the right cephalic vein.      < end of copied text >      < from: US Abdomen Limited (04.19.23 @ 14:41) >  IMPRESSION: Ascites as discussed with  multiloculated ascites in the left   lower quadrant similar to recent CT. Malignant ascites is a consideration      < end of copied text >    < from: CT Abdomen and Pelvis w/ IV Cont (04.07.23 @ 17:11) >  Very large amount of abdominal and pelvic ascites  Uterine fibroids    < end of copied text >  < from: US Duplex Venous Lower Ext Complete, Bilateral (04.08.23 @ 01:00) >  No evidence of deep venous thrombosis in either lower extremity.      < end of copied text >  < from: US Pelvis Complete (US Pelvis Complete .) (04.08.23 @ 10:29) >  Limited pelvic ultrasound.    The endometrium is incompletely assessed on this examination.  The   endometrium is either severely severely thickened to 6 cm or is distended   with complex material to 6 cm.  There is a 2.5 x1.6 x 1.7 cm shadowing   echogenic focus with shadowing along the periphery of the endometrium,   which may represent a calcified intramural versus submucosal fibroid,   versus an endometrial lesion.    The ovaries are not visualized.    Large volumepelvic ascites.    Pelvic MRI may be obtained for further evaluation.    < end of copied text >  Successful aspiration of 13,800 CC murky dark brown ascites. Drainage had to be halted for safety reasons to avoid large volume depletion. There is still residual large volume ascites. Repeat paracentesis will need to be performed some time later this week for therapeutic affect.  Back to floor  < from: CT Chest No Cont (04.10.23 @ 11:40) >  No malignancy in the chest.    < end of copied text >  < from: CT Abdomen and Pelvis w/ IV Cont (04.14.23 @ 08:57) >  Interval decrease in the volume of ascites with scalloping of the right   heart border suggesting malignant ascites.  peritoneal enhancement, nonspecific. Correlate for symptoms of   peritonitis.    Stable size of a central heterogeneous uterine mass.      < end of copied text >    MEDICATIONS  (STANDING):  enoxaparin Injectable 40 milliGRAM(s) SubCutaneous every 12 hours  famotidine    Tablet 20 milliGRAM(s) Oral daily  guaiFENesin  milliGRAM(s) Oral every 12 hours    MEDICATIONS  (PRN):  acetaminophen     Tablet .. 650 milliGRAM(s) Oral every 6 hours PRN Temp greater or equal to 38C (100.4F), Mild Pain (1 - 3)  aluminum hydroxide/magnesium hydroxide/simethicone Suspension 30 milliLiter(s) Oral every 4 hours PRN Dyspepsia  melatonin 3 milliGRAM(s) Oral at bedtime PRN Insomnia  ondansetron Injectable 4 milliGRAM(s) IV Push every 8 hours PRN Nausea and/or Vomiting

## 2023-04-25 NOTE — PROGRESS NOTE ADULT - SUBJECTIVE AND OBJECTIVE BOX
HPI:    72 y/o F presented to the ED c/o VINES with progressive abdominal distension for more than 6 weeks. She admitted that she traveled to and from [place] with these symptoms, recently returned a few weeks ago. She admitted to having decreased appetite with nausea and vomiting for the past few days but denied any recent fever, cough, chills, HA, vision/hearing changes, CP, palpitations, diarrhea, melena, BRBPR or any other acute c/o. (07 Apr 2023 23:06)      04/14/2023: Seen at bedside this morning after returning from CT    04/16/2023- Seen at bedside, OOB to bathroom ,reported having a BM today. Feels better,  with improvement in abdominal pain. c/o intermittent mild vaginal bleeding.    04/18/2023- Seen at bedside, in no acute distress. Awaiting Pleurx catheter placement, scheduled today.    4/25/2023     PAST MEDICAL & SURGICAL HISTORY:    No pertinent past medical history  No significant past surgical history      MEDICATIONS  (STANDING):    cefTRIAXone Injectable. 2000 milliGRAM(s) IV Push every 12 hours  famotidine    Tablet 20 milliGRAM(s) Oral daily  guaiFENesin  milliGRAM(s) Oral every 12 hours  heparin   Injectable 5000 Unit(s) SubCutaneous every 8 hours      MEDICATIONS  (PRN):    acetaminophen     Tablet .. 650 milliGRAM(s) Oral every 6 hours PRN Temp greater or equal to 38C (100.4F), Mild Pain (1 - 3)  aluminum hydroxide/magnesium hydroxide/simethicone Suspension 30 milliLiter(s) Oral every 4 hours PRN Dyspepsia  melatonin 3 milliGRAM(s) Oral at bedtime PRN Insomnia  ondansetron Injectable 4 milliGRAM(s) IV Push every 8 hours PRN Nausea and/or Vomiting      ALLERGIES:    No Known Allergies      FAMILY HISTORY:    No pertinent family history in first degree relatives        REVIEW OF SYSTEMS:    Constitutional, Eyes, ENT, Cardiovascular, Respiratory, Gastrointestinal, Genitourinary, Musculoskeletal, Integumentary, Neurological, Psychiatric, Endocrine, Heme/Lymph and Allergic/Immunologic review of systems are otherwise negative except as noted in HPI.       VITALS:    Vital Signs Last 24 Hrs  T(C): 37.8 (25 Apr 2023 07:44), Max: 37.8 (25 Apr 2023 07:44)  T(F): 100 (25 Apr 2023 07:44), Max: 100 (25 Apr 2023 07:44)  HR: 64 (25 Apr 2023 07:44) (64 - 77)  BP: 121/48 (25 Apr 2023 07:44) (107/44 - 121/48)  BP(mean): --  RR: 18 (25 Apr 2023 07:44) (18 - 18)  SpO2: 100% (25 Apr 2023 07:44) (99% - 100%)    Parameters below as of 25 Apr 2023 07:44  Patient On (Oxygen Delivery Method): room air    PHYSICAL:    Constitutional: no acute distress  Eyes: PERRL, EOMI  ENT: pharynx is unremarkable  Neck: supple without JVD  Pulmonary: clear to auscultation bilaterally, no dullness, no wheezing  Cardiac: RRR, normal S1S2  Vascular: no calf tenderness, venous stasis changes, varices  Abdomen:  obese, normoactive bowel sounds, distended, but soft and nontender, no hepatosplenomegaly or masses appreciated   Lymphatic: no peripheral adenopathy appreciated  Musculoskeletal: full range of motion and no deformities appreciated  Skin: normal appearance, no rash, nodules, vesicles, ulcers, erythema  Neurology: grossly intact  Psychiatric: affect appropriate      LABS:                          9.8    8.14  )-----------( 489      ( 24 Apr 2023 06:34 )             32.5     Comprehensive Metabolic Panel in AM (04.19.23 @ 06:52)   Sodium, Serum: 137 mmol/L  Potassium, Serum: 4.2 mmol/L  Chloride, Serum: 103 mmol/L  Carbon Dioxide, Serum: 33 mmol/L  Anion Gap, Serum: 1 mmol/L  Blood Urea Nitrogen, Serum: 9 mg/dL  Creatinine, Serum: 0.59 mg/dL  Glucose, Serum: 122 mg/dL  Calcium, Total Serum: 9.1 mg/dL  Protein Total, Serum: 6.9 gm/dL  Albumin, Serum: 1.8 g/dL  Bilirubin Total, Serum: 0.4 mg/dL  Alkaline Phosphatase, Serum: 267 U/L  Aspartate Aminotransferase (AST/SGOT): 28 U/L  Alanine Aminotransferase (ALT/SGPT): 28 U/L    RADIOLOGY & ADDITIONAL STUDIES:      CT Abdomen and Pelvis w/ IV Cont (04.07.23 @ 17:11)      IMPRESSION:  Very large amount of abdominal and pelvic ascites  Uterine fibroids          US Duplex Venous Lower Ext Complete, Bilateral (04.08.23 @ 01:00)    IMPRESSION:  No evidence of deep venous thrombosis in either lower extremity.        CT Chest No Cont (04.10.23 @ 11:40)    IMPRESSION:  No malignancy in the chest.          CT Abdomen and Pelvis w/ IV Cont (04.14.23 @ 08:57)    IMPRESSION:  Interval decrease in the volume of ascites with scalloping of the right   heart border suggesting malignant ascites.  peritoneal enhancement, nonspecific. Correlate for symptoms of   peritonitis.    Stable size of a central heterogeneous uterine mass.       HPI:    72 y/o F presented to the ED c/o VINES with progressive abdominal distension for more than 6 weeks. She admitted that she traveled to and from [place] with these symptoms, recently returned a few weeks ago. She admitted to having decreased appetite with nausea and vomiting for the past few days but denied any recent fever, cough, chills, HA, vision/hearing changes, CP, palpitations, diarrhea, melena, BRBPR or any other acute c/o. (07 Apr 2023 23:06)      04/14/2023: Seen at bedside this morning after returning from CT    04/16/2023- Seen at bedside, OOB to bathroom ,reported having a BM today. Feels better,  with improvement in abdominal pain. c/o intermittent mild vaginal bleeding.    04/18/2023- Seen at bedside, in no acute distress. Awaiting Pleurx catheter placement, scheduled today.    4/25/2023- OOB to chair, in no acute distress.      PAST MEDICAL & SURGICAL HISTORY:    No pertinent past medical history  No significant past surgical history      MEDICATIONS  (STANDING):    cefTRIAXone Injectable. 2000 milliGRAM(s) IV Push every 12 hours  famotidine    Tablet 20 milliGRAM(s) Oral daily  guaiFENesin  milliGRAM(s) Oral every 12 hours  heparin   Injectable 5000 Unit(s) SubCutaneous every 8 hours      MEDICATIONS  (PRN):    acetaminophen     Tablet .. 650 milliGRAM(s) Oral every 6 hours PRN Temp greater or equal to 38C (100.4F), Mild Pain (1 - 3)  aluminum hydroxide/magnesium hydroxide/simethicone Suspension 30 milliLiter(s) Oral every 4 hours PRN Dyspepsia  melatonin 3 milliGRAM(s) Oral at bedtime PRN Insomnia  ondansetron Injectable 4 milliGRAM(s) IV Push every 8 hours PRN Nausea and/or Vomiting      ALLERGIES:    No Known Allergies      FAMILY HISTORY:    No pertinent family history in first degree relatives        REVIEW OF SYSTEMS:    Constitutional, Eyes, ENT, Cardiovascular, Respiratory, Gastrointestinal, Genitourinary, Musculoskeletal, Integumentary, Neurological, Psychiatric, Endocrine, Heme/Lymph and Allergic/Immunologic review of systems are otherwise negative except as noted in HPI.       VITALS:    Vital Signs Last 24 Hrs  T(C): 37.8 (25 Apr 2023 07:44), Max: 37.8 (25 Apr 2023 07:44)  T(F): 100 (25 Apr 2023 07:44), Max: 100 (25 Apr 2023 07:44)  HR: 64 (25 Apr 2023 07:44) (64 - 77)  BP: 121/48 (25 Apr 2023 07:44) (107/44 - 121/48)  BP(mean): --  RR: 18 (25 Apr 2023 07:44) (18 - 18)  SpO2: 100% (25 Apr 2023 07:44) (99% - 100%)    Parameters below as of 25 Apr 2023 07:44  Patient On (Oxygen Delivery Method): room air        PHYSICAL:    Constitutional: no acute distress  Eyes: PERRL, EOMI  ENT: pharynx is unremarkable  Neck: supple without JVD  Pulmonary: clear to auscultation bilaterally, no dullness, no wheezing  Cardiac: RRR, normal S1S2  Vascular: no calf tenderness, venous stasis changes, varices  Abdomen:  obese, normoactive bowel sounds, distended, but soft and nontender, no hepatosplenomegaly or masses appreciated   Lymphatic: no peripheral adenopathy appreciated  Musculoskeletal: full range of motion and no deformities appreciated  Skin: normal appearance, no rash, nodules, vesicles, ulcers, erythema  Neurology: grossly intact  Psychiatric: affect appropriate      LABS:                          9.8    8.14  )-----------( 489      ( 24 Apr 2023 06:34 )             32.5     Comprehensive Metabolic Panel in AM (04.19.23 @ 06:52)   Sodium, Serum: 137 mmol/L  Potassium, Serum: 4.2 mmol/L  Chloride, Serum: 103 mmol/L  Carbon Dioxide, Serum: 33 mmol/L  Anion Gap, Serum: 1 mmol/L  Blood Urea Nitrogen, Serum: 9 mg/dL  Creatinine, Serum: 0.59 mg/dL  Glucose, Serum: 122 mg/dL  Calcium, Total Serum: 9.1 mg/dL  Protein Total, Serum: 6.9 gm/dL  Albumin, Serum: 1.8 g/dL  Bilirubin Total, Serum: 0.4 mg/dL  Alkaline Phosphatase, Serum: 267 U/L  Aspartate Aminotransferase (AST/SGOT): 28 U/L  Alanine Aminotransferase (ALT/SGPT): 28 U/L    RADIOLOGY & ADDITIONAL STUDIES:      CT Abdomen and Pelvis w/ IV Cont (04.07.23 @ 17:11)      IMPRESSION:  Very large amount of abdominal and pelvic ascites  Uterine fibroids          US Duplex Venous Lower Ext Complete, Bilateral (04.08.23 @ 01:00)    IMPRESSION:  No evidence of deep venous thrombosis in either lower extremity.        CT Chest No Cont (04.10.23 @ 11:40)    IMPRESSION:  No malignancy in the chest.          CT Abdomen and Pelvis w/ IV Cont (04.14.23 @ 08:57)    IMPRESSION:  Interval decrease in the volume of ascites with scalloping of the right   heart border suggesting malignant ascites.  peritoneal enhancement, nonspecific. Correlate for symptoms of   peritonitis.    Stable size of a central heterogeneous uterine mass.

## 2023-04-25 NOTE — PROGRESS NOTE ADULT - ASSESSMENT
73 year old woman who no known PMHx, has not seen a doctor in many years, has not suffered any recent illness as far as she knows, takes no medications, only on tumeric for arthritis in her knee, presented for further evaluation of several weeks of progressively worsening abdominal distension and increased abdominal girth, associated decreased ability to tolerate PO, and symptom of exertional dyspnea. Denied fever, chills, change in bowel habits, blood in stool or constipation. Denies chronic alcohol use, illicit drugs or family history of GI disease/malignancy or GYN disease/malignancy. In the ED, vitals WNL, exam notable for distended abdomen w/ fairly tense ascites. WBC 15. Hgb 10.9 Plt 581. INR 1.49. Na 129. Cr WNL. AST/ALT WNL. T bili 2.1. Alk phos 159. alb 2.1. Trop mininally elevated. . CT A/P showed large volume ascites and non-specific jejunal loop distension, otherwise no overt GI pathology. Uterine fibroids present. COVID19, flu and RSV PCR negative. UA suggestive of possible infection. Started on ceftriaxone and admitted to Medicine.    #Large volume ascites, endometrial thickening, Focally invasive endometrial adenocarcinoma   -S/p paracentesis with IR both 04/10 & 04/13 with approx. 15L taken off ---> initial sample (04/10) negative for malignant cells --->Negative for malignancy  -ECC D+C done 4/10 path showing endometrial adenocarcinoma   -Since ascitic fluid protein high, it is likely malignant ascites  -Prophylactically treat SBP w CTX 2g iv BID #5/5  -Per gyn onc:NACT (Carbo/Paclitaxel) followed by reassessment to determine candidacy for interval surgery, SUJATHA/BSO, & debulking as warranted. Abdominal PleurX  -will need f/u w GI  -abd US today to assess ascites and if requires another paracentesis   s/p repeat paracentesis on 4/21, 1.9 L out; no abd Pleurx at this time. cx neg  IR for  paracentesis on 4/24 called, spoke with WILLIAM Tapia. No paracentesis at this time. f/u as out pt   daily weight    # Right arm Cephalic vein superficial thrombophlebitis: warm compresses  pain meds    #Normocytic Anemia and Leukocytosis   -currently on Venofer   -Heme/onc following     #PT eval appreciated    #DVT ppx- lovenox    DISPO:     d/c planning to [facility];   await auth . expedited appeal pending.    Chemo and Sx as out pt.

## 2023-04-25 NOTE — PROGRESS NOTE ADULT - ASSESSMENT
74 y/o F with no significant PMHx found to have significant abdominal ascites with uterine mass.      #   Focally invasive endometrioid adenocarcinoma with ascites    - Initial CT imaging revealed significant amount of abdominal ascites  - S/p paracentesis with IR both 04/10 & 04/13 with approx. 15L taken off ---> initial -04/10 and subsequent 4/21 negative for malignant cells --->Negative for malignancy   - IR for  paracentesis was scheduled for 4/24, but deferred---> No paracentesis at this time, patient asymptomatic and due to multiple done already and large amount drained, risk fo SBP.   - Abd US today-4/25 per Dr. Maradiaga -->assess ascites and if requires then another paracentesis.  - S/P treatement for SBP with CTX 2g IV BID x 5 days  - S/p hysteroscopic D&C with EM- Bx by Gyn/ Onc 4/10; consistent with Endometrioid adenocarcinoma of the endometrium, FIGO grade 2, focally invasive.  - CT chest negative for any potential malignancy  - GOC discussion/meeting with family and Gyn/ Onc 04/14 or 04/15  - Attending Dr Bryant spoke with patient and discussed following up with Primary Heme/Onc Dr Ashley outpatient for treatment  -Scheduled for abdominal indwelling Pleurx placement today-4/18  - To f/u with Dr Ashley will follow up- Likely will need systemic therapy first before debulking surgery considered.    # Normocytic Anemia & Leukocytosis    - WBC count 8 K, now normal  - Hgb 9.8 g/dL   - Likely 2/2 suspected malignancy / SBP  - Low iron sat. Might benefit from IV iron.- ordered.  - Continue supportive measures        74 y/o F with no significant PMHx found to have significant abdominal ascites with uterine mass.      #   Focally invasive endometrioid adenocarcinoma with ascites    - Initial CT imaging revealed significant amount of abdominal ascites  - S/p paracentesis with IR both 04/10 & 04/13 with approx. 15L taken off ---> initial -04/10 and subsequent 4/21 negative for malignant cells --->Negative for malignancy   - IR for  paracentesis was scheduled for 4/24, but deferred---> No paracentesis at this time, patient asymptomatic and due to multiple done already and large amount drained, risk fo SBP.   - Abd US today-4/25 per Dr. Maradiaga -->assess ascites and if requires then another paracentesis.  - S/P treatement for SBP with CTX 2g IV BID x 5 days  - S/p hysteroscopic D&C with EM- Bx by Gyn/ Onc 4/10; consistent with Endometrioid adenocarcinoma of the endometrium, FIGO grade 2, focally invasive.  - CT chest negative for any potential malignancy  - GOC discussion/meeting with family and Gyn/ Onc 04/14 or 04/15  - Attending Dr Bryant spoke with patient and discussed following up with Primary Heme/Onc Dr Ashley outpatient for treatment  -Scheduled for abdominal indwelling Pleurx placement today-4/18  - To f/u with Dr Ashley will follow up- Likely will need systemic therapy first before debulking surgery considered.  - Patient dispo to rehab pending insurance coverage.    # Normocytic Anemia & Leukocytosis    - WBC count 8 K, now normal  - Hgb 9.8 g/dL   - Likely 2/2 suspected malignancy / SBP  - Low iron sat. Might benefit from IV iron.- ordered.  - Continue supportive measures        74 y/o F with no significant PMHx found to have abdominal ascites with a uterine mass.      # Endometrioid adenocarcinoma with ascites    - CT imaging revealed significant amount of abdominal ascites  - S/p paracentesis 04/10 & 04/13 with approx. 15L taken off; 4/21 negative for malignant cells  - paracentesis was scheduled for 4/24, but deferred--->patient asymptomatic   - S/P treatement for SBP with CTX 2g IV BID x 5 days  - S/p D&C with EM- Bx 4/10:  Endometrioid adenocarcinoma of the endometrium, FIGO grade 2, focally invasive.  - CT chest negative   - GOC discussion/meeting with family and Gyn/ Onc 04/14 or 04/15  - abdominal indwelling Pleurx placed 4/18  - plan for outpt systemic therapy   - Patient dispo to rehab pending insurance coverage.    # Normocytic Anemia & Leukocytosis    - WBC count 8 K, now normal  - Hgb 9.8 g/dL   - Likely 2/2 suspected malignancy / SBP  - Low iron sat. Might benefit from IV iron.- ordered.  - Continue supportive measures

## 2023-04-26 ENCOUNTER — TRANSCRIPTION ENCOUNTER (OUTPATIENT)
Age: 73
End: 2023-04-26

## 2023-04-26 VITALS
SYSTOLIC BLOOD PRESSURE: 108 MMHG | RESPIRATION RATE: 18 BRPM | HEART RATE: 81 BPM | OXYGEN SATURATION: 99 % | DIASTOLIC BLOOD PRESSURE: 57 MMHG | TEMPERATURE: 98 F

## 2023-04-26 LAB
CULTURE RESULTS: SIGNIFICANT CHANGE UP
SARS-COV-2 RNA SPEC QL NAA+PROBE: SIGNIFICANT CHANGE UP
SPECIMEN SOURCE: SIGNIFICANT CHANGE UP

## 2023-04-26 PROCEDURE — 99239 HOSP IP/OBS DSCHRG MGMT >30: CPT

## 2023-04-26 RX ADMIN — FAMOTIDINE 20 MILLIGRAM(S): 10 INJECTION INTRAVENOUS at 10:27

## 2023-04-26 RX ADMIN — ENOXAPARIN SODIUM 40 MILLIGRAM(S): 100 INJECTION SUBCUTANEOUS at 10:27

## 2023-04-26 RX ADMIN — Medication 600 MILLIGRAM(S): at 10:27

## 2023-04-26 NOTE — DISCHARGE NOTE NURSING/CASE MANAGEMENT/SOCIAL WORK - NSDCPEFALRISK_GEN_ALL_CORE
For information on Fall & Injury Prevention, visit: [email or web address] OR  [email or web address] OR  https://www.cdc.gov/steadi/patient.html

## 2023-04-26 NOTE — DISCHARGE NOTE PROVIDER - CARE PROVIDER_API CALL
Rosa Isela Ashley)  Medical Oncology  [address], [place] [address]  Phone: [phone]  Fax: [phone]  Follow Up Time:     Leda Krishnan)  [facility]  [address] [place] [address]  Phone: [phone]  Fax: [phone]  Follow Up Time:

## 2023-04-26 NOTE — DISCHARGE NOTE PROVIDER - CARE PROVIDERS DIRECT ADDRESSES
[email or web address]

## 2023-04-26 NOTE — DISCHARGE NOTE PROVIDER - HOSPITAL COURSE
PHYSICAL EXAM:    Daily     Daily     Vital Signs Last 24 Hrs  T(C): 36.5 (26 Apr 2023 07:41), Max: 36.7 (25 Apr 2023 15:38)  T(F): 97.7 (26 Apr 2023 07:41), Max: 98.1 (25 Apr 2023 15:38)  HR: 68 (26 Apr 2023 07:41) (68 - 75)  BP: 118/52 (26 Apr 2023 07:41) (105/52 - 118/52)  BP(mean): --  RR: 18 (26 Apr 2023 07:41) (18 - 18)  SpO2: 100% (26 Apr 2023 07:41) (98% - 100%)    Constitutional: Weak  appearing  HEENT: Atraumatic, ETELVINA, Normal, No congestion  Respiratory: Breath Sounds normal, no rhonchi/wheeze  Cardiovascular: N S1S2;   Gastrointestinal: Abdomen soft, non tender, Bowel Sounds present  Extremities: No edema, peripheral pulses present  Neurological: AAO x 3, no gross focal motor deficits  Skin: Non cellulitic, no rash, ulcers  Lymph Nodes: No lymphadenopathy noted  Back: No CVA tenderness   Musculoskeletal: non tender  Breasts: Deferred  Genitourinary: deferred  Rectal: Deferred    73 year old woman who no known PMHx, has not seen a doctor in many years, has not suffered any recent illness as far as she knows, takes no medications, only on tumeric for arthritis in her knee, presented for further evaluation of several weeks of progressively worsening abdominal distension and increased abdominal girth, associated decreased ability to tolerate PO, and symptom of exertional dyspnea. Denied fever, chills, change in bowel habits, blood in stool or constipation. Denies chronic alcohol use, illicit drugs or family history of GI disease/malignancy or GYN disease/malignancy. In the ED, vitals WNL, exam notable for distended abdomen w/ fairly tense ascites. WBC 15. Hgb 10.9 Plt 581. INR 1.49. Na 129. Cr WNL. AST/ALT WNL. T bili 2.1. Alk phos 159. alb 2.1. Trop mininally elevated. . CT A/P showed large volume ascites and non-specific jejunal loop distension, otherwise no overt GI pathology. Uterine fibroids present. COVID19, flu and RSV PCR negative. UA suggestive of possible infection. Started on ceftriaxone and admitted to Medicine.    Pt admitted with :    #Large volume ascites, endometrial thickening, Focally invasive endometrial adenocarcinoma   -S/p paracentesis with IR both 04/10 , 04/13 and 4/21   4/10  13,800CC (incomplete drainage due to high volume, completed on nest session)                     4/13 5,300CC                     4/21  1,900CC   negative for malignant cells  -ECC D+C done 4/10 path showing endometrial adenocarcinoma   -Prophylactically treat SBP w CTX 2g iv BID #5/5  -Per gyn onc:NACT (Carbo/Paclitaxel) followed by reassessment to determine candidacy for interval surgery, SUJATHA/BSO, & debulking as warranted.  s/p repeat paracentesis on 4/21, 1.9 L out; no abd Pleurx at this time. cx neg  IR for  paracentesis on 4/24 called, spoke with WILLIAM Tapia. No paracentesis at this time. f/u as out pt       # Right arm Cephalic vein superficial thrombophlebitis: warm compresses  pain meds prn    #Normocytic Anemia and Leukocytosis   -currently on Venofer   -Heme/onc following     #PT eval appreciated; to [facility]    DISPO:     d/c to [facility];   Chemo and Sx as out pt.     time spent 50 min

## 2023-04-26 NOTE — DISCHARGE NOTE NURSING/CASE MANAGEMENT/SOCIAL WORK - PATIENT PORTAL LINK FT
You can access the FollowMyHealth Patient Portal offered by [facility] by registering at the following website: [email or web address]. By joining [email or web address] FollowMyHealth portal, you will also be able to view your health information using other applications (apps) compatible with our system.

## 2023-04-26 NOTE — DISCHARGE NOTE PROVIDER - NSDCCPCAREPLAN_GEN_ALL_CORE_FT
PRINCIPAL DISCHARGE DIAGNOSIS  Diagnosis: Ascites  Assessment and Plan of Treatment: large volume taps , about 21 L out in 3 taps  s/p tap x 3  f/u with PCP/GYN/Oncology      SECONDARY DISCHARGE DIAGNOSES  Diagnosis: Acute UTI  Assessment and Plan of Treatment: treated with ABX    Diagnosis: Endometrial ca  Assessment and Plan of Treatment: f/u with GYN and Oncology as out pt for surgery and chemo

## 2023-04-28 DIAGNOSIS — D64.9 ANEMIA, UNSPECIFIED: ICD-10-CM

## 2023-04-28 DIAGNOSIS — E43 UNSPECIFIED SEVERE PROTEIN-CALORIE MALNUTRITION: ICD-10-CM

## 2023-04-28 DIAGNOSIS — R18.8 OTHER ASCITES: ICD-10-CM

## 2023-04-28 DIAGNOSIS — N39.0 URINARY TRACT INFECTION, SITE NOT SPECIFIED: ICD-10-CM

## 2023-04-28 DIAGNOSIS — M17.0 BILATERAL PRIMARY OSTEOARTHRITIS OF KNEE: ICD-10-CM

## 2023-04-28 DIAGNOSIS — E87.1 HYPO-OSMOLALITY AND HYPONATREMIA: ICD-10-CM

## 2023-04-28 DIAGNOSIS — C54.1 MALIGNANT NEOPLASM OF ENDOMETRIUM: ICD-10-CM

## 2023-05-02 ENCOUNTER — NON-APPOINTMENT (OUTPATIENT)
Age: 73
End: 2023-05-02

## 2023-05-04 NOTE — ASU PATIENT PROFILE, ADULT - FALL HARM RISK - HARM RISK INTERVENTIONS

## 2023-05-05 ENCOUNTER — RESULT REVIEW (OUTPATIENT)
Age: 73
End: 2023-05-05

## 2023-05-05 ENCOUNTER — OUTPATIENT (OUTPATIENT)
Dept: INPATIENT UNIT | Facility: HOSPITAL | Age: 73
LOS: 1 days | Discharge: HOME | End: 2023-05-05
Payer: MEDICARE

## 2023-05-05 ENCOUNTER — TRANSCRIPTION ENCOUNTER (OUTPATIENT)
Age: 73
End: 2023-05-05

## 2023-05-05 VITALS
RESPIRATION RATE: 16 BRPM | DIASTOLIC BLOOD PRESSURE: 45 MMHG | TEMPERATURE: 98 F | HEART RATE: 70 BPM | OXYGEN SATURATION: 100 % | SYSTOLIC BLOOD PRESSURE: 107 MMHG

## 2023-05-05 VITALS
OXYGEN SATURATION: 100 % | WEIGHT: 188.05 LBS | HEIGHT: 65 IN | DIASTOLIC BLOOD PRESSURE: 63 MMHG | RESPIRATION RATE: 16 BRPM | TEMPERATURE: 98 F | HEART RATE: 65 BPM | SYSTOLIC BLOOD PRESSURE: 114 MMHG

## 2023-05-05 DIAGNOSIS — R18.8 OTHER ASCITES: ICD-10-CM

## 2023-05-05 LAB
ANION GAP SERPL CALC-SCNC: 3 MMOL/L — LOW (ref 5–17)
BUN SERPL-MCNC: 12 MG/DL — SIGNIFICANT CHANGE UP (ref 7–23)
CALCIUM SERPL-MCNC: 9.1 MG/DL — SIGNIFICANT CHANGE UP (ref 8.5–10.1)
CHLORIDE SERPL-SCNC: 110 MMOL/L — HIGH (ref 96–108)
CO2 SERPL-SCNC: 28 MMOL/L — SIGNIFICANT CHANGE UP (ref 22–31)
CREAT SERPL-MCNC: 0.54 MG/DL — SIGNIFICANT CHANGE UP (ref 0.5–1.3)
EGFR: 97 ML/MIN/1.73M2 — SIGNIFICANT CHANGE UP
GLUCOSE SERPL-MCNC: 92 MG/DL — SIGNIFICANT CHANGE UP (ref 70–99)
GRAM STN FLD: SIGNIFICANT CHANGE UP
HCT VFR BLD CALC: 35.8 % — SIGNIFICANT CHANGE UP (ref 34.5–45)
HGB BLD-MCNC: 10.6 G/DL — LOW (ref 11.5–15.5)
INR BLD: 1.19 RATIO — HIGH (ref 0.88–1.16)
MCHC RBC-ENTMCNC: 23.6 PG — LOW (ref 27–34)
MCHC RBC-ENTMCNC: 29.6 GM/DL — LOW (ref 32–36)
MCV RBC AUTO: 79.7 FL — LOW (ref 80–100)
PLATELET # BLD AUTO: 424 K/UL — HIGH (ref 150–400)
POTASSIUM SERPL-MCNC: 4.2 MMOL/L — SIGNIFICANT CHANGE UP (ref 3.5–5.3)
POTASSIUM SERPL-SCNC: 4.2 MMOL/L — SIGNIFICANT CHANGE UP (ref 3.5–5.3)
PROTHROM AB SERPL-ACNC: 13.8 SEC — HIGH (ref 10.5–13.4)
RBC # BLD: 4.49 M/UL — SIGNIFICANT CHANGE UP (ref 3.8–5.2)
RBC # FLD: 23.4 % — HIGH (ref 10.3–14.5)
SODIUM SERPL-SCNC: 141 MMOL/L — SIGNIFICANT CHANGE UP (ref 135–145)
SPECIMEN SOURCE: SIGNIFICANT CHANGE UP
WBC # BLD: 6.44 K/UL — SIGNIFICANT CHANGE UP (ref 3.8–10.5)
WBC # FLD AUTO: 6.44 K/UL — SIGNIFICANT CHANGE UP (ref 3.8–10.5)

## 2023-05-05 PROCEDURE — 87075 CULTR BACTERIA EXCEPT BLOOD: CPT

## 2023-05-05 PROCEDURE — 87070 CULTURE OTHR SPECIMN AEROBIC: CPT

## 2023-05-05 PROCEDURE — 36415 COLL VENOUS BLD VENIPUNCTURE: CPT

## 2023-05-05 PROCEDURE — 49083 ABD PARACENTESIS W/IMAGING: CPT

## 2023-05-05 PROCEDURE — 87102 FUNGUS ISOLATION CULTURE: CPT

## 2023-05-05 PROCEDURE — 85027 COMPLETE CBC AUTOMATED: CPT

## 2023-05-05 PROCEDURE — 89051 BODY FLUID CELL COUNT: CPT

## 2023-05-05 PROCEDURE — 85610 PROTHROMBIN TIME: CPT

## 2023-05-05 PROCEDURE — 80048 BASIC METABOLIC PNL TOTAL CA: CPT

## 2023-05-05 NOTE — ASU DISCHARGE PLAN (ADULT/PEDIATRIC) - ASU DC SPECIAL INSTRUCTIONSFT
Paracentesis    Discharge Instructions  - You have had a paracentesis.  - You may shower in 24 hours.  - Keep the area covered and dry for the next 24 hours.  - Do not perform any heavy lifting for 48 hours  - You may resume your normal diet.  - You may resume your normal medications however you should wait 24 hours before restarting aspirin, plavix, or blood thinners.  - It is normal to experience some pain over the site for the next few days. You may take apply ice to the area (20 minutes on, 20 minutes off) and take Tylenol for that pain. Do not take more frequently than every 6 hours and do not exceed more than 3000mg of Tylenol in a 24 hour period.    Notify your primary physician and/or Interventional Radiology IMMEDIATELY if you experience any of the following       - Fever of 100.4F or 38C       - Chills or Rigors/ Shakes       - Swelling and/or Redness in the area around the biopsy site       - Worsening Pain       - Blood soaked bandages or worsening bleeding       - Lightheadedness and/or dizziness upon standing       - Chest Pain/ Tightness       - Shortness of Breath       - Difficulty walking    If you have a problem that you believe requires IMMEDIATE attention, please go to your NEAREST Emergency Room. If you believe your problem can safely wait until you speak to a physician, please call Interventional Radiology for any concerns.    [facility] Interventional Radiology  [phone]

## 2023-05-05 NOTE — ASU PREOP CHECKLIST - DENTURES
patial upper/yes partial upper/yes Propranolol Counseling:  I discussed with the patient the risks of propranolol including but not limited to low heart rate, low blood pressure, low blood sugar, restlessness and increased cold sensitivity. They should call the office if they experience any of these side effects.

## 2023-05-05 NOTE — ASU PREOP CHECKLIST - AICD PRESENT
no
Is This A New Presentation, Or A Follow-Up?: Skin Lesion
What Type Of Note Output Would You Prefer (Optional)?: Bullet Format
How Severe Is Your Skin Lesion?: mild
Has Your Skin Lesion Been Treated?: not been treated

## 2023-05-06 LAB
B PERT IGG+IGM PNL SER: ABNORMAL
COLOR FLD: YELLOW — SIGNIFICANT CHANGE UP
EOSINOPHIL # FLD: 0 % — SIGNIFICANT CHANGE UP
FLUID INTAKE SUBSTANCE CLASS: SIGNIFICANT CHANGE UP
FOLATE+VIT B12 SERBLD-IMP: 0 % — SIGNIFICANT CHANGE UP
LYMPHOCYTES # FLD: 23 % — SIGNIFICANT CHANGE UP
MESOTHL CELL # FLD: 0 % — SIGNIFICANT CHANGE UP
MONOS+MACROS # FLD: 25 % — SIGNIFICANT CHANGE UP
NEUTROPHILS-BODY FLUID: 52 % — SIGNIFICANT CHANGE UP
NRBC # FLD: 0 % — SIGNIFICANT CHANGE UP
OTHER CELLS FLD MANUAL: 0 % — SIGNIFICANT CHANGE UP
RCV VOL RI: HIGH /UL (ref 0–0)
TOTAL NUCLEATED CELL COUNT, BODY FLUID: 2223 /UL — SIGNIFICANT CHANGE UP
TUBE TYPE: SIGNIFICANT CHANGE UP

## 2023-05-09 PROBLEM — N85.8 OTHER SPECIFIED NONINFLAMMATORY DISORDERS OF UTERUS: Chronic | Status: ACTIVE | Noted: 2023-05-04

## 2023-05-09 PROBLEM — C54.1 MALIGNANT NEOPLASM OF ENDOMETRIUM: Chronic | Status: ACTIVE | Noted: 2023-05-04

## 2023-05-09 PROBLEM — Z87.898 PERSONAL HISTORY OF OTHER SPECIFIED CONDITIONS: Chronic | Status: ACTIVE | Noted: 2023-05-04

## 2023-05-10 LAB
CULTURE RESULTS: NO GROWTH — SIGNIFICANT CHANGE UP
CULTURE RESULTS: SIGNIFICANT CHANGE UP
SPECIMEN SOURCE: SIGNIFICANT CHANGE UP
SPECIMEN SOURCE: SIGNIFICANT CHANGE UP

## 2023-05-11 ENCOUNTER — OUTPATIENT (OUTPATIENT)
Dept: OUTPATIENT SERVICES | Facility: HOSPITAL | Age: 73
LOS: 1 days | Discharge: HOME | End: 2023-05-11

## 2023-05-11 DIAGNOSIS — C55 MALIGNANT NEOPLASM OF UTERUS, PART UNSPECIFIED: ICD-10-CM

## 2023-05-16 DIAGNOSIS — Z00.00 ENCOUNTER FOR GENERAL ADULT MEDICAL EXAMINATION W/OUT ABNORMAL FINDINGS: ICD-10-CM

## 2023-05-17 ENCOUNTER — APPOINTMENT (OUTPATIENT)
Dept: HEMATOLOGY ONCOLOGY | Facility: CLINIC | Age: 73
End: 2023-05-17
Payer: MEDICARE

## 2023-05-17 ENCOUNTER — RESULT REVIEW (OUTPATIENT)
Age: 73
End: 2023-05-17

## 2023-05-17 ENCOUNTER — NON-APPOINTMENT (OUTPATIENT)
Age: 73
End: 2023-05-17

## 2023-05-17 VITALS
HEART RATE: 79 BPM | BODY MASS INDEX: 32.71 KG/M2 | DIASTOLIC BLOOD PRESSURE: 74 MMHG | OXYGEN SATURATION: 99 % | TEMPERATURE: 98 F | HEIGHT: 65 IN | SYSTOLIC BLOOD PRESSURE: 124 MMHG | WEIGHT: 196.31 LBS | RESPIRATION RATE: 18 BRPM

## 2023-05-17 LAB
ALBUMIN SERPL ELPH-MCNC: 3.5 G/DL — SIGNIFICANT CHANGE UP (ref 3.3–5)
ALP SERPL-CCNC: 110 U/L — SIGNIFICANT CHANGE UP (ref 40–120)
ALT FLD-CCNC: 8 U/L — LOW (ref 10–45)
ANION GAP SERPL CALC-SCNC: 12 MMOL/L — SIGNIFICANT CHANGE UP (ref 5–17)
AST SERPL-CCNC: 13 U/L — SIGNIFICANT CHANGE UP (ref 10–40)
BASOPHILS # BLD AUTO: 0.08 K/UL — SIGNIFICANT CHANGE UP (ref 0–0.2)
BASOPHILS NFR BLD AUTO: 1.1 % — SIGNIFICANT CHANGE UP (ref 0–2)
BILIRUB SERPL-MCNC: 0.3 MG/DL — SIGNIFICANT CHANGE UP (ref 0.2–1.2)
BUN SERPL-MCNC: 10 MG/DL — SIGNIFICANT CHANGE UP (ref 7–23)
CALCIUM SERPL-MCNC: 9.4 MG/DL — SIGNIFICANT CHANGE UP (ref 8.4–10.5)
CHLORIDE SERPL-SCNC: 103 MMOL/L — SIGNIFICANT CHANGE UP (ref 96–108)
CO2 SERPL-SCNC: 25 MMOL/L — SIGNIFICANT CHANGE UP (ref 22–31)
CREAT SERPL-MCNC: 0.63 MG/DL — SIGNIFICANT CHANGE UP (ref 0.5–1.3)
EGFR: 94 ML/MIN/1.73M2 — SIGNIFICANT CHANGE UP
EOSINOPHIL # BLD AUTO: 0.26 K/UL — SIGNIFICANT CHANGE UP (ref 0–0.5)
EOSINOPHIL NFR BLD AUTO: 3.4 % — SIGNIFICANT CHANGE UP (ref 0–6)
GLUCOSE SERPL-MCNC: 97 MG/DL — SIGNIFICANT CHANGE UP (ref 70–99)
HCT VFR BLD CALC: 35.6 % — SIGNIFICANT CHANGE UP (ref 34.5–45)
HGB BLD-MCNC: 10.8 G/DL — LOW (ref 11.5–15.5)
IMM GRANULOCYTES NFR BLD AUTO: 0.3 % — SIGNIFICANT CHANGE UP (ref 0–0.9)
INR BLD: 1.14 RATIO — SIGNIFICANT CHANGE UP (ref 0.88–1.16)
LYMPHOCYTES # BLD AUTO: 1.93 K/UL — SIGNIFICANT CHANGE UP (ref 1–3.3)
LYMPHOCYTES # BLD AUTO: 25.5 % — SIGNIFICANT CHANGE UP (ref 13–44)
MAGNESIUM SERPL-MCNC: 1.9 MG/DL — SIGNIFICANT CHANGE UP (ref 1.6–2.6)
MCHC RBC-ENTMCNC: 24.3 PG — LOW (ref 27–34)
MCHC RBC-ENTMCNC: 30.3 GM/DL — LOW (ref 32–36)
MCV RBC AUTO: 80 FL — SIGNIFICANT CHANGE UP (ref 80–100)
MONOCYTES # BLD AUTO: 0.56 K/UL — SIGNIFICANT CHANGE UP (ref 0–0.9)
MONOCYTES NFR BLD AUTO: 7.4 % — SIGNIFICANT CHANGE UP (ref 2–14)
NEUTROPHILS # BLD AUTO: 4.72 K/UL — SIGNIFICANT CHANGE UP (ref 1.8–7.4)
NEUTROPHILS NFR BLD AUTO: 62.3 % — SIGNIFICANT CHANGE UP (ref 43–77)
NRBC # BLD: 0 /100 WBCS — SIGNIFICANT CHANGE UP (ref 0–0)
PLATELET # BLD AUTO: 381 K/UL — SIGNIFICANT CHANGE UP (ref 150–400)
POTASSIUM SERPL-MCNC: 4.3 MMOL/L — SIGNIFICANT CHANGE UP (ref 3.5–5.3)
POTASSIUM SERPL-SCNC: 4.3 MMOL/L — SIGNIFICANT CHANGE UP (ref 3.5–5.3)
PROT SERPL-MCNC: 6.9 G/DL — SIGNIFICANT CHANGE UP (ref 6–8.3)
PROTHROM AB SERPL-ACNC: 13.4 SEC — SIGNIFICANT CHANGE UP (ref 10.5–13.4)
RBC # BLD: 4.45 M/UL — SIGNIFICANT CHANGE UP (ref 3.8–5.2)
RBC # FLD: 23 % — HIGH (ref 10.3–14.5)
SODIUM SERPL-SCNC: 140 MMOL/L — SIGNIFICANT CHANGE UP (ref 135–145)
WBC # BLD: 7.57 K/UL — SIGNIFICANT CHANGE UP (ref 3.8–10.5)
WBC # FLD AUTO: 7.57 K/UL — SIGNIFICANT CHANGE UP (ref 3.8–10.5)

## 2023-05-17 PROCEDURE — 99215 OFFICE O/P EST HI 40 MIN: CPT

## 2023-05-17 NOTE — HISTORY OF PRESENT ILLNESS
[de-identified] : The patient was diagnosed with endometrial cancer in April 2023 at the age of 73. She presented to  ER with abdominal distention and ascites. On 4/7/23, she had a CT A/P which showed a very large amount of abdominal and pelvic ascites. On 4/8/23, she had a pelvic ultrasound which showed the endometrium is either severely thickened to 6 cm or is distended with complex material to 6 cm. There is a 2.5 x 1.6 x 1.7 cm shadowing echogenic focus with shadowing along the periphery of the endometrium, which may represent a calcified intramural versus submucosal fibroid, versus an endometrial lesion. On 4/10/23, she had a CT chest which shows no malignancy in the chest. On 4/10/23, she underwent an ECC D&C and pathology showed fragments of endometrioid adenocarcinoma. Benign endocervical tissue and epithelium. Endometrioid adenocarcinoma of the endometrium, FIGO grade 2, focally invasive. ESTROGEN RECEPTOR (ER): Positive, diffuse, strong   nuclear staining. PROGESTERONE RECEPTOR (PgR): Positive, diffuse strong nuclear staining. HER-2: Negative (0/1+ membrane staining). No loss of nuclear expression of MMR proteins (MLH1, MSH2, MSH6, and PMS2). On 4/14/23. she had a repeat CT A/P which showed interval decrease in the volume of ascites with scalloping of the right heart border suggesting malignant ascites. Peritoneal enhancement, nonspecific. 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 4/24/23 paracentesis not needed. She has a standing order of paracentesis q 2 weeks, next check is 5/19/23. Today she c/o vaginal bleeding since her D&C 4/10/23, using 4-5 pads per day. She denies pain, has arthritic pains in joints. She does report feeling her abdomen filling with ascites again. See full review of symptoms below.

## 2023-05-17 NOTE — REVIEW OF SYSTEMS
[Recent Change In Weight] : ~T recent weight change [Lower Ext Edema] : lower extremity edema [SOB on Exertion] : shortness of breath during exertion [Diarrhea: Grade 0] : Diarrhea: Grade 0 [Dysmenorrhea/Abn Vaginal Bleeding] : dysmenorrhea/abnormal vaginal bleeding [Negative] : Allergic/Immunologic [Chest Pain] : no chest pain [Shortness Of Breath] : no shortness of breath [FreeTextEntry2] : due to ascites  [FreeTextEntry5] : chronic left ankle [FreeTextEntry6] : onset with ascites

## 2023-05-17 NOTE — RESULTS/DATA
[FreeTextEntry1] : 4/14/23 CT A/P: Interval decrease in the volume of ascites with scalloping of the right heart border suggesting malignant ascites. peritoneal enhancement, nonspecific. Correlate for symptoms of peritonitis. Stable size of a central heterogeneous uterine mass.\par \par 4/10/23 Path: Endocervix (curettage): Fragments of endometrioid adenocarcinoma. Benign endocervical tissue and epithelium.\par Endometrium (curettage): Endometrioid adenocarcinoma of the endometrium, FIGO grade 2, focally invasive. Benign squamous epithelium.\par ESTROGEN RECEPTOR (ER): Positive, diffuse, strong   nuclear staining\par PROGESTERONE RECEPTOR (PgR): Positive, diffuse strong nuclear staining\par HER-2: Negative (0/1+ membrane staining)\par No loss of nuclear expression of MMR proteins (MLH1, MSH2, MSH6, and PMS2)\par \par 4/10/23 CT Chest: No malignancy in the chest.\par \par 4/10/23 Path: Ascitic fluid: Negative for malignant cells. \par \par 4/9/23 Abd Doppler: No evidence of portal vein thrombosis. The hepatic veins and hepatic artery are patent. Large volume of abdominal ascites.\par \par 4/8/23 US Pelvic: Limited pelvic ultrasound. The endometrium is incompletely assessed on this examination. The endometrium is either severely severely thickened to 6 cm or is distended with complex material to 6 cm.  There is a 2.5 x 1.6 x 1.7 cm shadowing echogenic focus with shadowing along the periphery of the endometrium, which may represent a calcified intramural versus submucosal fibroid, versus an endometrial lesion. The ovaries are not visualized. Large volume pelvic ascites. Pelvic MRI may be obtained for further evaluation.\par \par 4/7/23 CT A/P: Very large amount of abdominal and pelvic ascites. Uterine fibroids

## 2023-05-17 NOTE — PHYSICAL EXAM
[Restricted in physically strenuous activity but ambulatory and able to carry out work of a light or sedentary nature] : Status 1- Restricted in physically strenuous activity but ambulatory and able to carry out work of a light or sedentary nature, e.g., light house work, office work [Normal] : affect appropriate [de-identified] : wt fluctuations due to ascites  [de-identified] : 3 left forehead lesions.  b/l LE edema 2+ with erythema L>R

## 2023-05-17 NOTE — CONSULT LETTER
[Dear  ___] : Dear  [unfilled], [Consult Letter:] : I had the pleasure of evaluating your patient, [unfilled]. [Please see my note below.] : Please see my note below. [Consult Closing:] : Thank you very much for allowing me to participate in the care of this patient.  If you have any questions, please do not hesitate to contact me. [Sincerely,] : Sincerely, [FreeTextEntry3] : Dr. Rosa Isela Ashley

## 2023-05-18 LAB
CANCER AG125 SERPL-ACNC: 624 U/ML — HIGH
HAV IGM SER-ACNC: SIGNIFICANT CHANGE UP
HBV CORE IGM SER-ACNC: SIGNIFICANT CHANGE UP
HBV SURFACE AG SER-ACNC: SIGNIFICANT CHANGE UP
HCV AB S/CO SERPL IA: 0.11 S/CO — SIGNIFICANT CHANGE UP (ref 0–0.99)
HCV AB SERPL-IMP: SIGNIFICANT CHANGE UP

## 2023-05-20 LAB
CULTURE RESULTS: SIGNIFICANT CHANGE UP
SPECIMEN SOURCE: SIGNIFICANT CHANGE UP

## 2023-05-23 ENCOUNTER — APPOINTMENT (OUTPATIENT)
Dept: INTERNAL MEDICINE | Facility: CLINIC | Age: 73
End: 2023-05-23

## 2023-05-24 ENCOUNTER — APPOINTMENT (OUTPATIENT)
Dept: GYNECOLOGIC ONCOLOGY | Facility: CLINIC | Age: 73
End: 2023-05-24
Payer: MEDICARE

## 2023-05-24 VITALS
WEIGHT: 196 LBS | BODY MASS INDEX: 32.65 KG/M2 | DIASTOLIC BLOOD PRESSURE: 86 MMHG | TEMPERATURE: 97.7 F | HEIGHT: 65 IN | SYSTOLIC BLOOD PRESSURE: 122 MMHG

## 2023-05-24 DIAGNOSIS — L03.90 CELLULITIS, UNSPECIFIED: ICD-10-CM

## 2023-05-24 DIAGNOSIS — Z78.9 OTHER SPECIFIED HEALTH STATUS: ICD-10-CM

## 2023-05-24 PROCEDURE — 99214 OFFICE O/P EST MOD 30 MIN: CPT

## 2023-05-25 ENCOUNTER — NON-APPOINTMENT (OUTPATIENT)
Age: 73
End: 2023-05-25

## 2023-05-26 ENCOUNTER — NON-APPOINTMENT (OUTPATIENT)
Age: 73
End: 2023-05-26

## 2023-05-26 DIAGNOSIS — N93.9 ABNORMAL UTERINE AND VAGINAL BLEEDING, UNSPECIFIED: ICD-10-CM

## 2023-05-26 DIAGNOSIS — L98.9 DISORDER OF THE SKIN AND SUBCUTANEOUS TISSUE, UNSPECIFIED: ICD-10-CM

## 2023-05-26 DIAGNOSIS — C54.1 MALIGNANT NEOPLASM OF ENDOMETRIUM: ICD-10-CM

## 2023-06-01 ENCOUNTER — APPOINTMENT (OUTPATIENT)
Dept: INFUSION THERAPY | Facility: CLINIC | Age: 73
End: 2023-06-01

## 2023-06-01 ENCOUNTER — RESULT REVIEW (OUTPATIENT)
Age: 73
End: 2023-06-01

## 2023-06-01 ENCOUNTER — NON-APPOINTMENT (OUTPATIENT)
Age: 73
End: 2023-06-01

## 2023-06-01 ENCOUNTER — APPOINTMENT (OUTPATIENT)
Dept: HEMATOLOGY ONCOLOGY | Facility: CLINIC | Age: 73
End: 2023-06-01
Payer: MEDICARE

## 2023-06-01 LAB
ALBUMIN SERPL ELPH-MCNC: 3.6 G/DL — SIGNIFICANT CHANGE UP (ref 3.3–5)
ALP SERPL-CCNC: 96 U/L — SIGNIFICANT CHANGE UP (ref 40–120)
ALT FLD-CCNC: 5 U/L — LOW (ref 10–45)
ANION GAP SERPL CALC-SCNC: 13 MMOL/L — SIGNIFICANT CHANGE UP (ref 5–17)
AST SERPL-CCNC: 14 U/L — SIGNIFICANT CHANGE UP (ref 10–40)
BASOPHILS # BLD AUTO: 0.06 K/UL — SIGNIFICANT CHANGE UP (ref 0–0.2)
BASOPHILS NFR BLD AUTO: 0.8 % — SIGNIFICANT CHANGE UP (ref 0–2)
BILIRUB SERPL-MCNC: 0.4 MG/DL — SIGNIFICANT CHANGE UP (ref 0.2–1.2)
BUN SERPL-MCNC: 9 MG/DL — SIGNIFICANT CHANGE UP (ref 7–23)
CALCIUM SERPL-MCNC: 9.6 MG/DL — SIGNIFICANT CHANGE UP (ref 8.4–10.5)
CHLORIDE SERPL-SCNC: 103 MMOL/L — SIGNIFICANT CHANGE UP (ref 96–108)
CO2 SERPL-SCNC: 25 MMOL/L — SIGNIFICANT CHANGE UP (ref 22–31)
CREAT SERPL-MCNC: 0.6 MG/DL — SIGNIFICANT CHANGE UP (ref 0.5–1.3)
EGFR: 95 ML/MIN/1.73M2 — SIGNIFICANT CHANGE UP
EOSINOPHIL # BLD AUTO: 0.28 K/UL — SIGNIFICANT CHANGE UP (ref 0–0.5)
EOSINOPHIL NFR BLD AUTO: 3.8 % — SIGNIFICANT CHANGE UP (ref 0–6)
GLUCOSE SERPL-MCNC: 93 MG/DL — SIGNIFICANT CHANGE UP (ref 70–99)
HCT VFR BLD CALC: 35.2 % — SIGNIFICANT CHANGE UP (ref 34.5–45)
HGB BLD-MCNC: 10.7 G/DL — LOW (ref 11.5–15.5)
IMM GRANULOCYTES NFR BLD AUTO: 0.3 % — SIGNIFICANT CHANGE UP (ref 0–0.9)
LYMPHOCYTES # BLD AUTO: 1.8 K/UL — SIGNIFICANT CHANGE UP (ref 1–3.3)
LYMPHOCYTES # BLD AUTO: 24.3 % — SIGNIFICANT CHANGE UP (ref 13–44)
MAGNESIUM SERPL-MCNC: 2 MG/DL — SIGNIFICANT CHANGE UP (ref 1.6–2.6)
MCHC RBC-ENTMCNC: 24.7 PG — LOW (ref 27–34)
MCHC RBC-ENTMCNC: 30.4 GM/DL — LOW (ref 32–36)
MCV RBC AUTO: 81.1 FL — SIGNIFICANT CHANGE UP (ref 80–100)
MONOCYTES # BLD AUTO: 0.56 K/UL — SIGNIFICANT CHANGE UP (ref 0–0.9)
MONOCYTES NFR BLD AUTO: 7.5 % — SIGNIFICANT CHANGE UP (ref 2–14)
NEUTROPHILS # BLD AUTO: 4.7 K/UL — SIGNIFICANT CHANGE UP (ref 1.8–7.4)
NEUTROPHILS NFR BLD AUTO: 63.3 % — SIGNIFICANT CHANGE UP (ref 43–77)
NRBC # BLD: 0 /100 WBCS — SIGNIFICANT CHANGE UP (ref 0–0)
PLATELET # BLD AUTO: 368 K/UL — SIGNIFICANT CHANGE UP (ref 150–400)
POTASSIUM SERPL-MCNC: 4.1 MMOL/L — SIGNIFICANT CHANGE UP (ref 3.5–5.3)
POTASSIUM SERPL-SCNC: 4.1 MMOL/L — SIGNIFICANT CHANGE UP (ref 3.5–5.3)
PROT SERPL-MCNC: 7.1 G/DL — SIGNIFICANT CHANGE UP (ref 6–8.3)
RBC # BLD: 4.34 M/UL — SIGNIFICANT CHANGE UP (ref 3.8–5.2)
RBC # FLD: 20.7 % — HIGH (ref 10.3–14.5)
SODIUM SERPL-SCNC: 142 MMOL/L — SIGNIFICANT CHANGE UP (ref 135–145)
WBC # BLD: 7.42 K/UL — SIGNIFICANT CHANGE UP (ref 3.8–10.5)
WBC # FLD AUTO: 7.42 K/UL — SIGNIFICANT CHANGE UP (ref 3.8–10.5)

## 2023-06-01 PROCEDURE — 99215 OFFICE O/P EST HI 40 MIN: CPT

## 2023-06-02 DIAGNOSIS — R11.2 NAUSEA WITH VOMITING, UNSPECIFIED: ICD-10-CM

## 2023-06-02 DIAGNOSIS — Z51.11 ENCOUNTER FOR ANTINEOPLASTIC CHEMOTHERAPY: ICD-10-CM

## 2023-06-03 LAB
CULTURE RESULTS: SIGNIFICANT CHANGE UP
SPECIMEN SOURCE: SIGNIFICANT CHANGE UP

## 2023-06-15 NOTE — PHYSICAL EXAM
[Restricted in physically strenuous activity but ambulatory and able to carry out work of a light or sedentary nature] : Status 1- Restricted in physically strenuous activity but ambulatory and able to carry out work of a light or sedentary nature, e.g., light house work, office work [Normal] : affect appropriate [de-identified] : wt fluctuations due to ascites  [de-identified] : 3 left forehead lesions.  b/l LE edema 2+ with erythema L>R; facial flushing

## 2023-06-15 NOTE — HISTORY OF PRESENT ILLNESS
[de-identified] : The patient was diagnosed with endometrial cancer in April 2023 at the age of 73. She presented to  ER with abdominal distention and ascites. On 4/7/23, she had a CT A/P which showed a very large amount of abdominal and pelvic ascites. On 4/8/23, she had a pelvic ultrasound which showed the endometrium is either severely thickened to 6 cm or is distended with complex material to 6 cm. There is a 2.5 x 1.6 x 1.7 cm shadowing echogenic focus with shadowing along the periphery of the endometrium, which may represent a calcified intramural versus submucosal fibroid, versus an endometrial lesion. On 4/10/23, she had a CT chest which shows no malignancy in the chest. On 4/10/23, she underwent an ECC D&C and pathology showed fragments of endometrioid adenocarcinoma. Benign endocervical tissue and epithelium. Endometrioid adenocarcinoma of the endometrium, FIGO grade 2, focally invasive. ESTROGEN RECEPTOR (ER): Positive, diffuse, strong   nuclear staining. PROGESTERONE RECEPTOR (PgR): Positive, diffuse strong nuclear staining. HER-2: Negative (0/1+ membrane staining). No loss of nuclear expression of MMR proteins (MLH1, MSH2, MSH6, and PMS2). On 4/14/23. she had a repeat CT A/P which showed interval decrease in the volume of ascites with scalloping of the right heart border suggesting malignant ascites. Peritoneal enhancement, nonspecific. 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 Infusion stopped, patient then reported feeling warm and felt that her breathing was shallow. Benadryl 50mg IVp given, followed by methylprednisolone 125mg IVP- O2 sat on room air 86%- 2l Nc applied, VS- at 1215 - 151/78, HR-85. At 1219- vs- 154/79, HR-83, O2 sat-95 %. Facial flushing resolved. At 1228- all symptoms resolved - VS- 142/72, HR-78, Pulse on 95%.  At 1254-VS - 135/78, HR-68, pulse ox 100% on RA. Taxol restarted at 1257 and patient tolerated remainder of infusion. Tolerated therapy without any other adverse events noted. \par \par Prior hx:\par While admitted she underwent 3 paracentesis: 4/10 - 13,800CC (incomplete drainage due to high volume, completed on next session);  4/13 -  5,300CC;  4/21 - 1,900CC; all negative for malignant cells. 4/24/23 paracentesis not needed. She has a standing order of paracentesis q 2 weeks, next check is 5/19/23. Today she c/o vaginal bleeding since her D&C 4/10/23, using 4-5 pads per day. She denies pain, has arthritic pains in joints. She does report feeling her abdomen filling with ascites again. See full review of symptoms below.

## 2023-06-15 NOTE — REVIEW OF SYSTEMS
[Recent Change In Weight] : ~T recent weight change [Lower Ext Edema] : lower extremity edema [SOB on Exertion] : shortness of breath during exertion [Diarrhea: Grade 0] : Diarrhea: Grade 0 [Dysmenorrhea/Abn Vaginal Bleeding] : dysmenorrhea/abnormal vaginal bleeding [Negative] : Allergic/Immunologic [Shortness Of Breath] : shortness of breath [Chest Pain] : no chest pain [FreeTextEntry2] : due to ascites  [FreeTextEntry5] : chronic left ankle [de-identified] : facial flushing

## 2023-06-20 ENCOUNTER — APPOINTMENT (OUTPATIENT)
Dept: DERMATOLOGY | Facility: CLINIC | Age: 73
End: 2023-06-20

## 2023-06-20 NOTE — DISCUSSION/SUMMARY
[Reviewed Clinical Lab Test(s)] : Results of clinical tests were reviewed. [Reviewed Radiology Report(s)] : Radiology reports were reviewed. [FreeTextEntry1] : \par Although the ascites cytology is negative, clinical picture compatible with advanced stage endometrial cancer.  Reiterated my recommendation, as discussed while Anuja was inpatient at [facility], for neoadjuvant chemotherapy followed by interval assessment of surgical candidacy.  Plan for C/T/Pembro (cisplatin may replace carbo due to national shortage) with Dr. Ashley.  RTO after C3, return sooner p.r.n.

## 2023-06-20 NOTE — PHYSICAL EXAM
[Chaperone Present] : A chaperone was present in the examining room during all aspects of the physical examination [Normal] : Adnexa(ae): Normal [Abnormal] : Parametria: Abnormal [FreeTextEntry1] : Name of chaperone: Marion Sahni PA-C. [de-identified] : bilateral LE edema [de-identified] : moderate distention; ascites; 1-2cm nodule at base of umbilicus [de-identified] : bilateral erythema noted to anterior LE [de-identified] : mass effect in upper endocervix with deviation to left [de-identified] : shortening of left parametria [de-identified] : dilation of upper endocervix with firm tumor; deviation to left with shortening of parametria [Ambulatory and capable of all self care but unable to carry out any work activities] : Status 2- Ambulatory and capable of all self care but unable to carry out any work activities. Up and about more than 50% of waking hours

## 2023-06-20 NOTE — HISTORY OF PRESENT ILLNESS
[FreeTextEntry1] : 74 yo nulligravida LMP at age 60 presents to office for post operative examination and to discuss new diagnosis of endometrial cancer. She initially presented to  ED in the beginning of April with complaint of abdominal distention and ascites. She also admitted to intermittent postmenopausal bleeding over the last couple of months, possibly up to one year. On 4/7/23, she had a CT A/P which showed a very large amount of abdominal and pelvic ascites. On 4/8/23, she had a pelvic ultrasound which showed the endometrium is either severely thickened to 6 cm or is distended with complex material to 6 cm. There is a 2.5 x 1.6 x 1.7 cm shadowing echogenic focus with shadowing along the periphery of the endometrium, which may represent a calcified intramural versus submucosal fibroid, versus an endometrial lesion. On 4/10/23, she had a CT chest which shows no malignancy in the chest. Patient then underwent D&C, hysteroscopy on 4/10/23. Pathology revealed endometrium with endometrioid adenocarcinoma, FIGO grade 2, focally invasive, ER/[place](+), HER-2 negative, MMR intact; endocervix with fragments of endometrioid adenocarcinoma and benign endocervical tissue. She is also s/p paracentesis on 5/5/23 with 1300cc's ascites drained. On 4/14/23. she had a repeat CT A/P which showed interval decrease in the volume of ascites with scalloping of the right heart border suggesting malignant ascites. Peritoneal enhancement, nonspecific. Stable size of a central heterogeneous uterine mass. She saw Dr. Ashley on 5/17/23, with the plan for chemotherapy followed by reassessment to determine candidacy for interval surgery.\par \par Last pap: Years ago. Denies history of abnormal pap smears.\par Last mammo: Years ago- WNL, as per patient\par Last colo: Never\par Last DEXA: Never

## 2023-06-20 NOTE — CHIEF COMPLAINT
[Other: _____] : [unfilled] [FreeTextEntry1] : [facility]\par [address]\[place] [address]\par [phone]\par \par Endometrioid adenocarcinoma

## 2023-06-22 ENCOUNTER — APPOINTMENT (OUTPATIENT)
Dept: HEMATOLOGY ONCOLOGY | Facility: CLINIC | Age: 73
End: 2023-06-22
Payer: MEDICARE

## 2023-06-22 ENCOUNTER — APPOINTMENT (OUTPATIENT)
Dept: INFUSION THERAPY | Facility: CLINIC | Age: 73
End: 2023-06-22
Payer: MEDICARE

## 2023-06-22 ENCOUNTER — RESULT REVIEW (OUTPATIENT)
Age: 73
End: 2023-06-22

## 2023-06-22 VITALS
WEIGHT: 208 LBS | BODY MASS INDEX: 34.66 KG/M2 | RESPIRATION RATE: 18 BRPM | DIASTOLIC BLOOD PRESSURE: 74 MMHG | HEIGHT: 65 IN | HEART RATE: 76 BPM | SYSTOLIC BLOOD PRESSURE: 171 MMHG | TEMPERATURE: 97.8 F | OXYGEN SATURATION: 99 %

## 2023-06-22 LAB
BASOPHILS # BLD AUTO: 0.08 K/UL — SIGNIFICANT CHANGE UP (ref 0–0.2)
BASOPHILS NFR BLD AUTO: 1.3 % — SIGNIFICANT CHANGE UP (ref 0–2)
EOSINOPHIL # BLD AUTO: 0.2 K/UL — SIGNIFICANT CHANGE UP (ref 0–0.5)
EOSINOPHIL NFR BLD AUTO: 3.2 % — SIGNIFICANT CHANGE UP (ref 0–6)
HCT VFR BLD CALC: 35.2 % — SIGNIFICANT CHANGE UP (ref 34.5–45)
HGB BLD-MCNC: 10.9 G/DL — LOW (ref 11.5–15.5)
IMM GRANULOCYTES NFR BLD AUTO: 0.5 % — SIGNIFICANT CHANGE UP (ref 0–0.9)
LYMPHOCYTES # BLD AUTO: 1.67 K/UL — SIGNIFICANT CHANGE UP (ref 1–3.3)
LYMPHOCYTES # BLD AUTO: 27.1 % — SIGNIFICANT CHANGE UP (ref 13–44)
MCHC RBC-ENTMCNC: 25.7 PG — LOW (ref 27–34)
MCHC RBC-ENTMCNC: 31 GM/DL — LOW (ref 32–36)
MCV RBC AUTO: 83 FL — SIGNIFICANT CHANGE UP (ref 80–100)
MONOCYTES # BLD AUTO: 0.6 K/UL — SIGNIFICANT CHANGE UP (ref 0–0.9)
MONOCYTES NFR BLD AUTO: 9.7 % — SIGNIFICANT CHANGE UP (ref 2–14)
NEUTROPHILS # BLD AUTO: 3.59 K/UL — SIGNIFICANT CHANGE UP (ref 1.8–7.4)
NEUTROPHILS NFR BLD AUTO: 58.2 % — SIGNIFICANT CHANGE UP (ref 43–77)
NRBC # BLD: 0 /100 WBCS — SIGNIFICANT CHANGE UP (ref 0–0)
PLATELET # BLD AUTO: 364 K/UL — SIGNIFICANT CHANGE UP (ref 150–400)
RBC # BLD: 4.24 M/UL — SIGNIFICANT CHANGE UP (ref 3.8–5.2)
RBC # FLD: 17.7 % — HIGH (ref 10.3–14.5)
WBC # BLD: 6.17 K/UL — SIGNIFICANT CHANGE UP (ref 3.8–10.5)
WBC # FLD AUTO: 6.17 K/UL — SIGNIFICANT CHANGE UP (ref 3.8–10.5)

## 2023-06-22 PROCEDURE — 99214 OFFICE O/P EST MOD 30 MIN: CPT

## 2023-06-23 DIAGNOSIS — E86.0 DEHYDRATION: ICD-10-CM

## 2023-06-23 LAB
ALBUMIN SERPL ELPH-MCNC: 3.3 G/DL — SIGNIFICANT CHANGE UP (ref 3.3–5)
ALP SERPL-CCNC: 91 U/L — SIGNIFICANT CHANGE UP (ref 40–120)
ALT FLD-CCNC: 6 U/L — LOW (ref 10–45)
ANION GAP SERPL CALC-SCNC: 10 MMOL/L — SIGNIFICANT CHANGE UP (ref 5–17)
AST SERPL-CCNC: 14 U/L — SIGNIFICANT CHANGE UP (ref 10–40)
BILIRUB SERPL-MCNC: 0.2 MG/DL — SIGNIFICANT CHANGE UP (ref 0.2–1.2)
BUN SERPL-MCNC: 18 MG/DL — SIGNIFICANT CHANGE UP (ref 7–23)
CALCIUM SERPL-MCNC: 9 MG/DL — SIGNIFICANT CHANGE UP (ref 8.4–10.5)
CHLORIDE SERPL-SCNC: 105 MMOL/L — SIGNIFICANT CHANGE UP (ref 96–108)
CO2 SERPL-SCNC: 29 MMOL/L — SIGNIFICANT CHANGE UP (ref 22–31)
CREAT SERPL-MCNC: 1.2 MG/DL — SIGNIFICANT CHANGE UP (ref 0.5–1.3)
EGFR: 48 ML/MIN/1.73M2 — LOW
GLUCOSE SERPL-MCNC: 103 MG/DL — HIGH (ref 70–99)
MAGNESIUM SERPL-MCNC: 1.9 MG/DL — SIGNIFICANT CHANGE UP (ref 1.6–2.6)
POTASSIUM SERPL-MCNC: 3.9 MMOL/L — SIGNIFICANT CHANGE UP (ref 3.5–5.3)
POTASSIUM SERPL-SCNC: 3.9 MMOL/L — SIGNIFICANT CHANGE UP (ref 3.5–5.3)
PROT SERPL-MCNC: 6.7 G/DL — SIGNIFICANT CHANGE UP (ref 6–8.3)
SODIUM SERPL-SCNC: 144 MMOL/L — SIGNIFICANT CHANGE UP (ref 135–145)

## 2023-07-03 NOTE — HISTORY OF PRESENT ILLNESS
[de-identified] : The patient was diagnosed with endometrial cancer in April 2023 at the age of 73. She presented to  ER with abdominal distention and ascites. On 4/7/23, she had a CT A/P which showed a very large amount of abdominal and pelvic ascites. On 4/8/23, she had a pelvic ultrasound which showed the endometrium is either severely thickened to 6 cm or is distended with complex material to 6 cm. There is a 2.5 x 1.6 x 1.7 cm shadowing echogenic focus with shadowing along the periphery of the endometrium, which may represent a calcified intramural versus submucosal fibroid, versus an endometrial lesion. On 4/10/23, she had a CT chest which shows no malignancy in the chest. On 4/10/23, she underwent an ECC D&C and pathology showed fragments of endometrioid adenocarcinoma. Benign endocervical tissue and epithelium. Endometrioid adenocarcinoma of the endometrium, FIGO grade 2, focally invasive. ESTROGEN RECEPTOR (ER): Positive, diffuse, strong   nuclear staining. PROGESTERONE RECEPTOR (PgR): Positive, diffuse strong nuclear staining. HER-2: Negative (0/1+ membrane staining). No loss of nuclear expression of MMR proteins (MLH1, MSH2, MSH6, and PMS2). On 4/14/23. she had a repeat CT A/P which showed interval decrease in the volume of ascites with scalloping of the right heart border suggesting malignant ascites. Peritoneal enhancement, nonspecific. 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 Today she is C2 D1 of 6 planned Cisplatin / Taxol cycles. She has noted hair loss. She denies fatigue, nail or skin changes; neuropathy; mouth sores, N/V; ear ringing or C/D. \par \par Prior hx:\par While admitted she underwent 3 paracentesis: 4/10 - 13,800CC (incomplete drainage due to high volume, completed on next session);  4/13 -  5,300CC;  4/21 - 1,900CC; all negative for malignant cells. 4/24/23 paracentesis not needed. She has a standing order of paracentesis q 2 weeks, next check is 5/19/23. Today she c/o vaginal bleeding since her D&C 4/10/23, using 4-5 pads per day.

## 2023-07-03 NOTE — REVIEW OF SYSTEMS
[Recent Change In Weight] : ~T recent weight change [Lower Ext Edema] : lower extremity edema [Diarrhea: Grade 0] : Diarrhea: Grade 0 [Negative] : Genitourinary [Chest Pain] : no chest pain [FreeTextEntry2] : wt gain  [FreeTextEntry5] : chronic left ankle [de-identified] : facial flushing

## 2023-07-03 NOTE — PHYSICAL EXAM
[Restricted in physically strenuous activity but ambulatory and able to carry out work of a light or sedentary nature] : Status 1- Restricted in physically strenuous activity but ambulatory and able to carry out work of a light or sedentary nature, e.g., light house work, office work [Normal] : affect appropriate [de-identified] : wt gain; hair loss

## 2023-07-07 ENCOUNTER — OUTPATIENT (OUTPATIENT)
Dept: OUTPATIENT SERVICES | Facility: HOSPITAL | Age: 73
LOS: 1 days | Discharge: HOME | End: 2023-07-07

## 2023-07-07 DIAGNOSIS — C54.1 MALIGNANT NEOPLASM OF ENDOMETRIUM: ICD-10-CM

## 2023-07-13 ENCOUNTER — RESULT REVIEW (OUTPATIENT)
Age: 73
End: 2023-07-13

## 2023-07-13 ENCOUNTER — APPOINTMENT (OUTPATIENT)
Dept: HEMATOLOGY ONCOLOGY | Facility: CLINIC | Age: 73
End: 2023-07-13
Payer: MEDICARE

## 2023-07-13 ENCOUNTER — APPOINTMENT (OUTPATIENT)
Dept: INFUSION THERAPY | Facility: CLINIC | Age: 73
End: 2023-07-13
Payer: MEDICARE

## 2023-07-13 VITALS
BODY MASS INDEX: 35.5 KG/M2 | RESPIRATION RATE: 18 BRPM | WEIGHT: 213.06 LBS | SYSTOLIC BLOOD PRESSURE: 169 MMHG | TEMPERATURE: 98 F | OXYGEN SATURATION: 99 % | HEIGHT: 65 IN | HEART RATE: 71 BPM | DIASTOLIC BLOOD PRESSURE: 73 MMHG

## 2023-07-13 DIAGNOSIS — L98.9 DISORDER OF THE SKIN AND SUBCUTANEOUS TISSUE, UNSPECIFIED: ICD-10-CM

## 2023-07-13 DIAGNOSIS — N93.9 ABNORMAL UTERINE AND VAGINAL BLEEDING, UNSPECIFIED: ICD-10-CM

## 2023-07-13 DIAGNOSIS — C55 MALIGNANT NEOPLASM OF UTERUS, PART UNSPECIFIED: ICD-10-CM

## 2023-07-13 LAB
ALBUMIN SERPL ELPH-MCNC: 3.6 G/DL — SIGNIFICANT CHANGE UP (ref 3.3–5)
ALP SERPL-CCNC: 99 U/L — SIGNIFICANT CHANGE UP (ref 40–120)
ALT FLD-CCNC: 8 U/L — LOW (ref 10–45)
ANION GAP SERPL CALC-SCNC: 10 MMOL/L — SIGNIFICANT CHANGE UP (ref 5–17)
AST SERPL-CCNC: 15 U/L — SIGNIFICANT CHANGE UP (ref 10–40)
BASOPHILS # BLD AUTO: 0.07 K/UL — SIGNIFICANT CHANGE UP (ref 0–0.2)
BASOPHILS NFR BLD AUTO: 1.1 % — SIGNIFICANT CHANGE UP (ref 0–2)
BILIRUB SERPL-MCNC: 0.2 MG/DL — SIGNIFICANT CHANGE UP (ref 0.2–1.2)
BUN SERPL-MCNC: 14 MG/DL — SIGNIFICANT CHANGE UP (ref 7–23)
CALCIUM SERPL-MCNC: 9.1 MG/DL — SIGNIFICANT CHANGE UP (ref 8.4–10.5)
CHLORIDE SERPL-SCNC: 105 MMOL/L — SIGNIFICANT CHANGE UP (ref 96–108)
CO2 SERPL-SCNC: 27 MMOL/L — SIGNIFICANT CHANGE UP (ref 22–31)
CREAT SERPL-MCNC: 0.82 MG/DL — SIGNIFICANT CHANGE UP (ref 0.5–1.3)
EGFR: 75 ML/MIN/1.73M2 — SIGNIFICANT CHANGE UP
EOSINOPHIL # BLD AUTO: 0.21 K/UL — SIGNIFICANT CHANGE UP (ref 0–0.5)
EOSINOPHIL NFR BLD AUTO: 3.4 % — SIGNIFICANT CHANGE UP (ref 0–6)
GLUCOSE SERPL-MCNC: 92 MG/DL — SIGNIFICANT CHANGE UP (ref 70–99)
HCT VFR BLD CALC: 28.6 % — LOW (ref 34.5–45)
HGB BLD-MCNC: 9.1 G/DL — LOW (ref 11.5–15.5)
IMM GRANULOCYTES NFR BLD AUTO: 0.3 % — SIGNIFICANT CHANGE UP (ref 0–0.9)
LYMPHOCYTES # BLD AUTO: 1.62 K/UL — SIGNIFICANT CHANGE UP (ref 1–3.3)
LYMPHOCYTES # BLD AUTO: 26.4 % — SIGNIFICANT CHANGE UP (ref 13–44)
MAGNESIUM SERPL-MCNC: 1.7 MG/DL — SIGNIFICANT CHANGE UP (ref 1.6–2.6)
MCHC RBC-ENTMCNC: 25.9 PG — LOW (ref 27–34)
MCHC RBC-ENTMCNC: 31.8 GM/DL — LOW (ref 32–36)
MCV RBC AUTO: 81.3 FL — SIGNIFICANT CHANGE UP (ref 80–100)
MONOCYTES # BLD AUTO: 0.75 K/UL — SIGNIFICANT CHANGE UP (ref 0–0.9)
MONOCYTES NFR BLD AUTO: 12.2 % — SIGNIFICANT CHANGE UP (ref 2–14)
NEUTROPHILS # BLD AUTO: 3.46 K/UL — SIGNIFICANT CHANGE UP (ref 1.8–7.4)
NEUTROPHILS NFR BLD AUTO: 56.6 % — SIGNIFICANT CHANGE UP (ref 43–77)
NRBC # BLD: 0 /100 WBCS — SIGNIFICANT CHANGE UP (ref 0–0)
PLATELET # BLD AUTO: 317 K/UL — SIGNIFICANT CHANGE UP (ref 150–400)
POTASSIUM SERPL-MCNC: 3.8 MMOL/L — SIGNIFICANT CHANGE UP (ref 3.5–5.3)
POTASSIUM SERPL-SCNC: 3.8 MMOL/L — SIGNIFICANT CHANGE UP (ref 3.5–5.3)
PROT SERPL-MCNC: 6.6 G/DL — SIGNIFICANT CHANGE UP (ref 6–8.3)
RBC # BLD: 3.52 M/UL — LOW (ref 3.8–5.2)
RBC # FLD: 15.9 % — HIGH (ref 10.3–14.5)
SODIUM SERPL-SCNC: 142 MMOL/L — SIGNIFICANT CHANGE UP (ref 135–145)
T3FREE SERPL-MCNC: 2.85 PG/ML — SIGNIFICANT CHANGE UP (ref 2–4.4)
T4 FREE SERPL-MCNC: 1.2 NG/DL — SIGNIFICANT CHANGE UP (ref 0.9–1.8)
TSH SERPL-MCNC: 3.06 UIU/ML — SIGNIFICANT CHANGE UP (ref 0.27–4.2)
WBC # BLD: 6.13 K/UL — SIGNIFICANT CHANGE UP (ref 3.8–10.5)
WBC # FLD AUTO: 6.13 K/UL — SIGNIFICANT CHANGE UP (ref 3.8–10.5)

## 2023-07-13 PROCEDURE — 99215 OFFICE O/P EST HI 40 MIN: CPT

## 2023-07-13 NOTE — PHYSICAL EXAM
[Restricted in physically strenuous activity but ambulatory and able to carry out work of a light or sedentary nature] : Status 1- Restricted in physically strenuous activity but ambulatory and able to carry out work of a light or sedentary nature, e.g., light house work, office work [Normal] : affect appropriate [de-identified] : wt gain; hair loss

## 2023-07-13 NOTE — REVIEW OF SYSTEMS
[Recent Change In Weight] : ~T recent weight change [Lower Ext Edema] : lower extremity edema [Diarrhea: Grade 0] : Diarrhea: Grade 0 [Negative] : Allergic/Immunologic [Chest Pain] : no chest pain [FreeTextEntry2] : wt gain  [FreeTextEntry5] : chronic left ankle [de-identified] : facial flushing

## 2023-07-13 NOTE — HISTORY OF PRESENT ILLNESS
[de-identified] : The patient was diagnosed with endometrial cancer in April 2023 at the age of 73. She presented to  ER with abdominal distention and ascites. On 4/7/23, she had a CT A/P which showed a very large amount of abdominal and pelvic ascites. On 4/8/23, she had a pelvic ultrasound which showed the endometrium is either severely thickened to 6 cm or is distended with complex material to 6 cm. There is a 2.5 x 1.6 x 1.7 cm shadowing echogenic focus with shadowing along the periphery of the endometrium, which may represent a calcified intramural versus submucosal fibroid, versus an endometrial lesion. On 4/10/23, she had a CT chest which shows no malignancy in the chest. On 4/10/23, she underwent an ECC D&C and pathology showed fragments of endometrioid adenocarcinoma. Benign endocervical tissue and epithelium. Endometrioid adenocarcinoma of the endometrium, FIGO grade 2, focally invasive. ESTROGEN RECEPTOR (ER): Positive, diffuse, strong   nuclear staining. PROGESTERONE RECEPTOR (PgR): Positive, diffuse strong nuclear staining. HER-2: Negative (0/1+ membrane staining). No loss of nuclear expression of MMR proteins (MLH1, MSH2, MSH6, and PMS2). On 4/14/23. she had a repeat CT A/P which showed interval decrease in the volume of ascites with scalloping of the right heart border suggesting malignant ascites. Peritoneal enhancement, nonspecific. 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 Today she is C3 D1 of 6 planned Cisplatin / Taxol cycles. She has noted hair loss. She denies fatigue, nail or skin changes; neuropathy; mouth sores, N/V; ear ringing or C/D. \par \par Prior hx:\par While admitted she underwent 3 paracentesis: 4/10 - 13,800CC (incomplete drainage due to high volume, completed on next session);  4/13 -  5,300CC;  4/21 - 1,900CC; all negative for malignant cells. 4/24/23 paracentesis not needed. She has a standing order of paracentesis q 2 weeks, next check is 5/19/23. Today she c/o vaginal bleeding since her D&C 4/10/23, using 4-5 pads per day.

## 2023-07-14 DIAGNOSIS — Z51.11 ENCOUNTER FOR ANTINEOPLASTIC CHEMOTHERAPY: ICD-10-CM

## 2023-07-14 DIAGNOSIS — E86.0 DEHYDRATION: ICD-10-CM

## 2023-07-14 DIAGNOSIS — R11.2 NAUSEA WITH VOMITING, UNSPECIFIED: ICD-10-CM

## 2023-07-14 LAB — CANCER AG125 SERPL-ACNC: 34 U/ML — SIGNIFICANT CHANGE UP

## 2023-07-17 ENCOUNTER — RESULT REVIEW (OUTPATIENT)
Age: 73
End: 2023-07-17

## 2023-07-20 ENCOUNTER — OUTPATIENT (OUTPATIENT)
Dept: OUTPATIENT SERVICES | Facility: HOSPITAL | Age: 73
LOS: 1 days | End: 2023-07-20
Payer: COMMERCIAL

## 2023-07-20 ENCOUNTER — APPOINTMENT (OUTPATIENT)
Dept: CT IMAGING | Facility: CLINIC | Age: 73
End: 2023-07-20
Payer: COMMERCIAL

## 2023-07-20 DIAGNOSIS — C54.1 MALIGNANT NEOPLASM OF ENDOMETRIUM: ICD-10-CM

## 2023-07-20 PROCEDURE — 71260 CT THORAX DX C+: CPT

## 2023-07-20 PROCEDURE — 74177 CT ABD & PELVIS W/CONTRAST: CPT | Mod: 26

## 2023-07-20 PROCEDURE — 74177 CT ABD & PELVIS W/CONTRAST: CPT

## 2023-07-20 PROCEDURE — 71260 CT THORAX DX C+: CPT | Mod: 26

## 2023-07-27 ENCOUNTER — APPOINTMENT (OUTPATIENT)
Dept: GYNECOLOGIC ONCOLOGY | Facility: CLINIC | Age: 73
End: 2023-07-27
Payer: MEDICARE

## 2023-07-27 PROCEDURE — 99442: CPT

## 2023-08-01 ENCOUNTER — APPOINTMENT (OUTPATIENT)
Dept: INTERNAL MEDICINE | Facility: CLINIC | Age: 73
End: 2023-08-01
Payer: MEDICARE

## 2023-08-01 VITALS
HEART RATE: 64 BPM | BODY MASS INDEX: 35.28 KG/M2 | DIASTOLIC BLOOD PRESSURE: 78 MMHG | SYSTOLIC BLOOD PRESSURE: 178 MMHG | TEMPERATURE: 97.4 F | OXYGEN SATURATION: 99 % | WEIGHT: 212 LBS

## 2023-08-01 DIAGNOSIS — E66.9 OBESITY, UNSPECIFIED: ICD-10-CM

## 2023-08-01 PROCEDURE — 99204 OFFICE O/P NEW MOD 45 MIN: CPT

## 2023-08-01 RX ORDER — DICLOXACILLIN SODIUM 250 MG/1
250 CAPSULE ORAL 4 TIMES DAILY
Qty: 28 | Refills: 0 | Status: DISCONTINUED | COMMUNITY
Start: 2023-05-24 | End: 2023-08-01

## 2023-08-01 NOTE — HEALTH RISK ASSESSMENT
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 It is unknown if this medication is excreted in breast milk when applied topically.

## 2023-08-01 NOTE — HISTORY OF PRESENT ILLNESS
[FreeTextEntry1] : Patient here to establish care, also EDEMA in both legs and ankles. [de-identified] : LILIAN CASTILLO is a 73 year F who presents today to establish with a new PCP.  Previously, she did not have a PCP-but was advised to establish with a provider by her oncologist. She describes herself as being healthy until she was diagnosed with endometrial cancer in May of this year after presenting with ascites, and anorexia, She says had been having vaginal bleeding intermittantly for several years.  She is now on chemo with plans for debulking surgery in the future. She reports uncomfortable edema of the lower extremities since she was diagnosed with the cancer. In the hospital, she had been given Lasix to help with the fluid accumulation but is now out of the medication. She is requesting refills on the diuretic.

## 2023-08-03 ENCOUNTER — RESULT REVIEW (OUTPATIENT)
Age: 73
End: 2023-08-03

## 2023-08-03 ENCOUNTER — APPOINTMENT (OUTPATIENT)
Dept: INFUSION THERAPY | Facility: CLINIC | Age: 73
End: 2023-08-03

## 2023-08-03 VITALS
DIASTOLIC BLOOD PRESSURE: 72 MMHG | SYSTOLIC BLOOD PRESSURE: 173 MMHG | OXYGEN SATURATION: 99 % | TEMPERATURE: 98.4 F | BODY MASS INDEX: 34.79 KG/M2 | WEIGHT: 209.06 LBS | HEART RATE: 67 BPM | RESPIRATION RATE: 17 BRPM

## 2023-08-03 LAB
ALBUMIN SERPL ELPH-MCNC: 3.7 G/DL — SIGNIFICANT CHANGE UP (ref 3.3–5)
ALP SERPL-CCNC: 113 U/L — SIGNIFICANT CHANGE UP (ref 40–120)
ALT FLD-CCNC: 7 U/L — LOW (ref 10–45)
ANION GAP SERPL CALC-SCNC: 14 MMOL/L — SIGNIFICANT CHANGE UP (ref 5–17)
AST SERPL-CCNC: 17 U/L — SIGNIFICANT CHANGE UP (ref 10–40)
BASOPHILS # BLD AUTO: 0.07 K/UL — SIGNIFICANT CHANGE UP (ref 0–0.2)
BASOPHILS NFR BLD AUTO: 1.1 % — SIGNIFICANT CHANGE UP (ref 0–2)
BILIRUB SERPL-MCNC: 0.4 MG/DL — SIGNIFICANT CHANGE UP (ref 0.2–1.2)
BUN SERPL-MCNC: 11 MG/DL — SIGNIFICANT CHANGE UP (ref 7–23)
CALCIUM SERPL-MCNC: 9.1 MG/DL — SIGNIFICANT CHANGE UP (ref 8.4–10.5)
CHLORIDE SERPL-SCNC: 100 MMOL/L — SIGNIFICANT CHANGE UP (ref 96–108)
CO2 SERPL-SCNC: 29 MMOL/L — SIGNIFICANT CHANGE UP (ref 22–31)
CREAT SERPL-MCNC: 0.78 MG/DL — SIGNIFICANT CHANGE UP (ref 0.5–1.3)
EGFR: 80 ML/MIN/1.73M2 — SIGNIFICANT CHANGE UP
EOSINOPHIL # BLD AUTO: 0.17 K/UL — SIGNIFICANT CHANGE UP (ref 0–0.5)
EOSINOPHIL NFR BLD AUTO: 2.6 % — SIGNIFICANT CHANGE UP (ref 0–6)
GLUCOSE SERPL-MCNC: 110 MG/DL — HIGH (ref 70–99)
HCT VFR BLD CALC: 29.8 % — LOW (ref 34.5–45)
HGB BLD-MCNC: 9.5 G/DL — LOW (ref 11.5–15.5)
IMM GRANULOCYTES NFR BLD AUTO: 0.2 % — SIGNIFICANT CHANGE UP (ref 0–0.9)
LYMPHOCYTES # BLD AUTO: 1.83 K/UL — SIGNIFICANT CHANGE UP (ref 1–3.3)
LYMPHOCYTES # BLD AUTO: 27.7 % — SIGNIFICANT CHANGE UP (ref 13–44)
MAGNESIUM SERPL-MCNC: 1.4 MG/DL — LOW (ref 1.6–2.6)
MCHC RBC-ENTMCNC: 26.1 PG — LOW (ref 27–34)
MCHC RBC-ENTMCNC: 31.9 GM/DL — LOW (ref 32–36)
MCV RBC AUTO: 81.9 FL — SIGNIFICANT CHANGE UP (ref 80–100)
MONOCYTES # BLD AUTO: 0.75 K/UL — SIGNIFICANT CHANGE UP (ref 0–0.9)
MONOCYTES NFR BLD AUTO: 11.4 % — SIGNIFICANT CHANGE UP (ref 2–14)
NEUTROPHILS # BLD AUTO: 3.77 K/UL — SIGNIFICANT CHANGE UP (ref 1.8–7.4)
NEUTROPHILS NFR BLD AUTO: 57 % — SIGNIFICANT CHANGE UP (ref 43–77)
NRBC # BLD: 0 /100 WBCS — SIGNIFICANT CHANGE UP (ref 0–0)
PLATELET # BLD AUTO: 318 K/UL — SIGNIFICANT CHANGE UP (ref 150–400)
POTASSIUM SERPL-MCNC: 3.6 MMOL/L — SIGNIFICANT CHANGE UP (ref 3.5–5.3)
POTASSIUM SERPL-SCNC: 3.6 MMOL/L — SIGNIFICANT CHANGE UP (ref 3.5–5.3)
PROT SERPL-MCNC: 7 G/DL — SIGNIFICANT CHANGE UP (ref 6–8.3)
RBC # BLD: 3.64 M/UL — LOW (ref 3.8–5.2)
RBC # FLD: 15.7 % — HIGH (ref 10.3–14.5)
SODIUM SERPL-SCNC: 142 MMOL/L — SIGNIFICANT CHANGE UP (ref 135–145)
WBC # BLD: 6.6 K/UL — SIGNIFICANT CHANGE UP (ref 3.8–10.5)
WBC # FLD AUTO: 6.6 K/UL — SIGNIFICANT CHANGE UP (ref 3.8–10.5)

## 2023-08-11 ENCOUNTER — APPOINTMENT (OUTPATIENT)
Dept: HEMATOLOGY ONCOLOGY | Facility: CLINIC | Age: 73
End: 2023-08-11
Payer: MEDICARE

## 2023-08-11 VITALS
BODY MASS INDEX: 31.96 KG/M2 | DIASTOLIC BLOOD PRESSURE: 75 MMHG | TEMPERATURE: 98.3 F | OXYGEN SATURATION: 99 % | SYSTOLIC BLOOD PRESSURE: 151 MMHG | HEART RATE: 74 BPM | RESPIRATION RATE: 18 BRPM | WEIGHT: 192.06 LBS

## 2023-08-11 PROCEDURE — 99214 OFFICE O/P EST MOD 30 MIN: CPT

## 2023-08-11 NOTE — HISTORY OF PRESENT ILLNESS
[FreeTextEntry1] : 74 yo nulligravida LMP at age 60 presented to the office for post operative examination and to discuss new diagnosis of endometrial cancer on 5/24/23. She initially presented to  ED in the beginning of April with complaint of abdominal distention and ascites. She also admitted to intermittent postmenopausal bleeding over the last couple of months, possibly up to one year. On 4/7/23, she had a CT A/P which showed a very large amount of abdominal and pelvic ascites. On 4/8/23, she had a pelvic ultrasound which showed the endometrium is either severely thickened to 6 cm or is distended with complex material to 6 cm. There is a 2.5 x 1.6 x 1.7 cm shadowing echogenic focus with shadowing along the periphery of the endometrium, which may represent a calcified intramural versus submucosal fibroid, versus an endometrial lesion. On 4/10/23, she had a CT chest which shows no malignancy in the chest. Patient then underwent D&C, hysteroscopy on 4/10/23. Pathology revealed endometrium with endometrioid adenocarcinoma, FIGO grade 2, focally invasive, ER/[place](+), HER-2 negative, MMR intact; endocervix with fragments of endometrioid adenocarcinoma and benign endocervical tissue. She is also s/p paracentesis on 5/5/23 with 1300cc's ascites drained. On 4/14/23. she had a repeat CT A/P which showed interval decrease in the volume of ascites with scalloping of the right heart border suggesting malignant ascites. Peritoneal enhancement, nonspecific. Stable size of a central heterogeneous uterine mass. She saw Dr. Ashley on 5/17/23, with the plan for chemotherapy followed by reassessment to determine candidacy for interval surgery.\par \par Patient is s/p 3 cycles of cisplatin/taxol/Tembro. \par \par CT abdomen and pelvis 7/20/23 IMPRESSION:\par 1.  Interval decrease of uterine mass and peritoneal carcinomatosis compared to 4/14/2023.\par 2.  Moderate loculated ascites as above, similar in overall volume from 4/14/2023, decreased from 4/7/2023.\par 3.  No metastatic disease within the chest.\par \par \par Last pap: Years ago. Denies history of abnormal pap smears.\par Last mammo: Years ago- WNL, as per patient\par Last colo: Never\par Last DEXA: Never

## 2023-08-11 NOTE — REASON FOR VISIT
[Home] : at home, [unfilled] , at the time of the visit. [Medical Office: ([facility])___] : at the medical office located in  [Verbal consent obtained from patient] : the patient, [unfilled] [FreeTextEntry1] : Endometrioid adenocarcinoma \par Patient is s/p 3 cycles of cisplatin/taxol/Tembro. (start date 6/1/23) \par Review CT Abdomen and pelvis

## 2023-08-11 NOTE — END OF VISIT
[Time Spent: ___ minutes] : I have spent [unfilled] minutes of time on the encounter. [FreeTextEntry3] : Written by Mildred Carrillo, acting as a scribe for Dr. Shefali Cooper This note accurately reflects the work and decisions made by me.

## 2023-08-11 NOTE — ASSESSMENT
[FreeTextEntry1] : This 74 y/o with Endometrioid adenocarcinoma is s/p 3 cycles of chemotherapy. Advised patient that the mass inside the uterus is improved. Had strongly suspected that there was spread of disease at the time of her original appt given the amount of ascites, fluid has now decreased and patient reports last time fluid was drained was in May.  \par She reports it is not causing patient much pain or discomfort but patient can tell she does have fluid. No progression of cancer with chemo which is reassuring. Advised patient that we now have better measurement of cancer outside of the uterus because fluid is less. Due to the fact that there is still a fair amount of cancer outside of the uterus, surgery is on the table but would continue chemotherapy to 6th cycle before proceeding with surgery is my recommendation to make sure as optimal as possible and least radical.\par \par FD will call patient for appt end of September\par CT should be done prior to visit \par Should be a good candidate for surgery at that time. . \par Discussed laparoscopic surgery but might need open incision \par \par Ca125 now 34, that drop in cancer marker along with CT results, Chemotherapy working in a positive way \par Patient reports only side effects are hair loss, mood swings and edema in legs. \par \par Patient needs primary care doctor to see Furesemide. will refill for a month. \par Patient needs to be fully evaluated by PCP for clearance prior to surgery \par Nurse Navigator to help coordinate PCP appt

## 2023-08-14 NOTE — DIETITIAN INITIAL EVALUATION ADULT - CALCULATED TO (G/KG)
163.2 Solaraze Counseling:  I discussed with the patient the risks of Solaraze including but not limited to erythema, scaling, itching, weeping, crusting, and pain.

## 2023-08-15 ENCOUNTER — APPOINTMENT (OUTPATIENT)
Dept: INTERNAL MEDICINE | Facility: CLINIC | Age: 73
End: 2023-08-15
Payer: MEDICARE

## 2023-08-15 VITALS
HEIGHT: 65 IN | RESPIRATION RATE: 18 BRPM | SYSTOLIC BLOOD PRESSURE: 152 MMHG | HEART RATE: 68 BPM | OXYGEN SATURATION: 98 % | DIASTOLIC BLOOD PRESSURE: 78 MMHG

## 2023-08-15 VITALS — BODY MASS INDEX: 32.01 KG/M2 | WEIGHT: 192.38 LBS

## 2023-08-15 DIAGNOSIS — Z82.49 FAMILY HISTORY OF ISCHEMIC HEART DISEASE AND OTHER DISEASES OF THE CIRCULATORY SYSTEM: ICD-10-CM

## 2023-08-15 PROCEDURE — 99213 OFFICE O/P EST LOW 20 MIN: CPT

## 2023-08-15 NOTE — HISTORY OF PRESENT ILLNESS
[FreeTextEntry1] : pt following up on BP and swelling. [de-identified] : LILIAN CASTILLO is a 73 year F who presents today for Bp check. I had started her on daily lasix on August 1,2023 because she appeared to be volume overloaded and hypertensive. Since that time she has lost nearly 20 lbs. She says her abdomen is less 'bloated' and her shoes fit better. Her SBp check at the hematology office is down about 20 points.

## 2023-08-15 NOTE — PHYSICAL EXAM
[No Acute Distress] : no acute distress [Well Nourished] : well nourished [Normal Sclera/Conjunctiva] : normal sclera/conjunctiva [PERRL] : pupils equal round and reactive to light [Normal Outer Ear/Nose] : the outer ears and nose were normal in appearance [Normal Oropharynx] : the oropharynx was normal [No JVD] : no jugular venous distention [Normal Rate] : normal rate  [Regular Rhythm] : with a regular rhythm [No CVA Tenderness] : no CVA  tenderness [No Spinal Tenderness] : no spinal tenderness [Normal] : no rash [Speech Grossly Normal] : speech grossly normal [Memory Grossly Normal] : memory grossly normal [Normal Mood] : the mood was normal [de-identified] : 1/6 murmur [de-identified] : lower extremity edema 1-2+

## 2023-08-16 DIAGNOSIS — E87.6 HYPOKALEMIA: ICD-10-CM

## 2023-08-16 LAB
ANION GAP SERPL CALC-SCNC: 15 MMOL/L
BUN SERPL-MCNC: 16 MG/DL
CALCIUM SERPL-MCNC: 9.5 MG/DL
CHLORIDE SERPL-SCNC: 99 MMOL/L
CO2 SERPL-SCNC: 27 MMOL/L
CREAT SERPL-MCNC: 0.79 MG/DL
EGFR: 79 ML/MIN/1.73M2
GLUCOSE SERPL-MCNC: 97 MG/DL
POTASSIUM SERPL-SCNC: 3.4 MMOL/L
SODIUM SERPL-SCNC: 141 MMOL/L

## 2023-08-17 RX ORDER — POTASSIUM CHLORIDE 1500 MG/1
20 TABLET, FILM COATED, EXTENDED RELEASE ORAL
Qty: 40 | Refills: 0 | Status: ACTIVE | COMMUNITY
Start: 2023-08-16 | End: 1900-01-01

## 2023-08-22 NOTE — RESULTS/DATA
[FreeTextEntry1] : 7/20/23 CT C/A/P: Interval decrease of uterine mass, 9.5 x 5.6 cm, previously 11.5 x 6.3 cm, and peritoneal carcinomatosis, 9.5 x 5.6 cm, previously 11.5 x 6.3 cm, compared to 4/14/2023. Moderate loculated ascites as above, similar in overall volume from 4/14/2023, decreased from 4/7/2023. No metastatic disease within the chest.  4/14/23 CT A/P: Interval decrease in the volume of ascites with scalloping of the right heart border suggesting malignant ascites. peritoneal enhancement, nonspecific. Correlate for symptoms of peritonitis. Stable size of a central heterogeneous uterine mass.  4/10/23 Path: Endocervix (curettage): Fragments of endometrioid adenocarcinoma. Benign endocervical tissue and epithelium. Endometrium (curettage): Endometrioid adenocarcinoma of the endometrium, FIGO grade 2, focally invasive. Benign squamous epithelium. ESTROGEN RECEPTOR (ER): Positive, diffuse, strong   nuclear staining PROGESTERONE RECEPTOR (PgR): Positive, diffuse strong nuclear staining HER-2: Negative (0/1+ membrane staining) No loss of nuclear expression of MMR proteins (MLH1, MSH2, MSH6, and PMS2)  4/10/23 CT Chest: No malignancy in the chest.  4/10/23 Path: Ascitic fluid: Negative for malignant cells.   4/9/23 Abd Doppler: No evidence of portal vein thrombosis. The hepatic veins and hepatic artery are patent. Large volume of abdominal ascites.  4/8/23 US Pelvic: Limited pelvic ultrasound. The endometrium is incompletely assessed on this examination. The endometrium is either severely severely thickened to 6 cm or is distended with complex material to 6 cm.  There is a 2.5 x 1.6 x 1.7 cm shadowing echogenic focus with shadowing along the periphery of the endometrium, which may represent a calcified intramural versus submucosal fibroid, versus an endometrial lesion. The ovaries are not visualized. Large volume pelvic ascites. Pelvic MRI may be obtained for further evaluation.  4/7/23 CT A/P: Very large amount of abdominal and pelvic ascites. Uterine fibroids

## 2023-08-22 NOTE — HISTORY OF PRESENT ILLNESS
[de-identified] : The patient was diagnosed with endometrial cancer in April 2023 at the age of 73. She presented to  ER with abdominal distention and ascites. On 4/7/23, she had a CT A/P which showed a very large amount of abdominal and pelvic ascites. On 4/8/23, she had a pelvic ultrasound which showed the endometrium is either severely thickened to 6 cm or is distended with complex material to 6 cm. There is a 2.5 x 1.6 x 1.7 cm shadowing echogenic focus with shadowing along the periphery of the endometrium, which may represent a calcified intramural versus submucosal fibroid, versus an endometrial lesion. On 4/10/23, she had a CT chest which shows no malignancy in the chest. On 4/10/23, she underwent an ECC D&C and pathology showed fragments of endometrioid adenocarcinoma. Benign endocervical tissue and epithelium. Endometrioid adenocarcinoma of the endometrium, FIGO grade 2, focally invasive. ESTROGEN RECEPTOR (ER): Positive, diffuse, strong   nuclear staining. PROGESTERONE RECEPTOR (PgR): Positive, diffuse strong nuclear staining. HER-2: Negative (0/1+ membrane staining). No loss of nuclear expression of MMR proteins (MLH1, MSH2, MSH6, and PMS2). On 4/14/23. she had a repeat CT A/P which showed interval decrease in the volume of ascites with scalloping of the right heart border suggesting malignant ascites. Peritoneal enhancement, nonspecific. 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 Today she is C4 D9 of 6 planned Cisplatin / Taxol / Keytruda cycles. She has noted hair loss. She denies fatigue, nail or skin changes; neuropathy; mouth sores, N/V; ear ringing or C/D. B/L lower ext edema much improved, following with PCP. She has guide dog puppies that she cares for and keep her busy.  Feels ascites is building again, SOB with long distances; uses walker. She will let us know if she would like paracentesis scheduled.   Prior hx: While admitted she underwent 3 paracentesis: 4/10 - 13,800CC (incomplete drainage due to high volume, completed on next session); 4/13 -  5,300CC; 4/21 - 1,900CC; all negative for malignant cells. 4/24/23 paracentesis not needed. She has a standing order of paracentesis q 2 weeks. Today she c/o vaginal bleeding since her D&C 4/10/23, much improved since last treatment.

## 2023-08-22 NOTE — PHYSICAL EXAM
[Restricted in physically strenuous activity but ambulatory and able to carry out work of a light or sedentary nature] : Status 1- Restricted in physically strenuous activity but ambulatory and able to carry out work of a light or sedentary nature, e.g., light house work, office work [Normal] : affect appropriate [de-identified] : wt loss; hair loss

## 2023-08-22 NOTE — REVIEW OF SYSTEMS
[Recent Change In Weight] : ~T recent weight change [Lower Ext Edema] : lower extremity edema [Diarrhea: Grade 0] : Diarrhea: Grade 0 [de-identified] : facial flushing  [Chest Pain] : no chest pain [Negative] : Integumentary [FreeTextEntry2] : wt loss [FreeTextEntry5] : chronic left ankle

## 2023-08-24 ENCOUNTER — RESULT REVIEW (OUTPATIENT)
Age: 73
End: 2023-08-24

## 2023-08-24 ENCOUNTER — APPOINTMENT (OUTPATIENT)
Dept: INFUSION THERAPY | Facility: CLINIC | Age: 73
End: 2023-08-24

## 2023-08-24 VITALS
TEMPERATURE: 98.2 F | SYSTOLIC BLOOD PRESSURE: 147 MMHG | DIASTOLIC BLOOD PRESSURE: 81 MMHG | BODY MASS INDEX: 31.98 KG/M2 | RESPIRATION RATE: 18 BRPM | WEIGHT: 192.19 LBS | OXYGEN SATURATION: 100 % | HEART RATE: 83 BPM

## 2023-08-24 LAB
ALBUMIN SERPL ELPH-MCNC: 3.8 G/DL — SIGNIFICANT CHANGE UP (ref 3.3–5)
ALP SERPL-CCNC: 109 U/L — SIGNIFICANT CHANGE UP (ref 40–120)
ALT FLD-CCNC: 10 U/L — SIGNIFICANT CHANGE UP (ref 10–45)
ANION GAP SERPL CALC-SCNC: 13 MMOL/L — SIGNIFICANT CHANGE UP (ref 5–17)
AST SERPL-CCNC: 21 U/L — SIGNIFICANT CHANGE UP (ref 10–40)
BASOPHILS # BLD AUTO: 0.09 K/UL — SIGNIFICANT CHANGE UP (ref 0–0.2)
BASOPHILS NFR BLD AUTO: 1.3 % — SIGNIFICANT CHANGE UP (ref 0–2)
BILIRUB SERPL-MCNC: 0.3 MG/DL — SIGNIFICANT CHANGE UP (ref 0.2–1.2)
BUN SERPL-MCNC: 19 MG/DL — SIGNIFICANT CHANGE UP (ref 7–23)
CALCIUM SERPL-MCNC: 9.6 MG/DL — SIGNIFICANT CHANGE UP (ref 8.4–10.5)
CHLORIDE SERPL-SCNC: 100 MMOL/L — SIGNIFICANT CHANGE UP (ref 96–108)
CO2 SERPL-SCNC: 25 MMOL/L — SIGNIFICANT CHANGE UP (ref 22–31)
CREAT SERPL-MCNC: 0.85 MG/DL — SIGNIFICANT CHANGE UP (ref 0.5–1.3)
EGFR: 72 ML/MIN/1.73M2 — SIGNIFICANT CHANGE UP
EOSINOPHIL # BLD AUTO: 0.31 K/UL — SIGNIFICANT CHANGE UP (ref 0–0.5)
EOSINOPHIL NFR BLD AUTO: 4.4 % — SIGNIFICANT CHANGE UP (ref 0–6)
GLUCOSE SERPL-MCNC: 104 MG/DL — HIGH (ref 70–99)
HCT VFR BLD CALC: 31.4 % — LOW (ref 34.5–45)
HGB BLD-MCNC: 10.2 G/DL — LOW (ref 11.5–15.5)
IMM GRANULOCYTES NFR BLD AUTO: 0.6 % — SIGNIFICANT CHANGE UP (ref 0–0.9)
LYMPHOCYTES # BLD AUTO: 2.09 K/UL — SIGNIFICANT CHANGE UP (ref 1–3.3)
LYMPHOCYTES # BLD AUTO: 29.6 % — SIGNIFICANT CHANGE UP (ref 13–44)
MAGNESIUM SERPL-MCNC: 1.7 MG/DL — SIGNIFICANT CHANGE UP (ref 1.6–2.6)
MCHC RBC-ENTMCNC: 26.6 PG — LOW (ref 27–34)
MCHC RBC-ENTMCNC: 32.5 GM/DL — SIGNIFICANT CHANGE UP (ref 32–36)
MCV RBC AUTO: 82 FL — SIGNIFICANT CHANGE UP (ref 80–100)
MONOCYTES # BLD AUTO: 0.85 K/UL — SIGNIFICANT CHANGE UP (ref 0–0.9)
MONOCYTES NFR BLD AUTO: 12 % — SIGNIFICANT CHANGE UP (ref 2–14)
NEUTROPHILS # BLD AUTO: 3.69 K/UL — SIGNIFICANT CHANGE UP (ref 1.8–7.4)
NEUTROPHILS NFR BLD AUTO: 52.1 % — SIGNIFICANT CHANGE UP (ref 43–77)
NRBC # BLD: 0 /100 WBCS — SIGNIFICANT CHANGE UP (ref 0–0)
PLATELET # BLD AUTO: 338 K/UL — SIGNIFICANT CHANGE UP (ref 150–400)
POTASSIUM SERPL-MCNC: 4.8 MMOL/L — SIGNIFICANT CHANGE UP (ref 3.5–5.3)
POTASSIUM SERPL-SCNC: 4.8 MMOL/L — SIGNIFICANT CHANGE UP (ref 3.5–5.3)
PROT SERPL-MCNC: 6.9 G/DL — SIGNIFICANT CHANGE UP (ref 6–8.3)
RBC # BLD: 3.83 M/UL — SIGNIFICANT CHANGE UP (ref 3.8–5.2)
RBC # FLD: 16.5 % — HIGH (ref 10.3–14.5)
SODIUM SERPL-SCNC: 138 MMOL/L — SIGNIFICANT CHANGE UP (ref 135–145)
WBC # BLD: 7.07 K/UL — SIGNIFICANT CHANGE UP (ref 3.8–10.5)
WBC # FLD AUTO: 7.07 K/UL — SIGNIFICANT CHANGE UP (ref 3.8–10.5)

## 2023-09-02 ENCOUNTER — RESULT REVIEW (OUTPATIENT)
Age: 73
End: 2023-09-02

## 2023-09-11 ENCOUNTER — OUTPATIENT (OUTPATIENT)
Dept: OUTPATIENT SERVICES | Facility: HOSPITAL | Age: 73
LOS: 1 days | Discharge: HOME | End: 2023-09-11

## 2023-09-11 DIAGNOSIS — C54.1 MALIGNANT NEOPLASM OF ENDOMETRIUM: ICD-10-CM

## 2023-09-14 ENCOUNTER — RESULT REVIEW (OUTPATIENT)
Age: 73
End: 2023-09-14

## 2023-09-14 ENCOUNTER — APPOINTMENT (OUTPATIENT)
Dept: INFUSION THERAPY | Facility: CLINIC | Age: 73
End: 2023-09-14

## 2023-09-14 VITALS
HEART RATE: 74 BPM | DIASTOLIC BLOOD PRESSURE: 78 MMHG | SYSTOLIC BLOOD PRESSURE: 148 MMHG | BODY MASS INDEX: 31.86 KG/M2 | WEIGHT: 191.44 LBS | RESPIRATION RATE: 18 BRPM | TEMPERATURE: 98.2 F | OXYGEN SATURATION: 100 %

## 2023-09-14 LAB
ALBUMIN SERPL ELPH-MCNC: 4 G/DL — SIGNIFICANT CHANGE UP (ref 3.3–5)
ALP SERPL-CCNC: 115 U/L — SIGNIFICANT CHANGE UP (ref 40–120)
ALT FLD-CCNC: 6 U/L — LOW (ref 10–45)
ANION GAP SERPL CALC-SCNC: 14 MMOL/L — SIGNIFICANT CHANGE UP (ref 5–17)
AST SERPL-CCNC: 14 U/L — SIGNIFICANT CHANGE UP (ref 10–40)
BASOPHILS # BLD AUTO: 0.07 K/UL — SIGNIFICANT CHANGE UP (ref 0–0.2)
BASOPHILS NFR BLD AUTO: 0.9 % — SIGNIFICANT CHANGE UP (ref 0–2)
BILIRUB SERPL-MCNC: 0.2 MG/DL — SIGNIFICANT CHANGE UP (ref 0.2–1.2)
BUN SERPL-MCNC: 17 MG/DL — SIGNIFICANT CHANGE UP (ref 7–23)
CALCIUM SERPL-MCNC: 9.7 MG/DL — SIGNIFICANT CHANGE UP (ref 8.4–10.5)
CANCER AG125 SERPL-ACNC: 34 U/ML — SIGNIFICANT CHANGE UP
CHLORIDE SERPL-SCNC: 98 MMOL/L — SIGNIFICANT CHANGE UP (ref 96–108)
CO2 SERPL-SCNC: 27 MMOL/L — SIGNIFICANT CHANGE UP (ref 22–31)
CREAT SERPL-MCNC: 0.96 MG/DL — SIGNIFICANT CHANGE UP (ref 0.5–1.3)
EGFR: 62 ML/MIN/1.73M2 — SIGNIFICANT CHANGE UP
EOSINOPHIL # BLD AUTO: 0.17 K/UL — SIGNIFICANT CHANGE UP (ref 0–0.5)
EOSINOPHIL NFR BLD AUTO: 2.2 % — SIGNIFICANT CHANGE UP (ref 0–6)
GLUCOSE SERPL-MCNC: 114 MG/DL — HIGH (ref 70–99)
HCT VFR BLD CALC: 31.9 % — LOW (ref 34.5–45)
HGB BLD-MCNC: 10.4 G/DL — LOW (ref 11.5–15.5)
IMM GRANULOCYTES NFR BLD AUTO: 0.5 % — SIGNIFICANT CHANGE UP (ref 0–0.9)
LYMPHOCYTES # BLD AUTO: 1.91 K/UL — SIGNIFICANT CHANGE UP (ref 1–3.3)
LYMPHOCYTES # BLD AUTO: 24.6 % — SIGNIFICANT CHANGE UP (ref 13–44)
MAGNESIUM SERPL-MCNC: 1.5 MG/DL — LOW (ref 1.6–2.6)
MCHC RBC-ENTMCNC: 27.3 PG — SIGNIFICANT CHANGE UP (ref 27–34)
MCHC RBC-ENTMCNC: 32.6 GM/DL — SIGNIFICANT CHANGE UP (ref 32–36)
MCV RBC AUTO: 83.7 FL — SIGNIFICANT CHANGE UP (ref 80–100)
MONOCYTES # BLD AUTO: 0.73 K/UL — SIGNIFICANT CHANGE UP (ref 0–0.9)
MONOCYTES NFR BLD AUTO: 9.4 % — SIGNIFICANT CHANGE UP (ref 2–14)
NEUTROPHILS # BLD AUTO: 4.84 K/UL — SIGNIFICANT CHANGE UP (ref 1.8–7.4)
NEUTROPHILS NFR BLD AUTO: 62.4 % — SIGNIFICANT CHANGE UP (ref 43–77)
NRBC # BLD: 0 /100 WBCS — SIGNIFICANT CHANGE UP (ref 0–0)
PLATELET # BLD AUTO: 430 K/UL — HIGH (ref 150–400)
POTASSIUM SERPL-MCNC: 3.6 MMOL/L — SIGNIFICANT CHANGE UP (ref 3.5–5.3)
POTASSIUM SERPL-SCNC: 3.6 MMOL/L — SIGNIFICANT CHANGE UP (ref 3.5–5.3)
PROT SERPL-MCNC: 7.6 G/DL — SIGNIFICANT CHANGE UP (ref 6–8.3)
RBC # BLD: 3.81 M/UL — SIGNIFICANT CHANGE UP (ref 3.8–5.2)
RBC # FLD: 15.6 % — HIGH (ref 10.3–14.5)
SODIUM SERPL-SCNC: 140 MMOL/L — SIGNIFICANT CHANGE UP (ref 135–145)
WBC # BLD: 7.76 K/UL — SIGNIFICANT CHANGE UP (ref 3.8–10.5)
WBC # FLD AUTO: 7.76 K/UL — SIGNIFICANT CHANGE UP (ref 3.8–10.5)

## 2023-09-15 DIAGNOSIS — Z51.11 ENCOUNTER FOR ANTINEOPLASTIC CHEMOTHERAPY: ICD-10-CM

## 2023-09-15 DIAGNOSIS — E86.0 DEHYDRATION: ICD-10-CM

## 2023-09-15 DIAGNOSIS — R11.2 NAUSEA WITH VOMITING, UNSPECIFIED: ICD-10-CM

## 2023-09-20 ENCOUNTER — OUTPATIENT (OUTPATIENT)
Dept: OUTPATIENT SERVICES | Facility: HOSPITAL | Age: 73
LOS: 1 days | End: 2023-09-20
Payer: MEDICARE

## 2023-09-20 ENCOUNTER — APPOINTMENT (OUTPATIENT)
Dept: CT IMAGING | Facility: CLINIC | Age: 73
End: 2023-09-20
Payer: MEDICARE

## 2023-09-20 DIAGNOSIS — C54.1 MALIGNANT NEOPLASM OF ENDOMETRIUM: ICD-10-CM

## 2023-09-20 PROCEDURE — 71260 CT THORAX DX C+: CPT

## 2023-09-20 PROCEDURE — 74177 CT ABD & PELVIS W/CONTRAST: CPT | Mod: 26

## 2023-09-20 PROCEDURE — 71260 CT THORAX DX C+: CPT | Mod: 26

## 2023-09-20 PROCEDURE — 74177 CT ABD & PELVIS W/CONTRAST: CPT

## 2023-09-28 ENCOUNTER — RX RENEWAL (OUTPATIENT)
Age: 73
End: 2023-09-28

## 2023-09-28 ENCOUNTER — APPOINTMENT (OUTPATIENT)
Dept: GYNECOLOGIC ONCOLOGY | Facility: CLINIC | Age: 73
End: 2023-09-28
Payer: MEDICARE

## 2023-09-28 VITALS
HEIGHT: 65 IN | HEART RATE: 66 BPM | BODY MASS INDEX: 30.49 KG/M2 | WEIGHT: 183 LBS | OXYGEN SATURATION: 99 % | DIASTOLIC BLOOD PRESSURE: 53 MMHG | SYSTOLIC BLOOD PRESSURE: 149 MMHG

## 2023-09-28 PROCEDURE — 99214 OFFICE O/P EST MOD 30 MIN: CPT

## 2023-09-29 ENCOUNTER — APPOINTMENT (OUTPATIENT)
Dept: HEMATOLOGY ONCOLOGY | Facility: CLINIC | Age: 73
End: 2023-09-29
Payer: MEDICARE

## 2023-09-29 VITALS
DIASTOLIC BLOOD PRESSURE: 77 MMHG | HEART RATE: 65 BPM | RESPIRATION RATE: 18 BRPM | WEIGHT: 187 LBS | BODY MASS INDEX: 31.12 KG/M2 | OXYGEN SATURATION: 100 % | TEMPERATURE: 98 F | SYSTOLIC BLOOD PRESSURE: 133 MMHG

## 2023-09-29 DIAGNOSIS — N93.9 ABNORMAL UTERINE AND VAGINAL BLEEDING, UNSPECIFIED: ICD-10-CM

## 2023-09-29 DIAGNOSIS — C55 MALIGNANT NEOPLASM OF UTERUS, PART UNSPECIFIED: ICD-10-CM

## 2023-09-29 DIAGNOSIS — L98.9 DISORDER OF THE SKIN AND SUBCUTANEOUS TISSUE, UNSPECIFIED: ICD-10-CM

## 2023-09-29 PROCEDURE — 99215 OFFICE O/P EST HI 40 MIN: CPT

## 2023-10-05 ENCOUNTER — RESULT REVIEW (OUTPATIENT)
Age: 73
End: 2023-10-05

## 2023-10-05 ENCOUNTER — APPOINTMENT (OUTPATIENT)
Dept: INFUSION THERAPY | Facility: CLINIC | Age: 73
End: 2023-10-05

## 2023-10-05 LAB
ALBUMIN SERPL ELPH-MCNC: 3.8 G/DL — SIGNIFICANT CHANGE UP (ref 3.3–5)
ALP SERPL-CCNC: 100 U/L — SIGNIFICANT CHANGE UP (ref 40–120)
ALT FLD-CCNC: 5 U/L — LOW (ref 10–45)
ANION GAP SERPL CALC-SCNC: 14 MMOL/L — SIGNIFICANT CHANGE UP (ref 5–17)
AST SERPL-CCNC: 15 U/L — SIGNIFICANT CHANGE UP (ref 10–40)
BASOPHILS # BLD AUTO: 0.07 K/UL — SIGNIFICANT CHANGE UP (ref 0–0.2)
BASOPHILS NFR BLD AUTO: 0.9 % — SIGNIFICANT CHANGE UP (ref 0–2)
BILIRUB SERPL-MCNC: 0.3 MG/DL — SIGNIFICANT CHANGE UP (ref 0.2–1.2)
BUN SERPL-MCNC: 22 MG/DL — SIGNIFICANT CHANGE UP (ref 7–23)
CALCIUM SERPL-MCNC: 9.1 MG/DL — SIGNIFICANT CHANGE UP (ref 8.4–10.5)
CHLORIDE SERPL-SCNC: 97 MMOL/L — SIGNIFICANT CHANGE UP (ref 96–108)
CO2 SERPL-SCNC: 28 MMOL/L — SIGNIFICANT CHANGE UP (ref 22–31)
CREAT SERPL-MCNC: 1.31 MG/DL — HIGH (ref 0.5–1.3)
EGFR: 43 ML/MIN/1.73M2 — LOW
EOSINOPHIL # BLD AUTO: 0.25 K/UL — SIGNIFICANT CHANGE UP (ref 0–0.5)
EOSINOPHIL NFR BLD AUTO: 3.4 % — SIGNIFICANT CHANGE UP (ref 0–6)
GLUCOSE SERPL-MCNC: 77 MG/DL — SIGNIFICANT CHANGE UP (ref 70–99)
HCT VFR BLD CALC: 30.1 % — LOW (ref 34.5–45)
HGB BLD-MCNC: 9.8 G/DL — LOW (ref 11.5–15.5)
IMM GRANULOCYTES NFR BLD AUTO: 0.3 % — SIGNIFICANT CHANGE UP (ref 0–0.9)
LYMPHOCYTES # BLD AUTO: 2 K/UL — SIGNIFICANT CHANGE UP (ref 1–3.3)
LYMPHOCYTES # BLD AUTO: 27 % — SIGNIFICANT CHANGE UP (ref 13–44)
MAGNESIUM SERPL-MCNC: 1.2 MG/DL — LOW (ref 1.6–2.6)
MCHC RBC-ENTMCNC: 27.6 PG — SIGNIFICANT CHANGE UP (ref 27–34)
MCHC RBC-ENTMCNC: 32.6 GM/DL — SIGNIFICANT CHANGE UP (ref 32–36)
MCV RBC AUTO: 84.8 FL — SIGNIFICANT CHANGE UP (ref 80–100)
MONOCYTES # BLD AUTO: 0.93 K/UL — HIGH (ref 0–0.9)
MONOCYTES NFR BLD AUTO: 12.6 % — SIGNIFICANT CHANGE UP (ref 2–14)
NEUTROPHILS # BLD AUTO: 4.14 K/UL — SIGNIFICANT CHANGE UP (ref 1.8–7.4)
NEUTROPHILS NFR BLD AUTO: 55.8 % — SIGNIFICANT CHANGE UP (ref 43–77)
NRBC # BLD: 0 /100 WBCS — SIGNIFICANT CHANGE UP (ref 0–0)
PLATELET # BLD AUTO: 266 K/UL — SIGNIFICANT CHANGE UP (ref 150–400)
POTASSIUM SERPL-MCNC: 3.6 MMOL/L — SIGNIFICANT CHANGE UP (ref 3.5–5.3)
POTASSIUM SERPL-SCNC: 3.6 MMOL/L — SIGNIFICANT CHANGE UP (ref 3.5–5.3)
PROT SERPL-MCNC: 6.9 G/DL — SIGNIFICANT CHANGE UP (ref 6–8.3)
RBC # BLD: 3.55 M/UL — LOW (ref 3.8–5.2)
RBC # FLD: 15.2 % — HIGH (ref 10.3–14.5)
SODIUM SERPL-SCNC: 140 MMOL/L — SIGNIFICANT CHANGE UP (ref 135–145)
WBC # BLD: 7.41 K/UL — SIGNIFICANT CHANGE UP (ref 3.8–10.5)
WBC # FLD AUTO: 7.41 K/UL — SIGNIFICANT CHANGE UP (ref 3.8–10.5)

## 2023-10-26 ENCOUNTER — RESULT REVIEW (OUTPATIENT)
Age: 73
End: 2023-10-26

## 2023-10-26 ENCOUNTER — APPOINTMENT (OUTPATIENT)
Dept: HEMATOLOGY ONCOLOGY | Facility: CLINIC | Age: 73
End: 2023-10-26
Payer: MEDICARE

## 2023-10-26 ENCOUNTER — APPOINTMENT (OUTPATIENT)
Dept: INFUSION THERAPY | Facility: CLINIC | Age: 73
End: 2023-10-26
Payer: MEDICARE

## 2023-10-26 ENCOUNTER — NON-APPOINTMENT (OUTPATIENT)
Age: 73
End: 2023-10-26

## 2023-10-26 VITALS
BODY MASS INDEX: 32.06 KG/M2 | HEART RATE: 71 BPM | WEIGHT: 192.44 LBS | OXYGEN SATURATION: 100 % | HEIGHT: 65 IN | DIASTOLIC BLOOD PRESSURE: 84 MMHG | SYSTOLIC BLOOD PRESSURE: 147 MMHG | RESPIRATION RATE: 16 BRPM | TEMPERATURE: 98.2 F

## 2023-10-26 LAB
ALBUMIN SERPL ELPH-MCNC: 3.7 G/DL — SIGNIFICANT CHANGE UP (ref 3.3–5)
ALBUMIN SERPL ELPH-MCNC: 3.7 G/DL — SIGNIFICANT CHANGE UP (ref 3.3–5)
ALP SERPL-CCNC: 107 U/L — SIGNIFICANT CHANGE UP (ref 40–120)
ALP SERPL-CCNC: 107 U/L — SIGNIFICANT CHANGE UP (ref 40–120)
ALT FLD-CCNC: 7 U/L — LOW (ref 10–45)
ALT FLD-CCNC: 7 U/L — LOW (ref 10–45)
ANION GAP SERPL CALC-SCNC: 15 MMOL/L — SIGNIFICANT CHANGE UP (ref 5–17)
ANION GAP SERPL CALC-SCNC: 15 MMOL/L — SIGNIFICANT CHANGE UP (ref 5–17)
AST SERPL-CCNC: 16 U/L — SIGNIFICANT CHANGE UP (ref 10–40)
AST SERPL-CCNC: 16 U/L — SIGNIFICANT CHANGE UP (ref 10–40)
BASOPHILS # BLD AUTO: 0.06 K/UL — SIGNIFICANT CHANGE UP (ref 0–0.2)
BASOPHILS # BLD AUTO: 0.06 K/UL — SIGNIFICANT CHANGE UP (ref 0–0.2)
BASOPHILS NFR BLD AUTO: 0.8 % — SIGNIFICANT CHANGE UP (ref 0–2)
BASOPHILS NFR BLD AUTO: 0.8 % — SIGNIFICANT CHANGE UP (ref 0–2)
BILIRUB SERPL-MCNC: 0.3 MG/DL — SIGNIFICANT CHANGE UP (ref 0.2–1.2)
BILIRUB SERPL-MCNC: 0.3 MG/DL — SIGNIFICANT CHANGE UP (ref 0.2–1.2)
BUN SERPL-MCNC: 19 MG/DL — SIGNIFICANT CHANGE UP (ref 7–23)
BUN SERPL-MCNC: 19 MG/DL — SIGNIFICANT CHANGE UP (ref 7–23)
CALCIUM SERPL-MCNC: 8.6 MG/DL — SIGNIFICANT CHANGE UP (ref 8.4–10.5)
CALCIUM SERPL-MCNC: 8.6 MG/DL — SIGNIFICANT CHANGE UP (ref 8.4–10.5)
CHLORIDE SERPL-SCNC: 103 MMOL/L — SIGNIFICANT CHANGE UP (ref 96–108)
CHLORIDE SERPL-SCNC: 103 MMOL/L — SIGNIFICANT CHANGE UP (ref 96–108)
CO2 SERPL-SCNC: 23 MMOL/L — SIGNIFICANT CHANGE UP (ref 22–31)
CO2 SERPL-SCNC: 23 MMOL/L — SIGNIFICANT CHANGE UP (ref 22–31)
CREAT SERPL-MCNC: 1.29 MG/DL — SIGNIFICANT CHANGE UP (ref 0.5–1.3)
CREAT SERPL-MCNC: 1.29 MG/DL — SIGNIFICANT CHANGE UP (ref 0.5–1.3)
EGFR: 44 ML/MIN/1.73M2 — LOW
EGFR: 44 ML/MIN/1.73M2 — LOW
EOSINOPHIL # BLD AUTO: 0.17 K/UL — SIGNIFICANT CHANGE UP (ref 0–0.5)
EOSINOPHIL # BLD AUTO: 0.17 K/UL — SIGNIFICANT CHANGE UP (ref 0–0.5)
EOSINOPHIL NFR BLD AUTO: 2.3 % — SIGNIFICANT CHANGE UP (ref 0–6)
EOSINOPHIL NFR BLD AUTO: 2.3 % — SIGNIFICANT CHANGE UP (ref 0–6)
GLUCOSE SERPL-MCNC: 100 MG/DL — HIGH (ref 70–99)
GLUCOSE SERPL-MCNC: 100 MG/DL — HIGH (ref 70–99)
HCT VFR BLD CALC: 27.4 % — LOW (ref 34.5–45)
HCT VFR BLD CALC: 27.4 % — LOW (ref 34.5–45)
HGB BLD-MCNC: 9 G/DL — LOW (ref 11.5–15.5)
HGB BLD-MCNC: 9 G/DL — LOW (ref 11.5–15.5)
IMM GRANULOCYTES NFR BLD AUTO: 0.4 % — SIGNIFICANT CHANGE UP (ref 0–0.9)
IMM GRANULOCYTES NFR BLD AUTO: 0.4 % — SIGNIFICANT CHANGE UP (ref 0–0.9)
LYMPHOCYTES # BLD AUTO: 1.93 K/UL — SIGNIFICANT CHANGE UP (ref 1–3.3)
LYMPHOCYTES # BLD AUTO: 1.93 K/UL — SIGNIFICANT CHANGE UP (ref 1–3.3)
LYMPHOCYTES # BLD AUTO: 25.8 % — SIGNIFICANT CHANGE UP (ref 13–44)
LYMPHOCYTES # BLD AUTO: 25.8 % — SIGNIFICANT CHANGE UP (ref 13–44)
MAGNESIUM SERPL-MCNC: 1.1 MG/DL — LOW (ref 1.6–2.6)
MAGNESIUM SERPL-MCNC: 1.1 MG/DL — LOW (ref 1.6–2.6)
MCHC RBC-ENTMCNC: 27.4 PG — SIGNIFICANT CHANGE UP (ref 27–34)
MCHC RBC-ENTMCNC: 27.4 PG — SIGNIFICANT CHANGE UP (ref 27–34)
MCHC RBC-ENTMCNC: 32.8 GM/DL — SIGNIFICANT CHANGE UP (ref 32–36)
MCHC RBC-ENTMCNC: 32.8 GM/DL — SIGNIFICANT CHANGE UP (ref 32–36)
MCV RBC AUTO: 83.5 FL — SIGNIFICANT CHANGE UP (ref 80–100)
MCV RBC AUTO: 83.5 FL — SIGNIFICANT CHANGE UP (ref 80–100)
MONOCYTES # BLD AUTO: 1.02 K/UL — HIGH (ref 0–0.9)
MONOCYTES # BLD AUTO: 1.02 K/UL — HIGH (ref 0–0.9)
MONOCYTES NFR BLD AUTO: 13.6 % — SIGNIFICANT CHANGE UP (ref 2–14)
MONOCYTES NFR BLD AUTO: 13.6 % — SIGNIFICANT CHANGE UP (ref 2–14)
NEUTROPHILS # BLD AUTO: 4.28 K/UL — SIGNIFICANT CHANGE UP (ref 1.8–7.4)
NEUTROPHILS # BLD AUTO: 4.28 K/UL — SIGNIFICANT CHANGE UP (ref 1.8–7.4)
NEUTROPHILS NFR BLD AUTO: 57.1 % — SIGNIFICANT CHANGE UP (ref 43–77)
NEUTROPHILS NFR BLD AUTO: 57.1 % — SIGNIFICANT CHANGE UP (ref 43–77)
NRBC # BLD: 0 /100 WBCS — SIGNIFICANT CHANGE UP (ref 0–0)
NRBC # BLD: 0 /100 WBCS — SIGNIFICANT CHANGE UP (ref 0–0)
PLATELET # BLD AUTO: 274 K/UL — SIGNIFICANT CHANGE UP (ref 150–400)
PLATELET # BLD AUTO: 274 K/UL — SIGNIFICANT CHANGE UP (ref 150–400)
POTASSIUM SERPL-MCNC: 4 MMOL/L — SIGNIFICANT CHANGE UP (ref 3.5–5.3)
POTASSIUM SERPL-MCNC: 4 MMOL/L — SIGNIFICANT CHANGE UP (ref 3.5–5.3)
POTASSIUM SERPL-SCNC: 4 MMOL/L — SIGNIFICANT CHANGE UP (ref 3.5–5.3)
POTASSIUM SERPL-SCNC: 4 MMOL/L — SIGNIFICANT CHANGE UP (ref 3.5–5.3)
PROT SERPL-MCNC: 6.9 G/DL — SIGNIFICANT CHANGE UP (ref 6–8.3)
PROT SERPL-MCNC: 6.9 G/DL — SIGNIFICANT CHANGE UP (ref 6–8.3)
RBC # BLD: 3.28 M/UL — LOW (ref 3.8–5.2)
RBC # BLD: 3.28 M/UL — LOW (ref 3.8–5.2)
RBC # FLD: 15.4 % — HIGH (ref 10.3–14.5)
RBC # FLD: 15.4 % — HIGH (ref 10.3–14.5)
SODIUM SERPL-SCNC: 141 MMOL/L — SIGNIFICANT CHANGE UP (ref 135–145)
SODIUM SERPL-SCNC: 141 MMOL/L — SIGNIFICANT CHANGE UP (ref 135–145)
T3FREE SERPL-MCNC: 2.77 PG/ML — SIGNIFICANT CHANGE UP (ref 2–4.4)
T3FREE SERPL-MCNC: 2.77 PG/ML — SIGNIFICANT CHANGE UP (ref 2–4.4)
T4 FREE SERPL-MCNC: 1.4 NG/DL — SIGNIFICANT CHANGE UP (ref 0.9–1.8)
T4 FREE SERPL-MCNC: 1.4 NG/DL — SIGNIFICANT CHANGE UP (ref 0.9–1.8)
TSH SERPL-MCNC: 1.88 UIU/ML — SIGNIFICANT CHANGE UP (ref 0.27–4.2)
TSH SERPL-MCNC: 1.88 UIU/ML — SIGNIFICANT CHANGE UP (ref 0.27–4.2)
WBC # BLD: 7.49 K/UL — SIGNIFICANT CHANGE UP (ref 3.8–10.5)
WBC # BLD: 7.49 K/UL — SIGNIFICANT CHANGE UP (ref 3.8–10.5)
WBC # FLD AUTO: 7.49 K/UL — SIGNIFICANT CHANGE UP (ref 3.8–10.5)
WBC # FLD AUTO: 7.49 K/UL — SIGNIFICANT CHANGE UP (ref 3.8–10.5)

## 2023-10-26 PROCEDURE — 99214 OFFICE O/P EST MOD 30 MIN: CPT

## 2023-11-04 LAB
CORTICOSTEROID BINDING GLOBULIN RESULT: 2.1 MG/DL — SIGNIFICANT CHANGE UP
CORTICOSTEROID BINDING GLOBULIN RESULT: 2.1 MG/DL — SIGNIFICANT CHANGE UP
CORTIS F/TOTAL MFR SERPL: 5.4 % — SIGNIFICANT CHANGE UP
CORTIS F/TOTAL MFR SERPL: 5.4 % — SIGNIFICANT CHANGE UP
CORTIS SERPL-MCNC: 8.6 UG/DL — SIGNIFICANT CHANGE UP
CORTIS SERPL-MCNC: 8.6 UG/DL — SIGNIFICANT CHANGE UP
CORTISOL, FREE RESULT: 0.47 UG/DL — SIGNIFICANT CHANGE UP
CORTISOL, FREE RESULT: 0.47 UG/DL — SIGNIFICANT CHANGE UP

## 2023-11-30 ENCOUNTER — OUTPATIENT (OUTPATIENT)
Dept: OUTPATIENT SERVICES | Facility: HOSPITAL | Age: 73
LOS: 1 days | End: 2023-11-30
Payer: MEDICARE

## 2023-11-30 VITALS
DIASTOLIC BLOOD PRESSURE: 80 MMHG | HEIGHT: 65 IN | WEIGHT: 184.31 LBS | SYSTOLIC BLOOD PRESSURE: 140 MMHG | RESPIRATION RATE: 18 BRPM | TEMPERATURE: 98 F | HEART RATE: 60 BPM | OXYGEN SATURATION: 99 %

## 2023-11-30 DIAGNOSIS — Z29.9 ENCOUNTER FOR PROPHYLACTIC MEASURES, UNSPECIFIED: ICD-10-CM

## 2023-11-30 DIAGNOSIS — C54.1 MALIGNANT NEOPLASM OF ENDOMETRIUM: ICD-10-CM

## 2023-11-30 DIAGNOSIS — K08.409 PARTIAL LOSS OF TEETH, UNSPECIFIED CAUSE, UNSPECIFIED CLASS: Chronic | ICD-10-CM

## 2023-11-30 DIAGNOSIS — Z01.818 ENCOUNTER FOR OTHER PREPROCEDURAL EXAMINATION: ICD-10-CM

## 2023-11-30 LAB
A1C WITH ESTIMATED AVERAGE GLUCOSE RESULT: 5 % — SIGNIFICANT CHANGE UP (ref 4–5.6)
A1C WITH ESTIMATED AVERAGE GLUCOSE RESULT: 5 % — SIGNIFICANT CHANGE UP (ref 4–5.6)
ANION GAP SERPL CALC-SCNC: 13 MMOL/L — SIGNIFICANT CHANGE UP (ref 5–17)
ANION GAP SERPL CALC-SCNC: 13 MMOL/L — SIGNIFICANT CHANGE UP (ref 5–17)
APTT BLD: 21 SEC — LOW (ref 24.5–35.6)
APTT BLD: 21 SEC — LOW (ref 24.5–35.6)
BASOPHILS # BLD AUTO: 0.06 K/UL — SIGNIFICANT CHANGE UP (ref 0–0.2)
BASOPHILS # BLD AUTO: 0.06 K/UL — SIGNIFICANT CHANGE UP (ref 0–0.2)
BASOPHILS NFR BLD AUTO: 0.7 % — SIGNIFICANT CHANGE UP (ref 0–2)
BASOPHILS NFR BLD AUTO: 0.7 % — SIGNIFICANT CHANGE UP (ref 0–2)
BLD GP AB SCN SERPL QL: SIGNIFICANT CHANGE UP
BLD GP AB SCN SERPL QL: SIGNIFICANT CHANGE UP
BUN SERPL-MCNC: 35.6 MG/DL — HIGH (ref 8–20)
BUN SERPL-MCNC: 35.6 MG/DL — HIGH (ref 8–20)
CALCIUM SERPL-MCNC: 9.6 MG/DL — SIGNIFICANT CHANGE UP (ref 8.4–10.5)
CALCIUM SERPL-MCNC: 9.6 MG/DL — SIGNIFICANT CHANGE UP (ref 8.4–10.5)
CHLORIDE SERPL-SCNC: 101 MMOL/L — SIGNIFICANT CHANGE UP (ref 96–108)
CHLORIDE SERPL-SCNC: 101 MMOL/L — SIGNIFICANT CHANGE UP (ref 96–108)
CO2 SERPL-SCNC: 26 MMOL/L — SIGNIFICANT CHANGE UP (ref 22–29)
CO2 SERPL-SCNC: 26 MMOL/L — SIGNIFICANT CHANGE UP (ref 22–29)
CREAT SERPL-MCNC: 1.22 MG/DL — SIGNIFICANT CHANGE UP (ref 0.5–1.3)
CREAT SERPL-MCNC: 1.22 MG/DL — SIGNIFICANT CHANGE UP (ref 0.5–1.3)
EGFR: 47 ML/MIN/1.73M2 — LOW
EGFR: 47 ML/MIN/1.73M2 — LOW
EOSINOPHIL # BLD AUTO: 0.36 K/UL — SIGNIFICANT CHANGE UP (ref 0–0.5)
EOSINOPHIL # BLD AUTO: 0.36 K/UL — SIGNIFICANT CHANGE UP (ref 0–0.5)
EOSINOPHIL NFR BLD AUTO: 4.1 % — SIGNIFICANT CHANGE UP (ref 0–6)
EOSINOPHIL NFR BLD AUTO: 4.1 % — SIGNIFICANT CHANGE UP (ref 0–6)
ESTIMATED AVERAGE GLUCOSE: 97 MG/DL — SIGNIFICANT CHANGE UP (ref 68–114)
ESTIMATED AVERAGE GLUCOSE: 97 MG/DL — SIGNIFICANT CHANGE UP (ref 68–114)
GLUCOSE SERPL-MCNC: 96 MG/DL — SIGNIFICANT CHANGE UP (ref 70–99)
GLUCOSE SERPL-MCNC: 96 MG/DL — SIGNIFICANT CHANGE UP (ref 70–99)
HCT VFR BLD CALC: 29.5 % — LOW (ref 34.5–45)
HCT VFR BLD CALC: 29.5 % — LOW (ref 34.5–45)
HGB BLD-MCNC: 9.8 G/DL — LOW (ref 11.5–15.5)
HGB BLD-MCNC: 9.8 G/DL — LOW (ref 11.5–15.5)
IMM GRANULOCYTES NFR BLD AUTO: 0.2 % — SIGNIFICANT CHANGE UP (ref 0–0.9)
IMM GRANULOCYTES NFR BLD AUTO: 0.2 % — SIGNIFICANT CHANGE UP (ref 0–0.9)
INR BLD: 1.06 RATIO — SIGNIFICANT CHANGE UP (ref 0.85–1.18)
INR BLD: 1.06 RATIO — SIGNIFICANT CHANGE UP (ref 0.85–1.18)
LYMPHOCYTES # BLD AUTO: 2.02 K/UL — SIGNIFICANT CHANGE UP (ref 1–3.3)
LYMPHOCYTES # BLD AUTO: 2.02 K/UL — SIGNIFICANT CHANGE UP (ref 1–3.3)
LYMPHOCYTES # BLD AUTO: 22.9 % — SIGNIFICANT CHANGE UP (ref 13–44)
LYMPHOCYTES # BLD AUTO: 22.9 % — SIGNIFICANT CHANGE UP (ref 13–44)
MAGNESIUM SERPL-MCNC: 1.6 MG/DL — LOW (ref 1.8–2.6)
MAGNESIUM SERPL-MCNC: 1.6 MG/DL — LOW (ref 1.8–2.6)
MCHC RBC-ENTMCNC: 28.7 PG — SIGNIFICANT CHANGE UP (ref 27–34)
MCHC RBC-ENTMCNC: 28.7 PG — SIGNIFICANT CHANGE UP (ref 27–34)
MCHC RBC-ENTMCNC: 33.2 GM/DL — SIGNIFICANT CHANGE UP (ref 32–36)
MCHC RBC-ENTMCNC: 33.2 GM/DL — SIGNIFICANT CHANGE UP (ref 32–36)
MCV RBC AUTO: 86.5 FL — SIGNIFICANT CHANGE UP (ref 80–100)
MCV RBC AUTO: 86.5 FL — SIGNIFICANT CHANGE UP (ref 80–100)
MONOCYTES # BLD AUTO: 0.91 K/UL — HIGH (ref 0–0.9)
MONOCYTES # BLD AUTO: 0.91 K/UL — HIGH (ref 0–0.9)
MONOCYTES NFR BLD AUTO: 10.3 % — SIGNIFICANT CHANGE UP (ref 2–14)
MONOCYTES NFR BLD AUTO: 10.3 % — SIGNIFICANT CHANGE UP (ref 2–14)
NEUTROPHILS # BLD AUTO: 5.45 K/UL — SIGNIFICANT CHANGE UP (ref 1.8–7.4)
NEUTROPHILS # BLD AUTO: 5.45 K/UL — SIGNIFICANT CHANGE UP (ref 1.8–7.4)
NEUTROPHILS NFR BLD AUTO: 61.8 % — SIGNIFICANT CHANGE UP (ref 43–77)
NEUTROPHILS NFR BLD AUTO: 61.8 % — SIGNIFICANT CHANGE UP (ref 43–77)
PHOSPHATE SERPL-MCNC: 3.5 MG/DL — SIGNIFICANT CHANGE UP (ref 2.4–4.7)
PHOSPHATE SERPL-MCNC: 3.5 MG/DL — SIGNIFICANT CHANGE UP (ref 2.4–4.7)
PLATELET # BLD AUTO: 258 K/UL — SIGNIFICANT CHANGE UP (ref 150–400)
PLATELET # BLD AUTO: 258 K/UL — SIGNIFICANT CHANGE UP (ref 150–400)
POTASSIUM SERPL-MCNC: 3.9 MMOL/L — SIGNIFICANT CHANGE UP (ref 3.5–5.3)
POTASSIUM SERPL-MCNC: 3.9 MMOL/L — SIGNIFICANT CHANGE UP (ref 3.5–5.3)
POTASSIUM SERPL-SCNC: 3.9 MMOL/L — SIGNIFICANT CHANGE UP (ref 3.5–5.3)
POTASSIUM SERPL-SCNC: 3.9 MMOL/L — SIGNIFICANT CHANGE UP (ref 3.5–5.3)
PROTHROM AB SERPL-ACNC: 11.7 SEC — SIGNIFICANT CHANGE UP (ref 9.5–13)
PROTHROM AB SERPL-ACNC: 11.7 SEC — SIGNIFICANT CHANGE UP (ref 9.5–13)
RBC # BLD: 3.41 M/UL — LOW (ref 3.8–5.2)
RBC # BLD: 3.41 M/UL — LOW (ref 3.8–5.2)
RBC # FLD: 15.8 % — HIGH (ref 10.3–14.5)
RBC # FLD: 15.8 % — HIGH (ref 10.3–14.5)
SODIUM SERPL-SCNC: 140 MMOL/L — SIGNIFICANT CHANGE UP (ref 135–145)
SODIUM SERPL-SCNC: 140 MMOL/L — SIGNIFICANT CHANGE UP (ref 135–145)
WBC # BLD: 8.82 K/UL — SIGNIFICANT CHANGE UP (ref 3.8–10.5)
WBC # BLD: 8.82 K/UL — SIGNIFICANT CHANGE UP (ref 3.8–10.5)
WBC # FLD AUTO: 8.82 K/UL — SIGNIFICANT CHANGE UP (ref 3.8–10.5)
WBC # FLD AUTO: 8.82 K/UL — SIGNIFICANT CHANGE UP (ref 3.8–10.5)

## 2023-11-30 PROCEDURE — G0463: CPT

## 2023-11-30 PROCEDURE — 93005 ELECTROCARDIOGRAM TRACING: CPT

## 2023-11-30 PROCEDURE — 93010 ELECTROCARDIOGRAM REPORT: CPT

## 2023-11-30 RX ORDER — SODIUM CHLORIDE 9 MG/ML
3 INJECTION INTRAMUSCULAR; INTRAVENOUS; SUBCUTANEOUS EVERY 8 HOURS
Refills: 0 | Status: DISCONTINUED | OUTPATIENT
Start: 2023-12-15 | End: 2023-12-19

## 2023-11-30 RX ORDER — ACETAMINOPHEN 500 MG
2 TABLET ORAL
Refills: 0 | DISCHARGE

## 2023-11-30 RX ORDER — GENTAMICIN SULFATE 40 MG/ML
420 VIAL (ML) INJECTION ONCE
Refills: 0 | Status: DISCONTINUED | OUTPATIENT
Start: 2023-12-12 | End: 2023-12-14

## 2023-11-30 RX ORDER — SENNOSIDES/DOCUSATE SODIUM 8.6MG-50MG
2 TABLET ORAL
Refills: 0 | DISCHARGE

## 2023-11-30 NOTE — H&P PST ADULT - PROBLEM SELECTOR PLAN 2
Caprini Score 6 Moderate Risk, Surgical team should assess /Strongly recommend pharmacological and mechanical measures for VTE prophylaxis

## 2023-11-30 NOTE — H&P PST ADULT - HEART RATE (BEATS/MIN)
Additional Notes: Patient was not treated today, Patient is to follow up with Dr. Gómez Detail Level: Simple Render Risk Assessment In Note?: no 60

## 2023-11-30 NOTE — H&P PST ADULT - NSANTHOSAYNRD_GEN_A_CORE
No. TAMRA screening performed.  STOP BANG Legend: 0-2 = LOW Risk; 3-4 = INTERMEDIATE Risk; 5-8 = HIGH Risk

## 2023-11-30 NOTE — H&P PST ADULT - NSICDXPASTMEDICALHX_GEN_ALL_CORE_FT
PAST MEDICAL HISTORY:  Endometrial ca     H/O ascites     History of chemotherapy     Uterine mass

## 2023-11-30 NOTE — H&P PST ADULT - ASSESSMENT
CAPRINI SCORE    AGE RELATED RISK FACTORS                                                             [ ] Age 41-60 years                                            (1 Point)  [ ] Age: 61-74 years                                           (2 Points)                 [ ] Age= 75 years                                                (3 Points)             DISEASE RELATED RISK FACTORS                                                       [ ] Edema in the lower extremities                 (1 Point)                     [ ] Varicose veins                                               (1 Point)                                 [ ] BMI > 25 Kg/m2                                            (1 Point)                                  [ ] Serious infection (ie PNA)                            (1 Point)                     [ ] Lung disease ( COPD, Emphysema)            (1 Point)                                                                          [ ] Acute myocardial infarction                         (1 Point)                  [ ] Congestive heart failure (in the previous month)  (1 Point)         [ ] Inflammatory bowel disease                            (1 Point)                  [ ] Central venous access, PICC or Port               (2 points)       (within the last month)                                                                [ ] Stroke (in the previous month)                        (5 Points)    [ ] Previous or present malignancy                       (2 points)                                                                                                                                                         HEMATOLOGY RELATED FACTORS                                                         [ ] Prior episodes of VTE                                     (3 Points)                     [ ] Positive family history for VTE                      (3 Points)                  [ ] Prothrombin 37512 A                                     (3 Points)                     [ ] Factor V Leiden                                                (3 Points)                        [ ] Lupus anticoagulants                                      (3 Points)                                                           [ ] Anticardiolipin antibodies                              (3 Points)                                                       [ ] High homocysteine in the blood                   (3 Points)                                             [ ] Other congenital or acquired thrombophilia      (3 Points)                                                [ ] Heparin induced thrombocytopenia                  (3 Points)                                        MOBILITY RELATED FACTORS  [ ] Bed rest                                                         (1 Point)  [ ] Plaster cast                                                    (2 points)  [ ] Bed bound for more than 72 hours           (2 Points)    GENDER SPECIFIC FACTORS  [ ] Pregnancy or had a baby within the last month   (1 Point)  [ ] Post-partum < 6 weeks                                   (1 Point)  [ ] Hormonal therapy  or oral contraception   (1 Point)  [ ] History of pregnancy complications              (1 point)  [ ] Unexplained or recurrent              (1 Point)    OTHER RISK FACTORS                                           (1 Point)  [ ] BMI >40, smoking, diabetes requiring insulin, chemotherapy  blood transfusions and length of surgery over 2 hours    SURGERY RELATED RISK FACTORS  [ ]  Section within the last month     (1 Point)  [ ] Minor surgery                                                  (1 Point)  [ ] Arthroscopic surgery                                       (2 Points)  [ ] Planned major surgery lasting more            (2 Points)      than 45 minutes     [ ] Elective hip or knee joint replacement       (5 points)       surgery                                                TRAUMA RELATED RISK FACTORS  [ ] Fracture of the hip, pelvis, or leg                       (5 Points)  [ ] Spinal cord injury resulting in paralysis             (5 points)       (in the previous month)    [ ] Paralysis  (less than 1 month)                             (5 Points)  [ ] Multiple Trauma within 1 month                        (5 Points)    Total Score [        ]    Caprini Score 0-2: Low Risk, NO VTE prophylaxis required for most patients, encourage ambulation  Caprini Score 3-6: Moderate Risk , pharmacologic VTE prophylaxis is indicated for most patients (in the absence of contraindications)  Caprini Score Greater than or =7: High risk, pharmocologic VTE prophylaxis indicated for most patients (in the absence of contraindications)    OPIOID RISK TOOL    DEZ EACH BOX THAT APPLIES AND ADD TOTALS AT THE END    FAMILY HISTORY OF SUBSTANCE ABUSE                 FEMALE         MALE                                                Alcohol                             [  ]1 pt          [  ]3pts                                               Illegal Durgs                     [  ]2 pts        [  ]3pts                                               Rx Drugs                           [  ]4 pts        [  ]4 pts    PERSONAL HISTORY OF SUBSTANCE ABUSE                                                                                          Alcohol                             [  ]3 pts       [  ]3 pts                                               Illegal Durgs                     [  ]4 pts        [  ]4 pts                                               Rx Drugs                           [  ]5 pts        [  ]5 pts    AGE BETWEEN 16-45 YEARS                                      [  ]1 pt         [  ]1 pt    HISTORY OF PREADOLESCENT   SEXUAL ABUSE                                                             [  ]3 pts        [  ]0pts    PSYCHOLOGICAL DISEASE                     ADD, OCD, Bipolar, Schizophrenia        [  ]2 pts         [  ]2 pts                      Depression                                               [  ]1 pt           [  ]1 pt           SCORING TOTAL   0                               A score of 3 or lower indicated LOW risk for future opiod abuse  A score of 4 to 7 indicated moderate risk for future opiod abuse  A score of 8 or higher indicates a high risk for opiod abuse                           CAPRINI SCORE    AGE RELATED RISK FACTORS                                                             [ ] Age 41-60 years                                            (1 Point)  [ ] Age: 61-74 years                                           (2 Points)                 [ ] Age= 75 years                                                (3 Points)             DISEASE RELATED RISK FACTORS                                                       [ ] Edema in the lower extremities                 (1 Point)                     [ ] Varicose veins                                               (1 Point)                                 [ ] BMI > 25 Kg/m2                                            (1 Point)                                  [ ] Serious infection (ie PNA)                            (1 Point)                     [ ] Lung disease ( COPD, Emphysema)            (1 Point)                                                                          [ ] Acute myocardial infarction                         (1 Point)                  [ ] Congestive heart failure (in the previous month)  (1 Point)         [ ] Inflammatory bowel disease                            (1 Point)                  [ ] Central venous access, PICC or Port               (2 points)       (within the last month)                                                                [ ] Stroke (in the previous month)                        (5 Points)    [ ] Previous or present malignancy                       (2 points)                                                                                                                                                         HEMATOLOGY RELATED FACTORS                                                         [ ] Prior episodes of VTE                                     (3 Points)                     [ ] Positive family history for VTE                      (3 Points)                  [ ] Prothrombin 53488 A                                     (3 Points)                     [ ] Factor V Leiden                                                (3 Points)                        [ ] Lupus anticoagulants                                      (3 Points)                                                           [ ] Anticardiolipin antibodies                              (3 Points)                                                       [ ] High homocysteine in the blood                   (3 Points)                                             [ ] Other congenital or acquired thrombophilia      (3 Points)                                                [ ] Heparin induced thrombocytopenia                  (3 Points)                                        MOBILITY RELATED FACTORS  [ ] Bed rest                                                         (1 Point)  [ ] Plaster cast                                                    (2 points)  [ ] Bed bound for more than 72 hours           (2 Points)    GENDER SPECIFIC FACTORS  [ ] Pregnancy or had a baby within the last month   (1 Point)  [ ] Post-partum < 6 weeks                                   (1 Point)  [ ] Hormonal therapy  or oral contraception   (1 Point)  [ ] History of pregnancy complications              (1 point)  [ ] Unexplained or recurrent              (1 Point)    OTHER RISK FACTORS                                           (1 Point)  [ ] BMI >40, smoking, diabetes requiring insulin, chemotherapy  blood transfusions and length of surgery over 2 hours    SURGERY RELATED RISK FACTORS  [ ]  Section within the last month     (1 Point)  [ ] Minor surgery                                                  (1 Point)  [ ] Arthroscopic surgery                                       (2 Points)  [ ] Planned major surgery lasting more            (2 Points)      than 45 minutes     [ ] Elective hip or knee joint replacement       (5 points)       surgery                                                TRAUMA RELATED RISK FACTORS  [ ] Fracture of the hip, pelvis, or leg                       (5 Points)  [ ] Spinal cord injury resulting in paralysis             (5 points)       (in the previous month)    [ ] Paralysis  (less than 1 month)                             (5 Points)  [ ] Multiple Trauma within 1 month                        (5 Points)    Total Score [        ]    Caprini Score 0-2: Low Risk, NO VTE prophylaxis required for most patients, encourage ambulation  Caprini Score 3-6: Moderate Risk , pharmacologic VTE prophylaxis is indicated for most patients (in the absence of contraindications)  Caprini Score Greater than or =7: High risk, pharmocologic VTE prophylaxis indicated for most patients (in the absence of contraindications)    OPIOID RISK TOOL    DEZ EACH BOX THAT APPLIES AND ADD TOTALS AT THE END    FAMILY HISTORY OF SUBSTANCE ABUSE                 FEMALE         MALE                                                Alcohol                             [  ]1 pt          [  ]3pts                                               Illegal Durgs                     [  ]2 pts        [  ]3pts                                               Rx Drugs                           [  ]4 pts        [  ]4 pts    PERSONAL HISTORY OF SUBSTANCE ABUSE                                                                                          Alcohol                             [  ]3 pts       [  ]3 pts                                               Illegal Durgs                     [  ]4 pts        [  ]4 pts                                               Rx Drugs                           [  ]5 pts        [  ]5 pts    AGE BETWEEN 16-45 YEARS                                      [  ]1 pt         [  ]1 pt    HISTORY OF PREADOLESCENT   SEXUAL ABUSE                                                             [  ]3 pts        [  ]0pts    PSYCHOLOGICAL DISEASE                     ADD, OCD, Bipolar, Schizophrenia        [  ]2 pts         [  ]2 pts                      Depression                                               [  ]1 pt           [  ]1 pt           SCORING TOTAL   0                               A score of 3 or lower indicated LOW risk for future opiod abuse  A score of 4 to 7 indicated moderate risk for future opiod abuse  A score of 8 or higher indicates a high risk for opiod abuse                           CAPRINI SCORE    AGE RELATED RISK FACTORS                                                             [ ] Age 41-60 years                                            (1 Point)  [ ] Age: 61-74 years                                           (2 Points)                 [ ] Age= 75 years                                                (3 Points)             DISEASE RELATED RISK FACTORS                                                       [ ] Edema in the lower extremities                 (1 Point)                     [ ] Varicose veins                                               (1 Point)                                 [ ] BMI > 25 Kg/m2                                            (1 Point)                                  [ ] Serious infection (ie PNA)                            (1 Point)                     [ ] Lung disease ( COPD, Emphysema)            (1 Point)                                                                          [ ] Acute myocardial infarction                         (1 Point)                  [ ] Congestive heart failure (in the previous month)  (1 Point)         [ ] Inflammatory bowel disease                            (1 Point)                  [ ] Central venous access, PICC or Port               (2 points)       (within the last month)                                                                [ ] Stroke (in the previous month)                        (5 Points)    [ ] Previous or present malignancy                       (2 points)                                                                                                                                                         HEMATOLOGY RELATED FACTORS                                                         [ ] Prior episodes of VTE                                     (3 Points)                     [ ] Positive family history for VTE                      (3 Points)                  [ ] Prothrombin 74430 A                                     (3 Points)                     [ ] Factor V Leiden                                                (3 Points)                        [ ] Lupus anticoagulants                                      (3 Points)                                                           [ ] Anticardiolipin antibodies                              (3 Points)                                                       [ ] High homocysteine in the blood                   (3 Points)                                             [ ] Other congenital or acquired thrombophilia      (3 Points)                                                [ ] Heparin induced thrombocytopenia                  (3 Points)                                        MOBILITY RELATED FACTORS  [ ] Bed rest                                                         (1 Point)  [ ] Plaster cast                                                    (2 points)  [ ] Bed bound for more than 72 hours           (2 Points)    GENDER SPECIFIC FACTORS  [ ] Pregnancy or had a baby within the last month   (1 Point)  [ ] Post-partum < 6 weeks                                   (1 Point)  [ ] Hormonal therapy  or oral contraception   (1 Point)  [ ] History of pregnancy complications              (1 point)  [ ] Unexplained or recurrent              (1 Point)    OTHER RISK FACTORS                                           (1 Point)  [ ] BMI >40, smoking, diabetes requiring insulin, chemotherapy  blood transfusions and length of surgery over 2 hours    SURGERY RELATED RISK FACTORS  [ ]  Section within the last month     (1 Point)  [ ] Minor surgery                                                  (1 Point)  [ ] Arthroscopic surgery                                       (2 Points)  [ ] Planned major surgery lasting more            (2 Points)      than 45 minutes     [ ] Elective hip or knee joint replacement       (5 points)       surgery                                                TRAUMA RELATED RISK FACTORS  [ ] Fracture of the hip, pelvis, or leg                       (5 Points)  [ ] Spinal cord injury resulting in paralysis             (5 points)       (in the previous month)    [ ] Paralysis  (less than 1 month)                             (5 Points)  [ ] Multiple Trauma within 1 month                        (5 Points)    Total Score [        ]    Caprini Score 0-2: Low Risk, NO VTE prophylaxis required for most patients, encourage ambulation  Caprini Score 3-6: Moderate Risk , pharmacologic VTE prophylaxis is indicated for most patients (in the absence of contraindications)  Caprini Score Greater than or =7: High risk, pharmocologic VTE prophylaxis indicated for most patients (in the absence of contraindications)    OPIOID RISK TOOL    DEZ EACH BOX THAT APPLIES AND ADD TOTALS AT THE END    FAMILY HISTORY OF SUBSTANCE ABUSE                 FEMALE         MALE                                                Alcohol                             [  ]1 pt          [  ]3pts                                               Illegal Durgs                     [  ]2 pts        [  ]3pts                                               Rx Drugs                           [  ]4 pts        [  ]4 pts    PERSONAL HISTORY OF SUBSTANCE ABUSE                                                                                          Alcohol                             [  ]3 pts       [  ]3 pts                                               Illegal Durgs                     [  ]4 pts        [  ]4 pts                                               Rx Drugs                           [  ]5 pts        [  ]5 pts    AGE BETWEEN 16-45 YEARS                                      [  ]1 pt         [  ]1 pt    HISTORY OF PREADOLESCENT   SEXUAL ABUSE                                                             [  ]3 pts        [  ]0pts    PSYCHOLOGICAL DISEASE                     ADD, OCD, Bipolar, Schizophrenia        [  ]2 pts         [  ]2 pts                      Depression                                               [  ]1 pt           [  ]1 pt           SCORING TOTAL   0                               A score of 3 or lower indicated LOW risk for future opiod abuse  A score of 4 to 7 indicated moderate risk for future opiod abuse  A score of 8 or higher indicates a high risk for opiod abuse                           CAPRINI SCORE    AGE RELATED RISK FACTORS                                                             [ ] Age 41-60 years                                            (1 Point)  [x ] Age: 61-74 years                                           (2 Points)                 [ ] Age= 75 years                                                (3 Points)             DISEASE RELATED RISK FACTORS                                                       [x ] Edema in the lower extremities                 (1 Point)                     [ ] Varicose veins                                               (1 Point)                                 [x ] BMI > 25 Kg/m2                                            (1 Point)                                  [ ] Serious infection (ie PNA)                            (1 Point)                     [ ] Lung disease ( COPD, Emphysema)            (1 Point)                                                                          [ ] Acute myocardial infarction                         (1 Point)                  [ ] Congestive heart failure (in the previous month)  (1 Point)         [ ] Inflammatory bowel disease                            (1 Point)                  [ ] Central venous access, PICC or Port               (2 points)       (within the last month)                                                                [ ] Stroke (in the previous month)                        (5 Points)    [x ] Previous or present malignancy                       (2 points)                                                                                                                                                         HEMATOLOGY RELATED FACTORS                                                         [ ] Prior episodes of VTE                                     (3 Points)                     [ ] Positive family history for VTE                      (3 Points)                  [ ] Prothrombin 79691 A                                     (3 Points)                     [ ] Factor V Leiden                                                (3 Points)                        [ ] Lupus anticoagulants                                      (3 Points)                                                           [ ] Anticardiolipin antibodies                              (3 Points)                                                       [ ] High homocysteine in the blood                   (3 Points)                                             [ ] Other congenital or acquired thrombophilia      (3 Points)                                                [ ] Heparin induced thrombocytopenia                  (3 Points)                                        MOBILITY RELATED FACTORS  [ ] Bed rest                                                         (1 Point)  [ ] Plaster cast                                                    (2 points)  [ ] Bed bound for more than 72 hours           (2 Points)    GENDER SPECIFIC FACTORS  [ ] Pregnancy or had a baby within the last month   (1 Point)  [ ] Post-partum < 6 weeks                                   (1 Point)  [ ] Hormonal therapy  or oral contraception   (1 Point)  [ ] History of pregnancy complications              (1 point)  [ ] Unexplained or recurrent              (1 Point)    OTHER RISK FACTORS                                           (1 Point)  [ ] BMI >40, smoking, diabetes requiring insulin, chemotherapy  blood transfusions and length of surgery over 2 hours    SURGERY RELATED RISK FACTORS  [ ]  Section within the last month     (1 Point)  [ ] Minor surgery                                                  (1 Point)  [ ] Arthroscopic surgery                                       (2 Points)  [x ] Planned major surgery lasting more            (2 Points)      than 45 minutes     [ ] Elective hip or knee joint replacement       (5 points)       surgery                                                TRAUMA RELATED RISK FACTORS  [ ] Fracture of the hip, pelvis, or leg                       (5 Points)  [ ] Spinal cord injury resulting in paralysis             (5 points)       (in the previous month)    [ ] Paralysis  (less than 1 month)                             (5 Points)  [ ] Multiple Trauma within 1 month                        (5 Points)    Total Score [    8    ]    Caprini Score 0-2: Low Risk, NO VTE prophylaxis required for most patients, encourage ambulation  Caprini Score 3-6: Moderate Risk , pharmacologic VTE prophylaxis is indicated for most patients (in the absence of contraindications)  Caprini Score Greater than or =7: High risk, pharmocologic VTE prophylaxis indicated for most patients (in the absence of contraindications)    OPIOID RISK TOOL    DEZ EACH BOX THAT APPLIES AND ADD TOTALS AT THE END    FAMILY HISTORY OF SUBSTANCE ABUSE                 FEMALE         MALE                                                Alcohol                             [  ]1 pt          [  ]3pts                                               Illegal Durgs                     [  ]2 pts        [  ]3pts                                               Rx Drugs                           [  ]4 pts        [  ]4 pts    PERSONAL HISTORY OF SUBSTANCE ABUSE                                                                                          Alcohol                             [  ]3 pts       [  ]3 pts                                               Illegal Durgs                     [  ]4 pts        [  ]4 pts                                               Rx Drugs                           [  ]5 pts        [  ]5 pts    AGE BETWEEN 16-45 YEARS                                      [  ]1 pt         [  ]1 pt    HISTORY OF PREADOLESCENT   SEXUAL ABUSE                                                             [  ]3 pts        [  ]0pts    PSYCHOLOGICAL DISEASE                     ADD, OCD, Bipolar, Schizophrenia        [  ]2 pts         [  ]2 pts                      Depression                                               [  ]1 pt           [  ]1 pt           SCORING TOTAL   0                               A score of 3 or lower indicated LOW risk for future opiod abuse  A score of 4 to 7 indicated moderate risk for future opiod abuse  A score of 8 or higher indicates a high risk for opiod abuse    73 year old female with endometrioid adenocarcinoma diagnosed on 2023 after presenting to  ED with complaint of dyspnea on exertion, abdominal distention, and intermittent post menopausal bleeding.  CT A/P which showed a very large amount of abdominal and pelvic ascites. On 23, she had a pelvic ultrasound which showed the endometrium is either severely thickened to 6 cm or is distended with complex material to 6 cm. There is a 2.5 x 1.6 x 1.7 cm shadowing echogenic focus with shadowing along the periphery of the endometrium, which may represent a calcified intramural versus submucosal fibroid, versus an endometrial lesion. Patient then underwent D&C, hysteroscopy on 4/10/23. Pathology revealed endometrium with endometrioid adenocarcinoma, FIGO grade 2, focally invasive, ER/[place](+), HER-2 negative, MMR intact; endocervix with fragments of endometrioid adenocarcinoma and benign endocervical tissue. She is also s/p paracentesis on 23 with 1300cc's ascites drained. On 23. she had a repeat CT A/P which showed interval decrease in the volume of ascites with scalloping of the right heart border suggesting malignant ascites. Peritoneal enhancement, nonspecific. Stable size of a central heterogeneous uterine mass. Patient is s/p 8 rounds of chemotherapy, plan now is for surgery.  Today at [facility] she reports hearing loss left ear following chemotherapy, however she reports sense of taste is returning, appetite as returned . She denies fever, chills, abdominal pain, nausea, vomiting, diarrhea, constipation or dyspnea on exertion.  Patient is scheduled for exploratory lap, SUJATHA, BSO, colon resection, tumor debulking, possible ostomy with Dr Jones on 23. Patient educated on surgical scrub, bowel prep as per surgeons office, ERP, preadmission instructions, medical clearance and day of procedure medications, verbalizes understanding. Pt instructed to stop vitamins/supplements/herbal medications/ASA/NSAIDS for one week prior to surgery and discuss with PMD.    CAPRINI SCORE    AGE RELATED RISK FACTORS                                                             [ ] Age 41-60 years                                            (1 Point)  [x ] Age: 61-74 years                                           (2 Points)                 [ ] Age= 75 years                                                (3 Points)             DISEASE RELATED RISK FACTORS                                                       [x ] Edema in the lower extremities                 (1 Point)                     [ ] Varicose veins                                               (1 Point)                                 [x ] BMI > 25 Kg/m2                                            (1 Point)                                  [ ] Serious infection (ie PNA)                            (1 Point)                     [ ] Lung disease ( COPD, Emphysema)            (1 Point)                                                                          [ ] Acute myocardial infarction                         (1 Point)                  [ ] Congestive heart failure (in the previous month)  (1 Point)         [ ] Inflammatory bowel disease                            (1 Point)                  [ ] Central venous access, PICC or Port               (2 points)       (within the last month)                                                                [ ] Stroke (in the previous month)                        (5 Points)    [x ] Previous or present malignancy                       (2 points)                                                                                                                                                         HEMATOLOGY RELATED FACTORS                                                         [ ] Prior episodes of VTE                                     (3 Points)                     [ ] Positive family history for VTE                      (3 Points)                  [ ] Prothrombin 85174 A                                     (3 Points)                     [ ] Factor V Leiden                                                (3 Points)                        [ ] Lupus anticoagulants                                      (3 Points)                                                           [ ] Anticardiolipin antibodies                              (3 Points)                                                       [ ] High homocysteine in the blood                   (3 Points)                                             [ ] Other congenital or acquired thrombophilia      (3 Points)                                                [ ] Heparin induced thrombocytopenia                  (3 Points)                                        MOBILITY RELATED FACTORS  [ ] Bed rest                                                         (1 Point)  [ ] Plaster cast                                                    (2 points)  [ ] Bed bound for more than 72 hours           (2 Points)    GENDER SPECIFIC FACTORS  [ ] Pregnancy or had a baby within the last month   (1 Point)  [ ] Post-partum < 6 weeks                                   (1 Point)  [ ] Hormonal therapy  or oral contraception   (1 Point)  [ ] History of pregnancy complications              (1 point)  [ ] Unexplained or recurrent              (1 Point)    OTHER RISK FACTORS                                           (1 Point)  [ ] BMI >40, smoking, diabetes requiring insulin, chemotherapy  blood transfusions and length of surgery over 2 hours    SURGERY RELATED RISK FACTORS  [ ]  Section within the last month     (1 Point)  [ ] Minor surgery                                                  (1 Point)  [ ] Arthroscopic surgery                                       (2 Points)  [x ] Planned major surgery lasting more            (2 Points)      than 45 minutes     [ ] Elective hip or knee joint replacement       (5 points)       surgery                                                TRAUMA RELATED RISK FACTORS  [ ] Fracture of the hip, pelvis, or leg                       (5 Points)  [ ] Spinal cord injury resulting in paralysis             (5 points)       (in the previous month)    [ ] Paralysis  (less than 1 month)                             (5 Points)  [ ] Multiple Trauma within 1 month                        (5 Points)    Total Score [    8    ]    Caprini Score 0-2: Low Risk, NO VTE prophylaxis required for most patients, encourage ambulation  Caprini Score 3-6: Moderate Risk , pharmacologic VTE prophylaxis is indicated for most patients (in the absence of contraindications)  Caprini Score Greater than or =7: High risk, pharmocologic VTE prophylaxis indicated for most patients (in the absence of contraindications)    OPIOID RISK TOOL    DEZ EACH BOX THAT APPLIES AND ADD TOTALS AT THE END    FAMILY HISTORY OF SUBSTANCE ABUSE                 FEMALE         MALE                                                Alcohol                             [  ]1 pt          [  ]3pts                                               Illegal Durgs                     [  ]2 pts        [  ]3pts                                               Rx Drugs                           [  ]4 pts        [  ]4 pts    PERSONAL HISTORY OF SUBSTANCE ABUSE                                                                                          Alcohol                             [  ]3 pts       [  ]3 pts                                               Illegal Durgs                     [  ]4 pts        [  ]4 pts                                               Rx Drugs                           [  ]5 pts        [  ]5 pts    AGE BETWEEN 16-45 YEARS                                      [  ]1 pt         [  ]1 pt    HISTORY OF PREADOLESCENT   SEXUAL ABUSE                                                             [  ]3 pts        [  ]0pts    PSYCHOLOGICAL DISEASE                     ADD, OCD, Bipolar, Schizophrenia        [  ]2 pts         [  ]2 pts                      Depression                                               [  ]1 pt           [  ]1 pt           SCORING TOTAL   0                               A score of 3 or lower indicated LOW risk for future opiod abuse  A score of 4 to 7 indicated moderate risk for future opiod abuse  A score of 8 or higher indicates a high risk for opiod abuse    73 year old female with endometrioid adenocarcinoma diagnosed on 2023 after presenting to  ED with complaint of dyspnea on exertion, abdominal distention, and intermittent post menopausal bleeding.  CT A/P which showed a very large amount of abdominal and pelvic ascites. On 23, she had a pelvic ultrasound which showed the endometrium is either severely thickened to 6 cm or is distended with complex material to 6 cm. There is a 2.5 x 1.6 x 1.7 cm shadowing echogenic focus with shadowing along the periphery of the endometrium, which may represent a calcified intramural versus submucosal fibroid, versus an endometrial lesion. Patient then underwent D&C, hysteroscopy on 4/10/23. Pathology revealed endometrium with endometrioid adenocarcinoma, FIGO grade 2, focally invasive, ER/[place](+), HER-2 negative, MMR intact; endocervix with fragments of endometrioid adenocarcinoma and benign endocervical tissue. She is also s/p paracentesis on 23 with 1300cc's ascites drained. On 23. she had a repeat CT A/P which showed interval decrease in the volume of ascites with scalloping of the right heart border suggesting malignant ascites. Peritoneal enhancement, nonspecific. Stable size of a central heterogeneous uterine mass. Patient is s/p 8 rounds of chemotherapy, plan now is for surgery.  Today at [facility] she reports hearing loss left ear following chemotherapy, however she reports sense of taste is returning, appetite as returned . She denies fever, chills, abdominal pain, nausea, vomiting, diarrhea, constipation or dyspnea on exertion.  Patient is scheduled for exploratory lap, SUJATHA, BSO, colon resection, tumor debulking, possible ostomy with Dr Jones on 23. Patient educated on surgical scrub, bowel prep as per surgeons office, ERP, preadmission instructions, medical clearance and day of procedure medications, verbalizes understanding. Pt instructed to stop vitamins/supplements/herbal medications/ASA/NSAIDS for one week prior to surgery and discuss with PMD.    CAPRINI SCORE    AGE RELATED RISK FACTORS                                                             [ ] Age 41-60 years                                            (1 Point)  [x ] Age: 61-74 years                                           (2 Points)                 [ ] Age= 75 years                                                (3 Points)             DISEASE RELATED RISK FACTORS                                                       [x ] Edema in the lower extremities                 (1 Point)                     [ ] Varicose veins                                               (1 Point)                                 [x ] BMI > 25 Kg/m2                                            (1 Point)                                  [ ] Serious infection (ie PNA)                            (1 Point)                     [ ] Lung disease ( COPD, Emphysema)            (1 Point)                                                                          [ ] Acute myocardial infarction                         (1 Point)                  [ ] Congestive heart failure (in the previous month)  (1 Point)         [ ] Inflammatory bowel disease                            (1 Point)                  [ ] Central venous access, PICC or Port               (2 points)       (within the last month)                                                                [ ] Stroke (in the previous month)                        (5 Points)    [x ] Previous or present malignancy                       (2 points)                                                                                                                                                         HEMATOLOGY RELATED FACTORS                                                         [ ] Prior episodes of VTE                                     (3 Points)                     [ ] Positive family history for VTE                      (3 Points)                  [ ] Prothrombin 96028 A                                     (3 Points)                     [ ] Factor V Leiden                                                (3 Points)                        [ ] Lupus anticoagulants                                      (3 Points)                                                           [ ] Anticardiolipin antibodies                              (3 Points)                                                       [ ] High homocysteine in the blood                   (3 Points)                                             [ ] Other congenital or acquired thrombophilia      (3 Points)                                                [ ] Heparin induced thrombocytopenia                  (3 Points)                                        MOBILITY RELATED FACTORS  [ ] Bed rest                                                         (1 Point)  [ ] Plaster cast                                                    (2 points)  [ ] Bed bound for more than 72 hours           (2 Points)    GENDER SPECIFIC FACTORS  [ ] Pregnancy or had a baby within the last month   (1 Point)  [ ] Post-partum < 6 weeks                                   (1 Point)  [ ] Hormonal therapy  or oral contraception   (1 Point)  [ ] History of pregnancy complications              (1 point)  [ ] Unexplained or recurrent              (1 Point)    OTHER RISK FACTORS                                           (1 Point)  [ ] BMI >40, smoking, diabetes requiring insulin, chemotherapy  blood transfusions and length of surgery over 2 hours    SURGERY RELATED RISK FACTORS  [ ]  Section within the last month     (1 Point)  [ ] Minor surgery                                                  (1 Point)  [ ] Arthroscopic surgery                                       (2 Points)  [x ] Planned major surgery lasting more            (2 Points)      than 45 minutes     [ ] Elective hip or knee joint replacement       (5 points)       surgery                                                TRAUMA RELATED RISK FACTORS  [ ] Fracture of the hip, pelvis, or leg                       (5 Points)  [ ] Spinal cord injury resulting in paralysis             (5 points)       (in the previous month)    [ ] Paralysis  (less than 1 month)                             (5 Points)  [ ] Multiple Trauma within 1 month                        (5 Points)    Total Score [    8    ]    Caprini Score 0-2: Low Risk, NO VTE prophylaxis required for most patients, encourage ambulation  Caprini Score 3-6: Moderate Risk , pharmacologic VTE prophylaxis is indicated for most patients (in the absence of contraindications)  Caprini Score Greater than or =7: High risk, pharmocologic VTE prophylaxis indicated for most patients (in the absence of contraindications)    OPIOID RISK TOOL    DEZ EACH BOX THAT APPLIES AND ADD TOTALS AT THE END    FAMILY HISTORY OF SUBSTANCE ABUSE                 FEMALE         MALE                                                Alcohol                             [  ]1 pt          [  ]3pts                                               Illegal Durgs                     [  ]2 pts        [  ]3pts                                               Rx Drugs                           [  ]4 pts        [  ]4 pts    PERSONAL HISTORY OF SUBSTANCE ABUSE                                                                                          Alcohol                             [  ]3 pts       [  ]3 pts                                               Illegal Durgs                     [  ]4 pts        [  ]4 pts                                               Rx Drugs                           [  ]5 pts        [  ]5 pts    AGE BETWEEN 16-45 YEARS                                      [  ]1 pt         [  ]1 pt    HISTORY OF PREADOLESCENT   SEXUAL ABUSE                                                             [  ]3 pts        [  ]0pts    PSYCHOLOGICAL DISEASE                     ADD, OCD, Bipolar, Schizophrenia        [  ]2 pts         [  ]2 pts                      Depression                                               [  ]1 pt           [  ]1 pt           SCORING TOTAL   0                               A score of 3 or lower indicated LOW risk for future opiod abuse  A score of 4 to 7 indicated moderate risk for future opiod abuse  A score of 8 or higher indicates a high risk for opiod abuse    73 year old female with endometrioid adenocarcinoma diagnosed on 2023 after presenting to  ED with complaint of dyspnea on exertion, abdominal distention, and intermittent post menopausal bleeding.  CT A/P which showed a very large amount of abdominal and pelvic ascites. On 23, she had a pelvic ultrasound which showed the endometrium is either severely thickened to 6 cm or is distended with complex material to 6 cm. There is a 2.5 x 1.6 x 1.7 cm shadowing echogenic focus with shadowing along the periphery of the endometrium, which may represent a calcified intramural versus submucosal fibroid, versus an endometrial lesion. Patient then underwent D&C, hysteroscopy on 4/10/23. Pathology revealed endometrium with endometrioid adenocarcinoma, FIGO grade 2, focally invasive, ER/[place](+), HER-2 negative, MMR intact; endocervix with fragments of endometrioid adenocarcinoma and benign endocervical tissue. She is also s/p paracentesis on 23 with 1300cc's ascites drained. On 23. she had a repeat CT A/P which showed interval decrease in the volume of ascites with scalloping of the right heart border suggesting malignant ascites. Peritoneal enhancement, nonspecific. Stable size of a central heterogeneous uterine mass. Patient is s/p 8 rounds of chemotherapy, plan now is for surgery.  Today at [email or web address] she reports hearing loss left ear following chemotherapy, however she reports sense of taste is returning, appetite as returned . She denies fever, chills, abdominal pain, nausea, vomiting, diarrhea, constipation or dyspnea on exertion.  Patient is scheduled for exploratory lap, SUJATHA, BSO, colon resection, tumor debulking, possible ostomy with Dr Jones on 23. Patient educated on surgical scrub, bowel prep as per surgeons office, ERP, preadmission instructions, medical clearance and day of procedure medications, verbalizes understanding. Pt instructed to stop vitamins/supplements/herbal medications/ASA/NSAIDS for one week prior to surgery and discuss with PMD.

## 2023-11-30 NOTE — H&P PST ADULT - NSICDXFAMILYHX_GEN_ALL_CORE_FT
FAMILY HISTORY:  Father  Still living? Unknown  FH: myocardial infarction, Age at diagnosis: Age Unknown  FH: type 2 diabetes, Age at diagnosis: Age Unknown

## 2023-11-30 NOTE — H&P PST ADULT - PROBLEM SELECTOR PLAN 1
Patient is scheduled for exploratory lap, SUJATHA, BSO, colon resection, tumor debulking, possible ostomy with Dr Jones on 12/12/23.  Medical evaluation pending Patient is scheduled for exploratory lap, SUJATHA, BSO, possible colon resection, tumor debulking, possible ostomy with Dr Jones on 12/12/23.  Medical evaluation pending

## 2023-11-30 NOTE — H&P PST ADULT - HISTORY OF PRESENT ILLNESS
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 She initially presented to  ED in the beginning of April with complaint of abdominal distention and ascites. She also admitted to intermittent postmenopausal bleeding over the last couple of months, possibly up to one year. On 4/7/23, she had a CT A/P which showed a very large amount of abdominal and pelvic ascites. On 4/8/23, she had a pelvic ultrasound which showed the endometrium is either severely thickened to 6 cm or is distended with complex material to 6 cm. There is a 2.5 x 1.6 x 1.7 cm shadowing echogenic focus with shadowing along the periphery of the endometrium, which may represent a calcified intramural versus submucosal fibroid, versus an endometrial lesion. On 4/10/23, she had a CT chest which shows no malignancy in the chest. Patient then underwent D&C, hysteroscopy on 4/10/23. Pathology revealed endometrium with endometrioid adenocarcinoma, FIGO grade 2, focally invasive, ER/CO(+), HER-2 negative, MMR intact; endocervix with fragments of endometrioid adenocarcinoma and benign endocervical tissue. She is also s/p paracentesis on 5/5/23 with 1300cc's ascites drained. On 4/14/23. she had a repeat CT A/P which showed interval decrease in the volume of ascites with scalloping of the right heart border suggesting malignant ascites. Peritoneal enhancement, nonspecific. Stable size of a central heterogeneous uterine mass. She saw Dr. Ashley on 5/17/23, with the plan for chemotherapy followed by reassessment to determine candidacy for interval surgery.  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 She initially presented to  ED in the beginning of April with complaint of abdominal distention and ascites. She also admitted to intermittent postmenopausal bleeding over the last couple of months, possibly up to one year. On 4/7/23, she had a CT A/P which showed a very large amount of abdominal and pelvic ascites. On 4/8/23, she had a pelvic ultrasound which showed the endometrium is either severely thickened to 6 cm or is distended with complex material to 6 cm. There is a 2.5 x 1.6 x 1.7 cm shadowing echogenic focus with shadowing along the periphery of the endometrium, which may represent a calcified intramural versus submucosal fibroid, versus an endometrial lesion. On 4/10/23, she had a CT chest which shows no malignancy in the chest. Patient then underwent D&C, hysteroscopy on 4/10/23. Pathology revealed endometrium with endometrioid adenocarcinoma, FIGO grade 2, focally invasive, ER/IL(+), HER-2 negative, MMR intact; endocervix with fragments of endometrioid adenocarcinoma and benign endocervical tissue. She is also s/p paracentesis on 5/5/23 with 1300cc's ascites drained. On 4/14/23. she had a repeat CT A/P which showed interval decrease in the volume of ascites with scalloping of the right heart border suggesting malignant ascites. Peritoneal enhancement, nonspecific. Stable size of a central heterogeneous uterine mass. She saw Dr. Ashley on 5/17/23, with the plan for chemotherapy followed by reassessment to determine candidacy for interval surgery.  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 She initially presented to  ED in the beginning of April with complaint of abdominal distention and ascites. She also admitted to intermittent postmenopausal bleeding over the last couple of months, possibly up to one year. On 4/7/23, she had a CT A/P which showed a very large amount of abdominal and pelvic ascites. On 4/8/23, she had a pelvic ultrasound which showed the endometrium is either severely thickened to 6 cm or is distended with complex material to 6 cm. There is a 2.5 x 1.6 x 1.7 cm shadowing echogenic focus with shadowing along the periphery of the endometrium, which may represent a calcified intramural versus submucosal fibroid, versus an endometrial lesion. On 4/10/23, she had a CT chest which shows no malignancy in the chest. Patient then underwent D&C, hysteroscopy on 4/10/23. Pathology revealed endometrium with endometrioid adenocarcinoma, FIGO grade 2, focally invasive, ER/DE(+), HER-2 negative, MMR intact; endocervix with fragments of endometrioid adenocarcinoma and benign endocervical tissue. She is also s/p paracentesis on 5/5/23 with 1300cc's ascites drained. On 4/14/23. she had a repeat CT A/P which showed interval decrease in the volume of ascites with scalloping of the right heart border suggesting malignant ascites. Peritoneal enhancement, nonspecific. Stable size of a central heterogeneous uterine mass. She saw Dr. Ashley on 5/17/23, with the plan for chemotherapy followed by reassessment to determine candidacy for interval surgery.  s/p completion of 8 rounds of chemotherapy  reports hearing less left ear  reports sense of taste is returning, appetite as returned    denies fever, chills, abdominal pain, nausea, vomiting, diarrhea, constipation  73 year old female with endometrioid adenocarcinoma diagnosed on 05/24/2023 after presenting to  ED with complaint of dyspnea on exertion, abdominal distention, and intermittent post menopausal bleeding.  CT A/P which showed a very large amount of abdominal and pelvic ascites. On 4/8/23, she had a pelvic ultrasound which showed the endometrium is either severely thickened to 6 cm or is distended with complex material to 6 cm. There is a 2.5 x 1.6 x 1.7 cm shadowing echogenic focus with shadowing along the periphery of the endometrium, which may represent a calcified intramural versus submucosal fibroid, versus an endometrial lesion. Patient then underwent D&C, hysteroscopy on 4/10/23. Pathology revealed endometrium with endometrioid adenocarcinoma, FIGO grade 2, focally invasive, ER/VT(+), HER-2 negative, MMR intact; endocervix with fragments of endometrioid adenocarcinoma and benign endocervical tissue. She is also s/p paracentesis on 5/5/23 with 1300cc's ascites drained. On 4/14/23. she had a repeat CT A/P which showed interval decrease in the volume of ascites with scalloping of the right heart border suggesting malignant ascites. Peritoneal enhancement, nonspecific. Stable size of a central heterogeneous uterine mass. Patient is s/p 8 rounds of chemotherapy, plan now is for surgery.  Today at [facility] she reports hearing loss left ear following chemotherapy, however she reports sense of taste is returning, appetite as returned . She denies fever, chills, abdominal pain, nausea, vomiting, diarrhea, constipation or dyspnea on exertion.  Patient is scheduled for exploratory lap, SUJATHA, BSO, colon resection, tumor debulking, possible ostomy with Dr Jones on 12/12/23.  Medical evaluation is pending  73 year old female with endometrioid adenocarcinoma diagnosed on 05/24/2023 after presenting to  ED with complaint of dyspnea on exertion, abdominal distention, and intermittent post menopausal bleeding.  CT A/P which showed a very large amount of abdominal and pelvic ascites. On 4/8/23, she had a pelvic ultrasound which showed the endometrium is either severely thickened to 6 cm or is distended with complex material to 6 cm. There is a 2.5 x 1.6 x 1.7 cm shadowing echogenic focus with shadowing along the periphery of the endometrium, which may represent a calcified intramural versus submucosal fibroid, versus an endometrial lesion. Patient then underwent D&C, hysteroscopy on 4/10/23. Pathology revealed endometrium with endometrioid adenocarcinoma, FIGO grade 2, focally invasive, ER/MO(+), HER-2 negative, MMR intact; endocervix with fragments of endometrioid adenocarcinoma and benign endocervical tissue. She is also s/p paracentesis on 5/5/23 with 1300cc's ascites drained. On 4/14/23. she had a repeat CT A/P which showed interval decrease in the volume of ascites with scalloping of the right heart border suggesting malignant ascites. Peritoneal enhancement, nonspecific. Stable size of a central heterogeneous uterine mass. Patient is s/p 8 rounds of chemotherapy, plan now is for surgery.  Today at [facility] she reports hearing loss left ear following chemotherapy, however she reports sense of taste is returning, appetite as returned . She denies fever, chills, abdominal pain, nausea, vomiting, diarrhea, constipation or dyspnea on exertion.  Patient is scheduled for exploratory lap, SUJATHA, BSO, colon resection, tumor debulking, possible ostomy with Dr Jones on 12/12/23.  Medical evaluation is pending  73 year old female with endometrioid adenocarcinoma diagnosed on 05/24/2023 after presenting to  ED with complaint of dyspnea on exertion, abdominal distention, and intermittent post menopausal bleeding.  CT A/P which showed a very large amount of abdominal and pelvic ascites. On 4/8/23, she had a pelvic ultrasound which showed the endometrium is either severely thickened to 6 cm or is distended with complex material to 6 cm. There is a 2.5 x 1.6 x 1.7 cm shadowing echogenic focus with shadowing along the periphery of the endometrium, which may represent a calcified intramural versus submucosal fibroid, versus an endometrial lesion. Patient then underwent D&C, hysteroscopy on 4/10/23. Pathology revealed endometrium with endometrioid adenocarcinoma, FIGO grade 2, focally invasive, ER/WV(+), HER-2 negative, MMR intact; endocervix with fragments of endometrioid adenocarcinoma and benign endocervical tissue. She is also s/p paracentesis on 5/5/23 with 1300cc's ascites drained. On 4/14/23. she had a repeat CT A/P which showed interval decrease in the volume of ascites with scalloping of the right heart border suggesting malignant ascites. Peritoneal enhancement, nonspecific. Stable size of a central heterogeneous uterine mass. Patient is s/p 8 rounds of chemotherapy, plan now is for surgery.  Today at [facility] she reports hearing loss left ear following chemotherapy, however she reports sense of taste is returning, appetite as returned . She denies fever, chills, abdominal pain, nausea, vomiting, diarrhea, constipation or dyspnea on exertion.  Patient is scheduled for exploratory lap, SUJATHA, BSO, colon resection, tumor debulking, possible ostomy with Dr Jones on 12/12/23.  Medical evaluation is pending  73 year old female with endometrioid adenocarcinoma diagnosed on 05/24/2023 after presenting to  ED with complaint of dyspnea on exertion, abdominal distention, and intermittent post menopausal bleeding.  CT A/P which showed a very large amount of abdominal and pelvic ascites. On 4/8/23, she had a pelvic ultrasound which showed the endometrium is either severely thickened to 6 cm or is distended with complex material to 6 cm. There is a 2.5 x 1.6 x 1.7 cm shadowing echogenic focus with shadowing along the periphery of the endometrium, which may represent a calcified intramural versus submucosal fibroid, versus an endometrial lesion. Patient then underwent D&C, hysteroscopy on 4/10/23. Pathology revealed endometrium with endometrioid adenocarcinoma, FIGO grade 2, focally invasive, ER/OH(+), HER-2 negative, MMR intact; endocervix with fragments of endometrioid adenocarcinoma and benign endocervical tissue. She is also s/p paracentesis on 5/5/23 with 1300cc's ascites drained. On 4/14/23. she had a repeat CT A/P which showed interval decrease in the volume of ascites with scalloping of the right heart border suggesting malignant ascites. Peritoneal enhancement, nonspecific. Stable size of a central heterogeneous uterine mass. Patient is s/p 8 rounds of chemotherapy, plan now is for surgery.  Today at [facility] she reports hearing loss left ear following chemotherapy, however she reports sense of taste is returning, appetite as returned . She denies fever, chills, abdominal pain, nausea, vomiting, diarrhea, constipation or dyspnea on exertion.  Patient is scheduled for exploratory lap, SUJATHA, BSO, possible colon resection, tumor debulking, possible ostomy with Dr Jones on 12/12/23.  Medical evaluation is pending  73 year old female with endometrioid adenocarcinoma diagnosed on 05/24/2023 after presenting to  ED with complaint of dyspnea on exertion, abdominal distention, and intermittent post menopausal bleeding.  CT A/P which showed a very large amount of abdominal and pelvic ascites. On 4/8/23, she had a pelvic ultrasound which showed the endometrium is either severely thickened to 6 cm or is distended with complex material to 6 cm. There is a 2.5 x 1.6 x 1.7 cm shadowing echogenic focus with shadowing along the periphery of the endometrium, which may represent a calcified intramural versus submucosal fibroid, versus an endometrial lesion. Patient then underwent D&C, hysteroscopy on 4/10/23. Pathology revealed endometrium with endometrioid adenocarcinoma, FIGO grade 2, focally invasive, ER/OR(+), HER-2 negative, MMR intact; endocervix with fragments of endometrioid adenocarcinoma and benign endocervical tissue. She is also s/p paracentesis on 5/5/23 with 1300cc's ascites drained. On 4/14/23. she had a repeat CT A/P which showed interval decrease in the volume of ascites with scalloping of the right heart border suggesting malignant ascites. Peritoneal enhancement, nonspecific. Stable size of a central heterogeneous uterine mass. Patient is s/p 8 rounds of chemotherapy, plan now is for surgery.  Today at [facility] she reports hearing loss left ear following chemotherapy, however she reports sense of taste is returning, appetite as returned . She denies fever, chills, abdominal pain, nausea, vomiting, diarrhea, constipation or dyspnea on exertion.  Patient is scheduled for exploratory lap, SUJATHA, BSO, possible colon resection, tumor debulking, possible ostomy with Dr Jones on 12/12/23.  Medical evaluation is pending  73 year old female with endometrioid adenocarcinoma diagnosed on 05/24/2023 after presenting to  ED with complaint of dyspnea on exertion, abdominal distention, and intermittent post menopausal bleeding.  CT A/P which showed a very large amount of abdominal and pelvic ascites. On 4/8/23, she had a pelvic ultrasound which showed the endometrium is either severely thickened to 6 cm or is distended with complex material to 6 cm. There is a 2.5 x 1.6 x 1.7 cm shadowing echogenic focus with shadowing along the periphery of the endometrium, which may represent a calcified intramural versus submucosal fibroid, versus an endometrial lesion. Patient then underwent D&C, hysteroscopy on 4/10/23. Pathology revealed endometrium with endometrioid adenocarcinoma, FIGO grade 2, focally invasive, ER/PA(+), HER-2 negative, MMR intact; endocervix with fragments of endometrioid adenocarcinoma and benign endocervical tissue. She is also s/p paracentesis on 5/5/23 with 1300cc's ascites drained. On 4/14/23. she had a repeat CT A/P which showed interval decrease in the volume of ascites with scalloping of the right heart border suggesting malignant ascites. Peritoneal enhancement, nonspecific. Stable size of a central heterogeneous uterine mass. Patient is s/p 8 rounds of chemotherapy, plan now is for surgery.  Today at [facility] she reports hearing loss left ear following chemotherapy, however she reports sense of taste is returning, appetite as returned . She denies fever, chills, abdominal pain, nausea, vomiting, diarrhea, constipation or dyspnea on exertion.  Patient is scheduled for exploratory lap, SUJATHA, BSO, possible colon resection, tumor debulking, possible ostomy with Dr Jones on 12/12/23.  Medical evaluation is pending

## 2023-11-30 NOTE — H&P PST ADULT - NSICDXPASTSURGICALHX_GEN_ALL_CORE_FT
PAST SURGICAL HISTORY:  [place] teeth removed      PAST SURGICAL HISTORY:  [place] teeth removed      PAST SURGICAL HISTORY:  [place] teeth removed

## 2023-12-06 ENCOUNTER — APPOINTMENT (OUTPATIENT)
Dept: INTERNAL MEDICINE | Facility: CLINIC | Age: 73
End: 2023-12-06
Payer: MEDICARE

## 2023-12-06 VITALS
SYSTOLIC BLOOD PRESSURE: 142 MMHG | HEIGHT: 65 IN | OXYGEN SATURATION: 100 % | DIASTOLIC BLOOD PRESSURE: 70 MMHG | BODY MASS INDEX: 30.49 KG/M2 | HEART RATE: 70 BPM | WEIGHT: 183 LBS | TEMPERATURE: 97.3 F | RESPIRATION RATE: 16 BRPM

## 2023-12-06 DIAGNOSIS — L98.9 DISORDER OF THE SKIN AND SUBCUTANEOUS TISSUE, UNSPECIFIED: ICD-10-CM

## 2023-12-06 DIAGNOSIS — Z01.818 ENCOUNTER FOR OTHER PREPROCEDURAL EXAMINATION: ICD-10-CM

## 2023-12-06 DIAGNOSIS — C54.1 MALIGNANT NEOPLASM OF ENDOMETRIUM: ICD-10-CM

## 2023-12-06 DIAGNOSIS — N18.31 CHRONIC KIDNEY DISEASE, STAGE 3A: ICD-10-CM

## 2023-12-06 DIAGNOSIS — D64.9 ANEMIA, UNSPECIFIED: ICD-10-CM

## 2023-12-06 PROCEDURE — 99214 OFFICE O/P EST MOD 30 MIN: CPT

## 2023-12-06 RX ORDER — FUROSEMIDE 20 MG/1
20 TABLET ORAL
Qty: 30 | Refills: 0 | Status: DISCONTINUED | COMMUNITY
Start: 2023-08-01 | End: 2023-12-06

## 2023-12-06 RX ORDER — MAGNESIUM OXIDE 241.3 MG/1000MG
400 TABLET ORAL TWICE DAILY
Qty: 60 | Refills: 1 | Status: DISCONTINUED | COMMUNITY
Start: 2023-10-09 | End: 2023-12-06

## 2023-12-06 RX ORDER — ONDANSETRON 8 MG/1
8 TABLET, ORALLY DISINTEGRATING ORAL
Qty: 90 | Refills: 3 | Status: DISCONTINUED | COMMUNITY
Start: 2023-06-01 | End: 2023-12-06

## 2023-12-06 RX ORDER — PEMBROLIZUMAB 50 MG/2ML
INJECTION, POWDER, LYOPHILIZED, FOR SOLUTION INTRAVENOUS
Refills: 0 | Status: DISCONTINUED | COMMUNITY
End: 2023-12-06

## 2023-12-06 RX ORDER — CISPLATIN 1 MG/ML
INJECTION, SOLUTION INTRAVENOUS
Refills: 0 | Status: DISCONTINUED | COMMUNITY
End: 2023-12-06

## 2023-12-06 RX ORDER — PROCHLORPERAZINE MALEATE 10 MG/1
10 TABLET ORAL EVERY 6 HOURS
Qty: 120 | Refills: 2 | Status: DISCONTINUED | COMMUNITY
Start: 2023-06-01 | End: 2023-12-06

## 2023-12-07 PROBLEM — C54.1 ADENOCARCINOMA OF ENDOMETRIUM: Status: ACTIVE | Noted: 2023-05-17

## 2023-12-07 PROBLEM — D64.9 ANEMIA: Status: ACTIVE | Noted: 2023-12-06

## 2023-12-07 PROBLEM — N18.31 CHRONIC RENAL IMPAIRMENT, STAGE 3A: Status: ACTIVE | Noted: 2023-12-07

## 2023-12-07 PROBLEM — Z01.818 PRE-OP EVALUATION: Status: ACTIVE | Noted: 2023-12-06

## 2023-12-07 PROBLEM — L98.9 SKIN LESION: Status: ACTIVE | Noted: 2023-05-17

## 2023-12-07 LAB
HCT VFR BLD CALC: 30.5 %
HGB BLD-MCNC: 9.8 G/DL
MCHC RBC-ENTMCNC: 27.8 PG
MCHC RBC-ENTMCNC: 32.1 GM/DL
MCV RBC AUTO: 86.6 FL
PLATELET # BLD AUTO: 272 K/UL
RBC # BLD: 3.52 M/UL
RBC # FLD: 15.7 %
WBC # FLD AUTO: 8.41 K/UL

## 2023-12-11 ENCOUNTER — TRANSCRIPTION ENCOUNTER (OUTPATIENT)
Age: 73
End: 2023-12-11

## 2023-12-12 ENCOUNTER — TRANSCRIPTION ENCOUNTER (OUTPATIENT)
Age: 73
End: 2023-12-12

## 2023-12-12 ENCOUNTER — RESULT REVIEW (OUTPATIENT)
Age: 73
End: 2023-12-12

## 2023-12-12 ENCOUNTER — INPATIENT (INPATIENT)
Facility: HOSPITAL | Age: 73
LOS: 6 days | Discharge: HOME | DRG: 740 | End: 2023-12-19
Attending: OBSTETRICS & GYNECOLOGY | Admitting: OBSTETRICS & GYNECOLOGY
Payer: MEDICARE

## 2023-12-12 VITALS
OXYGEN SATURATION: 100 % | RESPIRATION RATE: 18 BRPM | WEIGHT: 184.31 LBS | SYSTOLIC BLOOD PRESSURE: 136 MMHG | HEIGHT: 65 IN | HEART RATE: 61 BPM | TEMPERATURE: 98 F | DIASTOLIC BLOOD PRESSURE: 64 MMHG

## 2023-12-12 DIAGNOSIS — K08.409 PARTIAL LOSS OF TEETH, UNSPECIFIED CAUSE, UNSPECIFIED CLASS: Chronic | ICD-10-CM

## 2023-12-12 DIAGNOSIS — C54.1 MALIGNANT NEOPLASM OF ENDOMETRIUM: ICD-10-CM

## 2023-12-12 LAB
HCT VFR BLD CALC: 27.2 % — LOW (ref 34.5–45)
HCT VFR BLD CALC: 27.2 % — LOW (ref 34.5–45)
HGB BLD-MCNC: 9.3 G/DL — LOW (ref 11.5–15.5)
HGB BLD-MCNC: 9.3 G/DL — LOW (ref 11.5–15.5)
MCHC RBC-ENTMCNC: 28.9 PG — SIGNIFICANT CHANGE UP (ref 27–34)
MCHC RBC-ENTMCNC: 28.9 PG — SIGNIFICANT CHANGE UP (ref 27–34)
MCHC RBC-ENTMCNC: 34.2 GM/DL — SIGNIFICANT CHANGE UP (ref 32–36)
MCHC RBC-ENTMCNC: 34.2 GM/DL — SIGNIFICANT CHANGE UP (ref 32–36)
MCV RBC AUTO: 84.5 FL — SIGNIFICANT CHANGE UP (ref 80–100)
MCV RBC AUTO: 84.5 FL — SIGNIFICANT CHANGE UP (ref 80–100)
PLATELET # BLD AUTO: 201 K/UL — SIGNIFICANT CHANGE UP (ref 150–400)
PLATELET # BLD AUTO: 201 K/UL — SIGNIFICANT CHANGE UP (ref 150–400)
RBC # BLD: 3.22 M/UL — LOW (ref 3.8–5.2)
RBC # BLD: 3.22 M/UL — LOW (ref 3.8–5.2)
RBC # FLD: 14.7 % — HIGH (ref 10.3–14.5)
RBC # FLD: 14.7 % — HIGH (ref 10.3–14.5)
WBC # BLD: 13.9 K/UL — HIGH (ref 3.8–10.5)
WBC # BLD: 13.9 K/UL — HIGH (ref 3.8–10.5)
WBC # FLD AUTO: 13.9 K/UL — HIGH (ref 3.8–10.5)
WBC # FLD AUTO: 13.9 K/UL — HIGH (ref 3.8–10.5)

## 2023-12-12 PROCEDURE — 88302 TISSUE EXAM BY PATHOLOGIST: CPT | Mod: 26

## 2023-12-12 PROCEDURE — 44005 FREEING OF BOWEL ADHESION: CPT

## 2023-12-12 PROCEDURE — 58953 TAH RAD DISSECT FOR DEBULK: CPT | Mod: 80

## 2023-12-12 PROCEDURE — 88307 TISSUE EXAM BY PATHOLOGIST: CPT | Mod: 26

## 2023-12-12 PROCEDURE — 58953 TAH RAD DISSECT FOR DEBULK: CPT

## 2023-12-12 PROCEDURE — 88305 TISSUE EXAM BY PATHOLOGIST: CPT | Mod: 26

## 2023-12-12 DEVICE — SURGICEL FIBRILLAR 4 X 4": Type: IMPLANTABLE DEVICE | Status: FUNCTIONAL

## 2023-12-12 DEVICE — LIGATING CLIPS WECK HORIZON LARGE (ORANGE) 6: Type: IMPLANTABLE DEVICE | Status: FUNCTIONAL

## 2023-12-12 DEVICE — LIGATING CLIPS WECK HORIZON MEDIUM (BLUE) 24: Type: IMPLANTABLE DEVICE | Status: FUNCTIONAL

## 2023-12-12 RX ORDER — SODIUM CHLORIDE 9 MG/ML
1000 INJECTION, SOLUTION INTRAVENOUS
Refills: 0 | Status: DISCONTINUED | OUTPATIENT
Start: 2023-12-15 | End: 2023-12-17

## 2023-12-12 RX ORDER — HYDROMORPHONE HYDROCHLORIDE 2 MG/ML
0.5 INJECTION INTRAMUSCULAR; INTRAVENOUS; SUBCUTANEOUS
Refills: 0 | Status: DISCONTINUED | OUTPATIENT
Start: 2023-12-15 | End: 2023-12-19

## 2023-12-12 RX ORDER — FENTANYL CITRATE 50 UG/ML
25 INJECTION INTRAVENOUS
Refills: 0 | Status: DISCONTINUED | OUTPATIENT
Start: 2023-12-15 | End: 2023-12-19

## 2023-12-12 RX ORDER — ACETAMINOPHEN 500 MG
975 TABLET ORAL ONCE
Refills: 0 | Status: COMPLETED | OUTPATIENT
Start: 2023-12-12 | End: 2023-12-12

## 2023-12-12 RX ORDER — VANCOMYCIN HCL 1 G
1250 VIAL (EA) INTRAVENOUS ONCE
Refills: 0 | Status: COMPLETED | OUTPATIENT
Start: 2023-12-12 | End: 2023-12-12

## 2023-12-12 RX ADMIN — Medication 166.67 MILLIGRAM(S): at 16:04

## 2023-12-12 RX ADMIN — Medication 975 MILLIGRAM(S): at 13:14

## 2023-12-12 NOTE — CONSULT NOTE ADULT - ASSESSMENT
72 yo f with metastatic endometrial cancer s/p ex-lap with concern for bowel injury.  After exploration and imaging reviewed, mist likely this corresponded to loculated collection from ascites/prior perforation/mets, however, this was not in communication with the lumen of the bowel.    Plan:   74 yo f with metastatic endometrial cancer s/p ex-lap with concern for bowel injury.  After exploration and imaging reviewed, mist likely this corresponded to loculated collection from ascites/prior perforation/mets, however, this was not in communication with the lumen of the bowel.    Plan:   74 yo f with metastatic endometrial cancer s/p ex-lap with concern for bowel injury.  After exploration and imaging reviewed, most likely this corresponded to loculated collection from ascites of unclear etiology, however, this was not in communication with the lumen of the bowel.    Plan:  Post op NGT due to extensive LUCY  hydrations  we shall follow

## 2023-12-12 NOTE — CONSULT NOTE ADULT - SUBJECTIVE AND OBJECTIVE BOX
SURGERY CONSULT  HPI reviewed as below.  74 yo F presented for SUJATHA after good response with chemotherapy for metastatic endometrial cancer with peritoneal implants.  Patient with imaging on september consistent with loculated fluid collection on mid abdomen.  During abdominal entry on Ex-lap there was spillage of what appeared to be enteric content, this was repaired with silk sutures and then there was noted an umbilical hernia with this protruding and all pre-peritoneal space had large amount of adhesions and surgery was consulted for possible bowel injury and to have access to the uterus.   On evaluation in OR patient was HDN normal.  Please see operative findings on brief op note.     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  CT A/P which showed a very large amount of abdominal and pelvic ascites. On 4/8/23, she had a pelvic ultrasound which showed the endometrium is either severely thickened to 6 cm or is distended with complex material to 6 cm. There is a 2.5 x 1.6 x 1.7 cm shadowing echogenic focus with shadowing along the periphery of the endometrium, which may represent a calcified intramural versus submucosal fibroid, versus an endometrial lesion. Patient then underwent D&C, hysteroscopy on 4/10/23. Pathology revealed endometrium with endometrioid adenocarcinoma, FIGO grade 2, focally invasive, ER/IN(+), HER-2 negative, MMR intact; endocervix with fragments of endometrioid adenocarcinoma and benign endocervical tissue. She is also s/p paracentesis on 5/5/23 with 1300cc's ascites drained. On 4/14/23. she had a repeat CT A/P which showed interval decrease in the volume of ascites with scalloping of the right heart border suggesting malignant ascites. Peritoneal enhancement, nonspecific. Stable size of a central heterogeneous uterine mass. Patient is s/p 8 rounds of chemotherapy, plan now is for surgery.  Today at [facility] she reports hearing loss left ear following chemotherapy, however she reports sense of taste is returning, appetite as returned . She denies fever, chills, abdominal pain, nausea, vomiting, diarrhea, constipation or dyspnea on exertion.  Patient is scheduled for exploratory lap, SUJATHA, BSO, possible colon resection, tumor debulking, possible ostomy with Dr Jones on 12/12/23.  Medical evaluation is pending  (30 Nov 2023 16:45)      PAST MEDICAL & SURGICAL HISTORY:  H/O ascites      Uterine mass      Endometrial ca      History of chemotherapy      [place] teeth removed        Home Meds: Home Medications:  furosemide 20 mg oral tablet: 1 tab(s) orally 4 times a week Take 2 tablets (40mg) on Monday, Wednesday, and Friday (12 Dec 2023 12:31)    Allergies: Allergies    No Known Allergies    Intolerances    Keflex (Other)    Soc:   Advanced Directives: Presumed Full Code     CURRENT MEDICATIONS:   --------------------------------------------------------------------------------------  Neurologic Medications    Respiratory Medications    Cardiovascular Medications    Gastrointestinal Medications    Genitourinary Medications    Hematologic/Oncologic Medications    Antimicrobial/Immunologic Medications  gentamicin   IVPB 420 milliGRAM(s) IV Intermittent once    Endocrine/Metabolic Medications    Topical/Other Medications    --------------------------------------------------------------------------------------    VITAL SIGNS, INS/OUTS (last 24 hours):  --------------------------------------------------------------------------------------  ICU Vital Signs Last 24 Hrs  T(C): 36.6 (12 Dec 2023 12:14), Max: 36.6 (12 Dec 2023 12:14)  T(F): 97.8 (12 Dec 2023 12:14), Max: 97.8 (12 Dec 2023 12:14)  HR: 61 (12 Dec 2023 12:14) (61 - 61)  BP: 136/64 (12 Dec 2023 12:14) (136/64 - 136/64)  BP(mean): --  ABP: --  ABP(mean): --  RR: 18 (12 Dec 2023 12:14) (18 - 18)  SpO2: 100% (12 Dec 2023 12:14) (100% - 100%)    O2 Parameters below as of 12 Dec 2023 12:14  Patient On (Oxygen Delivery Method): room air          I&O's Summary    --------------------------------------------------------------------------------------    PHYSICAL EXAM:  GENERAL: Intubated and sedated  Abdomen with midline laparotomy, no evident succus.   Please see brief op note for intra-op findings.       LABS  --------------------------------------------------------------------------------------  Labs:  CAPILLARY BLOOD GLUCOSE                              9.3    13.90 )-----------( 201      ( 12 Dec 2023 20:50 )             27.2                 LFTs:         Coags:                  --------------------------------------------------------------------------------------     SURGERY CONSULT  HPI reviewed as below.  72 yo F presented for SUJATHA after good response with chemotherapy for metastatic endometrial cancer with peritoneal implants.  Patient with imaging on september consistent with loculated fluid collection on mid abdomen.  During abdominal entry on Ex-lap there was spillage of what appeared to be enteric content, this was repaired with silk sutures and then there was noted an umbilical hernia with this protruding and all pre-peritoneal space had large amount of adhesions and surgery was consulted for possible bowel injury and to have access to the uterus.   On evaluation in OR patient was HDN normal.  Please see operative findings on brief op note.     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  CT A/P which showed a very large amount of abdominal and pelvic ascites. On 4/8/23, she had a pelvic ultrasound which showed the endometrium is either severely thickened to 6 cm or is distended with complex material to 6 cm. There is a 2.5 x 1.6 x 1.7 cm shadowing echogenic focus with shadowing along the periphery of the endometrium, which may represent a calcified intramural versus submucosal fibroid, versus an endometrial lesion. Patient then underwent D&C, hysteroscopy on 4/10/23. Pathology revealed endometrium with endometrioid adenocarcinoma, FIGO grade 2, focally invasive, ER/IL(+), HER-2 negative, MMR intact; endocervix with fragments of endometrioid adenocarcinoma and benign endocervical tissue. She is also s/p paracentesis on 5/5/23 with 1300cc's ascites drained. On 4/14/23. she had a repeat CT A/P which showed interval decrease in the volume of ascites with scalloping of the right heart border suggesting malignant ascites. Peritoneal enhancement, nonspecific. Stable size of a central heterogeneous uterine mass. Patient is s/p 8 rounds of chemotherapy, plan now is for surgery.  Today at [facility] she reports hearing loss left ear following chemotherapy, however she reports sense of taste is returning, appetite as returned . She denies fever, chills, abdominal pain, nausea, vomiting, diarrhea, constipation or dyspnea on exertion.  Patient is scheduled for exploratory lap, SUJATHA, BSO, possible colon resection, tumor debulking, possible ostomy with Dr Jones on 12/12/23.  Medical evaluation is pending  (30 Nov 2023 16:45)      PAST MEDICAL & SURGICAL HISTORY:  H/O ascites      Uterine mass      Endometrial ca      History of chemotherapy      [place] teeth removed        Home Meds: Home Medications:  furosemide 20 mg oral tablet: 1 tab(s) orally 4 times a week Take 2 tablets (40mg) on Monday, Wednesday, and Friday (12 Dec 2023 12:31)    Allergies: Allergies    No Known Allergies    Intolerances    Keflex (Other)    Soc:   Advanced Directives: Presumed Full Code     CURRENT MEDICATIONS:   --------------------------------------------------------------------------------------  Neurologic Medications    Respiratory Medications    Cardiovascular Medications    Gastrointestinal Medications    Genitourinary Medications    Hematologic/Oncologic Medications    Antimicrobial/Immunologic Medications  gentamicin   IVPB 420 milliGRAM(s) IV Intermittent once    Endocrine/Metabolic Medications    Topical/Other Medications    --------------------------------------------------------------------------------------    VITAL SIGNS, INS/OUTS (last 24 hours):  --------------------------------------------------------------------------------------  ICU Vital Signs Last 24 Hrs  T(C): 36.6 (12 Dec 2023 12:14), Max: 36.6 (12 Dec 2023 12:14)  T(F): 97.8 (12 Dec 2023 12:14), Max: 97.8 (12 Dec 2023 12:14)  HR: 61 (12 Dec 2023 12:14) (61 - 61)  BP: 136/64 (12 Dec 2023 12:14) (136/64 - 136/64)  BP(mean): --  ABP: --  ABP(mean): --  RR: 18 (12 Dec 2023 12:14) (18 - 18)  SpO2: 100% (12 Dec 2023 12:14) (100% - 100%)    O2 Parameters below as of 12 Dec 2023 12:14  Patient On (Oxygen Delivery Method): room air          I&O's Summary    --------------------------------------------------------------------------------------    PHYSICAL EXAM:  GENERAL: Intubated and sedated  Abdomen with midline laparotomy, no evident succus.   Please see brief op note for intra-op findings.       LABS  --------------------------------------------------------------------------------------  Labs:  CAPILLARY BLOOD GLUCOSE                              9.3    13.90 )-----------( 201      ( 12 Dec 2023 20:50 )             27.2                 LFTs:         Coags:                  --------------------------------------------------------------------------------------

## 2023-12-13 LAB
ALBUMIN SERPL ELPH-MCNC: 3.2 G/DL — LOW (ref 3.3–5.2)
ALBUMIN SERPL ELPH-MCNC: 3.2 G/DL — LOW (ref 3.3–5.2)
ALP SERPL-CCNC: 82 U/L — SIGNIFICANT CHANGE UP (ref 40–120)
ALP SERPL-CCNC: 82 U/L — SIGNIFICANT CHANGE UP (ref 40–120)
ALT FLD-CCNC: 10 U/L — SIGNIFICANT CHANGE UP
ALT FLD-CCNC: 10 U/L — SIGNIFICANT CHANGE UP
ANION GAP SERPL CALC-SCNC: 13 MMOL/L — SIGNIFICANT CHANGE UP (ref 5–17)
ANION GAP SERPL CALC-SCNC: 13 MMOL/L — SIGNIFICANT CHANGE UP (ref 5–17)
ANION GAP SERPL CALC-SCNC: 15 MMOL/L — SIGNIFICANT CHANGE UP (ref 5–17)
ANION GAP SERPL CALC-SCNC: 15 MMOL/L — SIGNIFICANT CHANGE UP (ref 5–17)
APPEARANCE UR: ABNORMAL
APPEARANCE UR: ABNORMAL
APTT BLD: 23.1 SEC — LOW (ref 24.5–35.6)
APTT BLD: 23.1 SEC — LOW (ref 24.5–35.6)
AST SERPL-CCNC: 24 U/L — SIGNIFICANT CHANGE UP
AST SERPL-CCNC: 24 U/L — SIGNIFICANT CHANGE UP
BACTERIA # UR AUTO: ABNORMAL /HPF
BACTERIA # UR AUTO: ABNORMAL /HPF
BASOPHILS # BLD AUTO: 0.05 K/UL — SIGNIFICANT CHANGE UP (ref 0–0.2)
BASOPHILS # BLD AUTO: 0.05 K/UL — SIGNIFICANT CHANGE UP (ref 0–0.2)
BASOPHILS # BLD AUTO: 0.06 K/UL — SIGNIFICANT CHANGE UP (ref 0–0.2)
BASOPHILS # BLD AUTO: 0.06 K/UL — SIGNIFICANT CHANGE UP (ref 0–0.2)
BASOPHILS NFR BLD AUTO: 0.2 % — SIGNIFICANT CHANGE UP (ref 0–2)
BASOPHILS NFR BLD AUTO: 0.2 % — SIGNIFICANT CHANGE UP (ref 0–2)
BASOPHILS NFR BLD AUTO: 0.3 % — SIGNIFICANT CHANGE UP (ref 0–2)
BASOPHILS NFR BLD AUTO: 0.3 % — SIGNIFICANT CHANGE UP (ref 0–2)
BILIRUB SERPL-MCNC: 0.6 MG/DL — SIGNIFICANT CHANGE UP (ref 0.4–2)
BILIRUB SERPL-MCNC: 0.6 MG/DL — SIGNIFICANT CHANGE UP (ref 0.4–2)
BILIRUB UR-MCNC: NEGATIVE — SIGNIFICANT CHANGE UP
BILIRUB UR-MCNC: NEGATIVE — SIGNIFICANT CHANGE UP
BUN SERPL-MCNC: 18 MG/DL — SIGNIFICANT CHANGE UP (ref 8–20)
BUN SERPL-MCNC: 18 MG/DL — SIGNIFICANT CHANGE UP (ref 8–20)
BUN SERPL-MCNC: 19.5 MG/DL — SIGNIFICANT CHANGE UP (ref 8–20)
BUN SERPL-MCNC: 19.5 MG/DL — SIGNIFICANT CHANGE UP (ref 8–20)
CALCIUM SERPL-MCNC: 8.6 MG/DL — SIGNIFICANT CHANGE UP (ref 8.4–10.5)
CALCIUM SERPL-MCNC: 8.6 MG/DL — SIGNIFICANT CHANGE UP (ref 8.4–10.5)
CALCIUM SERPL-MCNC: 8.7 MG/DL — SIGNIFICANT CHANGE UP (ref 8.4–10.5)
CALCIUM SERPL-MCNC: 8.7 MG/DL — SIGNIFICANT CHANGE UP (ref 8.4–10.5)
CAST: 3 /LPF — SIGNIFICANT CHANGE UP (ref 0–4)
CAST: 3 /LPF — SIGNIFICANT CHANGE UP (ref 0–4)
CHLORIDE SERPL-SCNC: 103 MMOL/L — SIGNIFICANT CHANGE UP (ref 96–108)
CHLORIDE SERPL-SCNC: 103 MMOL/L — SIGNIFICANT CHANGE UP (ref 96–108)
CHLORIDE SERPL-SCNC: 99 MMOL/L — SIGNIFICANT CHANGE UP (ref 96–108)
CHLORIDE SERPL-SCNC: 99 MMOL/L — SIGNIFICANT CHANGE UP (ref 96–108)
CO2 SERPL-SCNC: 21 MMOL/L — LOW (ref 22–29)
CO2 SERPL-SCNC: 21 MMOL/L — LOW (ref 22–29)
CO2 SERPL-SCNC: 22 MMOL/L — SIGNIFICANT CHANGE UP (ref 22–29)
CO2 SERPL-SCNC: 22 MMOL/L — SIGNIFICANT CHANGE UP (ref 22–29)
COLOR SPEC: ABNORMAL
COLOR SPEC: ABNORMAL
CREAT SERPL-MCNC: 1.13 MG/DL — SIGNIFICANT CHANGE UP (ref 0.5–1.3)
CREAT SERPL-MCNC: 1.13 MG/DL — SIGNIFICANT CHANGE UP (ref 0.5–1.3)
CREAT SERPL-MCNC: 1.17 MG/DL — SIGNIFICANT CHANGE UP (ref 0.5–1.3)
CREAT SERPL-MCNC: 1.17 MG/DL — SIGNIFICANT CHANGE UP (ref 0.5–1.3)
DIFF PNL FLD: ABNORMAL
DIFF PNL FLD: ABNORMAL
EGFR: 49 ML/MIN/1.73M2 — LOW
EGFR: 49 ML/MIN/1.73M2 — LOW
EGFR: 51 ML/MIN/1.73M2 — LOW
EGFR: 51 ML/MIN/1.73M2 — LOW
EOSINOPHIL # BLD AUTO: 0 K/UL — SIGNIFICANT CHANGE UP (ref 0–0.5)
EOSINOPHIL NFR BLD AUTO: 0 % — SIGNIFICANT CHANGE UP (ref 0–6)
GLUCOSE BLDC GLUCOMTR-MCNC: 108 MG/DL — HIGH (ref 70–99)
GLUCOSE SERPL-MCNC: 193 MG/DL — HIGH (ref 70–99)
GLUCOSE SERPL-MCNC: 193 MG/DL — HIGH (ref 70–99)
GLUCOSE SERPL-MCNC: 206 MG/DL — HIGH (ref 70–99)
GLUCOSE SERPL-MCNC: 206 MG/DL — HIGH (ref 70–99)
GLUCOSE UR QL: 250 MG/DL
GLUCOSE UR QL: 250 MG/DL
HCT VFR BLD CALC: 29.4 % — LOW (ref 34.5–45)
HCT VFR BLD CALC: 29.4 % — LOW (ref 34.5–45)
HCT VFR BLD CALC: 30.4 % — LOW (ref 34.5–45)
HCT VFR BLD CALC: 30.4 % — LOW (ref 34.5–45)
HGB BLD-MCNC: 10.1 G/DL — LOW (ref 11.5–15.5)
HGB BLD-MCNC: 10.1 G/DL — LOW (ref 11.5–15.5)
HGB BLD-MCNC: 9.5 G/DL — LOW (ref 11.5–15.5)
HGB BLD-MCNC: 9.5 G/DL — LOW (ref 11.5–15.5)
IMM GRANULOCYTES NFR BLD AUTO: 0.4 % — SIGNIFICANT CHANGE UP (ref 0–0.9)
IMM GRANULOCYTES NFR BLD AUTO: 0.4 % — SIGNIFICANT CHANGE UP (ref 0–0.9)
IMM GRANULOCYTES NFR BLD AUTO: 0.6 % — SIGNIFICANT CHANGE UP (ref 0–0.9)
IMM GRANULOCYTES NFR BLD AUTO: 0.6 % — SIGNIFICANT CHANGE UP (ref 0–0.9)
INR BLD: 1.1 RATIO — SIGNIFICANT CHANGE UP (ref 0.85–1.18)
INR BLD: 1.1 RATIO — SIGNIFICANT CHANGE UP (ref 0.85–1.18)
KETONES UR-MCNC: ABNORMAL MG/DL
KETONES UR-MCNC: ABNORMAL MG/DL
LACTATE SERPL-SCNC: 1.4 MMOL/L — SIGNIFICANT CHANGE UP (ref 0.5–2)
LACTATE SERPL-SCNC: 1.4 MMOL/L — SIGNIFICANT CHANGE UP (ref 0.5–2)
LACTATE SERPL-SCNC: 2.5 MMOL/L — HIGH (ref 0.5–2)
LACTATE SERPL-SCNC: 2.5 MMOL/L — HIGH (ref 0.5–2)
LACTATE SERPL-SCNC: 3.7 MMOL/L — HIGH (ref 0.5–2)
LACTATE SERPL-SCNC: 3.7 MMOL/L — HIGH (ref 0.5–2)
LACTATE SERPL-SCNC: 5.2 MMOL/L — CRITICAL HIGH (ref 0.5–2)
LACTATE SERPL-SCNC: 5.2 MMOL/L — CRITICAL HIGH (ref 0.5–2)
LEUKOCYTE ESTERASE UR-ACNC: ABNORMAL
LEUKOCYTE ESTERASE UR-ACNC: ABNORMAL
LYMPHOCYTES # BLD AUTO: 1.11 K/UL — SIGNIFICANT CHANGE UP (ref 1–3.3)
LYMPHOCYTES # BLD AUTO: 1.11 K/UL — SIGNIFICANT CHANGE UP (ref 1–3.3)
LYMPHOCYTES # BLD AUTO: 1.53 K/UL — SIGNIFICANT CHANGE UP (ref 1–3.3)
LYMPHOCYTES # BLD AUTO: 1.53 K/UL — SIGNIFICANT CHANGE UP (ref 1–3.3)
LYMPHOCYTES # BLD AUTO: 4.9 % — LOW (ref 13–44)
LYMPHOCYTES # BLD AUTO: 4.9 % — LOW (ref 13–44)
LYMPHOCYTES # BLD AUTO: 8 % — LOW (ref 13–44)
LYMPHOCYTES # BLD AUTO: 8 % — LOW (ref 13–44)
MAGNESIUM SERPL-MCNC: 1.2 MG/DL — LOW (ref 1.6–2.6)
MAGNESIUM SERPL-MCNC: 1.2 MG/DL — LOW (ref 1.6–2.6)
MAGNESIUM SERPL-MCNC: 1.3 MG/DL — LOW (ref 1.8–2.6)
MAGNESIUM SERPL-MCNC: 1.3 MG/DL — LOW (ref 1.8–2.6)
MCHC RBC-ENTMCNC: 27.9 PG — SIGNIFICANT CHANGE UP (ref 27–34)
MCHC RBC-ENTMCNC: 27.9 PG — SIGNIFICANT CHANGE UP (ref 27–34)
MCHC RBC-ENTMCNC: 28.8 PG — SIGNIFICANT CHANGE UP (ref 27–34)
MCHC RBC-ENTMCNC: 28.8 PG — SIGNIFICANT CHANGE UP (ref 27–34)
MCHC RBC-ENTMCNC: 32.3 GM/DL — SIGNIFICANT CHANGE UP (ref 32–36)
MCHC RBC-ENTMCNC: 32.3 GM/DL — SIGNIFICANT CHANGE UP (ref 32–36)
MCHC RBC-ENTMCNC: 33.2 GM/DL — SIGNIFICANT CHANGE UP (ref 32–36)
MCHC RBC-ENTMCNC: 33.2 GM/DL — SIGNIFICANT CHANGE UP (ref 32–36)
MCV RBC AUTO: 86.2 FL — SIGNIFICANT CHANGE UP (ref 80–100)
MCV RBC AUTO: 86.2 FL — SIGNIFICANT CHANGE UP (ref 80–100)
MCV RBC AUTO: 86.6 FL — SIGNIFICANT CHANGE UP (ref 80–100)
MCV RBC AUTO: 86.6 FL — SIGNIFICANT CHANGE UP (ref 80–100)
MONOCYTES # BLD AUTO: 1.28 K/UL — HIGH (ref 0–0.9)
MONOCYTES # BLD AUTO: 1.28 K/UL — HIGH (ref 0–0.9)
MONOCYTES # BLD AUTO: 1.74 K/UL — HIGH (ref 0–0.9)
MONOCYTES # BLD AUTO: 1.74 K/UL — HIGH (ref 0–0.9)
MONOCYTES NFR BLD AUTO: 6.7 % — SIGNIFICANT CHANGE UP (ref 2–14)
MONOCYTES NFR BLD AUTO: 6.7 % — SIGNIFICANT CHANGE UP (ref 2–14)
MONOCYTES NFR BLD AUTO: 7.7 % — SIGNIFICANT CHANGE UP (ref 2–14)
MONOCYTES NFR BLD AUTO: 7.7 % — SIGNIFICANT CHANGE UP (ref 2–14)
NEUTROPHILS # BLD AUTO: 16.07 K/UL — HIGH (ref 1.8–7.4)
NEUTROPHILS # BLD AUTO: 16.07 K/UL — HIGH (ref 1.8–7.4)
NEUTROPHILS # BLD AUTO: 19.44 K/UL — HIGH (ref 1.8–7.4)
NEUTROPHILS # BLD AUTO: 19.44 K/UL — HIGH (ref 1.8–7.4)
NEUTROPHILS NFR BLD AUTO: 84.6 % — HIGH (ref 43–77)
NEUTROPHILS NFR BLD AUTO: 84.6 % — HIGH (ref 43–77)
NEUTROPHILS NFR BLD AUTO: 86.6 % — HIGH (ref 43–77)
NEUTROPHILS NFR BLD AUTO: 86.6 % — HIGH (ref 43–77)
NITRITE UR-MCNC: NEGATIVE — SIGNIFICANT CHANGE UP
NITRITE UR-MCNC: NEGATIVE — SIGNIFICANT CHANGE UP
PH UR: 5.5 — SIGNIFICANT CHANGE UP (ref 5–8)
PH UR: 5.5 — SIGNIFICANT CHANGE UP (ref 5–8)
PHOSPHATE SERPL-MCNC: 3.5 MG/DL — SIGNIFICANT CHANGE UP (ref 2.4–4.7)
PHOSPHATE SERPL-MCNC: 3.5 MG/DL — SIGNIFICANT CHANGE UP (ref 2.4–4.7)
PHOSPHATE SERPL-MCNC: 4.3 MG/DL — SIGNIFICANT CHANGE UP (ref 2.4–4.7)
PHOSPHATE SERPL-MCNC: 4.3 MG/DL — SIGNIFICANT CHANGE UP (ref 2.4–4.7)
PLATELET # BLD AUTO: 258 K/UL — SIGNIFICANT CHANGE UP (ref 150–400)
PLATELET # BLD AUTO: 258 K/UL — SIGNIFICANT CHANGE UP (ref 150–400)
PLATELET # BLD AUTO: 281 K/UL — SIGNIFICANT CHANGE UP (ref 150–400)
PLATELET # BLD AUTO: 281 K/UL — SIGNIFICANT CHANGE UP (ref 150–400)
POTASSIUM SERPL-MCNC: 3.6 MMOL/L — SIGNIFICANT CHANGE UP (ref 3.5–5.3)
POTASSIUM SERPL-MCNC: 3.6 MMOL/L — SIGNIFICANT CHANGE UP (ref 3.5–5.3)
POTASSIUM SERPL-MCNC: 3.9 MMOL/L — SIGNIFICANT CHANGE UP (ref 3.5–5.3)
POTASSIUM SERPL-MCNC: 3.9 MMOL/L — SIGNIFICANT CHANGE UP (ref 3.5–5.3)
POTASSIUM SERPL-SCNC: 3.6 MMOL/L — SIGNIFICANT CHANGE UP (ref 3.5–5.3)
POTASSIUM SERPL-SCNC: 3.6 MMOL/L — SIGNIFICANT CHANGE UP (ref 3.5–5.3)
POTASSIUM SERPL-SCNC: 3.9 MMOL/L — SIGNIFICANT CHANGE UP (ref 3.5–5.3)
POTASSIUM SERPL-SCNC: 3.9 MMOL/L — SIGNIFICANT CHANGE UP (ref 3.5–5.3)
PROT SERPL-MCNC: 6.3 G/DL — LOW (ref 6.6–8.7)
PROT SERPL-MCNC: 6.3 G/DL — LOW (ref 6.6–8.7)
PROT UR-MCNC: 100 MG/DL
PROT UR-MCNC: 100 MG/DL
PROTHROM AB SERPL-ACNC: 12.2 SEC — SIGNIFICANT CHANGE UP (ref 9.5–13)
PROTHROM AB SERPL-ACNC: 12.2 SEC — SIGNIFICANT CHANGE UP (ref 9.5–13)
RBC # BLD: 3.41 M/UL — LOW (ref 3.8–5.2)
RBC # BLD: 3.41 M/UL — LOW (ref 3.8–5.2)
RBC # BLD: 3.51 M/UL — LOW (ref 3.8–5.2)
RBC # BLD: 3.51 M/UL — LOW (ref 3.8–5.2)
RBC # FLD: 14.7 % — HIGH (ref 10.3–14.5)
RBC # FLD: 14.7 % — HIGH (ref 10.3–14.5)
RBC # FLD: 14.9 % — HIGH (ref 10.3–14.5)
RBC # FLD: 14.9 % — HIGH (ref 10.3–14.5)
RBC CASTS # UR COMP ASSIST: 90 /HPF — HIGH (ref 0–4)
RBC CASTS # UR COMP ASSIST: 90 /HPF — HIGH (ref 0–4)
SODIUM SERPL-SCNC: 136 MMOL/L — SIGNIFICANT CHANGE UP (ref 135–145)
SODIUM SERPL-SCNC: 136 MMOL/L — SIGNIFICANT CHANGE UP (ref 135–145)
SODIUM SERPL-SCNC: 137 MMOL/L — SIGNIFICANT CHANGE UP (ref 135–145)
SODIUM SERPL-SCNC: 137 MMOL/L — SIGNIFICANT CHANGE UP (ref 135–145)
SP GR SPEC: 1.03 — SIGNIFICANT CHANGE UP (ref 1–1.03)
SP GR SPEC: 1.03 — SIGNIFICANT CHANGE UP (ref 1–1.03)
SQUAMOUS # UR AUTO: 25 /HPF — HIGH (ref 0–5)
SQUAMOUS # UR AUTO: 25 /HPF — HIGH (ref 0–5)
UROBILINOGEN FLD QL: 1 MG/DL — SIGNIFICANT CHANGE UP (ref 0.2–1)
UROBILINOGEN FLD QL: 1 MG/DL — SIGNIFICANT CHANGE UP (ref 0.2–1)
WBC # BLD: 19.02 K/UL — HIGH (ref 3.8–10.5)
WBC # BLD: 19.02 K/UL — HIGH (ref 3.8–10.5)
WBC # BLD: 22.47 K/UL — HIGH (ref 3.8–10.5)
WBC # BLD: 22.47 K/UL — HIGH (ref 3.8–10.5)
WBC # FLD AUTO: 19.02 K/UL — HIGH (ref 3.8–10.5)
WBC # FLD AUTO: 19.02 K/UL — HIGH (ref 3.8–10.5)
WBC # FLD AUTO: 22.47 K/UL — HIGH (ref 3.8–10.5)
WBC # FLD AUTO: 22.47 K/UL — HIGH (ref 3.8–10.5)
WBC UR QL: 12 /HPF — HIGH (ref 0–5)
WBC UR QL: 12 /HPF — HIGH (ref 0–5)

## 2023-12-13 PROCEDURE — 93010 ELECTROCARDIOGRAM REPORT: CPT

## 2023-12-13 PROCEDURE — 99024 POSTOP FOLLOW-UP VISIT: CPT

## 2023-12-13 RX ORDER — ONDANSETRON 8 MG/1
4 TABLET, FILM COATED ORAL EVERY 6 HOURS
Refills: 0 | Status: DISCONTINUED | OUTPATIENT
Start: 2023-12-13 | End: 2023-12-13

## 2023-12-13 RX ORDER — ONDANSETRON 8 MG/1
4 TABLET, FILM COATED ORAL EVERY 4 HOURS
Refills: 0 | Status: COMPLETED | OUTPATIENT
Start: 2023-12-13 | End: 2023-12-15

## 2023-12-13 RX ORDER — SODIUM CHLORIDE 9 MG/ML
1000 INJECTION, SOLUTION INTRAVENOUS ONCE
Refills: 0 | Status: COMPLETED | OUTPATIENT
Start: 2023-12-13 | End: 2023-12-13

## 2023-12-13 RX ORDER — MAGNESIUM SULFATE 500 MG/ML
2 VIAL (ML) INJECTION ONCE
Refills: 0 | Status: COMPLETED | OUTPATIENT
Start: 2023-12-13 | End: 2023-12-13

## 2023-12-13 RX ORDER — SODIUM CHLORIDE 9 MG/ML
1000 INJECTION, SOLUTION INTRAVENOUS
Refills: 0 | Status: DISCONTINUED | OUTPATIENT
Start: 2023-12-13 | End: 2023-12-17

## 2023-12-13 RX ORDER — ENOXAPARIN SODIUM 100 MG/ML
40 INJECTION SUBCUTANEOUS EVERY 24 HOURS
Refills: 0 | Status: DISCONTINUED | OUTPATIENT
Start: 2023-12-13 | End: 2023-12-14

## 2023-12-13 RX ORDER — SIMETHICONE 80 MG/1
80 TABLET, CHEWABLE ORAL ONCE
Refills: 0 | Status: COMPLETED | OUTPATIENT
Start: 2023-12-13 | End: 2023-12-13

## 2023-12-13 RX ORDER — ALBUMIN HUMAN 25 %
250 VIAL (ML) INTRAVENOUS ONCE
Refills: 0 | Status: COMPLETED | OUTPATIENT
Start: 2023-12-13 | End: 2023-12-13

## 2023-12-13 RX ORDER — CHLORHEXIDINE GLUCONATE 213 G/1000ML
1 SOLUTION TOPICAL
Refills: 0 | Status: DISCONTINUED | OUTPATIENT
Start: 2023-12-13 | End: 2023-12-13

## 2023-12-13 RX ORDER — ACETAMINOPHEN 500 MG
1000 TABLET ORAL EVERY 6 HOURS
Refills: 0 | Status: COMPLETED | OUTPATIENT
Start: 2023-12-13 | End: 2023-12-14

## 2023-12-13 RX ORDER — POTASSIUM CHLORIDE 20 MEQ
10 PACKET (EA) ORAL
Refills: 0 | Status: COMPLETED | OUTPATIENT
Start: 2023-12-13 | End: 2023-12-13

## 2023-12-13 RX ORDER — SODIUM CHLORIDE 9 MG/ML
1500 INJECTION, SOLUTION INTRAVENOUS ONCE
Refills: 0 | Status: COMPLETED | OUTPATIENT
Start: 2023-12-13 | End: 2023-12-13

## 2023-12-13 RX ORDER — SODIUM CHLORIDE 9 MG/ML
500 INJECTION INTRAMUSCULAR; INTRAVENOUS; SUBCUTANEOUS ONCE
Refills: 0 | Status: COMPLETED | OUTPATIENT
Start: 2023-12-13 | End: 2023-12-13

## 2023-12-13 RX ADMIN — CHLORHEXIDINE GLUCONATE 1 APPLICATION(S): 213 SOLUTION TOPICAL at 06:31

## 2023-12-13 RX ADMIN — Medication 125 MILLILITER(S): at 06:30

## 2023-12-13 RX ADMIN — SODIUM CHLORIDE 120 MILLILITER(S): 9 INJECTION, SOLUTION INTRAVENOUS at 20:14

## 2023-12-13 RX ADMIN — SIMETHICONE 80 MILLIGRAM(S): 80 TABLET, CHEWABLE ORAL at 13:32

## 2023-12-13 RX ADMIN — Medication 100 MILLIEQUIVALENT(S): at 07:46

## 2023-12-13 RX ADMIN — Medication 400 MILLIGRAM(S): at 11:26

## 2023-12-13 RX ADMIN — ONDANSETRON 4 MILLIGRAM(S): 8 TABLET, FILM COATED ORAL at 18:32

## 2023-12-13 RX ADMIN — SODIUM CHLORIDE 3000 MILLILITER(S): 9 INJECTION, SOLUTION INTRAVENOUS at 01:25

## 2023-12-13 RX ADMIN — ONDANSETRON 4 MILLIGRAM(S): 8 TABLET, FILM COATED ORAL at 06:10

## 2023-12-13 RX ADMIN — ONDANSETRON 4 MILLIGRAM(S): 8 TABLET, FILM COATED ORAL at 13:32

## 2023-12-13 RX ADMIN — SODIUM CHLORIDE 120 MILLILITER(S): 9 INJECTION, SOLUTION INTRAVENOUS at 07:45

## 2023-12-13 RX ADMIN — Medication 1000 MILLIGRAM(S): at 06:36

## 2023-12-13 RX ADMIN — Medication 400 MILLIGRAM(S): at 06:09

## 2023-12-13 RX ADMIN — SODIUM CHLORIDE 120 MILLILITER(S): 9 INJECTION, SOLUTION INTRAVENOUS at 09:53

## 2023-12-13 RX ADMIN — SODIUM CHLORIDE 2000 MILLILITER(S): 9 INJECTION, SOLUTION INTRAVENOUS at 08:18

## 2023-12-13 RX ADMIN — ENOXAPARIN SODIUM 40 MILLIGRAM(S): 100 INJECTION SUBCUTANEOUS at 06:10

## 2023-12-13 RX ADMIN — ONDANSETRON 4 MILLIGRAM(S): 8 TABLET, FILM COATED ORAL at 09:53

## 2023-12-13 RX ADMIN — Medication 1000 MILLIGRAM(S): at 12:10

## 2023-12-13 RX ADMIN — SODIUM CHLORIDE 120 MILLILITER(S): 9 INJECTION, SOLUTION INTRAVENOUS at 00:32

## 2023-12-13 RX ADMIN — SODIUM CHLORIDE 1000 MILLILITER(S): 9 INJECTION INTRAMUSCULAR; INTRAVENOUS; SUBCUTANEOUS at 05:28

## 2023-12-13 RX ADMIN — Medication 100 MILLIEQUIVALENT(S): at 05:30

## 2023-12-13 RX ADMIN — Medication 25 GRAM(S): at 04:58

## 2023-12-13 RX ADMIN — Medication 100 MILLIEQUIVALENT(S): at 06:35

## 2023-12-13 RX ADMIN — Medication 1000 MILLIGRAM(S): at 19:30

## 2023-12-13 RX ADMIN — Medication 400 MILLIGRAM(S): at 18:32

## 2023-12-13 NOTE — PROGRESS NOTE ADULT - ASSESSMENT
73y POD#1 s/p PEUA, ex lap, SUJATHA, BSO, tumor debulking, repair of umbilical hernia with drainage of abdominal collections, extensive LUCY; TAP BLOCK.     Cardiac: No active issues. BP well controlled   Pulmonary: No active disease.   Neuro: Pain well controlled with current regimen.   Endo: No active disease    GI: NPO, NGT removed minimal output  : Mosley in place. UOP initially low ON sp fluid boluses now improving 350cc from 6825-9593. Preop creatinine 1.22 -> 1.17  ID: No active disease. Preop WBC 8.82-> 22.47  Heme: Preop Hgb 9.8 > 9.5. SCDs when not ambulating, heparin for VTE prophylaxis.   Skin: No active disease. Dressing/wound CDI  Psych: no active problems   FEN: IVF 100cc/h and multiple boluses ON for low UOP which is now improving. Mg 1.2, Phos 3.5, K 3.6.     Dispo: Anticipate downgrade to Gyn Onc service today 73y POD#1 s/p PEUA, ex lap, SUJATHA, BSO, tumor debulking, repair of umbilical hernia with drainage of abdominal collections, extensive LUCY; TAP BLOCK.     Cardiac: No active issues. BP well controlled   Pulmonary: No active disease.   Neuro: Pain well controlled with current regimen.   Endo: No active disease    GI: NPO, NGT removed minimal output  : Mosley in place. UOP initially low ON sp fluid boluses now improving 350cc from 5198-0488. Preop creatinine 1.22 -> 1.17  ID: No active disease. Preop WBC 8.82-> 22.47  Heme: Preop Hgb 9.8 > 9.5. SCDs when not ambulating, heparin for VTE prophylaxis.   Skin: No active disease. Dressing/wound CDI  Psych: no active problems   FEN: IVF 100cc/h and multiple boluses ON for low UOP which is now improving. Mg 1.2, Phos 3.5, K 3.6.     Dispo: Anticipate downgrade to Gyn Onc service today 73y POD#1 s/p PEUA, ex lap, SUJATHA, BSO, tumor debulking, repair of umbilical hernia with drainage of abdominal collections, extensive LUCY; TAP BLOCK.     Cardiac: No active issues. BP well controlled   Pulmonary: No active disease.   Neuro: Pain well controlled with current regimen.   Endo: No active disease    GI: NPO, NGT removed minimal output  : Mosley in place. UOP initially low ON sp fluid boluses now improving 350cc from 2645-1190. Preop creatinine 1.22 -> 1.17  ID: No active disease. Preop WBC 8.82-> 22.47  Heme: Preop Hgb 9.8 > 9.5. SCDs when not ambulating, heparin for VTE prophylaxis.   Skin: No active disease. Dressing/wound CDI  Psych: no active problems   FEN: IVF 120cc/h and multiple boluses ON for low UOP which is now improving. Mg 1.2, Phos 3.5, K 3.6.     Dispo: Anticipate downgrade to Gyn Onc service today 73y POD#1 s/p PEUA, ex lap, SUJATHA, BSO, tumor debulking, repair of umbilical hernia with drainage of abdominal collections, extensive LUCY; TAP BLOCK.     Cardiac: No active issues. BP well controlled   Pulmonary: No active disease.   Neuro: Pain well controlled with current regimen.   Endo: No active disease    GI: NPO, NGT removed minimal output  : Mosley in place. UOP initially low ON sp fluid boluses now improving 350cc from 4064-3359. Preop creatinine 1.22 -> 1.17  ID: No active disease. Preop WBC 8.82-> 22.47  Heme: Preop Hgb 9.8 > 9.5. SCDs when not ambulating, heparin for VTE prophylaxis.   Skin: No active disease. Dressing/wound CDI  Psych: no active problems   FEN: IVF 120cc/h and multiple boluses ON for low UOP which is now improving. Mg 1.2, Phos 3.5, K 3.6.     Dispo: Anticipate downgrade to Gyn Onc service today

## 2023-12-13 NOTE — BRIEF OPERATIVE NOTE - SPECIMENS
1. Small bowel mesentery nodule, 2. Uterus, cervix, bilateral fallopian tube and ovaries, 3. Anterior rectal nodule, 4. Anterior hernia sac with collection
1. Small bowel mesentery nodule, 2. Uterus, cervix, bilateral fallopian tube and ovaries, 3. Anterior rectal nodule, 4. Anterior hernia sac with collection

## 2023-12-13 NOTE — BRIEF OPERATIVE NOTE - NSICDXBRIEFPROCEDURE_GEN_ALL_CORE_FT
PROCEDURES:  Exploratory laparotomy with total abdominal hysterectomy and bilateral salpingo-oophorectomy (SUJATHA-BSO) 13-Dec-2023 00:41:22  Keanu Sandhu  Lysis of pelvic adhesions 13-Dec-2023 00:41:34  Keanu Sandhu  
PROCEDURES:  Exploratory laparotomy with total abdominal hysterectomy and bilateral salpingo-oophorectomy (SUJATHA-BSO) 13-Dec-2023 00:41:22  Keanu Sandhu  Lysis of pelvic adhesions 13-Dec-2023 00:41:34  Keanu Sandhu

## 2023-12-13 NOTE — PROGRESS NOTE ADULT - ASSESSMENT
Pt is a 72 y/o female POD1 s/p ex-lap, SUJATHA-BSO. General surgery consulted intra-op for extensive lysis of adhesions, bowel ran without evidence of injury, excision of hernia sac.   - Monitor for return of bowel function  - Encourage pt to be OOB to chair as much as she can tolerate, ambulate if able, consider PT to assist if needed  - Pain control PRN  - DVT ppx w/ lovenox & SCDs  - Encourage frequent IS use  - Remainder of care per OBGYN - pt being downgraded from SICU, acute care surgery team will continue to follow once pt on the floor

## 2023-12-13 NOTE — PROGRESS NOTE ADULT - SUBJECTIVE AND OBJECTIVE BOX
GYNECOLOGIC ONCOLOGY PROGRESS NOTE    73y POD#1 s/p PEUA, ex lap, SUJATHA, BSO, tumor debulking, repair of umbilical hernia with drainage of abdominal collections, extensive LUCY; TAP BLOCK.     LILIAN CASTILLO is seen and examined at bedside.     SUBJECTIVE:  Pain well-controlled.  Flatus: No  Denies Nausea, Vomiting or Diarrhea.  Denies shortness of breath, chest pain or dyspnea on exertion.  NPO, NG tube in place ON now d/joan by SICU  Mosley in place draining well    OBJECTIVE:     VITALS:  T(F): 98.6 (12-13-23 @ 07:54), Max: 98.6 (12-13-23 @ 07:54)  HR: 91 (12-13-23 @ 08:00) (57 - 91)  BP: 117/51 (12-13-23 @ 08:00) (117/51 - 160/66)  RR: 17 (12-13-23 @ 08:00) (10 - 22)  SpO2: 100% (12-13-23 @ 08:00) (97% - 100%)  Wt(kg): --    I&O's Summary    12 Dec 2023 07:01  -  13 Dec 2023 07:00  --------------------------------------------------------  IN: 3510 mL / OUT: 250 mL / NET: 3260 mL    13 Dec 2023 07:01  -  13 Dec 2023 09:31  --------------------------------------------------------  IN: 1120 mL / OUT: 100 mL / NET: 1020 mL        MEDICATIONS  (STANDING):  acetaminophen   IVPB .. 1000 milliGRAM(s) IV Intermittent every 6 hours  chlorhexidine 2% Cloths 1 Application(s) Topical <User Schedule>  enoxaparin Injectable 40 milliGRAM(s) SubCutaneous every 24 hours  gentamicin   IVPB 420 milliGRAM(s) IV Intermittent once  multiple electrolytes Injection Type 1 1000 milliLiter(s) (120 mL/Hr) IV Continuous <Continuous>  ondansetron Injectable 4 milliGRAM(s) IV Push every 4 hours    MEDICATIONS  (PRN):      Physical Exam:  Constitutional: NAD  Pulmonary: clear to auscultation bilaterally.  Cardiovascular: Regular rate and rhythm   Abdomen: soft, non-tender, non-distended, normal bowel sounds, dressing dry  Extremities: no lower extremity edema or calf tenderness    LABS:                        9.5    22.47 )-----------( 258      ( 13 Dec 2023 03:23 )             29.4     12-13    136  |  99  |  19.5  ----------------------------<  193<H>  3.6   |  22.0  |  1.17    Ca    8.6      13 Dec 2023 03:23  Phos  3.5     12-13  Mg     1.2     12-13    TPro  6.3<L>  /  Alb  3.2<L>  /  TBili  0.6  /  DBili  x   /  AST  24  /  ALT  10  /  AlkPhos  82  12-13    PT/INR - ( 13 Dec 2023 03:23 )   PT: 12.2 sec;   INR: 1.10 ratio         PTT - ( 13 Dec 2023 03:23 )  PTT:23.1 sec  Urinalysis Basic - ( 13 Dec 2023 03:23 )    Color: x / Appearance: x / SG: x / pH: x  Gluc: 193 mg/dL / Ketone: x  / Bili: x / Urobili: x   Blood: x / Protein: x / Nitrite: x   Leuk Esterase: x / RBC: x / WBC x   Sq Epi: x / Non Sq Epi: x / Bacteria: x

## 2023-12-13 NOTE — PATIENT PROFILE ADULT - NSPROGENPREVTRANSF_GEN_A_NUR
Patient arrives to ER by EMS for fall tonight at [facility]. Patient states she rolled over while sleeping and fell out of bed, hitting the back of her head. Patient reports pain 3/10 on numeric scale for posterior head. Patient denies SOB, CP, visual changes, fever/chills, or dizziness. Patient states she bruises easily and takes Aspirin daily. Patient has hematoma on left wrist with ROM WNL. Patient also has skin tear on right posterior lower leg that is weeping. no

## 2023-12-13 NOTE — PROGRESS NOTE ADULT - SUBJECTIVE AND OBJECTIVE BOX
SUBJECTIVE / 24H EVENTS: Patient seen and examined at bedside. She reports abdominal pain is minimal. States she had mild nausea this AM, improved w/ zofran, no emesis. Denies having yet passed gas or had a BM. NGT output was minimal & discontinued earlier this AM. Given additional IVF this AM for elevated lactate - now downtrending 5.2>3.7>2.5.    MEDICATIONS  (STANDING):  acetaminophen   IVPB .. 1000 milliGRAM(s) IV Intermittent every 6 hours  enoxaparin Injectable 40 milliGRAM(s) SubCutaneous every 24 hours  gentamicin   IVPB 420 milliGRAM(s) IV Intermittent once  multiple electrolytes Injection Type 1 1000 milliLiter(s) (120 mL/Hr) IV Continuous <Continuous>  ondansetron Injectable 4 milliGRAM(s) IV Push every 4 hours    Vital Signs Last 24 Hrs  T(C): 37 (13 Dec 2023 07:54), Max: 37 (13 Dec 2023 07:54)  T(F): 98.6 (13 Dec 2023 07:54), Max: 98.6 (13 Dec 2023 07:54)  HR: 96 (13 Dec 2023 10:16) (57 - 96)  BP: 110/99 (13 Dec 2023 10:16) (104/48 - 160/66)  BP(mean): 59 (13 Dec 2023 10:16) (59 - 135)  RR: 23 (13 Dec 2023 10:16) (10 - 23)  SpO2: 100% (13 Dec 2023 10:16) (97% - 100%)    Parameters below as of 13 Dec 2023 10:16  Patient On (Oxygen Delivery Method): room air      Constitutional: patient resting comfortably in bed, in no acute distress, easily awoken from sleep  Respiratory: respirations appear unlabored, no accessory muscle use, no conversational dyspnea  Cardiovascular: regular rate & rhythm  Gastrointestinal: abdomen softly distended, mild jonas-incisional TTP, no rebound tenderness / guarding, midline incision is well approximated w/ staples, no surrounding erythema or drainage  : dos santos in place, urine yellow, clear  Neurological: A&O x 3  Skin: mucous membranes moist, no diaphoresis, pallor, cyanosis or jaundice      I&O's Detail    12 Dec 2023 07:01  -  13 Dec 2023 07:00  --------------------------------------------------------  IN:    Albumin 5%  - 250 mL: 250 mL    IV PiggyBack: 300 mL    Lactated Ringers Bolus: 1500 mL    multiple electrolytes Injection Type 1.: 960 mL    Sodium Chloride 0.9% Bolus: 500 mL  Total IN: 3510 mL    OUT:    Indwelling Catheter - Urethral (mL): 250 mL  Total OUT: 250 mL    Total NET: 3260 mL      13 Dec 2023 07:01  -  13 Dec 2023 10:52  --------------------------------------------------------  IN:    multiple electrolytes Injection Type 1 Bolus: 1000 mL    multiple electrolytes Injection Type 1.: 360 mL  Total IN: 1360 mL    OUT:    Indwelling Catheter - Urethral (mL): 250 mL  Total OUT: 250 mL    Total NET: 1110 mL          LABS:                        9.5    22.47 )-----------( 258      ( 13 Dec 2023 03:23 )             29.4     12-13    136  |  99  |  19.5  ----------------------------<  193<H>  3.6   |  22.0  |  1.17    Ca    8.6      13 Dec 2023 03:23  Phos  3.5     12-13  Mg     1.2     12-13    TPro  6.3<L>  /  Alb  3.2<L>  /  TBili  0.6  /  DBili  x   /  AST  24  /  ALT  10  /  AlkPhos  82  12-13    PT/INR - ( 13 Dec 2023 03:23 )   PT: 12.2 sec;   INR: 1.10 ratio         PTT - ( 13 Dec 2023 03:23 )  PTT:23.1 sec  Urinalysis Basic - ( 13 Dec 2023 03:23 )    Color: x / Appearance: x / SG: x / pH: x  Gluc: 193 mg/dL / Ketone: x  / Bili: x / Urobili: x   Blood: x / Protein: x / Nitrite: x   Leuk Esterase: x / RBC: x / WBC x   Sq Epi: x / Non Sq Epi: x / Bacteria: x

## 2023-12-13 NOTE — CHART NOTE - NSCHARTNOTEFT_GEN_A_CORE
SICU TRANSFER NOTE  -----------------------------  ICU Admission Date: 12/13/23  Transfer Date: 12-13-23 @ 10:31    Admission Diagnosis: Endometrial Adenocarcinoma    Active Problems/injuries: Post-op care    Procedures: 12/12: SUJATHA-BSPOLA w/ extensive LUCY    Consultants:  [ ] Cardiology  [ ] Endocrine  [ ] Infectious Disease  [ ] Medicine  [ ]Neurosurgery  [ ] Ortho       [ ] Weight Bearing Status:  [ ] Palliative       [ ] Advanced Directives:    [ ] Physical Medicine and Rehab       [ ] Disposition :   [ ] Plastics  [ ] Pulmonary    Medications  acetaminophen   IVPB .. 1000 milliGRAM(s) IV Intermittent every 6 hours  chlorhexidine 2% Cloths 1 Application(s) Topical <User Schedule>  enoxaparin Injectable 40 milliGRAM(s) SubCutaneous every 24 hours  gentamicin   IVPB 420 milliGRAM(s) IV Intermittent once  multiple electrolytes Injection Type 1 1000 milliLiter(s) IV Continuous <Continuous>  ondansetron Injectable 4 milliGRAM(s) IV Push every 4 hours      [ ] I attest I have reviewed and reconciled all medications prior to transfer    IV Fluids  Plasma-lyte @ 120    Indication: NPO/continued resuscitation    Antibiotics:  gentamicin   IVPB 420 milliGRAM(s) IV Intermittent once    Indication: Once Intra-op End Date: Completed      I have discussed this case with GYN Team upon transfer and all questions regarding ICU course were answered.  The following items are to be followed up:  1. Lactate 5.2 post-op. Received total of 3 L IVF and 250 CC 5% albumin. Lactate now 2.5 with improvement of UOP.  2. HD stable  3. NGT removed. Feels bloated but has not passed flatus or had BM yet.  4. Remain NPO until return of bowel function. High probability of prolonged ileus due to extensive manipulation of bowel intra-op.  5. Consider DC dos santos and TOV now that patient appears resuscitated  6. Remainder of care per primary team

## 2023-12-13 NOTE — PATIENT PROFILE ADULT - FUNCTIONAL ASSESSMENT - BASIC MOBILITY 6.
2-calculated by average/Not able to assess (calculate score using [facility] averaging method)  2-calculated by average/Not able to assess (calculate score using [facility] averaging method)

## 2023-12-13 NOTE — PROGRESS NOTE ADULT - ASSESSMENT
Assessment and Plan:  73y Female with PMHx metastatic endometrioid adenocarcinoma admitted to SICU s/p ex lap SUJATHA, BSO and extensive LUCY , extubated , NGT in place , HD stable     Neuro:   - Multimodal pain control   - delirium precautions  - Optimize sleep / wake cycle    CV:   - no pressors requirement   - lactate pending   - will POCUS bedside to assess fluid status   - Continue hemodynamic monitoring  - Maintain MAP > 65    Pulm:   - Incentive spirometry  - Pulmonary toilet  - OOB to chair    GI/Nutrition:   - NGT / NPO   - IVF   - continue abdominal binder   - Monitor bowel function and continue serial abdominal exams      /Renal:   - dos santos for strict I&O  - monitor kidney fxn    ID:  - Monitor fever curve  - Leukocytosis    Endo:  - monitor blood glucose    Skin:   - repositioning for DTI prevention while in bed    Heme/DVT Prophylaxis:  - SCDs  - Chemical prophylaxis with lov     Dispo:  - patient remains critically ill  - care per SICU

## 2023-12-13 NOTE — BRIEF OPERATIVE NOTE - NSICDXBRIEFPREOP_GEN_ALL_CORE_FT
PRE-OP DIAGNOSIS:  Endometrial ca 13-Dec-2023 00:41:41  Keanu Sandhu  
PRE-OP DIAGNOSIS:  Endometrial ca 13-Dec-2023 00:41:41  Keanu Sandhu

## 2023-12-13 NOTE — PATIENT PROFILE ADULT - FALL HARM RISK - HARM RISK INTERVENTIONS
Assistance with ambulation/Assistance OOB with selected safe patient handling equipment/Communicate Risk of Fall with Harm to all staff/Monitor gait and stability/Reinforce activity limits and safety measures with patient and family/Sit up slowly, dangle for a short time, stand at bedside before walking/Tailored Fall Risk Interventions/Use of alarms - bed, chair and/or voice tab/Visual Cue: Yellow wristband and red socks/Bed in lowest position, wheels locked, appropriate side rails in place/Call bell, personal items and telephone in reach/Instruct patient to call for assistance before getting out of bed or chair/Non-slip footwear when patient is out of bed/[place] to call system/Physically safe environment - no spills, clutter or unnecessary equipment/Purposeful Proactive Rounding/Room/bathroom lighting operational, light cord in reach Assistance with ambulation/Assistance OOB with selected safe patient handling equipment/Communicate Risk of Fall with Harm to all staff/Monitor gait and stability/Reinforce activity limits and safety measures with patient and family/Sit up slowly, dangle for a short time, stand at bedside before walking/Tailored Fall Risk Interventions/Use of alarms - bed, chair and/or voice tab/Visual Cue: Yellow wristband and red socks/Bed in lowest position, wheels locked, appropriate side rails in place/Call bell, personal items and telephone in reach/Instruct patient to call for assistance before getting out of bed or chair/Non-slip footwear when patient is out of bed/[place] to call system/Physically safe environment - no spills, clutter or unnecessary equipment/Purposeful Proactive Rounding/Room/bathroom lighting operational, light cord in reach

## 2023-12-13 NOTE — BRIEF OPERATIVE NOTE - OPERATION/FINDINGS
Normal external genitalia, mobile cervix with no nodularity noted. Abdominal survey revealed an umbilical hernia with a walled off collection of green tinged fluid. General surgery consulted for extensive abdominal adhesions, see their operative note for full findings. The bowel was run and no discontinuities or areas of perforations were noted. Nodules were seen on small bowel mesentery, anterior rectum and in segment of hernia sac that were resection. Remainder of survey had many filmy adhesions that were taken down and survey revealed no residual disease. Uterus was bulky approximately 8cm in size with enlarged fallopian tube and grossly normal ovaries. Bubble test was negative. Fibrillar was placed in pelvis.
extensive interloop filmy adhesions, no bowel injury.  excision of hernia sac / thick wall of collection and mesenteric nodules    bowel explore from ICV to LOT with no signs of perforation, sigmoid , rectum with no perforations.

## 2023-12-13 NOTE — PROGRESS NOTE ADULT - SUBJECTIVE AND OBJECTIVE BOX
SICU Consultation Note  =====================================================  HPI: 73y Female with PMHx metastatic endometrioid adenocarcinoma admitted to SICU s/p ex lap SUJATHA, BSO and extensive LUCY , extubated , NGT in place   HPI:  73 year old female with endometrioid adenocarcinoma diagnosed on 05/24/2023 after presenting to  ED with complaint of dyspnea on exertion, abdominal distention, and intermittent post menopausal bleeding.  CT A/P which showed a very large amount of abdominal and pelvic ascites. On 4/8/23, she had a pelvic ultrasound which showed the endometrium is either severely thickened to 6 cm or is distended with complex material to 6 cm. There is a 2.5 x 1.6 x 1.7 cm shadowing echogenic focus with shadowing along the periphery of the endometrium, which may represent a calcified intramural versus submucosal fibroid, versus an endometrial lesion. Patient then underwent D&C, hysteroscopy on 4/10/23. Pathology revealed endometrium with endometrioid adenocarcinoma, FIGO grade 2, focally invasive, ER/WI(+), HER-2 negative, MMR intact; endocervix with fragments of endometrioid adenocarcinoma and benign endocervical tissue. She is also s/p paracentesis on 5/5/23 with 1300cc's ascites drained. On 4/14/23. she had a repeat CT A/P which showed interval decrease in the volume of ascites with scalloping of the right heart border suggesting malignant ascites. Peritoneal enhancement, nonspecific. Stable size of a central heterogeneous uterine mass. Patient is s/p 8 rounds of chemotherapy, plan now is for surgery.  Today at [facility] she reports hearing loss left ear following chemotherapy, however she reports sense of taste is returning, appetite as returned . She denies fever, chills, abdominal pain, nausea, vomiting, diarrhea, constipation or dyspnea on exertion.  Patient is scheduled for exploratory lap, SUJATHA, BSO, possible colon resection, tumor debulking, possible ostomy with Dr Jones on 12/12/23.  Medical evaluation is pending  (30 Nov 2023 16:45)      Surgery Information  OR time:  6hours     EBL:  800        IV Fluids: 4L      Blood Products: none   UOP:   300       PAST MEDICAL & SURGICAL HISTORY:  H/O ascites      Uterine mass      Endometrial ca      History of chemotherapy      [place] teeth removed        Home Meds: Home Medications:  furosemide 20 mg oral tablet: 1 tab(s) orally 4 times a week Take 2 tablets (40mg) on Monday, Wednesday, and Friday (12 Dec 2023 12:31)    Allergies: Allergies    No Known Allergies    Intolerances    Keflex (Other)    Soc:   Advanced Directives: Presumed Full Code     ROS:    REVIEW OF SYSTEMS    [x ] A ten-point review of systems was otherwise negative except as noted.  [ ] Due to altered mental status/intubation, subjective information were not able to be obtained from the patient. History was obtained, to the extent possible, from review of the chart and collateral sources of information.      CURRENT MEDICATIONS:   --------------------------------------------------------------------------------------  Neurologic Medications  acetaminophen   IVPB .. 1000 milliGRAM(s) IV Intermittent every 6 hours    Respiratory Medications    Cardiovascular Medications    Gastrointestinal Medications  multiple electrolytes Injection Type 1 1000 milliLiter(s) IV Continuous <Continuous>    Genitourinary Medications    Hematologic/Oncologic Medications    Antimicrobial/Immunologic Medications  gentamicin   IVPB 420 milliGRAM(s) IV Intermittent once    Endocrine/Metabolic Medications    Topical/Other Medications  chlorhexidine 2% Cloths 1 Application(s) Topical <User Schedule>    --------------------------------------------------------------------------------------    VITAL SIGNS, INS/OUTS (last 24 hours):  --------------------------------------------------------------------------------------  ICU Vital Signs Last 24 Hrs  T(C): 36.6 (12 Dec 2023 12:14), Max: 36.6 (12 Dec 2023 12:14)  T(F): 97.8 (12 Dec 2023 12:14), Max: 97.8 (12 Dec 2023 12:14)  HR: 61 (12 Dec 2023 12:14) (61 - 61)  BP: 136/64 (12 Dec 2023 12:14) (136/64 - 136/64)  BP(mean): --  ABP: --  ABP(mean): --  RR: 18 (12 Dec 2023 12:14) (18 - 18)  SpO2: 100% (12 Dec 2023 12:14) (100% - 100%)    O2 Parameters below as of 12 Dec 2023 12:14  Patient On (Oxygen Delivery Method): room air          I&O's Summary    --------------------------------------------------------------------------------------    EXAM:  General/Neuro  RASS: 0  Exam: Normal, NAD, alert, oriented x 3    Respiratory  Exam: Lungs clear to auscultation, Normal expansion/effort.        Cardiovascular  Exam: S1, S2.  Regular rate and rhythm.   Cardiac Rhythm: Normal Sinus Rhythm      GI  Exam: Abdomen soft, appropriately tender , abdominal binder in place   incision:   C/D/I   Current Diet:  NPO/ NGT / IVF       Tubes/Lines/Drains   [x] Peripheral IV        [x] Urinary Catheter		Date Placed: 12/13/23     Extremities  Exam: Extremities warm, pink, well-perfused.        Derm:  Exam: Good skin turgor, no skin breakdown.      :   Exam: Mosley catheter in place.     LABS  --------------------------------------------------------------------------------------  Labs:  CAPILLARY BLOOD GLUCOSE                              9.3    13.90 )-----------( 201      ( 12 Dec 2023 20:50 )             27.2                      SICU Consultation Note  =====================================================  HPI: 73y Female with PMHx metastatic endometrioid adenocarcinoma admitted to SICU s/p ex lap SUJATHA, BSO and extensive LUCY , extubated , NGT in place   HPI:  73 year old female with endometrioid adenocarcinoma diagnosed on 05/24/2023 after presenting to  ED with complaint of dyspnea on exertion, abdominal distention, and intermittent post menopausal bleeding.  CT A/P which showed a very large amount of abdominal and pelvic ascites. On 4/8/23, she had a pelvic ultrasound which showed the endometrium is either severely thickened to 6 cm or is distended with complex material to 6 cm. There is a 2.5 x 1.6 x 1.7 cm shadowing echogenic focus with shadowing along the periphery of the endometrium, which may represent a calcified intramural versus submucosal fibroid, versus an endometrial lesion. Patient then underwent D&C, hysteroscopy on 4/10/23. Pathology revealed endometrium with endometrioid adenocarcinoma, FIGO grade 2, focally invasive, ER/DC(+), HER-2 negative, MMR intact; endocervix with fragments of endometrioid adenocarcinoma and benign endocervical tissue. She is also s/p paracentesis on 5/5/23 with 1300cc's ascites drained. On 4/14/23. she had a repeat CT A/P which showed interval decrease in the volume of ascites with scalloping of the right heart border suggesting malignant ascites. Peritoneal enhancement, nonspecific. Stable size of a central heterogeneous uterine mass. Patient is s/p 8 rounds of chemotherapy, plan now is for surgery.  Today at [facility] she reports hearing loss left ear following chemotherapy, however she reports sense of taste is returning, appetite as returned . She denies fever, chills, abdominal pain, nausea, vomiting, diarrhea, constipation or dyspnea on exertion.  Patient is scheduled for exploratory lap, SUJATHA, BSO, possible colon resection, tumor debulking, possible ostomy with Dr Jones on 12/12/23.  Medical evaluation is pending  (30 Nov 2023 16:45)      Surgery Information  OR time:  6hours     EBL:  800        IV Fluids: 4L      Blood Products: none   UOP:   300       PAST MEDICAL & SURGICAL HISTORY:  H/O ascites      Uterine mass      Endometrial ca      History of chemotherapy      [place] teeth removed        Home Meds: Home Medications:  furosemide 20 mg oral tablet: 1 tab(s) orally 4 times a week Take 2 tablets (40mg) on Monday, Wednesday, and Friday (12 Dec 2023 12:31)    Allergies: Allergies    No Known Allergies    Intolerances    Keflex (Other)    Soc:   Advanced Directives: Presumed Full Code     ROS:    REVIEW OF SYSTEMS    [x ] A ten-point review of systems was otherwise negative except as noted.  [ ] Due to altered mental status/intubation, subjective information were not able to be obtained from the patient. History was obtained, to the extent possible, from review of the chart and collateral sources of information.      CURRENT MEDICATIONS:   --------------------------------------------------------------------------------------  Neurologic Medications  acetaminophen   IVPB .. 1000 milliGRAM(s) IV Intermittent every 6 hours    Respiratory Medications    Cardiovascular Medications    Gastrointestinal Medications  multiple electrolytes Injection Type 1 1000 milliLiter(s) IV Continuous <Continuous>    Genitourinary Medications    Hematologic/Oncologic Medications    Antimicrobial/Immunologic Medications  gentamicin   IVPB 420 milliGRAM(s) IV Intermittent once    Endocrine/Metabolic Medications    Topical/Other Medications  chlorhexidine 2% Cloths 1 Application(s) Topical <User Schedule>    --------------------------------------------------------------------------------------    VITAL SIGNS, INS/OUTS (last 24 hours):  --------------------------------------------------------------------------------------  ICU Vital Signs Last 24 Hrs  T(C): 36.6 (12 Dec 2023 12:14), Max: 36.6 (12 Dec 2023 12:14)  T(F): 97.8 (12 Dec 2023 12:14), Max: 97.8 (12 Dec 2023 12:14)  HR: 61 (12 Dec 2023 12:14) (61 - 61)  BP: 136/64 (12 Dec 2023 12:14) (136/64 - 136/64)  BP(mean): --  ABP: --  ABP(mean): --  RR: 18 (12 Dec 2023 12:14) (18 - 18)  SpO2: 100% (12 Dec 2023 12:14) (100% - 100%)    O2 Parameters below as of 12 Dec 2023 12:14  Patient On (Oxygen Delivery Method): room air          I&O's Summary    --------------------------------------------------------------------------------------    EXAM:  General/Neuro  RASS: 0  Exam: Normal, NAD, alert, oriented x 3    Respiratory  Exam: Lungs clear to auscultation, Normal expansion/effort.        Cardiovascular  Exam: S1, S2.  Regular rate and rhythm.   Cardiac Rhythm: Normal Sinus Rhythm      GI  Exam: Abdomen soft, appropriately tender , abdominal binder in place   incision:   C/D/I   Current Diet:  NPO/ NGT / IVF       Tubes/Lines/Drains   [x] Peripheral IV        [x] Urinary Catheter		Date Placed: 12/13/23     Extremities  Exam: Extremities warm, pink, well-perfused.        Derm:  Exam: Good skin turgor, no skin breakdown.      :   Exam: Mosley catheter in place.     LABS  --------------------------------------------------------------------------------------  Labs:  CAPILLARY BLOOD GLUCOSE                              9.3    13.90 )-----------( 201      ( 12 Dec 2023 20:50 )             27.2

## 2023-12-13 NOTE — CHART NOTE - NSCHARTNOTEFT_GEN_A_CORE
73y POD#0 s/p PEUA, ex lap, SUJATHA, BSO, tumor debulking, repair of umbilical hernia with drainage of abdominal collections, extensive LUCY; TAP BLOCK.     S:   Patient in SICU.   Patient seen and examined at bedside.  Pain is controlled on current pain regimen.  Patient has been NPO; NG tube in place   Endorses some nausea  Not yet passing flatus  Mosley catheter in place.      O:  T(C): 34.4 (12-13-23 @ 00:25), Max: 36.6 (12-12-23 @ 12:14)  HR: 81 (12-13-23 @ 04:00) (57 - 81)  BP: 143/112 (12-13-23 @ 04:00) (118/66 - 160/66)  RR: 14 (12-13-23 @ 04:00) (10 - 22)  SpO2: 98% (12-13-23 @ 04:00) (97% - 100%)    12-12-23 @ 07:01  -  12-13-23 @ 04:52  --------------------------------------------------------  IN: 2100 mL / OUT: 120 mL / NET: 1980 mL        PE:  General: NAD  Heart: RRR  Lungs: CTABL  Abdomen: soft, nondistended, nontender to palpation, dressing c/d/i  Ext: no calf pain BL    A/P: 73y s/p PEUA, ex lap, SUJATHA, BSO, tumor debulking, repair of umbilical hernia with drainage of abdominal collections, extensive LUCY; TAP BLOCK.   -Continue post operative care  -Continue pain medication  - UOP noted to be about 125 cc in last 4 hours; Mosley catheter was flushed and noted to flow better. Will continue to monitor UOP.  - All other care per SICU team.    D/w Dr. Sandhu

## 2023-12-13 NOTE — CHART NOTE - NSCHARTNOTEFT_GEN_A_CORE
Patient seen and examined this am. Doing well, pain controlled, lactate trending down (3.7 from 5). Will give another liter IVF this am, recheck. NG removed this am, scant output, patient states she is passing flatus. Discussed with Dr. Jones of primary team. Stable for downgrade from ICU today.

## 2023-12-13 NOTE — BRIEF OPERATIVE NOTE - NSICDXBRIEFPOSTOP_GEN_ALL_CORE_FT
POST-OP DIAGNOSIS:  Endometrial ca 13-Dec-2023 00:41:47  Keanu Sandhu  
POST-OP DIAGNOSIS:  Endometrial ca 13-Dec-2023 00:41:47  Keanu Sandhu

## 2023-12-14 LAB
ANION GAP SERPL CALC-SCNC: 9 MMOL/L — SIGNIFICANT CHANGE UP (ref 5–17)
ANION GAP SERPL CALC-SCNC: 9 MMOL/L — SIGNIFICANT CHANGE UP (ref 5–17)
BASOPHILS # BLD AUTO: 0.03 K/UL — SIGNIFICANT CHANGE UP (ref 0–0.2)
BASOPHILS # BLD AUTO: 0.03 K/UL — SIGNIFICANT CHANGE UP (ref 0–0.2)
BASOPHILS # BLD AUTO: 0.05 K/UL — SIGNIFICANT CHANGE UP (ref 0–0.2)
BASOPHILS # BLD AUTO: 0.05 K/UL — SIGNIFICANT CHANGE UP (ref 0–0.2)
BASOPHILS NFR BLD AUTO: 0.3 % — SIGNIFICANT CHANGE UP (ref 0–2)
BASOPHILS NFR BLD AUTO: 0.3 % — SIGNIFICANT CHANGE UP (ref 0–2)
BASOPHILS NFR BLD AUTO: 0.5 % — SIGNIFICANT CHANGE UP (ref 0–2)
BASOPHILS NFR BLD AUTO: 0.5 % — SIGNIFICANT CHANGE UP (ref 0–2)
BUN SERPL-MCNC: 16.7 MG/DL — SIGNIFICANT CHANGE UP (ref 8–20)
BUN SERPL-MCNC: 16.7 MG/DL — SIGNIFICANT CHANGE UP (ref 8–20)
CALCIUM SERPL-MCNC: 8.3 MG/DL — LOW (ref 8.4–10.5)
CALCIUM SERPL-MCNC: 8.3 MG/DL — LOW (ref 8.4–10.5)
CHLORIDE SERPL-SCNC: 105 MMOL/L — SIGNIFICANT CHANGE UP (ref 96–108)
CHLORIDE SERPL-SCNC: 105 MMOL/L — SIGNIFICANT CHANGE UP (ref 96–108)
CO2 SERPL-SCNC: 27 MMOL/L — SIGNIFICANT CHANGE UP (ref 22–29)
CO2 SERPL-SCNC: 27 MMOL/L — SIGNIFICANT CHANGE UP (ref 22–29)
CREAT SERPL-MCNC: 1.22 MG/DL — SIGNIFICANT CHANGE UP (ref 0.5–1.3)
CREAT SERPL-MCNC: 1.22 MG/DL — SIGNIFICANT CHANGE UP (ref 0.5–1.3)
EGFR: 47 ML/MIN/1.73M2 — LOW
EGFR: 47 ML/MIN/1.73M2 — LOW
EOSINOPHIL # BLD AUTO: 0.14 K/UL — SIGNIFICANT CHANGE UP (ref 0–0.5)
EOSINOPHIL # BLD AUTO: 0.14 K/UL — SIGNIFICANT CHANGE UP (ref 0–0.5)
EOSINOPHIL # BLD AUTO: 0.19 K/UL — SIGNIFICANT CHANGE UP (ref 0–0.5)
EOSINOPHIL # BLD AUTO: 0.19 K/UL — SIGNIFICANT CHANGE UP (ref 0–0.5)
EOSINOPHIL NFR BLD AUTO: 1.5 % — SIGNIFICANT CHANGE UP (ref 0–6)
EOSINOPHIL NFR BLD AUTO: 1.5 % — SIGNIFICANT CHANGE UP (ref 0–6)
EOSINOPHIL NFR BLD AUTO: 1.8 % — SIGNIFICANT CHANGE UP (ref 0–6)
EOSINOPHIL NFR BLD AUTO: 1.8 % — SIGNIFICANT CHANGE UP (ref 0–6)
GLUCOSE SERPL-MCNC: 94 MG/DL — SIGNIFICANT CHANGE UP (ref 70–99)
GLUCOSE SERPL-MCNC: 94 MG/DL — SIGNIFICANT CHANGE UP (ref 70–99)
HCT VFR BLD CALC: 18.8 % — CRITICAL LOW (ref 34.5–45)
HCT VFR BLD CALC: 18.8 % — CRITICAL LOW (ref 34.5–45)
HCT VFR BLD CALC: 19.6 % — CRITICAL LOW (ref 34.5–45)
HCT VFR BLD CALC: 19.6 % — CRITICAL LOW (ref 34.5–45)
HGB BLD-MCNC: 6 G/DL — CRITICAL LOW (ref 11.5–15.5)
HGB BLD-MCNC: 6 G/DL — CRITICAL LOW (ref 11.5–15.5)
HGB BLD-MCNC: 6.2 G/DL — CRITICAL LOW (ref 11.5–15.5)
HGB BLD-MCNC: 6.2 G/DL — CRITICAL LOW (ref 11.5–15.5)
IMM GRANULOCYTES NFR BLD AUTO: 0.4 % — SIGNIFICANT CHANGE UP (ref 0–0.9)
IMM GRANULOCYTES NFR BLD AUTO: 0.4 % — SIGNIFICANT CHANGE UP (ref 0–0.9)
IMM GRANULOCYTES NFR BLD AUTO: 0.7 % — SIGNIFICANT CHANGE UP (ref 0–0.9)
IMM GRANULOCYTES NFR BLD AUTO: 0.7 % — SIGNIFICANT CHANGE UP (ref 0–0.9)
LYMPHOCYTES # BLD AUTO: 1.81 K/UL — SIGNIFICANT CHANGE UP (ref 1–3.3)
LYMPHOCYTES # BLD AUTO: 1.81 K/UL — SIGNIFICANT CHANGE UP (ref 1–3.3)
LYMPHOCYTES # BLD AUTO: 17.5 % — SIGNIFICANT CHANGE UP (ref 13–44)
LYMPHOCYTES # BLD AUTO: 17.5 % — SIGNIFICANT CHANGE UP (ref 13–44)
LYMPHOCYTES # BLD AUTO: 2 K/UL — SIGNIFICANT CHANGE UP (ref 1–3.3)
LYMPHOCYTES # BLD AUTO: 2 K/UL — SIGNIFICANT CHANGE UP (ref 1–3.3)
LYMPHOCYTES # BLD AUTO: 20.9 % — SIGNIFICANT CHANGE UP (ref 13–44)
LYMPHOCYTES # BLD AUTO: 20.9 % — SIGNIFICANT CHANGE UP (ref 13–44)
MAGNESIUM SERPL-MCNC: 1.9 MG/DL — SIGNIFICANT CHANGE UP (ref 1.8–2.6)
MAGNESIUM SERPL-MCNC: 1.9 MG/DL — SIGNIFICANT CHANGE UP (ref 1.8–2.6)
MCHC RBC-ENTMCNC: 28.2 PG — SIGNIFICANT CHANGE UP (ref 27–34)
MCHC RBC-ENTMCNC: 28.2 PG — SIGNIFICANT CHANGE UP (ref 27–34)
MCHC RBC-ENTMCNC: 28.3 PG — SIGNIFICANT CHANGE UP (ref 27–34)
MCHC RBC-ENTMCNC: 28.3 PG — SIGNIFICANT CHANGE UP (ref 27–34)
MCHC RBC-ENTMCNC: 31.6 GM/DL — LOW (ref 32–36)
MCHC RBC-ENTMCNC: 31.6 GM/DL — LOW (ref 32–36)
MCHC RBC-ENTMCNC: 31.9 GM/DL — LOW (ref 32–36)
MCHC RBC-ENTMCNC: 31.9 GM/DL — LOW (ref 32–36)
MCV RBC AUTO: 88.7 FL — SIGNIFICANT CHANGE UP (ref 80–100)
MCV RBC AUTO: 88.7 FL — SIGNIFICANT CHANGE UP (ref 80–100)
MCV RBC AUTO: 89.1 FL — SIGNIFICANT CHANGE UP (ref 80–100)
MCV RBC AUTO: 89.1 FL — SIGNIFICANT CHANGE UP (ref 80–100)
MONOCYTES # BLD AUTO: 0.86 K/UL — SIGNIFICANT CHANGE UP (ref 0–0.9)
MONOCYTES # BLD AUTO: 0.86 K/UL — SIGNIFICANT CHANGE UP (ref 0–0.9)
MONOCYTES # BLD AUTO: 0.94 K/UL — HIGH (ref 0–0.9)
MONOCYTES # BLD AUTO: 0.94 K/UL — HIGH (ref 0–0.9)
MONOCYTES NFR BLD AUTO: 9 % — SIGNIFICANT CHANGE UP (ref 2–14)
MONOCYTES NFR BLD AUTO: 9 % — SIGNIFICANT CHANGE UP (ref 2–14)
MONOCYTES NFR BLD AUTO: 9.1 % — SIGNIFICANT CHANGE UP (ref 2–14)
MONOCYTES NFR BLD AUTO: 9.1 % — SIGNIFICANT CHANGE UP (ref 2–14)
NEUTROPHILS # BLD AUTO: 6.51 K/UL — SIGNIFICANT CHANGE UP (ref 1.8–7.4)
NEUTROPHILS # BLD AUTO: 6.51 K/UL — SIGNIFICANT CHANGE UP (ref 1.8–7.4)
NEUTROPHILS # BLD AUTO: 7.3 K/UL — SIGNIFICANT CHANGE UP (ref 1.8–7.4)
NEUTROPHILS # BLD AUTO: 7.3 K/UL — SIGNIFICANT CHANGE UP (ref 1.8–7.4)
NEUTROPHILS NFR BLD AUTO: 67.9 % — SIGNIFICANT CHANGE UP (ref 43–77)
NEUTROPHILS NFR BLD AUTO: 67.9 % — SIGNIFICANT CHANGE UP (ref 43–77)
NEUTROPHILS NFR BLD AUTO: 70.4 % — SIGNIFICANT CHANGE UP (ref 43–77)
NEUTROPHILS NFR BLD AUTO: 70.4 % — SIGNIFICANT CHANGE UP (ref 43–77)
PHOSPHATE SERPL-MCNC: 2.6 MG/DL — SIGNIFICANT CHANGE UP (ref 2.4–4.7)
PHOSPHATE SERPL-MCNC: 2.6 MG/DL — SIGNIFICANT CHANGE UP (ref 2.4–4.7)
PLATELET # BLD AUTO: 189 K/UL — SIGNIFICANT CHANGE UP (ref 150–400)
PLATELET # BLD AUTO: 189 K/UL — SIGNIFICANT CHANGE UP (ref 150–400)
PLATELET # BLD AUTO: 192 K/UL — SIGNIFICANT CHANGE UP (ref 150–400)
PLATELET # BLD AUTO: 192 K/UL — SIGNIFICANT CHANGE UP (ref 150–400)
POTASSIUM SERPL-MCNC: 4.3 MMOL/L — SIGNIFICANT CHANGE UP (ref 3.5–5.3)
POTASSIUM SERPL-MCNC: 4.3 MMOL/L — SIGNIFICANT CHANGE UP (ref 3.5–5.3)
POTASSIUM SERPL-SCNC: 4.3 MMOL/L — SIGNIFICANT CHANGE UP (ref 3.5–5.3)
POTASSIUM SERPL-SCNC: 4.3 MMOL/L — SIGNIFICANT CHANGE UP (ref 3.5–5.3)
RBC # BLD: 2.12 M/UL — LOW (ref 3.8–5.2)
RBC # BLD: 2.12 M/UL — LOW (ref 3.8–5.2)
RBC # BLD: 2.2 M/UL — LOW (ref 3.8–5.2)
RBC # BLD: 2.2 M/UL — LOW (ref 3.8–5.2)
RBC # FLD: 15.5 % — HIGH (ref 10.3–14.5)
RBC # FLD: 15.5 % — HIGH (ref 10.3–14.5)
RBC # FLD: 15.7 % — HIGH (ref 10.3–14.5)
RBC # FLD: 15.7 % — HIGH (ref 10.3–14.5)
SODIUM SERPL-SCNC: 141 MMOL/L — SIGNIFICANT CHANGE UP (ref 135–145)
SODIUM SERPL-SCNC: 141 MMOL/L — SIGNIFICANT CHANGE UP (ref 135–145)
WBC # BLD: 10.36 K/UL — SIGNIFICANT CHANGE UP (ref 3.8–10.5)
WBC # BLD: 10.36 K/UL — SIGNIFICANT CHANGE UP (ref 3.8–10.5)
WBC # BLD: 9.58 K/UL — SIGNIFICANT CHANGE UP (ref 3.8–10.5)
WBC # BLD: 9.58 K/UL — SIGNIFICANT CHANGE UP (ref 3.8–10.5)
WBC # FLD AUTO: 10.36 K/UL — SIGNIFICANT CHANGE UP (ref 3.8–10.5)
WBC # FLD AUTO: 10.36 K/UL — SIGNIFICANT CHANGE UP (ref 3.8–10.5)
WBC # FLD AUTO: 9.58 K/UL — SIGNIFICANT CHANGE UP (ref 3.8–10.5)
WBC # FLD AUTO: 9.58 K/UL — SIGNIFICANT CHANGE UP (ref 3.8–10.5)

## 2023-12-14 PROCEDURE — 99024 POSTOP FOLLOW-UP VISIT: CPT

## 2023-12-14 RX ORDER — BENZOCAINE AND MENTHOL 5; 1 G/100ML; G/100ML
1 LIQUID ORAL THREE TIMES A DAY
Refills: 0 | Status: DISCONTINUED | OUTPATIENT
Start: 2023-12-14 | End: 2023-12-19

## 2023-12-14 RX ORDER — SIMETHICONE 80 MG/1
80 TABLET, CHEWABLE ORAL EVERY 6 HOURS
Refills: 0 | Status: DISCONTINUED | OUTPATIENT
Start: 2023-12-14 | End: 2023-12-19

## 2023-12-14 RX ORDER — ACETAMINOPHEN 500 MG
650 TABLET ORAL EVERY 6 HOURS
Refills: 0 | Status: DISCONTINUED | OUTPATIENT
Start: 2023-12-14 | End: 2023-12-19

## 2023-12-14 RX ORDER — HEPARIN SODIUM 5000 [USP'U]/ML
5000 INJECTION INTRAVENOUS; SUBCUTANEOUS EVERY 8 HOURS
Refills: 0 | Status: DISCONTINUED | OUTPATIENT
Start: 2023-12-14 | End: 2023-12-15

## 2023-12-14 RX ORDER — CALCIUM CARBONATE 500(1250)
1 TABLET ORAL
Refills: 0 | Status: DISCONTINUED | OUTPATIENT
Start: 2023-12-14 | End: 2023-12-19

## 2023-12-14 RX ORDER — DIPHENHYDRAMINE HCL 50 MG
25 CAPSULE ORAL ONCE
Refills: 0 | Status: COMPLETED | OUTPATIENT
Start: 2023-12-14 | End: 2023-12-14

## 2023-12-14 RX ADMIN — Medication 25 MILLIGRAM(S): at 12:39

## 2023-12-14 RX ADMIN — Medication 650 MILLIGRAM(S): at 12:39

## 2023-12-14 RX ADMIN — Medication 400 MILLIGRAM(S): at 01:23

## 2023-12-14 RX ADMIN — SIMETHICONE 80 MILLIGRAM(S): 80 TABLET, CHEWABLE ORAL at 23:03

## 2023-12-14 RX ADMIN — BENZOCAINE AND MENTHOL 1 LOZENGE: 5; 1 LIQUID ORAL at 17:52

## 2023-12-14 RX ADMIN — SIMETHICONE 80 MILLIGRAM(S): 80 TABLET, CHEWABLE ORAL at 12:39

## 2023-12-14 RX ADMIN — SODIUM CHLORIDE 120 MILLILITER(S): 9 INJECTION, SOLUTION INTRAVENOUS at 05:23

## 2023-12-14 RX ADMIN — ONDANSETRON 4 MILLIGRAM(S): 8 TABLET, FILM COATED ORAL at 23:03

## 2023-12-14 RX ADMIN — Medication 1000 MILLIGRAM(S): at 02:00

## 2023-12-14 RX ADMIN — SIMETHICONE 80 MILLIGRAM(S): 80 TABLET, CHEWABLE ORAL at 18:10

## 2023-12-14 RX ADMIN — Medication 650 MILLIGRAM(S): at 23:02

## 2023-12-14 RX ADMIN — Medication 650 MILLIGRAM(S): at 18:10

## 2023-12-14 RX ADMIN — HEPARIN SODIUM 5000 UNIT(S): 5000 INJECTION INTRAVENOUS; SUBCUTANEOUS at 23:04

## 2023-12-14 RX ADMIN — ENOXAPARIN SODIUM 40 MILLIGRAM(S): 100 INJECTION SUBCUTANEOUS at 05:14

## 2023-12-14 RX ADMIN — Medication 600 MILLIGRAM(S): at 05:14

## 2023-12-14 NOTE — PROGRESS NOTE ADULT - ASSESSMENT
ASSESSMENT: Pt is a 72 y/o female POD1 s/p ex-lap, SUJATHA-BSO. General surgery consulted intra-op for extensive lysis of adhesions, bowel ran without evidence of injury, excision of hernia sac. Pt still with no bowel function postoperatively.     - Monitor for return of bowel function  - Encourage pt to be OOB to chair as much as she can tolerate, ambulate if able, consider PT to assist if needed  - Pain control PRN  - DVT ppx w/ lovenox & SCDs  - Encourage frequent IS use  - Remainder of care per OBGYN   - pt to be transfused today for drop in Hgb, will f/u post transfusion CBC

## 2023-12-14 NOTE — PROGRESS NOTE ADULT - SUBJECTIVE AND OBJECTIVE BOX
POD 2 status post  POD 2 status post Pelvic EUA, Ex lap, SUJATHA, BSO, tumor debulking, repair of umbilical hernia with drainage of abdominal collections, extensive LUCY; TAP BLOCK and recovering well.  Downgraded from SICU yesterday and today with AVSS, afebrile.  Adequate urine output as patient endorses the past 2 voids were unmeasured but of large volume and clear yellow.  She has minimal abdominal/incisional pain; denies flatus/BM/N/V.  Decrease Hgb to 6 - 6.2 this AM for which 2 U PRBC transfusion ordered - 2nd unit starting now.  She reports no s/sx of anemia including no dizziness or palpitations when ambulating to bathroom.  The surgical procedure and findings were discussed in detail including the areas of residual disease; await final pathology.  Plan to advance to full liquids as tolerated, PT consult as ambulates w/ walker at home & evaluate for home PT services, check AM labs.  Dispo planning once return of bowel function & plan for extended DVT prophylaxis with apixaban.  Anticipate additional postop C/T/Pembro followed by Pembro maintenance.  She verbalized an understanding & her questions were answered to her apparent satisfaction. POD 2 status post Pelvic EUA, Ex lap, SUJATHA, BSO, tumor debulking, repair of umbilical hernia with drainage of abdominal collections, extensive ULCY; TAP BLOCK and recovering well.  Downgraded from SICU yesterday and today with AVSS, afebrile.  Adequate urine output as patient endorses the past 2 voids were unmeasured but of large volume and clear yellow.  She has minimal abdominal/incisional pain; denies flatus/BM/N/V.  Decrease Hgb to 6 - 6.2 this AM for which 2 U PRBC transfusion ordered - 2nd unit starting now.  She reports no s/sx of anemia including no dizziness or palpitations when ambulating to bathroom.  The surgical procedure and findings were discussed in detail including the areas of residual disease; await final pathology.  Plan to advance to full liquids as tolerated, PT consult as ambulates w/ walker at home & evaluate for home PT services, check AM labs.  Dispo planning once return of bowel function & plan for extended DVT prophylaxis with apixaban.  Anticipate additional postop C/T/Pembro followed by Pembro maintenance.  She verbalized an understanding & her questions were answered to her apparent satisfaction.

## 2023-12-14 NOTE — PROGRESS NOTE ADULT - SUBJECTIVE AND OBJECTIVE BOX
SUBJECTIVE: Resting comfortably this morning. States that she still is having some pain but controlled on current pain regimen. Still not passing gas or having bowel function. Hemodynamically stable, no acute events.     Vitals:  T(C): 37.6 (12-14-23 @ 09:22), Max: 37.6 (12-14-23 @ 09:22)  T(F): 99.7 (12-14-23 @ 09:22), Max: 99.7 (12-14-23 @ 09:22)  HR: 77 (12-14-23 @ 09:22) (77 - 85)  BP: 129/67 (12-14-23 @ 09:22) (104/61 - 150/65)  ABP: --  ABP(mean): --  RR: 18 (12-14-23 @ 09:22) (18 - 18)  SpO2: 97% (12-14-23 @ 09:22) (92% - 97%)    LABS:  cret                        6.0    10.36 )-----------( 192      ( 14 Dec 2023 08:23 )             18.8     12-14    141  |  105  |  16.7  ----------------------------<  94  4.3   |  27.0  |  1.22    Ca    8.3<L>      14 Dec 2023 06:05  Phos  2.6     12-14  Mg     1.9     12-14    TPro  6.3<L>  /  Alb  3.2<L>  /  TBili  0.6  /  DBili  x   /  AST  24  /  ALT  10  /  AlkPhos  82  12-13    PT/INR - ( 13 Dec 2023 03:23 )   PT: 12.2 sec;   INR: 1.10 ratio         PTT - ( 13 Dec 2023 03:23 )  PTT:23.1 sec    GENERAL: NAD, lying in bed comfortably  HEAD:  Atraumatic, normocephalic  NECK: Supple, no JVD  HEART: RRR  LUNGS: Unlabored respirations. No conversational dyspnea.   ABDOMEN: Soft, mildly tender to palpation, surgical incision with overlying dressing, c/d/i  EXTREMITIES: No clubbing, cyanosis, or edema  NERVOUS SYSTEM:  A&Ox3, no focal deficits

## 2023-12-14 NOTE — CHART NOTE - NSCHARTNOTEFT_GEN_A_CORE
Patient seen and evaluated at bedside. Hgb 6 this AM. Patient denies lightheadedness, dizziness, palpitations, shortness of breath and chest pain.     Vital Signs Last 24 Hrs  T(C): 37.6 (14 Dec 2023 09:22), Max: 37.6 (14 Dec 2023 09:22)  T(F): 99.7 (14 Dec 2023 09:22), Max: 99.7 (14 Dec 2023 09:22)  HR: 77 (14 Dec 2023 09:22) (77 - 85)  BP: 129/67 (14 Dec 2023 09:22) (104/61 - 150/65)  BP(mean): --  RR: 18 (14 Dec 2023 09:22) (18 - 18)  SpO2: 97% (14 Dec 2023 09:22) (92% - 97%)    Parameters below as of 14 Dec 2023 09:22  Patient On (Oxygen Delivery Method): room air    General: NAD, well-appearing  Abdominal: soft, appropriately tender, non-distended, no rebound or guarding, +BS                          6.0    10.36 )-----------( 192      ( 14 Dec 2023 08:23 )             18.8       A/P: POD#2 s/p PEUA, ex lap, SUJATHA, BSO, tumor debulking, repair of umbilical hernia with drainage of abdominal collections, extensive LUCY; TAP BLOCK. Hgb 6 this AM. 2u pRBCs ordered. Transfusion consent signed at bedside. R/B/A were reviewed. Patient was allowed to asked questions. Patient verbalized understanding and agreement with plan. Will premedicate with Tylenol and Benadryl.

## 2023-12-14 NOTE — PROGRESS NOTE ADULT - SUBJECTIVE AND OBJECTIVE BOX
GYNECOLOGIC ONCOLOGY PROGRESS NOTE    73y POD#2 s/p PEUA, ex lap, SUJATHA, BSO, tumor debulking, repair of umbilical hernia with drainage of abdominal collections, extensive LUCY; TAP BLOCK.     LILIAN CASTILLO is seen and examined at bedside.     SUBJECTIVE:  Pain well-controlled.  Flatus: No  Denies Nausea, Vomiting or Diarrhea.  Denies shortness of breath, chest pain or dyspnea on exertion.  NPO  Mosley in place draining well    OBJECTIVE:   Vital Signs Last 24 Hrs  T(C): 36.7 (14 Dec 2023 05:15), Max: 37.1 (13 Dec 2023 13:15)  T(F): 98 (14 Dec 2023 05:15), Max: 98.8 (13 Dec 2023 13:15)  HR: 79 (14 Dec 2023 05:15) (79 - 96)  BP: 117/63 (14 Dec 2023 05:15) (104/48 - 150/65)  BP(mean): 71 (13 Dec 2023 12:00) (59 - 135)  RR: 18 (14 Dec 2023 05:15) (15 - 23)  SpO2: 97% (14 Dec 2023 05:15) (92% - 100%)    Parameters below as of 14 Dec 2023 05:15  Patient On (Oxygen Delivery Method): room air    I&O's Detail    13 Dec 2023 07:01  -  14 Dec 2023 07:00  --------------------------------------------------------  IN:    multiple electrolytes Injection Type 1 Bolus: 1000 mL    multiple electrolytes Injection Type 1.: 480 mL  Total IN: 1480 mL    OUT:    Indwelling Catheter - Urethral (mL): 1600 mL  Total OUT: 1600 mL    Total NET: -120 mL      MEDICATIONS  (STANDING):  enoxaparin Injectable 40 milliGRAM(s) SubCutaneous every 24 hours  gentamicin   IVPB 420 milliGRAM(s) IV Intermittent once  multiple electrolytes Injection Type 1 1000 milliLiter(s) (120 mL/Hr) IV Continuous <Continuous>  ondansetron Injectable 4 milliGRAM(s) IV Push every 4 hours  simethicone 80 milliGRAM(s) Chew every 6 hours    MEDICATIONS  (PRN):      Physical Exam:  Constitutional: NAD  Pulmonary: clear to auscultation bilaterally.  Cardiovascular: Regular rate and rhythm   Abdomen: soft, non-tender, non-distended, normal bowel sounds, dressing dry  Extremities: no lower extremity edema or calf tenderness    LABS:                        9.5    22.47 )-----------( 258      ( 13 Dec 2023 03:23 )             29.4     12-13    136  |  99  |  19.5  ----------------------------<  193<H>  3.6   |  22.0  |  1.17    Ca    8.6      13 Dec 2023 03:23  Phos  3.5     12-13  Mg     1.2     12-13    TPro  6.3<L>  /  Alb  3.2<L>  /  TBili  0.6  /  DBili  x   /  AST  24  /  ALT  10  /  AlkPhos  82  12-13    Urinalysis Basic - ( 13 Dec 2023 03:23 )    Color: x / Appearance: x / SG: x / pH: x  Gluc: 193 mg/dL / Ketone: x  / Bili: x / Urobili: x   Blood: x / Protein: x / Nitrite: x   Leuk Esterase: x / RBC: x / WBC x   Sq Epi: x / Non Sq Epi: x / Bacteria: x

## 2023-12-14 NOTE — PROGRESS NOTE ADULT - ASSESSMENT
73y POD#2 s/p PEUA, ex lap, SUJATHA, BSO, tumor debulking, repair of umbilical hernia with drainage of abdominal collections, extensive LUCY; TAP BLOCK.     Cardiac: No active issues. BP well controlled   Pulmonary: No active disease.   Neuro: Pain well controlled with current regimen.   Endo: No active disease    GI: NPO, NGT removed yesterday  : Mosley in place. UOP adequate. Preop creatinine 1.22 -> 1.17 -> AM labs pending  ID: No active disease. Preop WBC 8.82-> 22.47 -> AM labs pending  Heme: Preop Hgb 9.8 > 9.5 > AM labs pending. SCDs when not ambulating, heparin for VTE prophylaxis.   Skin: No active disease. Dressing/wound CDI  Psych: no active problems   FEN: IVF 120cc/h and multiple boluses ON for low UOP which is now improving. Mg 1.2, Phos 3.5, K 3.6.     Dispo: Continue inpt mgmt 73y POD#2 s/p PEUA, ex lap, SUJATHA, BSO, tumor debulking, repair of umbilical hernia with drainage of abdominal collections, extensive LUCY; TAP BLOCK.     Cardiac: No active issues. BP well controlled   Pulmonary: No active disease.   Neuro: Pain well controlled with current regimen.   Endo: No active disease    GI: NPO, NGT removed yesterday  : Mosley in place. UOP adequate. Preop creatinine 1.22 -> 1.17 -> AM labs pending  ID: No active disease. Preop WBC 8.82-> 22.47 -> AM labs pending  Heme: Preop Hgb 9.8 > 9.5 > AM labs pending. SCDs when not ambulating, heparin for VTE prophylaxis.   Skin: No active disease. Dressing/wound CDI  Psych: no active problems   FEN: IVF 120cc/h, UOP adequate. F/u AM electrolytes    Dispo: Continue inpt mgmt

## 2023-12-15 ENCOUNTER — TRANSCRIPTION ENCOUNTER (OUTPATIENT)
Age: 73
End: 2023-12-15

## 2023-12-15 LAB
ANION GAP SERPL CALC-SCNC: 8 MMOL/L — SIGNIFICANT CHANGE UP (ref 5–17)
BASOPHILS # BLD AUTO: 0.03 K/UL — SIGNIFICANT CHANGE UP (ref 0–0.2)
BASOPHILS # BLD AUTO: 0.03 K/UL — SIGNIFICANT CHANGE UP (ref 0–0.2)
BASOPHILS # BLD AUTO: 0.04 K/UL — SIGNIFICANT CHANGE UP (ref 0–0.2)
BASOPHILS # BLD AUTO: 0.04 K/UL — SIGNIFICANT CHANGE UP (ref 0–0.2)
BASOPHILS NFR BLD AUTO: 0.3 % — SIGNIFICANT CHANGE UP (ref 0–2)
BASOPHILS NFR BLD AUTO: 0.3 % — SIGNIFICANT CHANGE UP (ref 0–2)
BASOPHILS NFR BLD AUTO: 0.4 % — SIGNIFICANT CHANGE UP (ref 0–2)
BASOPHILS NFR BLD AUTO: 0.4 % — SIGNIFICANT CHANGE UP (ref 0–2)
BUN SERPL-MCNC: 11.6 MG/DL — SIGNIFICANT CHANGE UP (ref 8–20)
BUN SERPL-MCNC: 11.6 MG/DL — SIGNIFICANT CHANGE UP (ref 8–20)
BUN SERPL-MCNC: 12.4 MG/DL — SIGNIFICANT CHANGE UP (ref 8–20)
BUN SERPL-MCNC: 12.4 MG/DL — SIGNIFICANT CHANGE UP (ref 8–20)
CALCIUM SERPL-MCNC: 8.3 MG/DL — LOW (ref 8.4–10.5)
CALCIUM SERPL-MCNC: 8.3 MG/DL — LOW (ref 8.4–10.5)
CALCIUM SERPL-MCNC: 8.5 MG/DL — SIGNIFICANT CHANGE UP (ref 8.4–10.5)
CALCIUM SERPL-MCNC: 8.5 MG/DL — SIGNIFICANT CHANGE UP (ref 8.4–10.5)
CHLORIDE SERPL-SCNC: 101 MMOL/L — SIGNIFICANT CHANGE UP (ref 96–108)
CHLORIDE SERPL-SCNC: 101 MMOL/L — SIGNIFICANT CHANGE UP (ref 96–108)
CHLORIDE SERPL-SCNC: 104 MMOL/L — SIGNIFICANT CHANGE UP (ref 96–108)
CHLORIDE SERPL-SCNC: 104 MMOL/L — SIGNIFICANT CHANGE UP (ref 96–108)
CO2 SERPL-SCNC: 26 MMOL/L — SIGNIFICANT CHANGE UP (ref 22–29)
CO2 SERPL-SCNC: 26 MMOL/L — SIGNIFICANT CHANGE UP (ref 22–29)
CO2 SERPL-SCNC: 27 MMOL/L — SIGNIFICANT CHANGE UP (ref 22–29)
CO2 SERPL-SCNC: 27 MMOL/L — SIGNIFICANT CHANGE UP (ref 22–29)
CREAT SERPL-MCNC: 1.09 MG/DL — SIGNIFICANT CHANGE UP (ref 0.5–1.3)
CREAT SERPL-MCNC: 1.09 MG/DL — SIGNIFICANT CHANGE UP (ref 0.5–1.3)
CREAT SERPL-MCNC: 1.12 MG/DL — SIGNIFICANT CHANGE UP (ref 0.5–1.3)
CREAT SERPL-MCNC: 1.12 MG/DL — SIGNIFICANT CHANGE UP (ref 0.5–1.3)
EGFR: 52 ML/MIN/1.73M2 — LOW
EGFR: 52 ML/MIN/1.73M2 — LOW
EGFR: 54 ML/MIN/1.73M2 — LOW
EGFR: 54 ML/MIN/1.73M2 — LOW
EOSINOPHIL # BLD AUTO: 0.4 K/UL — SIGNIFICANT CHANGE UP (ref 0–0.5)
EOSINOPHIL # BLD AUTO: 0.4 K/UL — SIGNIFICANT CHANGE UP (ref 0–0.5)
EOSINOPHIL # BLD AUTO: 0.55 K/UL — HIGH (ref 0–0.5)
EOSINOPHIL # BLD AUTO: 0.55 K/UL — HIGH (ref 0–0.5)
EOSINOPHIL NFR BLD AUTO: 4.1 % — SIGNIFICANT CHANGE UP (ref 0–6)
EOSINOPHIL NFR BLD AUTO: 4.1 % — SIGNIFICANT CHANGE UP (ref 0–6)
EOSINOPHIL NFR BLD AUTO: 5.3 % — SIGNIFICANT CHANGE UP (ref 0–6)
EOSINOPHIL NFR BLD AUTO: 5.3 % — SIGNIFICANT CHANGE UP (ref 0–6)
GLUCOSE SERPL-MCNC: 136 MG/DL — HIGH (ref 70–99)
GLUCOSE SERPL-MCNC: 136 MG/DL — HIGH (ref 70–99)
GLUCOSE SERPL-MCNC: 95 MG/DL — SIGNIFICANT CHANGE UP (ref 70–99)
GLUCOSE SERPL-MCNC: 95 MG/DL — SIGNIFICANT CHANGE UP (ref 70–99)
HCT VFR BLD CALC: 24.5 % — LOW (ref 34.5–45)
HCT VFR BLD CALC: 24.5 % — LOW (ref 34.5–45)
HCT VFR BLD CALC: 25.6 % — LOW (ref 34.5–45)
HCT VFR BLD CALC: 25.6 % — LOW (ref 34.5–45)
HGB BLD-MCNC: 7.9 G/DL — LOW (ref 11.5–15.5)
HGB BLD-MCNC: 7.9 G/DL — LOW (ref 11.5–15.5)
HGB BLD-MCNC: 8.3 G/DL — LOW (ref 11.5–15.5)
HGB BLD-MCNC: 8.3 G/DL — LOW (ref 11.5–15.5)
IMM GRANULOCYTES NFR BLD AUTO: 0.3 % — SIGNIFICANT CHANGE UP (ref 0–0.9)
IMM GRANULOCYTES NFR BLD AUTO: 0.3 % — SIGNIFICANT CHANGE UP (ref 0–0.9)
IMM GRANULOCYTES NFR BLD AUTO: 0.5 % — SIGNIFICANT CHANGE UP (ref 0–0.9)
IMM GRANULOCYTES NFR BLD AUTO: 0.5 % — SIGNIFICANT CHANGE UP (ref 0–0.9)
LYMPHOCYTES # BLD AUTO: 1.98 K/UL — SIGNIFICANT CHANGE UP (ref 1–3.3)
LYMPHOCYTES # BLD AUTO: 1.98 K/UL — SIGNIFICANT CHANGE UP (ref 1–3.3)
LYMPHOCYTES # BLD AUTO: 19.1 % — SIGNIFICANT CHANGE UP (ref 13–44)
LYMPHOCYTES # BLD AUTO: 19.1 % — SIGNIFICANT CHANGE UP (ref 13–44)
LYMPHOCYTES # BLD AUTO: 2.04 K/UL — SIGNIFICANT CHANGE UP (ref 1–3.3)
LYMPHOCYTES # BLD AUTO: 2.04 K/UL — SIGNIFICANT CHANGE UP (ref 1–3.3)
LYMPHOCYTES # BLD AUTO: 20.8 % — SIGNIFICANT CHANGE UP (ref 13–44)
LYMPHOCYTES # BLD AUTO: 20.8 % — SIGNIFICANT CHANGE UP (ref 13–44)
MAGNESIUM SERPL-MCNC: 1.6 MG/DL — LOW (ref 1.8–2.6)
MAGNESIUM SERPL-MCNC: 1.6 MG/DL — LOW (ref 1.8–2.6)
MCHC RBC-ENTMCNC: 27.9 PG — SIGNIFICANT CHANGE UP (ref 27–34)
MCHC RBC-ENTMCNC: 27.9 PG — SIGNIFICANT CHANGE UP (ref 27–34)
MCHC RBC-ENTMCNC: 28.1 PG — SIGNIFICANT CHANGE UP (ref 27–34)
MCHC RBC-ENTMCNC: 28.1 PG — SIGNIFICANT CHANGE UP (ref 27–34)
MCHC RBC-ENTMCNC: 32.2 GM/DL — SIGNIFICANT CHANGE UP (ref 32–36)
MCHC RBC-ENTMCNC: 32.2 GM/DL — SIGNIFICANT CHANGE UP (ref 32–36)
MCHC RBC-ENTMCNC: 32.4 GM/DL — SIGNIFICANT CHANGE UP (ref 32–36)
MCHC RBC-ENTMCNC: 32.4 GM/DL — SIGNIFICANT CHANGE UP (ref 32–36)
MCV RBC AUTO: 85.9 FL — SIGNIFICANT CHANGE UP (ref 80–100)
MCV RBC AUTO: 85.9 FL — SIGNIFICANT CHANGE UP (ref 80–100)
MCV RBC AUTO: 87.2 FL — SIGNIFICANT CHANGE UP (ref 80–100)
MCV RBC AUTO: 87.2 FL — SIGNIFICANT CHANGE UP (ref 80–100)
MONOCYTES # BLD AUTO: 0.99 K/UL — HIGH (ref 0–0.9)
MONOCYTES # BLD AUTO: 0.99 K/UL — HIGH (ref 0–0.9)
MONOCYTES # BLD AUTO: 1.06 K/UL — HIGH (ref 0–0.9)
MONOCYTES # BLD AUTO: 1.06 K/UL — HIGH (ref 0–0.9)
MONOCYTES NFR BLD AUTO: 10.8 % — SIGNIFICANT CHANGE UP (ref 2–14)
MONOCYTES NFR BLD AUTO: 10.8 % — SIGNIFICANT CHANGE UP (ref 2–14)
MONOCYTES NFR BLD AUTO: 9.6 % — SIGNIFICANT CHANGE UP (ref 2–14)
MONOCYTES NFR BLD AUTO: 9.6 % — SIGNIFICANT CHANGE UP (ref 2–14)
NEUTROPHILS # BLD AUTO: 6.26 K/UL — SIGNIFICANT CHANGE UP (ref 1.8–7.4)
NEUTROPHILS # BLD AUTO: 6.26 K/UL — SIGNIFICANT CHANGE UP (ref 1.8–7.4)
NEUTROPHILS # BLD AUTO: 6.75 K/UL — SIGNIFICANT CHANGE UP (ref 1.8–7.4)
NEUTROPHILS # BLD AUTO: 6.75 K/UL — SIGNIFICANT CHANGE UP (ref 1.8–7.4)
NEUTROPHILS NFR BLD AUTO: 63.6 % — SIGNIFICANT CHANGE UP (ref 43–77)
NEUTROPHILS NFR BLD AUTO: 63.6 % — SIGNIFICANT CHANGE UP (ref 43–77)
NEUTROPHILS NFR BLD AUTO: 65.2 % — SIGNIFICANT CHANGE UP (ref 43–77)
NEUTROPHILS NFR BLD AUTO: 65.2 % — SIGNIFICANT CHANGE UP (ref 43–77)
PHOSPHATE SERPL-MCNC: 2.6 MG/DL — SIGNIFICANT CHANGE UP (ref 2.4–4.7)
PHOSPHATE SERPL-MCNC: 2.6 MG/DL — SIGNIFICANT CHANGE UP (ref 2.4–4.7)
PLATELET # BLD AUTO: 188 K/UL — SIGNIFICANT CHANGE UP (ref 150–400)
PLATELET # BLD AUTO: 188 K/UL — SIGNIFICANT CHANGE UP (ref 150–400)
PLATELET # BLD AUTO: 208 K/UL — SIGNIFICANT CHANGE UP (ref 150–400)
PLATELET # BLD AUTO: 208 K/UL — SIGNIFICANT CHANGE UP (ref 150–400)
POTASSIUM SERPL-MCNC: 3.7 MMOL/L — SIGNIFICANT CHANGE UP (ref 3.5–5.3)
POTASSIUM SERPL-MCNC: 3.7 MMOL/L — SIGNIFICANT CHANGE UP (ref 3.5–5.3)
POTASSIUM SERPL-MCNC: 3.9 MMOL/L — SIGNIFICANT CHANGE UP (ref 3.5–5.3)
POTASSIUM SERPL-MCNC: 3.9 MMOL/L — SIGNIFICANT CHANGE UP (ref 3.5–5.3)
POTASSIUM SERPL-SCNC: 3.7 MMOL/L — SIGNIFICANT CHANGE UP (ref 3.5–5.3)
POTASSIUM SERPL-SCNC: 3.7 MMOL/L — SIGNIFICANT CHANGE UP (ref 3.5–5.3)
POTASSIUM SERPL-SCNC: 3.9 MMOL/L — SIGNIFICANT CHANGE UP (ref 3.5–5.3)
POTASSIUM SERPL-SCNC: 3.9 MMOL/L — SIGNIFICANT CHANGE UP (ref 3.5–5.3)
RBC # BLD: 2.81 M/UL — LOW (ref 3.8–5.2)
RBC # BLD: 2.81 M/UL — LOW (ref 3.8–5.2)
RBC # BLD: 2.98 M/UL — LOW (ref 3.8–5.2)
RBC # BLD: 2.98 M/UL — LOW (ref 3.8–5.2)
RBC # FLD: 16.3 % — HIGH (ref 10.3–14.5)
SODIUM SERPL-SCNC: 135 MMOL/L — SIGNIFICANT CHANGE UP (ref 135–145)
SODIUM SERPL-SCNC: 135 MMOL/L — SIGNIFICANT CHANGE UP (ref 135–145)
SODIUM SERPL-SCNC: 139 MMOL/L — SIGNIFICANT CHANGE UP (ref 135–145)
SODIUM SERPL-SCNC: 139 MMOL/L — SIGNIFICANT CHANGE UP (ref 135–145)
WBC # BLD: 10.35 K/UL — SIGNIFICANT CHANGE UP (ref 3.8–10.5)
WBC # BLD: 10.35 K/UL — SIGNIFICANT CHANGE UP (ref 3.8–10.5)
WBC # BLD: 9.83 K/UL — SIGNIFICANT CHANGE UP (ref 3.8–10.5)
WBC # BLD: 9.83 K/UL — SIGNIFICANT CHANGE UP (ref 3.8–10.5)
WBC # FLD AUTO: 10.35 K/UL — SIGNIFICANT CHANGE UP (ref 3.8–10.5)
WBC # FLD AUTO: 10.35 K/UL — SIGNIFICANT CHANGE UP (ref 3.8–10.5)
WBC # FLD AUTO: 9.83 K/UL — SIGNIFICANT CHANGE UP (ref 3.8–10.5)
WBC # FLD AUTO: 9.83 K/UL — SIGNIFICANT CHANGE UP (ref 3.8–10.5)

## 2023-12-15 PROCEDURE — 99024 POSTOP FOLLOW-UP VISIT: CPT

## 2023-12-15 RX ORDER — APIXABAN 2.5 MG/1
1 TABLET, FILM COATED ORAL
Qty: 60 | Refills: 0
Start: 2023-12-15 | End: 2024-01-13

## 2023-12-15 RX ORDER — POTASSIUM CHLORIDE 20 MEQ
20 PACKET (EA) ORAL
Refills: 0 | Status: COMPLETED | OUTPATIENT
Start: 2023-12-15 | End: 2023-12-15

## 2023-12-15 RX ORDER — ENOXAPARIN SODIUM 100 MG/ML
40 INJECTION SUBCUTANEOUS EVERY 24 HOURS
Refills: 0 | Status: DISCONTINUED | OUTPATIENT
Start: 2023-12-15 | End: 2023-12-19

## 2023-12-15 RX ORDER — MAGNESIUM SULFATE 500 MG/ML
2 VIAL (ML) INJECTION ONCE
Refills: 0 | Status: COMPLETED | OUTPATIENT
Start: 2023-12-15 | End: 2023-12-15

## 2023-12-15 RX ORDER — FUROSEMIDE 40 MG
10 TABLET ORAL DAILY
Refills: 0 | Status: DISCONTINUED | OUTPATIENT
Start: 2023-12-15 | End: 2023-12-19

## 2023-12-15 RX ADMIN — SODIUM CHLORIDE 120 MILLILITER(S): 9 INJECTION, SOLUTION INTRAVENOUS at 06:05

## 2023-12-15 RX ADMIN — SIMETHICONE 80 MILLIGRAM(S): 80 TABLET, CHEWABLE ORAL at 11:44

## 2023-12-15 RX ADMIN — HEPARIN SODIUM 5000 UNIT(S): 5000 INJECTION INTRAVENOUS; SUBCUTANEOUS at 13:47

## 2023-12-15 RX ADMIN — Medication 650 MILLIGRAM(S): at 17:06

## 2023-12-15 RX ADMIN — Medication 10 MILLIGRAM(S): at 11:44

## 2023-12-15 RX ADMIN — Medication 650 MILLIGRAM(S): at 00:03

## 2023-12-15 RX ADMIN — SIMETHICONE 80 MILLIGRAM(S): 80 TABLET, CHEWABLE ORAL at 05:53

## 2023-12-15 RX ADMIN — Medication 20 MILLIEQUIVALENT(S): at 08:06

## 2023-12-15 RX ADMIN — HEPARIN SODIUM 5000 UNIT(S): 5000 INJECTION INTRAVENOUS; SUBCUTANEOUS at 05:53

## 2023-12-15 RX ADMIN — Medication 650 MILLIGRAM(S): at 12:43

## 2023-12-15 RX ADMIN — Medication 25 GRAM(S): at 08:07

## 2023-12-15 RX ADMIN — Medication 20 MILLIEQUIVALENT(S): at 10:20

## 2023-12-15 RX ADMIN — SIMETHICONE 80 MILLIGRAM(S): 80 TABLET, CHEWABLE ORAL at 23:41

## 2023-12-15 RX ADMIN — Medication 650 MILLIGRAM(S): at 18:50

## 2023-12-15 RX ADMIN — Medication 650 MILLIGRAM(S): at 05:53

## 2023-12-15 RX ADMIN — Medication 650 MILLIGRAM(S): at 11:44

## 2023-12-15 RX ADMIN — BENZOCAINE AND MENTHOL 1 LOZENGE: 5; 1 LIQUID ORAL at 11:44

## 2023-12-15 RX ADMIN — ONDANSETRON 4 MILLIGRAM(S): 8 TABLET, FILM COATED ORAL at 02:28

## 2023-12-15 RX ADMIN — Medication 650 MILLIGRAM(S): at 23:41

## 2023-12-15 RX ADMIN — SIMETHICONE 80 MILLIGRAM(S): 80 TABLET, CHEWABLE ORAL at 17:06

## 2023-12-15 NOTE — DISCHARGE NOTE PROVIDER - NSDCCPCAREPLAN_GEN_ALL_CORE_FT
PRINCIPAL DISCHARGE DIAGNOSIS  Diagnosis: Endometrial ca  Assessment and Plan of Treatment:       SECONDARY DISCHARGE DIAGNOSES  Diagnosis: Endometrial ca  Assessment and Plan of Treatment:

## 2023-12-15 NOTE — PROGRESS NOTE ADULT - NS ATTEND AMEND GEN_ALL_CORE FT
I have seen and examined this patient with the surgical team.  No acute events overnight.  Patient appears well this morning.  Endorses abdominal pain.  Denies nausea/vomiting currently.  Denies passing flatus/BMs.  Abdomen appropriately TTP on exam, no distension.  Diet per gyn-onc team.  Please reconsult PRN.
I have seen and examined this patient with the surgical team.  No acute events overnight.  Patient appears well this morning.  Endorses abdominal pain.  Denies nausea/vomiting currently, endorses some nausea overnight.  Denies passing flatus/BMs.  Abdomen appropriately TTP on exam.  Diet per gyn-onc team.  ACS will follow.
I have seen and examined this patient with the surgical team.  No acute events overnight.  Patient appears well this morning.  Endorses abdominal pain.  Denies nausea/vomiting currently, endorses some nausea overnight.  Denies passing flatus/BMs.  Abdomen appropriately TTP on exam.  Diet per gyn-onc team.  ACS will follow.

## 2023-12-15 NOTE — DISCHARGE NOTE PROVIDER - CARE PROVIDERS DIRECT ADDRESSES
[email or web address]

## 2023-12-15 NOTE — DISCHARGE NOTE PROVIDER - NSDCMRMEDTOKEN_GEN_ALL_CORE_FT
Eliquis 2.5 mg oral tablet: 1 tab(s) orally 2 times a day  furosemide 20 mg oral tablet: 1 tab(s) orally 4 times a week Take 2 tablets (40mg) on Monday, Wednesday, and Friday

## 2023-12-15 NOTE — DISCHARGE NOTE PROVIDER - NSDCCPGOAL_GEN_ALL_CORE_FT
1) Please take Ibuprofen and/or Tylenol as needed for pain.  2) Nothing in the vagina for 6 weeks (including no sex, no tampons, and no douching).  3) Please follow up with Dr. Jones within 2 weeks.   4) Please continue taking all of your home medications.   5) Please call the office sooner if you have heavy vaginal bleeding, severe abdominal pain, or fever > 100.4F.  6) You may resume regular daily activity as tolerated

## 2023-12-15 NOTE — PHYSICAL THERAPY INITIAL EVALUATION ADULT - ADDITIONAL COMMENTS
Pt AxOx4 states she lives at home alone with 3STE 1HR on R, and amb with RW outside of home. Pt will have friends to assist, drive and shop for pt upon d/c.

## 2023-12-15 NOTE — DISCHARGE NOTE PROVIDER - CARE PROVIDER_API CALL
Shefali Jones  Gynecologic Oncology  [address] [place] [address]  Phone: [phone]  Fax: [phone]  Follow Up Time:    Shefali Jones  Gynecologic Oncology  [address] [place] [address]  Phone: [phone]  Fax: [phone]  Follow Up Time:

## 2023-12-15 NOTE — PROGRESS NOTE ADULT - SUBJECTIVE AND OBJECTIVE BOX
SUBJECTIVE: Resting comfortably. States that she has been OOB and walking. Not passing gas but tolerating liquid diet. No bowel movements. Hemodynamically stable, no acute events overnight.    Vitals:  T(C): 37.1 (12-15-23 @ 09:00), Max: 37.1 (12-14-23 @ 19:37)  T(F): 98.7 (12-15-23 @ 09:00), Max: 98.7 (12-14-23 @ 19:37)  HR: 72 (12-15-23 @ 09:00) (72 - 82)  BP: 150/65 (12-15-23 @ 09:00) (118/68 - 150/65)  ABP: --  ABP(mean): --  RR: 18 (12-15-23 @ 09:00) (18 - 18)  SpO2: 96% (12-15-23 @ 09:00) (94% - 97%)      LABS:  cret                        7.9    9.83  )-----------( 188      ( 15 Dec 2023 04:35 )             24.5     12-15    139  |  104  |  12.4  ----------------------------<  95  3.7   |  27.0  |  1.09    Ca    8.5      15 Dec 2023 04:35  Phos  2.6     12-15  Mg     1.6     12-15      GENERAL: NAD, lying in bed comfortably  HEAD:  Atraumatic, normocephalic  NECK: Supple, no JVD  HEART: RRR  LUNGS: Unlabored respirations. No conversational dyspnea.   ABDOMEN: Soft, mildly tender to palpation, mildly distended. Surgical incisions well-healing, c/d/i.   EXTREMITIES: No clubbing, cyanosis, or edema  NERVOUS SYSTEM:  A&Ox3, no focal deficits   SKIN: No rashes or lesions

## 2023-12-15 NOTE — PHYSICAL THERAPY INITIAL EVALUATION ADULT - PERTINENT HX OF CURRENT PROBLEM, REHAB EVAL
PEUA, ex lap, SUJATHA, BSO, tumor debulking, repair of umbilical hernia with drainage of abdominal collections, extensive LUCY; TAP BLOCK.

## 2023-12-15 NOTE — PROGRESS NOTE ADULT - SUBJECTIVE AND OBJECTIVE BOX
GYNECOLOGIC ONCOLOGY PROGRESS NOTE    73y POD#3 s/p PEUA, ex lap, SUJATHA, BSO, tumor debulking, repair of umbilical hernia with drainage of abdominal collections, extensive LUCY; TAP BLOCK.     LILIAN CASTILLO is seen and examined at bedside. She reports to tolerated liquids this AM including tea and some orange juice.     SUBJECTIVE:    Patient is without complaints.  Pain well-controlled.  Flatus: no   Denies Nausea, Vomiting or Diarrhea.  Denies shortness of breath, chest pain or dyspnea on exertion.  Tolerating CLD.    OBJECTIVE:     VITALS:  T(F): 98.7 (12-15-23 @ 09:00), Max: 98.7 (12-14-23 @ 19:37)  HR: 72 (12-15-23 @ 09:00) (72 - 82)  BP: 150/65 (12-15-23 @ 09:00) (118/68 - 150/65)  RR: 18 (12-15-23 @ 09:00) (18 - 18)  SpO2: 96% (12-15-23 @ 09:00) (94% - 97%)      I&O's Summary    14 Dec 2023 07:01  -  15 Dec 2023 07:00  --------------------------------------------------------  IN: 360 mL / OUT: 800 mL / NET: -440 mL    Voiding without difficulty since dos santos removal      MEDICATIONS  (STANDING):  acetaminophen     Tablet .. 650 milliGRAM(s) Oral every 6 hours  furosemide    Tablet 10 milliGRAM(s) Oral daily  heparin   Injectable 5000 Unit(s) SubCutaneous every 8 hours  multiple electrolytes Injection Type 1 1000 milliLiter(s) (120 mL/Hr) IV Continuous <Continuous>  simethicone 80 milliGRAM(s) Chew every 6 hours    MEDICATIONS  (PRN):  benzocaine/menthol Lozenge 1 Lozenge Oral three times a day PRN Sore Throat  calcium carbonate    500 mG (Tums) Chewable 1 Tablet(s) Chew two times a day PRN Heartburn      Physical Exam:  Constitutional: NAD  Abdomen: soft, non-tender, non-distended, normal bowel sounds  Extremities: no lower extremity edema or calve tenderness  Incision: dressing Clean, dry, intact.        LABS: AM labs reviewed                         7.9    9.83  )-----------( 188      ( 15 Dec 2023 04:35 )             24.5     12-15    139  |  104  |  12.4  ----------------------------<  95  3.7   |  27.0  |  1.09    Ca    8.5      15 Dec 2023 04:35  Phos  2.6     12-15  Mg     1.6     12-15        Urinalysis Basic - ( 15 Dec 2023 04:35 )    Color: x / Appearance: x / SG: x / pH: x  Gluc: 95 mg/dL / Ketone: x  / Bili: x / Urobili: x   Blood: x / Protein: x / Nitrite: x   Leuk Esterase: x / RBC: x / WBC x   Sq Epi: x / Non Sq Epi: x / Bacteria: x   GYNECOLOGIC ONCOLOGY PROGRESS NOTE    73y POD#3 s/p PEUA, ex lap, SUJATHA, BSO, tumor debulking, repair of umbilical hernia with drainage of abdominal collections, extensive LUCY; TAP BLOCK.     LILIAN CASTILLO is seen and examined at bedside. She reports to tolerated liquids this AM including tea and some orange juice.     SUBJECTIVE:    Patient is without complaints.  Pain well-controlled.  Flatus: no   Denies Nausea, Vomiting or Diarrhea.  Denies shortness of breath, chest pain or dyspnea on exertion.  Tolerating CLD.    OBJECTIVE:     VITALS:  T(F): 98.7 (12-15-23 @ 09:00), Max: 98.7 (12-14-23 @ 19:37)  HR: 72 (12-15-23 @ 09:00) (72 - 82)  BP: 150/65 (12-15-23 @ 09:00) (118/68 - 150/65)  RR: 18 (12-15-23 @ 09:00) (18 - 18)  SpO2: 96% (12-15-23 @ 09:00) (94% - 97%)      I&O's Summary    14 Dec 2023 07:01  -  15 Dec 2023 07:00  --------------------------------------------------------  IN: 360 mL / OUT: 800 mL / NET: -440 mL    Voiding without difficulty since dos santos removal      MEDICATIONS  (STANDING):  acetaminophen     Tablet .. 650 milliGRAM(s) Oral every 6 hours  furosemide    Tablet 10 milliGRAM(s) Oral daily  heparin   Injectable 5000 Unit(s) SubCutaneous every 8 hours  multiple electrolytes Injection Type 1 1000 milliLiter(s) (120 mL/Hr) IV Continuous <Continuous>  simethicone 80 milliGRAM(s) Chew every 6 hours    MEDICATIONS  (PRN):  benzocaine/menthol Lozenge 1 Lozenge Oral three times a day PRN Sore Throat  calcium carbonate    500 mG (Tums) Chewable 1 Tablet(s) Chew two times a day PRN Heartburn      Physical Exam:  Constitutional: NAD  Abdomen: soft, non-tender, non-distended, normal bowel sounds  Extremities: no lower extremity edema or calve tenderness  Incision: dressing Clean, dry, intact.        LABS: AM labs reviewed                         7.9    9.83  )-----------( 188      ( 15 Dec 2023 04:35 )             24.5     12-15    139  |  104  |  12.4  ----------------------------<  95  3.7   |  27.0  |  1.09    Ca    8.5      15 Dec 2023 04:35  Phos  2.6     12-15  Mg     1.6     12-15        Urinalysis Basic - ( 15 Dec 2023 04:35 )    Color: x / Appearance: x / SG: x / pH: x  Gluc: 95 mg/dL / Ketone: x  / Bili: x / Urobili: x   Blood: x / Protein: x / Nitrite: x   Leuk Esterase: x / RBC: x / WBC x   Sq Epi: x / Non Sq Epi: x / Bacteria: x    Patient seen and examined with Dr. Sandhu.

## 2023-12-15 NOTE — PHYSICAL THERAPY INITIAL EVALUATION ADULT - DID THE PATIENT HAVE SURGERY?
xploratory laparotomy with total abdominal hysterectomy and bilateral salpingo-oophorectomy, Lysis of pelvic adhesions/yes

## 2023-12-15 NOTE — PROGRESS NOTE ADULT - ASSESSMENT
ASSESSMENT: Pt is a 74 y/o female POD1 s/p ex-lap, SUJATHA-BSO. General surgery consulted intra-op for extensive lysis of adhesions, bowel ran without evidence of injury, excision of hernia sac. Pt tolerating diet but still does not have bowel function.     - Monitor for return of bowel function  - Encourage pt to be OOB to chair as much as she can tolerate, ambulate if able, consider PT to assist if needed  - Pain control PRN  - DVT ppx w/ lovenox & SCDs  - Encourage frequent IS use  - Remainder of care per OBGYN   - please reconsult general surgery as needed  ASSESSMENT: Pt is a 72 y/o female POD1 s/p ex-lap, SUJATHA-BSO. General surgery consulted intra-op for extensive lysis of adhesions, bowel ran without evidence of injury, excision of hernia sac. Pt tolerating diet but still does not have bowel function.     - Monitor for return of bowel function  - Encourage pt to be OOB to chair as much as she can tolerate, ambulate if able, consider PT to assist if needed  - Pain control PRN  - DVT ppx w/ lovenox & SCDs  - Encourage frequent IS use  - Remainder of care per OBGYN   - please reconsult general surgery as needed

## 2023-12-15 NOTE — DISCHARGE NOTE PROVIDER - NSDCCPTREATMENT_GEN_ALL_CORE_FT
PRINCIPAL PROCEDURE  Procedure: Exploratory laparotomy with total abdominal hysterectomy and bilateral salpingo-oophorectomy (SUJATHA-BSO)  Findings and Treatment:

## 2023-12-15 NOTE — PROGRESS NOTE ADULT - ASSESSMENT
73y POD#3 s/p PEUA, ex lap, SUJATHA, BSO, tumor debulking, repair of umbilical hernia with drainage of abdominal collections, extensive LUCY; TAP BLOCK.     Cardiac: No active issues. BP titrating upward, patient takes Lasix 20mg M/W/F and 10mg on other days. Will resume 10mg daily.   Pulmonary: No active disease. Encourage IS use.   Neuro: Pain well controlled with current regimen.   Endo: No active disease    GI: FLD, will advance as tolerated pending bowel function.   : Mosley in place. UOP adequate. AM Cr 1.09.  ID: No active disease.   Heme: S/P 2 U PRBC, AM hgb 7.9. Will check repeat labs this afternoon. SCDs when not ambulating, heparin for VTE prophylaxis. Plan to transition to Lovenox if Hgb remains stable.  Skin: No active disease. Dressing/wound CDI   Psych: no active problems   FEN: IVF 120cc/h, UOP adequate. F/u AM electrolytes. Mag and K supplemented.     Dispo: Continue inpt mgmt   73y POD#3 s/p PEUA, ex lap, SUJATHA, BSO, tumor debulking, repair of umbilical hernia with drainage of abdominal collections, extensive LUCY; TAP BLOCK.     Cardiac: No active issues. BP titrating upward, patient takes Lasix 20mg M/W/F and 10mg on other days. Will resume 10mg daily.   Pulmonary: No active disease. Encourage IS use.   Neuro: Pain well controlled with current regimen.   Endo: No active disease    GI: FLD, will advance as tolerated pending bowel function.   : Mosley in place. UOP adequate. AM Cr 1.09.  ID: No active disease.   Heme: S/P 2 U PRBC, AM hgb 7.9. Will check repeat labs this afternoon. SCDs when not ambulating, heparin for VTE prophylaxis. Plan to transition to Lovenox if Hgb remains stable.  Skin: No active disease. Dressing/wound CDI  Psych: no active problems  FEN: IVF 120cc/h, UOP adequate. F/u AM electrolytes. Mag and K supplemented.     Dispo: Continue inpt mgmt

## 2023-12-15 NOTE — DISCHARGE NOTE PROVIDER - NSDCFUADDINST_GEN_ALL_CORE_FT
May walk and climb stairs as often as you'd like, no vigorous activity, do not lift anything greater than 10lbs, nothing per vagina x 6-8 weeks.   Please take Eliquis as prescribed to avoid blood clot formation.  May take tylenol 1,000 mg every 6 hours for pain relief.

## 2023-12-15 NOTE — DISCHARGE NOTE PROVIDER - HOSPITAL COURSE
Patient underwent ex lap, SUJATHA, BSO, tumor debulking, repair of umbilical hernia with drainage of abdominal collections, extensive LUCY in combination with general surgery. Post-op course was significant for a blood transfusion. Pain is well controlled with PRN medication. She has no difficulty with ambulation, voiding, or PO intake. Lab values and vital signs are within normal limits prior to discharge.

## 2023-12-16 LAB
ALBUMIN SERPL ELPH-MCNC: 2.7 G/DL — LOW (ref 3.3–5.2)
ALBUMIN SERPL ELPH-MCNC: 2.7 G/DL — LOW (ref 3.3–5.2)
ALP SERPL-CCNC: 67 U/L — SIGNIFICANT CHANGE UP (ref 40–120)
ALP SERPL-CCNC: 67 U/L — SIGNIFICANT CHANGE UP (ref 40–120)
ALT FLD-CCNC: 8 U/L — SIGNIFICANT CHANGE UP
ALT FLD-CCNC: 8 U/L — SIGNIFICANT CHANGE UP
ANION GAP SERPL CALC-SCNC: 11 MMOL/L — SIGNIFICANT CHANGE UP (ref 5–17)
ANION GAP SERPL CALC-SCNC: 11 MMOL/L — SIGNIFICANT CHANGE UP (ref 5–17)
AST SERPL-CCNC: 17 U/L — SIGNIFICANT CHANGE UP
AST SERPL-CCNC: 17 U/L — SIGNIFICANT CHANGE UP
BASOPHILS # BLD AUTO: 0.04 K/UL — SIGNIFICANT CHANGE UP (ref 0–0.2)
BASOPHILS # BLD AUTO: 0.04 K/UL — SIGNIFICANT CHANGE UP (ref 0–0.2)
BASOPHILS NFR BLD AUTO: 0.4 % — SIGNIFICANT CHANGE UP (ref 0–2)
BASOPHILS NFR BLD AUTO: 0.4 % — SIGNIFICANT CHANGE UP (ref 0–2)
BILIRUB SERPL-MCNC: 0.6 MG/DL — SIGNIFICANT CHANGE UP (ref 0.4–2)
BILIRUB SERPL-MCNC: 0.6 MG/DL — SIGNIFICANT CHANGE UP (ref 0.4–2)
BUN SERPL-MCNC: 9.9 MG/DL — SIGNIFICANT CHANGE UP (ref 8–20)
BUN SERPL-MCNC: 9.9 MG/DL — SIGNIFICANT CHANGE UP (ref 8–20)
CALCIUM SERPL-MCNC: 8.6 MG/DL — SIGNIFICANT CHANGE UP (ref 8.4–10.5)
CALCIUM SERPL-MCNC: 8.6 MG/DL — SIGNIFICANT CHANGE UP (ref 8.4–10.5)
CHLORIDE SERPL-SCNC: 102 MMOL/L — SIGNIFICANT CHANGE UP (ref 96–108)
CHLORIDE SERPL-SCNC: 102 MMOL/L — SIGNIFICANT CHANGE UP (ref 96–108)
CO2 SERPL-SCNC: 27 MMOL/L — SIGNIFICANT CHANGE UP (ref 22–29)
CO2 SERPL-SCNC: 27 MMOL/L — SIGNIFICANT CHANGE UP (ref 22–29)
CREAT SERPL-MCNC: 1.12 MG/DL — SIGNIFICANT CHANGE UP (ref 0.5–1.3)
CREAT SERPL-MCNC: 1.12 MG/DL — SIGNIFICANT CHANGE UP (ref 0.5–1.3)
EGFR: 52 ML/MIN/1.73M2 — LOW
EGFR: 52 ML/MIN/1.73M2 — LOW
EOSINOPHIL # BLD AUTO: 0.74 K/UL — HIGH (ref 0–0.5)
EOSINOPHIL # BLD AUTO: 0.74 K/UL — HIGH (ref 0–0.5)
EOSINOPHIL NFR BLD AUTO: 8 % — HIGH (ref 0–6)
EOSINOPHIL NFR BLD AUTO: 8 % — HIGH (ref 0–6)
GLUCOSE SERPL-MCNC: 103 MG/DL — HIGH (ref 70–99)
GLUCOSE SERPL-MCNC: 103 MG/DL — HIGH (ref 70–99)
HCT VFR BLD CALC: 25 % — LOW (ref 34.5–45)
HCT VFR BLD CALC: 25 % — LOW (ref 34.5–45)
HGB BLD-MCNC: 8.4 G/DL — LOW (ref 11.5–15.5)
HGB BLD-MCNC: 8.4 G/DL — LOW (ref 11.5–15.5)
IMM GRANULOCYTES NFR BLD AUTO: 0.3 % — SIGNIFICANT CHANGE UP (ref 0–0.9)
IMM GRANULOCYTES NFR BLD AUTO: 0.3 % — SIGNIFICANT CHANGE UP (ref 0–0.9)
LYMPHOCYTES # BLD AUTO: 2.27 K/UL — SIGNIFICANT CHANGE UP (ref 1–3.3)
LYMPHOCYTES # BLD AUTO: 2.27 K/UL — SIGNIFICANT CHANGE UP (ref 1–3.3)
LYMPHOCYTES # BLD AUTO: 24.4 % — SIGNIFICANT CHANGE UP (ref 13–44)
LYMPHOCYTES # BLD AUTO: 24.4 % — SIGNIFICANT CHANGE UP (ref 13–44)
MAGNESIUM SERPL-MCNC: 1.8 MG/DL — SIGNIFICANT CHANGE UP (ref 1.6–2.6)
MAGNESIUM SERPL-MCNC: 1.8 MG/DL — SIGNIFICANT CHANGE UP (ref 1.6–2.6)
MCHC RBC-ENTMCNC: 29 PG — SIGNIFICANT CHANGE UP (ref 27–34)
MCHC RBC-ENTMCNC: 29 PG — SIGNIFICANT CHANGE UP (ref 27–34)
MCHC RBC-ENTMCNC: 33.6 GM/DL — SIGNIFICANT CHANGE UP (ref 32–36)
MCHC RBC-ENTMCNC: 33.6 GM/DL — SIGNIFICANT CHANGE UP (ref 32–36)
MCV RBC AUTO: 86.2 FL — SIGNIFICANT CHANGE UP (ref 80–100)
MCV RBC AUTO: 86.2 FL — SIGNIFICANT CHANGE UP (ref 80–100)
MONOCYTES # BLD AUTO: 0.81 K/UL — SIGNIFICANT CHANGE UP (ref 0–0.9)
MONOCYTES # BLD AUTO: 0.81 K/UL — SIGNIFICANT CHANGE UP (ref 0–0.9)
MONOCYTES NFR BLD AUTO: 8.7 % — SIGNIFICANT CHANGE UP (ref 2–14)
MONOCYTES NFR BLD AUTO: 8.7 % — SIGNIFICANT CHANGE UP (ref 2–14)
NEUTROPHILS # BLD AUTO: 5.41 K/UL — SIGNIFICANT CHANGE UP (ref 1.8–7.4)
NEUTROPHILS # BLD AUTO: 5.41 K/UL — SIGNIFICANT CHANGE UP (ref 1.8–7.4)
NEUTROPHILS NFR BLD AUTO: 58.2 % — SIGNIFICANT CHANGE UP (ref 43–77)
NEUTROPHILS NFR BLD AUTO: 58.2 % — SIGNIFICANT CHANGE UP (ref 43–77)
PHOSPHATE SERPL-MCNC: 2.8 MG/DL — SIGNIFICANT CHANGE UP (ref 2.4–4.7)
PHOSPHATE SERPL-MCNC: 2.8 MG/DL — SIGNIFICANT CHANGE UP (ref 2.4–4.7)
PLATELET # BLD AUTO: 226 K/UL — SIGNIFICANT CHANGE UP (ref 150–400)
PLATELET # BLD AUTO: 226 K/UL — SIGNIFICANT CHANGE UP (ref 150–400)
POTASSIUM SERPL-MCNC: 3.7 MMOL/L — SIGNIFICANT CHANGE UP (ref 3.5–5.3)
POTASSIUM SERPL-MCNC: 3.7 MMOL/L — SIGNIFICANT CHANGE UP (ref 3.5–5.3)
POTASSIUM SERPL-SCNC: 3.7 MMOL/L — SIGNIFICANT CHANGE UP (ref 3.5–5.3)
POTASSIUM SERPL-SCNC: 3.7 MMOL/L — SIGNIFICANT CHANGE UP (ref 3.5–5.3)
PROT SERPL-MCNC: 5.5 G/DL — LOW (ref 6.6–8.7)
PROT SERPL-MCNC: 5.5 G/DL — LOW (ref 6.6–8.7)
RBC # BLD: 2.9 M/UL — LOW (ref 3.8–5.2)
RBC # BLD: 2.9 M/UL — LOW (ref 3.8–5.2)
RBC # FLD: 15.8 % — HIGH (ref 10.3–14.5)
RBC # FLD: 15.8 % — HIGH (ref 10.3–14.5)
SODIUM SERPL-SCNC: 140 MMOL/L — SIGNIFICANT CHANGE UP (ref 135–145)
SODIUM SERPL-SCNC: 140 MMOL/L — SIGNIFICANT CHANGE UP (ref 135–145)
WBC # BLD: 9.3 K/UL — SIGNIFICANT CHANGE UP (ref 3.8–10.5)
WBC # BLD: 9.3 K/UL — SIGNIFICANT CHANGE UP (ref 3.8–10.5)
WBC # FLD AUTO: 9.3 K/UL — SIGNIFICANT CHANGE UP (ref 3.8–10.5)
WBC # FLD AUTO: 9.3 K/UL — SIGNIFICANT CHANGE UP (ref 3.8–10.5)

## 2023-12-16 RX ADMIN — Medication 650 MILLIGRAM(S): at 05:37

## 2023-12-16 RX ADMIN — Medication 650 MILLIGRAM(S): at 23:08

## 2023-12-16 RX ADMIN — SIMETHICONE 80 MILLIGRAM(S): 80 TABLET, CHEWABLE ORAL at 17:40

## 2023-12-16 RX ADMIN — Medication 10 MILLIGRAM(S): at 11:23

## 2023-12-16 RX ADMIN — SODIUM CHLORIDE 3 MILLILITER(S): 9 INJECTION INTRAMUSCULAR; INTRAVENOUS; SUBCUTANEOUS at 05:35

## 2023-12-16 RX ADMIN — SIMETHICONE 80 MILLIGRAM(S): 80 TABLET, CHEWABLE ORAL at 23:08

## 2023-12-16 RX ADMIN — Medication 650 MILLIGRAM(S): at 12:22

## 2023-12-16 RX ADMIN — SODIUM CHLORIDE 3 MILLILITER(S): 9 INJECTION INTRAMUSCULAR; INTRAVENOUS; SUBCUTANEOUS at 15:23

## 2023-12-16 RX ADMIN — Medication 650 MILLIGRAM(S): at 00:40

## 2023-12-16 RX ADMIN — Medication 650 MILLIGRAM(S): at 11:22

## 2023-12-16 RX ADMIN — SIMETHICONE 80 MILLIGRAM(S): 80 TABLET, CHEWABLE ORAL at 11:23

## 2023-12-16 RX ADMIN — SIMETHICONE 80 MILLIGRAM(S): 80 TABLET, CHEWABLE ORAL at 05:37

## 2023-12-16 RX ADMIN — Medication 650 MILLIGRAM(S): at 18:40

## 2023-12-16 RX ADMIN — SODIUM CHLORIDE 3 MILLILITER(S): 9 INJECTION INTRAMUSCULAR; INTRAVENOUS; SUBCUTANEOUS at 22:49

## 2023-12-16 RX ADMIN — Medication 650 MILLIGRAM(S): at 17:40

## 2023-12-16 RX ADMIN — Medication 650 MILLIGRAM(S): at 06:15

## 2023-12-16 RX ADMIN — ENOXAPARIN SODIUM 40 MILLIGRAM(S): 100 INJECTION SUBCUTANEOUS at 05:36

## 2023-12-16 RX ADMIN — SODIUM CHLORIDE 120 MILLILITER(S): 9 INJECTION, SOLUTION INTRAVENOUS at 02:08

## 2023-12-16 NOTE — PROGRESS NOTE ADULT - SUBJECTIVE AND OBJECTIVE BOX
GYNECOLOGIC ONCOLOGY PROGRESS NOTE    73y POD#4 s/p PEUA, ex lap, SUJATHA, BSO, tumor debulking, repair of umbilical hernia with drainage of abdominal collections, extensive LUCY; TAP BLOCK.     LILIAN CASTILLO     SUBJECTIVE:  Passed flatus.    OBJECTIVE:   Vital Signs Last 24 Hrs  T(C): 36.7 (16 Dec 2023 05:06), Max: 37.2 (15 Dec 2023 13:12)  T(F): 98.1 (16 Dec 2023 05:06), Max: 98.9 (15 Dec 2023 13:12)  HR: 65 (16 Dec 2023 05:06) (65 - 74)  BP: 159/73 (16 Dec 2023 05:06) (130/65 - 159/73)  RR: 17 (16 Dec 2023 05:06) (17 - 18)  SpO2: 96% (16 Dec 2023 05:06) (95% - 98%)    Parameters below as of 16 Dec 2023 05:06  Patient On (Oxygen Delivery Method): room air                          8.4    9.30  )-----------( 226      ( 16 Dec 2023 05:37 )             25.0     12-15    135  |  101  |  11.6  ----------------------------<  136<H>  3.9   |  26.0  |  1.12    Ca    8.3<L>      15 Dec 2023 13:06  Phos  2.6     12-15  Mg     1.6     12-15        I&O's Detail    15 Dec 2023 07:01  -  16 Dec 2023 07:00  --------------------------------------------------------  IN:    Albumin 5%  - 250 mL: 600 mL    Oral Fluid: 360 mL  Total IN: 960 mL    OUT:    Voided (mL): 475 mL  Total OUT: 475 mL    Total NET: 485 mL       GYNECOLOGIC ONCOLOGY PROGRESS NOTE    73y POD#4 s/p PEUA, ex lap, SUJATHA, BSO, tumor debulking, repair of umbilical hernia with drainage of abdominal collections, extensive LUCY; TAP BLOCK.     LILIAN CASTILLO     SUBJECTIVE:  Pt feeling well this morning, has been ambulating.   States her current pain is well controlled. The abdominal binder helps with her discomfort.  Thinks she passed flatus, felt relief after. Wants to advance diet.  Denies lightheadedness/dizziness/CP/SOB/n/v    OBJECTIVE:   Vital Signs Last 24 Hrs  T(C): 36.7 (16 Dec 2023 05:06), Max: 37.2 (15 Dec 2023 13:12)  T(F): 98.1 (16 Dec 2023 05:06), Max: 98.9 (15 Dec 2023 13:12)  HR: 65 (16 Dec 2023 05:06) (65 - 74)  BP: 159/73 (16 Dec 2023 05:06) (130/65 - 159/73)  RR: 17 (16 Dec 2023 05:06) (17 - 18)  SpO2: 96% (16 Dec 2023 05:06) (95% - 98%)    Parameters below as of 16 Dec 2023 05:06  Patient On (Oxygen Delivery Method): room air                          8.4    9.30  )-----------( 226      ( 16 Dec 2023 05:37 )             25.0     12-15    135  |  101  |  11.6  ----------------------------<  136<H>  3.9   |  26.0  |  1.12    Ca    8.3<L>      15 Dec 2023 13:06  Phos  2.6     12-15  Mg     1.6     12-15    Gen: well-appearing, NAD  Neuro: A&Ox3  Resp: breathing comfortably on RA  Abd: soft, nondistended, staples in place, incision cdi    I&O's Detail    15 Dec 2023 07:01  -  16 Dec 2023 07:00  --------------------------------------------------------  IN:    Albumin 5%  - 250 mL: 600 mL    Oral Fluid: 360 mL  Total IN: 960 mL    OUT:    Voided (mL): 475 mL  Total OUT: 475 mL    Total NET: 485 mL

## 2023-12-16 NOTE — PROGRESS NOTE ADULT - ASSESSMENT
73y POD#4 s/p PEUA, ex lap, SUJATHA, BSO, tumor debulking, repair of umbilical hernia with drainage of abdominal collections, extensive LUCY; TAP BLOCK.     Cardiac: No active issues. BP titrating upward, patient takes Lasix 20mg M/W/F and 10mg on other days. resumed 10mg daily, will monitor Cr  Pulmonary: No active disease. Encourage IS use.   Neuro: Pain well controlled with current regimen.   Endo: No active disease    GI: FLD, will advance as now passing flatus. denies n/v.  : Voiding spontaneously. AM Cr pending  ID: No active disease.   Heme: S/P 2 U PRBC, AM Hgb up to 8.4. SCDs when not ambulating, lovenox for VTE prophylaxis  Skin: No active disease. Dressing/wound CDI  Psych: no active problems  FEN: IVF 75cc/h, UOP adequate. F/u AM electrolytes. Mag and K supplement as needed.    Dispo: Continue inpt mgmt

## 2023-12-17 LAB
ANION GAP SERPL CALC-SCNC: 12 MMOL/L — SIGNIFICANT CHANGE UP (ref 5–17)
ANION GAP SERPL CALC-SCNC: 12 MMOL/L — SIGNIFICANT CHANGE UP (ref 5–17)
BUN SERPL-MCNC: 10.1 MG/DL — SIGNIFICANT CHANGE UP (ref 8–20)
BUN SERPL-MCNC: 10.1 MG/DL — SIGNIFICANT CHANGE UP (ref 8–20)
CALCIUM SERPL-MCNC: 8.8 MG/DL — SIGNIFICANT CHANGE UP (ref 8.4–10.5)
CALCIUM SERPL-MCNC: 8.8 MG/DL — SIGNIFICANT CHANGE UP (ref 8.4–10.5)
CHLORIDE SERPL-SCNC: 100 MMOL/L — SIGNIFICANT CHANGE UP (ref 96–108)
CHLORIDE SERPL-SCNC: 100 MMOL/L — SIGNIFICANT CHANGE UP (ref 96–108)
CO2 SERPL-SCNC: 26 MMOL/L — SIGNIFICANT CHANGE UP (ref 22–29)
CO2 SERPL-SCNC: 26 MMOL/L — SIGNIFICANT CHANGE UP (ref 22–29)
CREAT SERPL-MCNC: 1.17 MG/DL — SIGNIFICANT CHANGE UP (ref 0.5–1.3)
CREAT SERPL-MCNC: 1.17 MG/DL — SIGNIFICANT CHANGE UP (ref 0.5–1.3)
EGFR: 49 ML/MIN/1.73M2 — LOW
EGFR: 49 ML/MIN/1.73M2 — LOW
GLUCOSE SERPL-MCNC: 98 MG/DL — SIGNIFICANT CHANGE UP (ref 70–99)
GLUCOSE SERPL-MCNC: 98 MG/DL — SIGNIFICANT CHANGE UP (ref 70–99)
MAGNESIUM SERPL-MCNC: 1.7 MG/DL — SIGNIFICANT CHANGE UP (ref 1.6–2.6)
MAGNESIUM SERPL-MCNC: 1.7 MG/DL — SIGNIFICANT CHANGE UP (ref 1.6–2.6)
PHOSPHATE SERPL-MCNC: 4 MG/DL — SIGNIFICANT CHANGE UP (ref 2.4–4.7)
PHOSPHATE SERPL-MCNC: 4 MG/DL — SIGNIFICANT CHANGE UP (ref 2.4–4.7)
POTASSIUM SERPL-MCNC: 4.1 MMOL/L — SIGNIFICANT CHANGE UP (ref 3.5–5.3)
POTASSIUM SERPL-MCNC: 4.1 MMOL/L — SIGNIFICANT CHANGE UP (ref 3.5–5.3)
POTASSIUM SERPL-SCNC: 4.1 MMOL/L — SIGNIFICANT CHANGE UP (ref 3.5–5.3)
POTASSIUM SERPL-SCNC: 4.1 MMOL/L — SIGNIFICANT CHANGE UP (ref 3.5–5.3)
SODIUM SERPL-SCNC: 138 MMOL/L — SIGNIFICANT CHANGE UP (ref 135–145)
SODIUM SERPL-SCNC: 138 MMOL/L — SIGNIFICANT CHANGE UP (ref 135–145)

## 2023-12-17 RX ORDER — MAGNESIUM SULFATE 500 MG/ML
2 VIAL (ML) INJECTION ONCE
Refills: 0 | Status: COMPLETED | OUTPATIENT
Start: 2023-12-17 | End: 2023-12-17

## 2023-12-17 RX ORDER — MAGNESIUM SULFATE 500 MG/ML
1 VIAL (ML) INJECTION ONCE
Refills: 0 | Status: COMPLETED | OUTPATIENT
Start: 2023-12-17 | End: 2023-12-17

## 2023-12-17 RX ORDER — POTASSIUM PHOSPHATE, MONOBASIC POTASSIUM PHOSPHATE, DIBASIC 236; 224 MG/ML; MG/ML
15 INJECTION, SOLUTION INTRAVENOUS ONCE
Refills: 0 | Status: COMPLETED | OUTPATIENT
Start: 2023-12-17 | End: 2023-12-17

## 2023-12-17 RX ADMIN — Medication 25 GRAM(S): at 18:18

## 2023-12-17 RX ADMIN — Medication 650 MILLIGRAM(S): at 12:27

## 2023-12-17 RX ADMIN — Medication 650 MILLIGRAM(S): at 06:15

## 2023-12-17 RX ADMIN — SODIUM CHLORIDE 3 MILLILITER(S): 9 INJECTION INTRAMUSCULAR; INTRAVENOUS; SUBCUTANEOUS at 22:02

## 2023-12-17 RX ADMIN — SODIUM CHLORIDE 3 MILLILITER(S): 9 INJECTION INTRAMUSCULAR; INTRAVENOUS; SUBCUTANEOUS at 05:14

## 2023-12-17 RX ADMIN — Medication 650 MILLIGRAM(S): at 18:19

## 2023-12-17 RX ADMIN — Medication 650 MILLIGRAM(S): at 18:44

## 2023-12-17 RX ADMIN — Medication 10 MILLIGRAM(S): at 12:28

## 2023-12-17 RX ADMIN — Medication 650 MILLIGRAM(S): at 00:02

## 2023-12-17 RX ADMIN — SODIUM CHLORIDE 3 MILLILITER(S): 9 INJECTION INTRAMUSCULAR; INTRAVENOUS; SUBCUTANEOUS at 12:58

## 2023-12-17 RX ADMIN — SIMETHICONE 80 MILLIGRAM(S): 80 TABLET, CHEWABLE ORAL at 12:28

## 2023-12-17 RX ADMIN — ENOXAPARIN SODIUM 40 MILLIGRAM(S): 100 INJECTION SUBCUTANEOUS at 22:08

## 2023-12-17 RX ADMIN — SIMETHICONE 80 MILLIGRAM(S): 80 TABLET, CHEWABLE ORAL at 05:18

## 2023-12-17 RX ADMIN — Medication 100 GRAM(S): at 06:47

## 2023-12-17 RX ADMIN — SIMETHICONE 80 MILLIGRAM(S): 80 TABLET, CHEWABLE ORAL at 18:19

## 2023-12-17 RX ADMIN — Medication 650 MILLIGRAM(S): at 05:18

## 2023-12-17 RX ADMIN — Medication 650 MILLIGRAM(S): at 13:27

## 2023-12-17 RX ADMIN — POTASSIUM PHOSPHATE, MONOBASIC POTASSIUM PHOSPHATE, DIBASIC 62.5 MILLIMOLE(S): 236; 224 INJECTION, SOLUTION INTRAVENOUS at 12:27

## 2023-12-17 RX ADMIN — ENOXAPARIN SODIUM 40 MILLIGRAM(S): 100 INJECTION SUBCUTANEOUS at 05:19

## 2023-12-17 NOTE — PROGRESS NOTE ADULT - SUBJECTIVE AND OBJECTIVE BOX
GYNECOLOGIC ONCOLOGY PROGRESS NOTE    73y POD#5 s/p PEUA, ex lap, SUJATHA, BSO, tumor debulking, repair of umbilical hernia with drainage of abdominal collections, extensive LUCY; TAP BLOCK.     LILIAN CASTILLO     SUBJECTIVE:  Pt feeling well this morning.   States her current pain is well controlled. The abdominal binder helps with her discomfort.  Tolerating PO diet w/o n/v. Continues to pass flatus.  Denies lightheadedness/dizziness/CP/SOB/n/v    OBJECTIVE:   Vital Signs Last 24 Hrs  T(C): 36.7 (17 Dec 2023 05:00), Max: 36.7 (16 Dec 2023 10:56)  T(F): 98.1 (17 Dec 2023 05:00), Max: 98.1 (16 Dec 2023 10:56)  HR: 69 (17 Dec 2023 05:00) (64 - 78)  BP: 153/68 (17 Dec 2023 05:00) (136/71 - 154/77)  RR: 18 (17 Dec 2023 05:00) (18 - 18)  SpO2: 97% (17 Dec 2023 05:00) (97% - 100%)    Parameters below as of 17 Dec 2023 05:00  Patient On (Oxygen Delivery Method): room air    Gen: well-appearing, NAD  Neuro: A&Ox3  Resp: breathing comfortably on RA  Abd: nontender, soft   Incision: abdominal pad removed, incision with minimal drainage, dried blood on skin. staples in place                          8.4    9.30  )-----------( 226      ( 16 Dec 2023 05:37 )             25.0     12-15    135  |  101  |  11.6  ----------------------------<  136<H>  3.9   |  26.0  |  1.12    Ca    8.3<L>      15 Dec 2023 13:06  Phos  2.6     12-15  Mg     1.6     12-15    I&O's Detail    15 Dec 2023 07:01  -  16 Dec 2023 07:00  --------------------------------------------------------  IN:    Albumin 5%  - 250 mL: 600 mL    Oral Fluid: 360 mL  Total IN: 960 mL    OUT:    Voided (mL): 475 mL  Total OUT: 475 mL    Total NET: 485 mL      16 Dec 2023 07:01  -  17 Dec 2023 05:48  --------------------------------------------------------  IN:    Oral Fluid: 550 mL  Total IN: 550 mL    OUT:  Total OUT: 0 mL    Total NET: 550 mL

## 2023-12-17 NOTE — PROGRESS NOTE ADULT - ASSESSMENT
73y POD#5 s/p PEUA, ex lap, SUJATHA, BSO, tumor debulking, repair of umbilical hernia with drainage of abdominal collections, extensive LUCY; TAP BLOCK.     Cardiac: No active issues. BP titrating upward, patient takes Lasix 20mg M/W/F and 10mg on other days. resumed 10mg daily  Pulmonary: No active disease. Encourage IS use.   Neuro: Pain well controlled with current regimen.   Endo: No active disease    GI: tolerating regular diet, feeling hungry, denies n/v  : Voiding spontaneously.   ID: No active disease.   Heme: S/P 2 U PRBC, AM Hgb up to 8.4. SCDs when not ambulating, lovenox for VTE prophylaxis  Skin: No active disease. Dressing/wound CDI, staples in place  Psych: no active problems  FEN: discontinued IVF, UOP adequate. Mag and K phos supplemented this AM    Dispo: Continue inpt mgmt

## 2023-12-18 LAB
ANION GAP SERPL CALC-SCNC: 13 MMOL/L — SIGNIFICANT CHANGE UP (ref 5–17)
ANION GAP SERPL CALC-SCNC: 13 MMOL/L — SIGNIFICANT CHANGE UP (ref 5–17)
ANION GAP SERPL CALC-SCNC: 7 MMOL/L — SIGNIFICANT CHANGE UP (ref 5–17)
ANION GAP SERPL CALC-SCNC: 7 MMOL/L — SIGNIFICANT CHANGE UP (ref 5–17)
BLD GP AB SCN SERPL QL: SIGNIFICANT CHANGE UP
BLD GP AB SCN SERPL QL: SIGNIFICANT CHANGE UP
BUN SERPL-MCNC: 15 MG/DL — SIGNIFICANT CHANGE UP (ref 8–20)
BUN SERPL-MCNC: 15 MG/DL — SIGNIFICANT CHANGE UP (ref 8–20)
BUN SERPL-MCNC: 15.7 MG/DL — SIGNIFICANT CHANGE UP (ref 8–20)
BUN SERPL-MCNC: 15.7 MG/DL — SIGNIFICANT CHANGE UP (ref 8–20)
CALCIUM SERPL-MCNC: 8.6 MG/DL — SIGNIFICANT CHANGE UP (ref 8.4–10.5)
CALCIUM SERPL-MCNC: 8.6 MG/DL — SIGNIFICANT CHANGE UP (ref 8.4–10.5)
CALCIUM SERPL-MCNC: 9 MG/DL — SIGNIFICANT CHANGE UP (ref 8.4–10.5)
CALCIUM SERPL-MCNC: 9 MG/DL — SIGNIFICANT CHANGE UP (ref 8.4–10.5)
CHLORIDE SERPL-SCNC: 101 MMOL/L — SIGNIFICANT CHANGE UP (ref 96–108)
CHLORIDE SERPL-SCNC: 101 MMOL/L — SIGNIFICANT CHANGE UP (ref 96–108)
CHLORIDE SERPL-SCNC: 104 MMOL/L — SIGNIFICANT CHANGE UP (ref 96–108)
CHLORIDE SERPL-SCNC: 104 MMOL/L — SIGNIFICANT CHANGE UP (ref 96–108)
CO2 SERPL-SCNC: 24 MMOL/L — SIGNIFICANT CHANGE UP (ref 22–29)
CO2 SERPL-SCNC: 24 MMOL/L — SIGNIFICANT CHANGE UP (ref 22–29)
CO2 SERPL-SCNC: 30 MMOL/L — HIGH (ref 22–29)
CO2 SERPL-SCNC: 30 MMOL/L — HIGH (ref 22–29)
CREAT SERPL-MCNC: 1.38 MG/DL — HIGH (ref 0.5–1.3)
CREAT SERPL-MCNC: 1.38 MG/DL — HIGH (ref 0.5–1.3)
CREAT SERPL-MCNC: 1.47 MG/DL — HIGH (ref 0.5–1.3)
CREAT SERPL-MCNC: 1.47 MG/DL — HIGH (ref 0.5–1.3)
EGFR: 37 ML/MIN/1.73M2 — LOW
EGFR: 37 ML/MIN/1.73M2 — LOW
EGFR: 40 ML/MIN/1.73M2 — LOW
EGFR: 40 ML/MIN/1.73M2 — LOW
GLUCOSE SERPL-MCNC: 104 MG/DL — HIGH (ref 70–99)
GLUCOSE SERPL-MCNC: 104 MG/DL — HIGH (ref 70–99)
GLUCOSE SERPL-MCNC: 117 MG/DL — HIGH (ref 70–99)
GLUCOSE SERPL-MCNC: 117 MG/DL — HIGH (ref 70–99)
HCT VFR BLD CALC: 26.6 % — LOW (ref 34.5–45)
HCT VFR BLD CALC: 26.6 % — LOW (ref 34.5–45)
HGB BLD-MCNC: 8.8 G/DL — LOW (ref 11.5–15.5)
HGB BLD-MCNC: 8.8 G/DL — LOW (ref 11.5–15.5)
MAGNESIUM SERPL-MCNC: 2 MG/DL — SIGNIFICANT CHANGE UP (ref 1.6–2.6)
MAGNESIUM SERPL-MCNC: 2 MG/DL — SIGNIFICANT CHANGE UP (ref 1.6–2.6)
MCHC RBC-ENTMCNC: 28.9 PG — SIGNIFICANT CHANGE UP (ref 27–34)
MCHC RBC-ENTMCNC: 28.9 PG — SIGNIFICANT CHANGE UP (ref 27–34)
MCHC RBC-ENTMCNC: 33.1 GM/DL — SIGNIFICANT CHANGE UP (ref 32–36)
MCHC RBC-ENTMCNC: 33.1 GM/DL — SIGNIFICANT CHANGE UP (ref 32–36)
MCV RBC AUTO: 87.5 FL — SIGNIFICANT CHANGE UP (ref 80–100)
MCV RBC AUTO: 87.5 FL — SIGNIFICANT CHANGE UP (ref 80–100)
PHOSPHATE SERPL-MCNC: 4 MG/DL — SIGNIFICANT CHANGE UP (ref 2.4–4.7)
PHOSPHATE SERPL-MCNC: 4 MG/DL — SIGNIFICANT CHANGE UP (ref 2.4–4.7)
PLATELET # BLD AUTO: 272 K/UL — SIGNIFICANT CHANGE UP (ref 150–400)
PLATELET # BLD AUTO: 272 K/UL — SIGNIFICANT CHANGE UP (ref 150–400)
POTASSIUM SERPL-MCNC: 4.1 MMOL/L — SIGNIFICANT CHANGE UP (ref 3.5–5.3)
POTASSIUM SERPL-MCNC: 4.1 MMOL/L — SIGNIFICANT CHANGE UP (ref 3.5–5.3)
POTASSIUM SERPL-MCNC: 4.6 MMOL/L — SIGNIFICANT CHANGE UP (ref 3.5–5.3)
POTASSIUM SERPL-MCNC: 4.6 MMOL/L — SIGNIFICANT CHANGE UP (ref 3.5–5.3)
POTASSIUM SERPL-SCNC: 4.1 MMOL/L — SIGNIFICANT CHANGE UP (ref 3.5–5.3)
POTASSIUM SERPL-SCNC: 4.1 MMOL/L — SIGNIFICANT CHANGE UP (ref 3.5–5.3)
POTASSIUM SERPL-SCNC: 4.6 MMOL/L — SIGNIFICANT CHANGE UP (ref 3.5–5.3)
POTASSIUM SERPL-SCNC: 4.6 MMOL/L — SIGNIFICANT CHANGE UP (ref 3.5–5.3)
RBC # BLD: 3.04 M/UL — LOW (ref 3.8–5.2)
RBC # BLD: 3.04 M/UL — LOW (ref 3.8–5.2)
RBC # FLD: 15.7 % — HIGH (ref 10.3–14.5)
RBC # FLD: 15.7 % — HIGH (ref 10.3–14.5)
SODIUM SERPL-SCNC: 138 MMOL/L — SIGNIFICANT CHANGE UP (ref 135–145)
SODIUM SERPL-SCNC: 138 MMOL/L — SIGNIFICANT CHANGE UP (ref 135–145)
SODIUM SERPL-SCNC: 141 MMOL/L — SIGNIFICANT CHANGE UP (ref 135–145)
SODIUM SERPL-SCNC: 141 MMOL/L — SIGNIFICANT CHANGE UP (ref 135–145)
WBC # BLD: 7.69 K/UL — SIGNIFICANT CHANGE UP (ref 3.8–10.5)
WBC # BLD: 7.69 K/UL — SIGNIFICANT CHANGE UP (ref 3.8–10.5)
WBC # FLD AUTO: 7.69 K/UL — SIGNIFICANT CHANGE UP (ref 3.8–10.5)
WBC # FLD AUTO: 7.69 K/UL — SIGNIFICANT CHANGE UP (ref 3.8–10.5)

## 2023-12-18 RX ORDER — SODIUM CHLORIDE 9 MG/ML
500 INJECTION, SOLUTION INTRAVENOUS ONCE
Refills: 0 | Status: COMPLETED | OUTPATIENT
Start: 2023-12-18 | End: 2023-12-18

## 2023-12-18 RX ADMIN — SIMETHICONE 80 MILLIGRAM(S): 80 TABLET, CHEWABLE ORAL at 05:05

## 2023-12-18 RX ADMIN — Medication 650 MILLIGRAM(S): at 05:04

## 2023-12-18 RX ADMIN — SIMETHICONE 80 MILLIGRAM(S): 80 TABLET, CHEWABLE ORAL at 00:47

## 2023-12-18 RX ADMIN — SIMETHICONE 80 MILLIGRAM(S): 80 TABLET, CHEWABLE ORAL at 17:04

## 2023-12-18 RX ADMIN — ENOXAPARIN SODIUM 40 MILLIGRAM(S): 100 INJECTION SUBCUTANEOUS at 23:01

## 2023-12-18 RX ADMIN — Medication 650 MILLIGRAM(S): at 17:04

## 2023-12-18 RX ADMIN — Medication 650 MILLIGRAM(S): at 00:46

## 2023-12-18 RX ADMIN — Medication 10 MILLIGRAM(S): at 05:05

## 2023-12-18 RX ADMIN — SIMETHICONE 80 MILLIGRAM(S): 80 TABLET, CHEWABLE ORAL at 23:01

## 2023-12-18 RX ADMIN — SODIUM CHLORIDE 3 MILLILITER(S): 9 INJECTION INTRAMUSCULAR; INTRAVENOUS; SUBCUTANEOUS at 13:07

## 2023-12-18 RX ADMIN — Medication 650 MILLIGRAM(S): at 01:46

## 2023-12-18 RX ADMIN — Medication 650 MILLIGRAM(S): at 06:04

## 2023-12-18 RX ADMIN — Medication 650 MILLIGRAM(S): at 18:04

## 2023-12-18 RX ADMIN — SIMETHICONE 80 MILLIGRAM(S): 80 TABLET, CHEWABLE ORAL at 11:38

## 2023-12-18 RX ADMIN — SODIUM CHLORIDE 1000 MILLILITER(S): 9 INJECTION, SOLUTION INTRAVENOUS at 08:38

## 2023-12-18 RX ADMIN — SODIUM CHLORIDE 3 MILLILITER(S): 9 INJECTION INTRAMUSCULAR; INTRAVENOUS; SUBCUTANEOUS at 05:01

## 2023-12-18 RX ADMIN — Medication 650 MILLIGRAM(S): at 23:01

## 2023-12-18 RX ADMIN — Medication 650 MILLIGRAM(S): at 12:38

## 2023-12-18 RX ADMIN — Medication 650 MILLIGRAM(S): at 11:38

## 2023-12-18 NOTE — PROGRESS NOTE ADULT - ASSESSMENT
73y POD#6 s/p PEUA, ex lap, SUJATHA, BSO, tumor debulking, repair of umbilical hernia with drainage of abdominal collections, extensive LUCY; TAP BLOCK.     Cardiac: No active issues. BP wasrating upward, patient takes Lasix 20mg M/W/F and 10mg on other days. Resumed 10mg daily - BPs controlled now 130-150s/60-70s  Pulmonary: No active disease. Encourage IS use.   Neuro: Pain well controlled with current regimen.   Endo: No active disease    GI: Tolerating regular diet, feeling hungry, denies n/v  : Voiding spontaneously.   ID: No active disease.   Heme: S/P 2 U PRBC during postoperative period, Hb 8.8 this AM. SCDs when not ambulating, lovenox for VTE prophylaxis  Skin: No active disease. Dressing/wound CDI minimal serosanguinous drainage from umbilicus, staples in place  Psych: no active problems  FEN: discontinued IVF, UOP adequate.    Dispo: Possible discharge home today

## 2023-12-18 NOTE — PROGRESS NOTE ADULT - SUBJECTIVE AND OBJECTIVE BOX
GYNECOLOGIC ONCOLOGY PROGRESS NOTE    73y POD#6 s/p PEUA, ex lap, SUJATHA, BSO, tumor debulking, repair of umbilical hernia with drainage of abdominal collections, extensive LUCY; TAP BLOCK.     LILIAN CASTILLO     SUBJECTIVE:  Pt feeling well this morning.   States her current pain is well controlled.  Tolerating PO regular diet w/o n/v. Continues to pass flatus.  Denies lightheadedness/dizziness/CP/SOB/n/v    OBJECTIVE:              Vital Signs Last 24 Hrs  T(C): 36.7 (18 Dec 2023 04:55), Max: 37.1 (17 Dec 2023 19:09)  T(F): 98.1 (18 Dec 2023 04:55), Max: 98.7 (17 Dec 2023 19:09)  HR: 64 (18 Dec 2023 04:55) (64 - 77)  BP: 143/63 (18 Dec 2023 04:56) (129/73 - 163/73)  BP(mean): --  RR: 18 (18 Dec 2023 04:56) (16 - 18)  SpO2: 98% (18 Dec 2023 04:56) (97% - 99%)    Parameters below as of 18 Dec 2023 04:56  Patient On (Oxygen Delivery Method): room air    I&O's Detail    17 Dec 2023 07:01  -  18 Dec 2023 07:00  --------------------------------------------------------  IN:  Total IN: 0 mL    OUT:    Voided (mL): 825 mL  Total OUT: 825 mL    Total NET: -825 mL        Gen: well-appearing, NAD  Neuro: A&Ox3  Resp: breathing comfortably on RA  Abd: nontender, soft   Incision: incision with minimal serosanguinous drainage from umbilicus, dried blood on skin. staples in place                          8.8    7.69  )-----------( 272      ( 18 Dec 2023 05:33 )             26.6     12-18    141  |  104  |  15.0  ----------------------------<  104<H>  4.6   |  30.0<H>  |  1.47<H>    Ca    9.0      18 Dec 2023 05:33  Phos  4.0     12-18  Mg     2.0     12-18

## 2023-12-19 ENCOUNTER — TRANSCRIPTION ENCOUNTER (OUTPATIENT)
Age: 73
End: 2023-12-19

## 2023-12-19 VITALS
TEMPERATURE: 98 F | DIASTOLIC BLOOD PRESSURE: 72 MMHG | OXYGEN SATURATION: 98 % | SYSTOLIC BLOOD PRESSURE: 132 MMHG | RESPIRATION RATE: 18 BRPM | HEART RATE: 68 BPM

## 2023-12-19 LAB
ANION GAP SERPL CALC-SCNC: 9 MMOL/L — SIGNIFICANT CHANGE UP (ref 5–17)
ANION GAP SERPL CALC-SCNC: 9 MMOL/L — SIGNIFICANT CHANGE UP (ref 5–17)
BUN SERPL-MCNC: 17.4 MG/DL — SIGNIFICANT CHANGE UP (ref 8–20)
BUN SERPL-MCNC: 17.4 MG/DL — SIGNIFICANT CHANGE UP (ref 8–20)
CALCIUM SERPL-MCNC: 8.8 MG/DL — SIGNIFICANT CHANGE UP (ref 8.4–10.5)
CALCIUM SERPL-MCNC: 8.8 MG/DL — SIGNIFICANT CHANGE UP (ref 8.4–10.5)
CHLORIDE SERPL-SCNC: 103 MMOL/L — SIGNIFICANT CHANGE UP (ref 96–108)
CHLORIDE SERPL-SCNC: 103 MMOL/L — SIGNIFICANT CHANGE UP (ref 96–108)
CO2 SERPL-SCNC: 27 MMOL/L — SIGNIFICANT CHANGE UP (ref 22–29)
CO2 SERPL-SCNC: 27 MMOL/L — SIGNIFICANT CHANGE UP (ref 22–29)
CREAT SERPL-MCNC: 1.25 MG/DL — SIGNIFICANT CHANGE UP (ref 0.5–1.3)
CREAT SERPL-MCNC: 1.25 MG/DL — SIGNIFICANT CHANGE UP (ref 0.5–1.3)
EGFR: 46 ML/MIN/1.73M2 — LOW
EGFR: 46 ML/MIN/1.73M2 — LOW
GLUCOSE SERPL-MCNC: 93 MG/DL — SIGNIFICANT CHANGE UP (ref 70–99)
GLUCOSE SERPL-MCNC: 93 MG/DL — SIGNIFICANT CHANGE UP (ref 70–99)
POTASSIUM SERPL-MCNC: 3.9 MMOL/L — SIGNIFICANT CHANGE UP (ref 3.5–5.3)
POTASSIUM SERPL-MCNC: 3.9 MMOL/L — SIGNIFICANT CHANGE UP (ref 3.5–5.3)
POTASSIUM SERPL-SCNC: 3.9 MMOL/L — SIGNIFICANT CHANGE UP (ref 3.5–5.3)
POTASSIUM SERPL-SCNC: 3.9 MMOL/L — SIGNIFICANT CHANGE UP (ref 3.5–5.3)
SODIUM SERPL-SCNC: 139 MMOL/L — SIGNIFICANT CHANGE UP (ref 135–145)
SODIUM SERPL-SCNC: 139 MMOL/L — SIGNIFICANT CHANGE UP (ref 135–145)

## 2023-12-19 PROCEDURE — 85730 THROMBOPLASTIN TIME PARTIAL: CPT

## 2023-12-19 PROCEDURE — 88307 TISSUE EXAM BY PATHOLOGIST: CPT

## 2023-12-19 PROCEDURE — 88302 TISSUE EXAM BY PATHOLOGIST: CPT

## 2023-12-19 PROCEDURE — 80053 COMPREHEN METABOLIC PANEL: CPT

## 2023-12-19 PROCEDURE — 83735 ASSAY OF MAGNESIUM: CPT

## 2023-12-19 PROCEDURE — C1889: CPT

## 2023-12-19 PROCEDURE — 84100 ASSAY OF PHOSPHORUS: CPT

## 2023-12-19 PROCEDURE — 36430 TRANSFUSION BLD/BLD COMPNT: CPT

## 2023-12-19 PROCEDURE — P9045: CPT

## 2023-12-19 PROCEDURE — 86901 BLOOD TYPING SEROLOGIC RH(D): CPT

## 2023-12-19 PROCEDURE — 93005 ELECTROCARDIOGRAM TRACING: CPT

## 2023-12-19 PROCEDURE — 88305 TISSUE EXAM BY PATHOLOGIST: CPT

## 2023-12-19 PROCEDURE — 86900 BLOOD TYPING SEROLOGIC ABO: CPT

## 2023-12-19 PROCEDURE — 80048 BASIC METABOLIC PNL TOTAL CA: CPT

## 2023-12-19 PROCEDURE — 85027 COMPLETE CBC AUTOMATED: CPT

## 2023-12-19 PROCEDURE — 86850 RBC ANTIBODY SCREEN: CPT

## 2023-12-19 PROCEDURE — 85025 COMPLETE CBC W/AUTO DIFF WBC: CPT

## 2023-12-19 PROCEDURE — 36415 COLL VENOUS BLD VENIPUNCTURE: CPT

## 2023-12-19 PROCEDURE — 82962 GLUCOSE BLOOD TEST: CPT

## 2023-12-19 PROCEDURE — 85610 PROTHROMBIN TIME: CPT

## 2023-12-19 PROCEDURE — P9016: CPT

## 2023-12-19 PROCEDURE — 83605 ASSAY OF LACTIC ACID: CPT

## 2023-12-19 PROCEDURE — 81001 URINALYSIS AUTO W/SCOPE: CPT

## 2023-12-19 RX ORDER — SIMETHICONE 80 MG/1
1 TABLET, CHEWABLE ORAL
Qty: 9 | Refills: 0
Start: 2023-12-19 | End: 2023-12-21

## 2023-12-19 RX ORDER — APIXABAN 2.5 MG/1
1 TABLET, FILM COATED ORAL
Qty: 60 | Refills: 0
Start: 2023-12-19 | End: 2024-01-17

## 2023-12-19 RX ORDER — ACETAMINOPHEN 500 MG
2 TABLET ORAL
Qty: 40 | Refills: 0
Start: 2023-12-19 | End: 2023-12-23

## 2023-12-19 RX ADMIN — SIMETHICONE 80 MILLIGRAM(S): 80 TABLET, CHEWABLE ORAL at 13:30

## 2023-12-19 RX ADMIN — Medication 650 MILLIGRAM(S): at 13:30

## 2023-12-19 RX ADMIN — SODIUM CHLORIDE 3 MILLILITER(S): 9 INJECTION INTRAMUSCULAR; INTRAVENOUS; SUBCUTANEOUS at 13:34

## 2023-12-19 RX ADMIN — SIMETHICONE 80 MILLIGRAM(S): 80 TABLET, CHEWABLE ORAL at 05:58

## 2023-12-19 RX ADMIN — Medication 650 MILLIGRAM(S): at 05:58

## 2023-12-19 RX ADMIN — Medication 10 MILLIGRAM(S): at 05:59

## 2023-12-19 NOTE — PROGRESS NOTE ADULT - ATTENDING COMMENTS
Fellow Attestation: patient overall feels well. Tolerating PO, passing flatus and ambulating to chair/stout with walker with assistance. VSS and describes pain controlled. Mild old drainage from incision however no active bleeding or drainage and otherwise is dry and intact. Plan: s/p IVF, continue to replete and for PM labs to continue to monitor status, meeting post operative milestones however has not had regular meal comparable to what she would eat at home due to self limiting. Will continue to monitor post operative status. d/w Dr. Lima
Fellow attestation: patient was seen and examined, overall feeling well. Tolerating PO, ambulating and meeting post operative milestones. Cr downtrending and improving. Benign exam. A/P: reviewed discharge planning and plan for follow up in office. d/w Dr. Lima
Fellow Attestation: patient seen and examined during rounds, overall feels well voices no complaints at this time. Denies feeling lightheaded, dizziness, chest pain and palpitations. Hgb noted to be low 6.2 with stat repeat of 6, VSS. Plan: given drop in Hgb will transfuse 2u pRBC and r/b/a d/w patient. Will obtain post op CBC and continue to monitor clincal status however given VSS, benign exam, adequate uop low clinical suspicion for bleeding at this time and likely equilibrating. Dr. Lima
Fellow Attestation: overall feels well, voices no complaints pain controlled and tolerated liquid diet. VSS and Hgb stable x2 with benign exam incision c/d/i. Plan: to continue to monitor bowel function and replete electrolytes PRN with goal of Mg > 2 and K > 4. Converted to lovenox. d/w Dr. Lima

## 2023-12-19 NOTE — DISCHARGE NOTE NURSING/CASE MANAGEMENT/SOCIAL WORK - PATIENT PORTAL LINK FT
You can access the FollowMyHealth Patient Portal offered by [facility] by registering at the following website: [email or web address]. By joining [employer]’s FollowMyHealth portal, you will also be able to view your health information using other applications (apps) compatible with our system. You can access the FollowMyHealth Patient Portal offered by [facility] by registering at the following website: [email or web address]. By joining [employer]’s FollowMyHealth portal, you will also be able to view your health information using other applications (apps) compatible with our system.

## 2023-12-19 NOTE — PROGRESS NOTE ADULT - SUBJECTIVE AND OBJECTIVE BOX
GYNECOLOGIC ONCOLOGY PROGRESS NOTE    73y POD#7 s/p PEUA, ex lap, SUJATHA, BSO, tumor debulking, repair of umbilical hernia with drainage of abdominal collections, extensive LUCY; TAP BLOCK.     LILIAN CASTILLO     SUBJECTIVE:  Pt feeling well this morning.   States her current pain is well controlled.  Tolerating PO regular diet w/o n/v. Continues to pass flatus.  Denies lightheadedness/dizziness/CP/SOB/n/v    OBJECTIVE:    Vital Signs Last 24 Hrs  T(C): 36.6 (19 Dec 2023 04:21), Max: 36.7 (18 Dec 2023 17:31)  T(F): 97.9 (19 Dec 2023 04:21), Max: 98.1 (18 Dec 2023 17:31)  HR: 66 (19 Dec 2023 04:21) (66 - 75)  BP: 135/70 (19 Dec 2023 04:21) (128/71 - 143/78)  BP(mean): --  RR: 18 (19 Dec 2023 04:21) (18 - 18)  SpO2: 98% (19 Dec 2023 04:21) (97% - 100%)    Parameters below as of 19 Dec 2023 04:21  Patient On (Oxygen Delivery Method): room air        Gen: well-appearing, NAD  Neuro: A&Ox3  Resp: breathing comfortably on RA  Abd: nontender, soft   Incision: incision with minimal serosanguinous drainage from umbilicus covered with 2x2 and tape. staples in place                          8.8    7.69  )-----------( 272      ( 18 Dec 2023 05:33 )             26.6     12-19    139  |  103  |  17.4  ----------------------------<  93  3.9   |  27.0  |  1.25    Ca    8.8      19 Dec 2023 04:44  Phos  4.0     12-18  Mg     2.0     12-18 12-18    141  |  104  |  15.0  ----------------------------<  104<H>  4.6   |  30.0<H>  |  1.47<H>    Ca    9.0      18 Dec 2023 05:33  Phos  4.0     12-18  Mg     2.0     12-18

## 2023-12-19 NOTE — PROGRESS NOTE ADULT - ASSESSMENT
73y POD#7 s/p PEUA, ex lap, SUJATHA, BSO, tumor debulking, repair of umbilical hernia with drainage of abdominal collections, extensive LUCY; TAP BLOCK.     Cardiac: No active issues. BP wasrating upward, patient takes Lasix 20mg M/W/F and 10mg on other days. Resumed 10mg daily - BPs controlled now 130-150s/60-70s  Pulmonary: No active disease. Encourage IS use.   Neuro: Pain well controlled with current regimen.   Endo: No active disease    GI: Tolerating regular diet, feeling hungry, denies n/v  : Voiding spontaneously. Cr 1.25 at baseline  ID: No active disease.   Heme: S/P 2 U PRBC during postoperative period, Hb 8.8 this AM. SCDs when not ambulating, lovenox for VTE prophylaxis  Skin: No active disease. Dressing/wound CDI minimal serosanguinous drainage from umbilicus, staples in place  Psych: no active problems  FEN: discontinued IVF, UOP adequate.    Dispo: Dc home today

## 2023-12-19 NOTE — DISCHARGE NOTE NURSING/CASE MANAGEMENT/SOCIAL WORK - NSDCPEFALRISK_GEN_ALL_CORE
For information on Fall & Injury Prevention, visit: [email or web address] OR  [email or web address] OR  https://www.cdc.gov/steadi/patient.html For information on Fall & Injury Prevention, visit: [email or web address] OR  [email or web address] OR  https://www.cdc.gov/steadi/patient.html

## 2023-12-20 PROBLEM — Z92.21 PERSONAL HISTORY OF ANTINEOPLASTIC CHEMOTHERAPY: Chronic | Status: ACTIVE | Noted: 2023-11-30

## 2023-12-20 NOTE — CDI QUERY NOTE - NSCDIOTHERTXTBX3_GEN_ALL_CORE_FT
Lactate level was elevated after surgery, IVF and albumin were given and levels were followed up, please clarify if these findings support a clinically significant diagnosis.  -Lactic acidosis  - Acidosis  -Other, please specify      Supporting Documentation:      Chart Note-am rounds Attending [Charted Location: [place] [id number]] [Authored: 13-Dec-2023 08:23]  Patient seen and examined this am. Doing well, pain controlled, lactate trending down (3.7 from 5). Will give another liter IVF this am, recheck. NG removed this am, scant output, patient states she is passing flatus. Discussed with Dr. Jones of primary team. Stable for downgrade from ICU today.      Chart Note-Transfer Note SICU D PA [Charted Location: [place] [address]] [Authored: 13-Dec-2023 10:30]  The following items are to be followed up:  1.	Lactate 5.2 post-op. Received total of 3 L IVF and 250 CC 5% albumin. Lactate now 2.5 with improvement of UOP. Lactate level was elevated after surgery, IVF and albumin were given and levels were followed up, please clarify if these findings support a clinically significant diagnosis.  -Lactic acidosis  - Acidosis  -Other, please specify      Supporting Documentation:      Chart Note-am rounds Attending [Charted Location: [place] [address]] [Authored: 13-Dec-2023 08:23]  Patient seen and examined this am. Doing well, pain controlled, lactate trending down (3.7 from 5). Will give another liter IVF this am, recheck. NG removed this am, scant output, patient states she is passing flatus. Discussed with Dr. Jones of primary team. Stable for downgrade from ICU today.      Chart Note-Transfer Note SICU D PA [Charted Location: [place] [address]] [Authored: 13-Dec-2023 10:30]  The following items are to be followed up:  1.	Lactate 5.2 post-op. Received total of 3 L IVF and 250 CC 5% albumin. Lactate now 2.5 with improvement of UOP.

## 2023-12-20 NOTE — CDI QUERY NOTE - NSCDIOTHERTXTBX_GEN_ALL_CORE_HH
[facility] uses the KDIGO criteria (0.3 increase in Crt or 1.5X the baseline retrospectively) to determine ISABELLE.      Please clarify if rise in creatinine level supports a clinically significant diagnosis  -ISABELLE  - Other, please specify  - Not clinically significant        Creatinine Trend:  Creatinine: 1.25 mg/dL [0.50 - 1.30] (12-19-23)  Creatinine: 1.38 mg/dL *H* [0.50 - 1.30] (12-18-23)  Creatinine: 1.47 mg/dL *H* [0.50 - 1.30] (12-18-23)  Creatinine: 1.17 mg/dL [0.50 - 1.30] (12-17-23)  Creatinine: 1.12 mg/dL [0.50 - 1.30] (12-16-23)  Creatinine: 1.12 mg/dL [0.50 - 1.30] (12-15-23)  Creatinine: 1.09 mg/dL [0.50 - 1.30] (12-15-23)  Creatinine: 1.22 mg/dL [0.50 - 1.30] (12-14-23)  Creatinine: 1.17 mg/dL [0.50 - 1.30] (12-13-23)  Creatinine: 1.13 mg/dL [0.50 - 1.30] (12-13-23)      Chart Documentation:          Progress Note Adult-Gyn Onc Resident/Attending [Charted Location: [place] [address]] [Authored: 14-Dec-2023 07:10]  : Mosley in place. UOP adequate. Preop creatinine 1.22 -> 1.17 -> AM labs pending        Progress Note Adult-Gyn Onc Resident/Attending [Charted Location: [place] [address]] [Authored: 19-Dec-2023 08:26]  : Voiding spontaneously. Cr 1.25 at baseline  ID: No active disease. [facility] uses the KDIGO criteria (0.3 increase in Crt or 1.5X the baseline retrospectively) to determine ISABELLE.      Please clarify if rise in creatinine level supports a clinically significant diagnosis  -ISABELLE  - Other, please specify  - Not clinically significant        Creatinine Trend:  Creatinine: 1.25 mg/dL [0.50 - 1.30] (12-19-23)  Creatinine: 1.38 mg/dL *H* [0.50 - 1.30] (12-18-23)  Creatinine: 1.47 mg/dL *H* [0.50 - 1.30] (12-18-23)  Creatinine: 1.17 mg/dL [0.50 - 1.30] (12-17-23)  Creatinine: 1.12 mg/dL [0.50 - 1.30] (12-16-23)  Creatinine: 1.12 mg/dL [0.50 - 1.30] (12-15-23)  Creatinine: 1.09 mg/dL [0.50 - 1.30] (12-15-23)  Creatinine: 1.22 mg/dL [0.50 - 1.30] (12-14-23)  Creatinine: 1.17 mg/dL [0.50 - 1.30] (12-13-23)  Creatinine: 1.13 mg/dL [0.50 - 1.30] (12-13-23)      Chart Documentation:          Progress Note Adult-Gyn Onc Resident/Attending [Charted Location: [place] [address]] [Authored: 14-Dec-2023 07:10]  : Mosley in place. UOP adequate. Preop creatinine 1.22 -> 1.17 -> AM labs pending        Progress Note Adult-Gyn Onc Resident/Attending [Charted Location: [place] [address]] [Authored: 19-Dec-2023 08:26]  : Voiding spontaneously. Cr 1.25 at baseline  ID: No active disease.

## 2023-12-20 NOTE — CDI QUERY NOTE - NSCDIOTHERTXTBX2_GEN_ALL_CORE_FT
Please clarify indication for PRBC transfusion  -Acute blood loss anemia  -Postoperative blood loss anemia  -Other, please specify        Hemoglobin:  Hemoglobin: 8.8 g/dL (12.18.23 @ 05:33)   Hemoglobin: 8.4 g/dL (12.16.23 @ 05:37)   Hemoglobin: 8.3 g/dL (12.15.23 @ 13:06)   Hemoglobin: 7.9 g/dL (12.15.23 @ 04:35)   Hemoglobin: 6.0: Result called. (12.14.23 @ 08:23)   Hemoglobin: 6.2: Test Repeated (12.14.23 @ 06:05)   Hemoglobin: 9.5 g/dL (12.13.23 @ 03:23)   Hemoglobin: 10.1 g/dL (12.13.23 @ 01:00)   Hemoglobin: 9.3 g/dL (12.12.23 @ 20:50)         Progress Note Adult-Gyn Onc Resident/Attending [Charted Location: [place] [address]] [Authored: 14-Dec-2023 07:10]  Hgb noted to be low 6.2 with stat repeat of 6, VSS. Plan: given drop in Hgb will transfuse 2u pRBC and r/b/a d/w patient. Will obtain post op CBC and continue to monitor clincal status however given VSS, benign exam, adequate uop low clinical suspicion for bleeding at this time and likely equilibrating. Dr. Lima .        Progress Note Adult-Gyn Onc Resident/Attending [Charted Location: [place] [address]] [Authored: 19-Dec-2023 08:26]  Heme: S/P 2 U PRBC during postoperative period, Hb 8.8 this AM. SCDs when not ambulating, lovenox for VTE prophylaxis Please clarify indication for PRBC transfusion  -Acute blood loss anemia  -Postoperative blood loss anemia  -Other, please specify        Hemoglobin:  Hemoglobin: 8.8 g/dL (12.18.23 @ 05:33)   Hemoglobin: 8.4 g/dL (12.16.23 @ 05:37)   Hemoglobin: 8.3 g/dL (12.15.23 @ 13:06)   Hemoglobin: 7.9 g/dL (12.15.23 @ 04:35)   Hemoglobin: 6.0: Result called. (12.14.23 @ 08:23)   Hemoglobin: 6.2: Test Repeated (12.14.23 @ 06:05)   Hemoglobin: 9.5 g/dL (12.13.23 @ 03:23)   Hemoglobin: 10.1 g/dL (12.13.23 @ 01:00)   Hemoglobin: 9.3 g/dL (12.12.23 @ 20:50)         Progress Note Adult-Gyn Onc Resident/Attending [Charted Location: [place] [address]] [Authored: 14-Dec-2023 07:10]  Hgb noted to be low 6.2 with stat repeat of 6, VSS. Plan: given drop in Hgb will transfuse 2u pRBC and r/b/a d/w patient. Will obtain post op CBC and continue to monitor clincal status however given VSS, benign exam, adequate uop low clinical suspicion for bleeding at this time and likely equilibrating. Dr. Lima .        Progress Note Adult-Gyn Onc Resident/Attending [Charted Location: [place] [address]] [Authored: 19-Dec-2023 08:26]  Heme: S/P 2 U PRBC during postoperative period, Hb 8.8 this AM. SCDs when not ambulating, lovenox for VTE prophylaxis

## 2023-12-23 LAB
SURGICAL PATHOLOGY STUDY: SIGNIFICANT CHANGE UP
SURGICAL PATHOLOGY STUDY: SIGNIFICANT CHANGE UP

## 2023-12-27 ENCOUNTER — APPOINTMENT (OUTPATIENT)
Dept: GYNECOLOGIC ONCOLOGY | Facility: CLINIC | Age: 73
End: 2023-12-27
Payer: MEDICARE

## 2023-12-27 VITALS
HEART RATE: 67 BPM | HEIGHT: 65 IN | WEIGHT: 185 LBS | DIASTOLIC BLOOD PRESSURE: 90 MMHG | TEMPERATURE: 97.2 F | OXYGEN SATURATION: 99 % | BODY MASS INDEX: 30.82 KG/M2 | RESPIRATION RATE: 16 BRPM | SYSTOLIC BLOOD PRESSURE: 140 MMHG

## 2023-12-27 PROCEDURE — 99213 OFFICE O/P EST LOW 20 MIN: CPT | Mod: 24

## 2023-12-29 NOTE — CHART NOTE - NSCHARTNOTEFT_GEN_A_CORE
ISABELLE likely secondary to dehydration  - Improved with hydration, improved prior to discharge    d/w Dr. Lima ISABELLE likely secondary to dehydration  - Improved with hydration, improved prior to discharge    Acute blood loss anemia  -Postoperative blood loss anemia  -delayed from surgery due to equilibration  -stable Hgb prior to discharge    Lactic acidosis  - immediately post op likely due to bowel prep, extended surgery and likely dehydration  - extensive IVF repletion by SICU team and continued post downgrade from ICU      d/w Dr. Lima

## 2023-12-29 NOTE — CHART NOTE - NSCHARTNOTESELECT_GEN_ALL_CORE
Event Note
Nutrition Services
Post-Discharge Note
SICU Downgrade/Transfer Note
am rounds
Postop Check/Event Note

## 2024-01-02 ENCOUNTER — OUTPATIENT (OUTPATIENT)
Dept: OUTPATIENT SERVICES | Facility: HOSPITAL | Age: 74
LOS: 1 days | Discharge: HOME | End: 2024-01-02

## 2024-01-02 DIAGNOSIS — K08.409 PARTIAL LOSS OF TEETH, UNSPECIFIED CAUSE, UNSPECIFIED CLASS: Chronic | ICD-10-CM

## 2024-01-02 DIAGNOSIS — C54.1 MALIGNANT NEOPLASM OF ENDOMETRIUM: ICD-10-CM

## 2024-01-03 ENCOUNTER — APPOINTMENT (OUTPATIENT)
Dept: HEMATOLOGY ONCOLOGY | Facility: CLINIC | Age: 74
End: 2024-01-03
Payer: MEDICARE

## 2024-01-03 ENCOUNTER — RESULT REVIEW (OUTPATIENT)
Age: 74
End: 2024-01-03

## 2024-01-03 VITALS
TEMPERATURE: 98.4 F | HEART RATE: 75 BPM | BODY MASS INDEX: 29.99 KG/M2 | OXYGEN SATURATION: 100 % | WEIGHT: 180 LBS | RESPIRATION RATE: 16 BRPM | DIASTOLIC BLOOD PRESSURE: 85 MMHG | HEIGHT: 65 IN | SYSTOLIC BLOOD PRESSURE: 138 MMHG

## 2024-01-03 DIAGNOSIS — C55 MALIGNANT NEOPLASM OF UTERUS, PART UNSPECIFIED: ICD-10-CM

## 2024-01-03 DIAGNOSIS — H91.93 UNSPECIFIED HEARING LOSS, BILATERAL: ICD-10-CM

## 2024-01-03 LAB
ALBUMIN SERPL ELPH-MCNC: 4 G/DL — SIGNIFICANT CHANGE UP (ref 3.3–5)
ALBUMIN SERPL ELPH-MCNC: 4 G/DL — SIGNIFICANT CHANGE UP (ref 3.3–5)
ALP SERPL-CCNC: 115 U/L — SIGNIFICANT CHANGE UP (ref 40–120)
ALP SERPL-CCNC: 115 U/L — SIGNIFICANT CHANGE UP (ref 40–120)
ALT FLD-CCNC: 7 U/L — LOW (ref 10–45)
ALT FLD-CCNC: 7 U/L — LOW (ref 10–45)
ANION GAP SERPL CALC-SCNC: 13 MMOL/L — SIGNIFICANT CHANGE UP (ref 5–17)
ANION GAP SERPL CALC-SCNC: 13 MMOL/L — SIGNIFICANT CHANGE UP (ref 5–17)
AST SERPL-CCNC: 17 U/L — SIGNIFICANT CHANGE UP (ref 10–40)
AST SERPL-CCNC: 17 U/L — SIGNIFICANT CHANGE UP (ref 10–40)
BASOPHILS # BLD AUTO: 0.06 K/UL — SIGNIFICANT CHANGE UP (ref 0–0.2)
BASOPHILS # BLD AUTO: 0.06 K/UL — SIGNIFICANT CHANGE UP (ref 0–0.2)
BASOPHILS NFR BLD AUTO: 0.9 % — SIGNIFICANT CHANGE UP (ref 0–2)
BASOPHILS NFR BLD AUTO: 0.9 % — SIGNIFICANT CHANGE UP (ref 0–2)
BILIRUB SERPL-MCNC: 0.2 MG/DL — SIGNIFICANT CHANGE UP (ref 0.2–1.2)
BILIRUB SERPL-MCNC: 0.2 MG/DL — SIGNIFICANT CHANGE UP (ref 0.2–1.2)
BUN SERPL-MCNC: 29 MG/DL — HIGH (ref 7–23)
BUN SERPL-MCNC: 29 MG/DL — HIGH (ref 7–23)
CALCIUM SERPL-MCNC: 9.6 MG/DL — SIGNIFICANT CHANGE UP (ref 8.4–10.5)
CALCIUM SERPL-MCNC: 9.6 MG/DL — SIGNIFICANT CHANGE UP (ref 8.4–10.5)
CANCER AG125 SERPL-ACNC: 13 U/ML — SIGNIFICANT CHANGE UP
CANCER AG125 SERPL-ACNC: 13 U/ML — SIGNIFICANT CHANGE UP
CHLORIDE SERPL-SCNC: 99 MMOL/L — SIGNIFICANT CHANGE UP (ref 96–108)
CHLORIDE SERPL-SCNC: 99 MMOL/L — SIGNIFICANT CHANGE UP (ref 96–108)
CO2 SERPL-SCNC: 26 MMOL/L — SIGNIFICANT CHANGE UP (ref 22–31)
CO2 SERPL-SCNC: 26 MMOL/L — SIGNIFICANT CHANGE UP (ref 22–31)
CREAT SERPL-MCNC: 1.29 MG/DL — SIGNIFICANT CHANGE UP (ref 0.5–1.3)
CREAT SERPL-MCNC: 1.29 MG/DL — SIGNIFICANT CHANGE UP (ref 0.5–1.3)
EGFR: 44 ML/MIN/1.73M2 — LOW
EGFR: 44 ML/MIN/1.73M2 — LOW
EOSINOPHIL # BLD AUTO: 0.46 K/UL — SIGNIFICANT CHANGE UP (ref 0–0.5)
EOSINOPHIL # BLD AUTO: 0.46 K/UL — SIGNIFICANT CHANGE UP (ref 0–0.5)
EOSINOPHIL NFR BLD AUTO: 6.7 % — HIGH (ref 0–6)
EOSINOPHIL NFR BLD AUTO: 6.7 % — HIGH (ref 0–6)
GLUCOSE SERPL-MCNC: 121 MG/DL — HIGH (ref 70–99)
GLUCOSE SERPL-MCNC: 121 MG/DL — HIGH (ref 70–99)
HCT VFR BLD CALC: 30.3 % — LOW (ref 34.5–45)
HCT VFR BLD CALC: 30.3 % — LOW (ref 34.5–45)
HGB BLD-MCNC: 10 G/DL — LOW (ref 11.5–15.5)
HGB BLD-MCNC: 10 G/DL — LOW (ref 11.5–15.5)
IMM GRANULOCYTES NFR BLD AUTO: 0.3 % — SIGNIFICANT CHANGE UP (ref 0–0.9)
IMM GRANULOCYTES NFR BLD AUTO: 0.3 % — SIGNIFICANT CHANGE UP (ref 0–0.9)
LYMPHOCYTES # BLD AUTO: 2.03 K/UL — SIGNIFICANT CHANGE UP (ref 1–3.3)
LYMPHOCYTES # BLD AUTO: 2.03 K/UL — SIGNIFICANT CHANGE UP (ref 1–3.3)
LYMPHOCYTES # BLD AUTO: 29.4 % — SIGNIFICANT CHANGE UP (ref 13–44)
LYMPHOCYTES # BLD AUTO: 29.4 % — SIGNIFICANT CHANGE UP (ref 13–44)
MAGNESIUM SERPL-MCNC: 1.5 MG/DL — LOW (ref 1.6–2.6)
MAGNESIUM SERPL-MCNC: 1.5 MG/DL — LOW (ref 1.6–2.6)
MCHC RBC-ENTMCNC: 28.6 PG — SIGNIFICANT CHANGE UP (ref 27–34)
MCHC RBC-ENTMCNC: 28.6 PG — SIGNIFICANT CHANGE UP (ref 27–34)
MCHC RBC-ENTMCNC: 33 GM/DL — SIGNIFICANT CHANGE UP (ref 32–36)
MCHC RBC-ENTMCNC: 33 GM/DL — SIGNIFICANT CHANGE UP (ref 32–36)
MCV RBC AUTO: 86.6 FL — SIGNIFICANT CHANGE UP (ref 80–100)
MCV RBC AUTO: 86.6 FL — SIGNIFICANT CHANGE UP (ref 80–100)
MONOCYTES # BLD AUTO: 0.67 K/UL — SIGNIFICANT CHANGE UP (ref 0–0.9)
MONOCYTES # BLD AUTO: 0.67 K/UL — SIGNIFICANT CHANGE UP (ref 0–0.9)
MONOCYTES NFR BLD AUTO: 9.7 % — SIGNIFICANT CHANGE UP (ref 2–14)
MONOCYTES NFR BLD AUTO: 9.7 % — SIGNIFICANT CHANGE UP (ref 2–14)
NEUTROPHILS # BLD AUTO: 3.67 K/UL — SIGNIFICANT CHANGE UP (ref 1.8–7.4)
NEUTROPHILS # BLD AUTO: 3.67 K/UL — SIGNIFICANT CHANGE UP (ref 1.8–7.4)
NEUTROPHILS NFR BLD AUTO: 53 % — SIGNIFICANT CHANGE UP (ref 43–77)
NEUTROPHILS NFR BLD AUTO: 53 % — SIGNIFICANT CHANGE UP (ref 43–77)
NRBC # BLD: 0 /100 WBCS — SIGNIFICANT CHANGE UP (ref 0–0)
NRBC # BLD: 0 /100 WBCS — SIGNIFICANT CHANGE UP (ref 0–0)
PLATELET # BLD AUTO: 271 K/UL — SIGNIFICANT CHANGE UP (ref 150–400)
PLATELET # BLD AUTO: 271 K/UL — SIGNIFICANT CHANGE UP (ref 150–400)
POTASSIUM SERPL-MCNC: 3.5 MMOL/L — SIGNIFICANT CHANGE UP (ref 3.5–5.3)
POTASSIUM SERPL-MCNC: 3.5 MMOL/L — SIGNIFICANT CHANGE UP (ref 3.5–5.3)
POTASSIUM SERPL-SCNC: 3.5 MMOL/L — SIGNIFICANT CHANGE UP (ref 3.5–5.3)
POTASSIUM SERPL-SCNC: 3.5 MMOL/L — SIGNIFICANT CHANGE UP (ref 3.5–5.3)
PROT SERPL-MCNC: 7.3 G/DL — SIGNIFICANT CHANGE UP (ref 6–8.3)
PROT SERPL-MCNC: 7.3 G/DL — SIGNIFICANT CHANGE UP (ref 6–8.3)
RBC # BLD: 3.5 M/UL — LOW (ref 3.8–5.2)
RBC # BLD: 3.5 M/UL — LOW (ref 3.8–5.2)
RBC # FLD: 14.6 % — HIGH (ref 10.3–14.5)
RBC # FLD: 14.6 % — HIGH (ref 10.3–14.5)
SODIUM SERPL-SCNC: 138 MMOL/L — SIGNIFICANT CHANGE UP (ref 135–145)
SODIUM SERPL-SCNC: 138 MMOL/L — SIGNIFICANT CHANGE UP (ref 135–145)
WBC # BLD: 6.91 K/UL — SIGNIFICANT CHANGE UP (ref 3.8–10.5)
WBC # BLD: 6.91 K/UL — SIGNIFICANT CHANGE UP (ref 3.8–10.5)
WBC # FLD AUTO: 6.91 K/UL — SIGNIFICANT CHANGE UP (ref 3.8–10.5)
WBC # FLD AUTO: 6.91 K/UL — SIGNIFICANT CHANGE UP (ref 3.8–10.5)

## 2024-01-03 PROCEDURE — 99215 OFFICE O/P EST HI 40 MIN: CPT

## 2024-01-03 NOTE — PHYSICAL EXAM
[Restricted in physically strenuous activity but ambulatory and able to carry out work of a light or sedentary nature] : Status 1- Restricted in physically strenuous activity but ambulatory and able to carry out work of a light or sedentary nature, e.g., light house work, office work [Normal] : affect appropriate [de-identified] : wt loss; hair loss

## 2024-01-03 NOTE — REVIEW OF SYSTEMS
[Recent Change In Weight] : ~T recent weight change [Lower Ext Edema] : lower extremity edema [Diarrhea: Grade 0] : Diarrhea: Grade 0 [Negative] : Allergic/Immunologic [Chest Pain] : no chest pain [FreeTextEntry2] : wt loss [FreeTextEntry5] : chronic left ankle

## 2024-01-03 NOTE — HISTORY OF PRESENT ILLNESS
[de-identified] : The patient was diagnosed with endometrial cancer in April 2023 at the age of 73. She presented to  ER with abdominal distention and ascites. On 4/7/23, she had a CT A/P which showed a very large amount of abdominal and pelvic ascites. On 4/8/23, she had a pelvic ultrasound which showed the endometrium is either severely thickened to 6 cm or is distended with complex material to 6 cm. There is a 2.5 x 1.6 x 1.7 cm shadowing echogenic focus with shadowing along the periphery of the endometrium, which may represent a calcified intramural versus submucosal fibroid, versus an endometrial lesion. On 4/10/23, she had a CT chest which shows no malignancy in the chest. On 4/10/23, she underwent an ECC D&C and pathology showed fragments of endometrioid adenocarcinoma. Benign endocervical tissue and epithelium. Endometrioid adenocarcinoma of the endometrium, FIGO grade 2, focally invasive. ESTROGEN RECEPTOR (ER): Positive, diffuse, strong   nuclear staining. PROGESTERONE RECEPTOR (PgR): Positive, diffuse strong nuclear staining. HER-2: Negative (0/1+ membrane staining). No loss of nuclear expression of MMR proteins (MLH1, MSH2, MSH6, and PMS2). On 4/14/23. she had a repeat CT A/P which showed interval decrease in the volume of ascites with scalloping of the right heart border suggesting malignant ascites. Peritoneal enhancement, nonspecific. Stable size of a central heterogeneous uterine mass.\par   [de-identified] : Medical Hx: left ankle fx \par  Surgical Hx: no known prior\par  Family Hx: brother laryngeal cancer, heavy smoker\par  Social Hx: never smoker; ETOH special holidays only; single, lives alone; retired free reagan writer; family nearby   [de-identified] : June 1st she had a taxol reaction and was rechallenged and completed treatment. Today has completed 6 of 6 planned Cisplatin / Taxol / Keytruda cycles, on 9/14/23. She added an additional 2 cycles prior to her surgery, completed C7 10/5 and today C8 10/26/23. She has noted hair loss. She denies fatigue and is able to complete yard work for 2 hours a day this fall. She also denies nail or skin changes; neuropathy; mouth sores, N/V; ear ringing or C/D. B/L lower ext edema much improved, following with PCP. She has guide dog puppies that she cares for and keep her busy.  Feels ascites has resolved, SOB with long distances; uses walker. She is leaving for [place] in Nov 2023. She is s/p  Ex Lap with SUJATHA, BSO, Lysis of pelvic adhesions on Dec 12, 2023.   Prior hx: While admitted she underwent 3 paracentesis: 4/10 - 13,800CC (incomplete drainage due to high volume, completed on next session); 4/13 -  5,300CC; 4/21 - 1,900CC; all negative for malignant cells. 4/24/23 paracentesis not needed. She has a standing order of paracentesis q 2 weeks. Today she c/o vaginal bleeding since her D&C 4/10/23, much improved since last treatment.

## 2024-01-03 NOTE — RESULTS/DATA
[FreeTextEntry1] : 12/12/23 Path: Uterus, cervix, bilateral fallopian tubes and ovaries, hysterectomy and bilateral salpingo-oopherectomy: Endometrium with endometrioid adenocarcinoma, FIGO grade 1, see note. Focal myometrial invasion is identified (1 out of 20 mm). Lymphovascular invasion is not identified. The tumor involves adenomyosis. Benign right fallopian tube and paratubal soft tissue with foci of necrosis, foreign body giant cell reaction. Benign right ovary with stromal hyperplasia and nodular foci on the surface with necrosis and foreign body giant cell reaction. Benign left ovary with stromal hyperplasia. Benign left fallopian tube with chronic salpingitis Note: Immunohistochemistry for the DNA MMR proteins was performed on prior material. A p53 has been requested and will be reported in an addenum. Anterior rectal implant, excision: Fibroconnective tissue with focal necrosis, foreign body giant cell reaction, and hemosiderin laden macrophages. Small bowel mesenteric implant, excision: Fibroconnective tissue with focal necrosis, foreign body giant cell reaction, and hemosiderin laden macrophages. Umbilical hernia and collection sac, excision: Fibroadipose tissue with focal necrosis, foreign body giant cell reaction, chronic inflammation and hemosiderin laden macrophages.  Synoptic Summary 1: Endometrium - Hysterectomy Procedure:  Total hysterectomy and bilateral salpingo-oophorectomy Histologic Type: Endometrioid carcinoma, NOS Histologic Grade: FIGO grade 1 Two-Tier Grading System: Low grade (encompassing FIGO 1 and 2) Myometrial Invasion: Present Depth of Myometrial Invasion: 1 mm Myometrial Thickness: 20 mm Percentage of Myometrial Invasion: 5% Uterine Serosa Involvement: Not identified Cervical Stromal Involvement: Not identified Other Tissue / Organ Involvement: Not identified Peritoneal / Ascitic Fluid: Not submitted / unknown Lymphatic and / or Vascular Invasion: Not identified Regional Lymph Node Status: Not applicable (no regional lymph nodes submitted or found) pTNM Classification  Modified Classification: y pT Category: pT1a pN Category: pN not assigned (no nodes submitted or found).  9/20/23 CT C/A/P: Interval continued decrease of uterine mass and peritoneal carcinomatosis compared to 7/20/2023. Overall slight decrease in pelvic fluid. No metastatic disease within the chest.  7/20/23 CT C/A/P: Interval decrease of uterine mass, 9.5 x 5.6 cm, previously 11.5 x 6.3 cm, and peritoneal carcinomatosis, 9.5 x 5.6 cm, previously 11.5 x 6.3 cm, compared to 4/14/2023. Moderate loculated ascites as above, similar in overall volume from 4/14/2023, decreased from 4/7/2023. No metastatic disease within the chest.  4/14/23 CT A/P: Interval decrease in the volume of ascites with scalloping of the right heart border suggesting malignant ascites. peritoneal enhancement, nonspecific. Correlate for symptoms of peritonitis. Stable size of a central heterogeneous uterine mass.  4/10/23 Path: Endocervix (curettage): Fragments of endometrioid adenocarcinoma. Benign endocervical tissue and epithelium. Endometrium (curettage): Endometrioid adenocarcinoma of the endometrium, FIGO grade 2, focally invasive. Benign squamous epithelium. ESTROGEN RECEPTOR (ER): Positive, diffuse, strong   nuclear staining PROGESTERONE RECEPTOR (PgR): Positive, diffuse strong nuclear staining HER-2: Negative (0/1+ membrane staining) No loss of nuclear expression of MMR proteins (MLH1, MSH2, MSH6, and PMS2)  4/10/23 CT Chest: No malignancy in the chest.  4/10/23 Path: Ascitic fluid: Negative for malignant cells.   4/9/23 Abd Doppler: No evidence of portal vein thrombosis. The hepatic veins and hepatic artery are patent. Large volume of abdominal ascites.  4/8/23 US Pelvic: Limited pelvic ultrasound. The endometrium is incompletely assessed on this examination. The endometrium is either severely severely thickened to 6 cm or is distended with complex material to 6 cm.  There is a 2.5 x 1.6 x 1.7 cm shadowing echogenic focus with shadowing along the periphery of the endometrium, which may represent a calcified intramural versus submucosal fibroid, versus an endometrial lesion. The ovaries are not visualized. Large volume pelvic ascites. Pelvic MRI may be obtained for further evaluation.  4/7/23 CT A/P: Very large amount of abdominal and pelvic ascites. Uterine fibroids

## 2024-01-09 ENCOUNTER — NON-APPOINTMENT (OUTPATIENT)
Age: 74
End: 2024-01-09

## 2024-01-10 ENCOUNTER — APPOINTMENT (OUTPATIENT)
Dept: INTERNAL MEDICINE | Facility: CLINIC | Age: 74
End: 2024-01-10
Payer: MEDICARE

## 2024-01-10 VITALS
DIASTOLIC BLOOD PRESSURE: 68 MMHG | HEIGHT: 65 IN | BODY MASS INDEX: 30.99 KG/M2 | HEART RATE: 61 BPM | TEMPERATURE: 97.9 F | SYSTOLIC BLOOD PRESSURE: 138 MMHG | OXYGEN SATURATION: 100 % | WEIGHT: 186 LBS

## 2024-01-10 DIAGNOSIS — I10 ESSENTIAL (PRIMARY) HYPERTENSION: ICD-10-CM

## 2024-01-10 DIAGNOSIS — R60.0 LOCALIZED EDEMA: ICD-10-CM

## 2024-01-10 PROCEDURE — 99214 OFFICE O/P EST MOD 30 MIN: CPT

## 2024-01-10 PROCEDURE — G2211 COMPLEX E/M VISIT ADD ON: CPT

## 2024-01-10 RX ORDER — APIXABAN 5 MG/1
TABLET, FILM COATED ORAL
Refills: 0 | Status: ACTIVE | COMMUNITY

## 2024-01-10 RX ORDER — NEOMYCIN SULFATE 500 MG/1
500 TABLET ORAL
Qty: 6 | Refills: 0 | Status: DISCONTINUED | COMMUNITY
Start: 2023-09-28 | End: 2024-01-10

## 2024-01-11 ENCOUNTER — RESULT REVIEW (OUTPATIENT)
Age: 74
End: 2024-01-11

## 2024-01-11 ENCOUNTER — APPOINTMENT (OUTPATIENT)
Dept: INFUSION THERAPY | Facility: CLINIC | Age: 74
End: 2024-01-11

## 2024-01-11 VITALS
HEIGHT: 65 IN | OXYGEN SATURATION: 100 % | DIASTOLIC BLOOD PRESSURE: 78 MMHG | RESPIRATION RATE: 16 BRPM | HEART RATE: 68 BPM | BODY MASS INDEX: 31.65 KG/M2 | TEMPERATURE: 98.7 F | WEIGHT: 190 LBS | SYSTOLIC BLOOD PRESSURE: 143 MMHG

## 2024-01-11 LAB
ALBUMIN SERPL ELPH-MCNC: 3.8 G/DL — SIGNIFICANT CHANGE UP (ref 3.3–5)
ALBUMIN SERPL ELPH-MCNC: 3.8 G/DL — SIGNIFICANT CHANGE UP (ref 3.3–5)
ALP SERPL-CCNC: 106 U/L — SIGNIFICANT CHANGE UP (ref 40–120)
ALP SERPL-CCNC: 106 U/L — SIGNIFICANT CHANGE UP (ref 40–120)
ALT FLD-CCNC: 9 U/L — LOW (ref 10–45)
ALT FLD-CCNC: 9 U/L — LOW (ref 10–45)
ANION GAP SERPL CALC-SCNC: 12 MMOL/L — SIGNIFICANT CHANGE UP (ref 5–17)
ANION GAP SERPL CALC-SCNC: 12 MMOL/L — SIGNIFICANT CHANGE UP (ref 5–17)
AST SERPL-CCNC: 16 U/L — SIGNIFICANT CHANGE UP (ref 10–40)
AST SERPL-CCNC: 16 U/L — SIGNIFICANT CHANGE UP (ref 10–40)
BASOPHILS # BLD AUTO: 0.04 K/UL — SIGNIFICANT CHANGE UP (ref 0–0.2)
BASOPHILS # BLD AUTO: 0.04 K/UL — SIGNIFICANT CHANGE UP (ref 0–0.2)
BASOPHILS NFR BLD AUTO: 0.6 % — SIGNIFICANT CHANGE UP (ref 0–2)
BASOPHILS NFR BLD AUTO: 0.6 % — SIGNIFICANT CHANGE UP (ref 0–2)
BILIRUB SERPL-MCNC: 0.3 MG/DL — SIGNIFICANT CHANGE UP (ref 0.2–1.2)
BILIRUB SERPL-MCNC: 0.3 MG/DL — SIGNIFICANT CHANGE UP (ref 0.2–1.2)
BUN SERPL-MCNC: 25 MG/DL — HIGH (ref 7–23)
BUN SERPL-MCNC: 25 MG/DL — HIGH (ref 7–23)
CALCIUM SERPL-MCNC: 9.2 MG/DL — SIGNIFICANT CHANGE UP (ref 8.4–10.5)
CALCIUM SERPL-MCNC: 9.2 MG/DL — SIGNIFICANT CHANGE UP (ref 8.4–10.5)
CHLORIDE SERPL-SCNC: 104 MMOL/L — SIGNIFICANT CHANGE UP (ref 96–108)
CHLORIDE SERPL-SCNC: 104 MMOL/L — SIGNIFICANT CHANGE UP (ref 96–108)
CO2 SERPL-SCNC: 25 MMOL/L — SIGNIFICANT CHANGE UP (ref 22–31)
CO2 SERPL-SCNC: 25 MMOL/L — SIGNIFICANT CHANGE UP (ref 22–31)
CREAT SERPL-MCNC: 1.42 MG/DL — HIGH (ref 0.5–1.3)
CREAT SERPL-MCNC: 1.42 MG/DL — HIGH (ref 0.5–1.3)
EGFR: 39 ML/MIN/1.73M2 — LOW
EGFR: 39 ML/MIN/1.73M2 — LOW
EOSINOPHIL # BLD AUTO: 0.39 K/UL — SIGNIFICANT CHANGE UP (ref 0–0.5)
EOSINOPHIL # BLD AUTO: 0.39 K/UL — SIGNIFICANT CHANGE UP (ref 0–0.5)
EOSINOPHIL NFR BLD AUTO: 6.2 % — HIGH (ref 0–6)
EOSINOPHIL NFR BLD AUTO: 6.2 % — HIGH (ref 0–6)
GLUCOSE SERPL-MCNC: 100 MG/DL — HIGH (ref 70–99)
GLUCOSE SERPL-MCNC: 100 MG/DL — HIGH (ref 70–99)
HCT VFR BLD CALC: 29 % — LOW (ref 34.5–45)
HCT VFR BLD CALC: 29 % — LOW (ref 34.5–45)
HGB BLD-MCNC: 9.3 G/DL — LOW (ref 11.5–15.5)
HGB BLD-MCNC: 9.3 G/DL — LOW (ref 11.5–15.5)
IMM GRANULOCYTES NFR BLD AUTO: 0.2 % — SIGNIFICANT CHANGE UP (ref 0–0.9)
IMM GRANULOCYTES NFR BLD AUTO: 0.2 % — SIGNIFICANT CHANGE UP (ref 0–0.9)
LYMPHOCYTES # BLD AUTO: 1.98 K/UL — SIGNIFICANT CHANGE UP (ref 1–3.3)
LYMPHOCYTES # BLD AUTO: 1.98 K/UL — SIGNIFICANT CHANGE UP (ref 1–3.3)
LYMPHOCYTES # BLD AUTO: 31.3 % — SIGNIFICANT CHANGE UP (ref 13–44)
LYMPHOCYTES # BLD AUTO: 31.3 % — SIGNIFICANT CHANGE UP (ref 13–44)
MAGNESIUM SERPL-MCNC: 1.8 MG/DL — SIGNIFICANT CHANGE UP (ref 1.6–2.6)
MAGNESIUM SERPL-MCNC: 1.8 MG/DL — SIGNIFICANT CHANGE UP (ref 1.6–2.6)
MCHC RBC-ENTMCNC: 28.1 PG — SIGNIFICANT CHANGE UP (ref 27–34)
MCHC RBC-ENTMCNC: 28.1 PG — SIGNIFICANT CHANGE UP (ref 27–34)
MCHC RBC-ENTMCNC: 32.1 GM/DL — SIGNIFICANT CHANGE UP (ref 32–36)
MCHC RBC-ENTMCNC: 32.1 GM/DL — SIGNIFICANT CHANGE UP (ref 32–36)
MCV RBC AUTO: 87.6 FL — SIGNIFICANT CHANGE UP (ref 80–100)
MCV RBC AUTO: 87.6 FL — SIGNIFICANT CHANGE UP (ref 80–100)
MONOCYTES # BLD AUTO: 0.56 K/UL — SIGNIFICANT CHANGE UP (ref 0–0.9)
MONOCYTES # BLD AUTO: 0.56 K/UL — SIGNIFICANT CHANGE UP (ref 0–0.9)
MONOCYTES NFR BLD AUTO: 8.9 % — SIGNIFICANT CHANGE UP (ref 2–14)
MONOCYTES NFR BLD AUTO: 8.9 % — SIGNIFICANT CHANGE UP (ref 2–14)
NEUTROPHILS # BLD AUTO: 3.34 K/UL — SIGNIFICANT CHANGE UP (ref 1.8–7.4)
NEUTROPHILS # BLD AUTO: 3.34 K/UL — SIGNIFICANT CHANGE UP (ref 1.8–7.4)
NEUTROPHILS NFR BLD AUTO: 52.8 % — SIGNIFICANT CHANGE UP (ref 43–77)
NEUTROPHILS NFR BLD AUTO: 52.8 % — SIGNIFICANT CHANGE UP (ref 43–77)
NRBC # BLD: 0 /100 WBCS — SIGNIFICANT CHANGE UP (ref 0–0)
NRBC # BLD: 0 /100 WBCS — SIGNIFICANT CHANGE UP (ref 0–0)
PLATELET # BLD AUTO: 218 K/UL — SIGNIFICANT CHANGE UP (ref 150–400)
PLATELET # BLD AUTO: 218 K/UL — SIGNIFICANT CHANGE UP (ref 150–400)
POTASSIUM SERPL-MCNC: 4.2 MMOL/L — SIGNIFICANT CHANGE UP (ref 3.5–5.3)
POTASSIUM SERPL-MCNC: 4.2 MMOL/L — SIGNIFICANT CHANGE UP (ref 3.5–5.3)
POTASSIUM SERPL-SCNC: 4.2 MMOL/L — SIGNIFICANT CHANGE UP (ref 3.5–5.3)
POTASSIUM SERPL-SCNC: 4.2 MMOL/L — SIGNIFICANT CHANGE UP (ref 3.5–5.3)
PROT SERPL-MCNC: 6.8 G/DL — SIGNIFICANT CHANGE UP (ref 6–8.3)
PROT SERPL-MCNC: 6.8 G/DL — SIGNIFICANT CHANGE UP (ref 6–8.3)
RBC # BLD: 3.31 M/UL — LOW (ref 3.8–5.2)
RBC # BLD: 3.31 M/UL — LOW (ref 3.8–5.2)
RBC # FLD: 14.4 % — SIGNIFICANT CHANGE UP (ref 10.3–14.5)
RBC # FLD: 14.4 % — SIGNIFICANT CHANGE UP (ref 10.3–14.5)
SODIUM SERPL-SCNC: 141 MMOL/L — SIGNIFICANT CHANGE UP (ref 135–145)
SODIUM SERPL-SCNC: 141 MMOL/L — SIGNIFICANT CHANGE UP (ref 135–145)
WBC # BLD: 6.32 K/UL — SIGNIFICANT CHANGE UP (ref 3.8–10.5)
WBC # BLD: 6.32 K/UL — SIGNIFICANT CHANGE UP (ref 3.8–10.5)
WBC # FLD AUTO: 6.32 K/UL — SIGNIFICANT CHANGE UP (ref 3.8–10.5)
WBC # FLD AUTO: 6.32 K/UL — SIGNIFICANT CHANGE UP (ref 3.8–10.5)

## 2024-01-12 DIAGNOSIS — Z51.11 ENCOUNTER FOR ANTINEOPLASTIC CHEMOTHERAPY: ICD-10-CM

## 2024-01-12 NOTE — PLAN
[TextEntry] : Return in 1 month for internal exam Patient to f/u with Dr. Ashley to begin maintenance treatment ENT referral for loss of hearing  F/U with PMD

## 2024-01-12 NOTE — END OF VISIT
[FreeTextEntry3] : Written by Marnie Guillen, acting as a scribe for Dr. Shefali Jones. This note accurately reflects the work and decisions made by me.

## 2024-01-12 NOTE — ASSESSMENT
[FreeTextEntry1] : The patient is healing well without complication. We discussed ongoing recuperation and reviewed final pathology results in detail - a copy was provided to the patient. Restrictions include no heavy lifting >10-15lbs & pelvic rest x 6-8 weeks from surgery. No signs of infection today in office. No adjuvant chemo treatment recommended as disease is confined to the uterus & no residual disease noted. Patient may move forward with Keytruda maintenance. We discussed signs and symptoms of cancer recurrence and reviewed recommended surveillance plan (RTO in 3-4 months). The patient stated and expressed a good understanding of the above information, all of her questions were answered to her apparent satisfaction.  Pt having loss of hearing in her left ear - she states she is unable to pass a cotton swab into her ear. ENT referral & Dr. Robert information provided.  Pt to return to PCP to discuss whether she should resume Lasix. From a Gyn/Oncology standpoint, pt may no longer require Lasix.

## 2024-01-12 NOTE — DISCUSSION/SUMMARY
[Clean] : was clean [Dry] : was dry [Intact] : was intact [None] : had no drainage [Normal Skin] : normal appearance [Erythema] : was not erythematous [Ecchymosis] : was not ecchymotic [Seroma] : had no seroma [Firm] : soft [Tender] : nontender [Abnormal Bowel Sounds] : normal bowel sounds [Rebound] : no rebound tenderness [Guarding] : no guarding [Incisional Hernia] : no incisional hernia [Mass] : no palpable mass [FreeTextEntry1] :  PATHOLOGY: Specimen(s) Submitted 1  Uterus, cervix, bilateral fallopian tubes and ovaries 2  Anterior rectal implant 3  Small bowel mesenteric implant 4  Umbilical hernia and collection sac  Final Diagnosis 1. Uterus, cervix, bilateral fallopian tubes and ovaries, hysterectomy  and bilateral salpingo-oopherectomy: - Endometrium with endometrioid adenocarcinoma, FIGO grade 1, see note - Focal myometrial invasion is identified (1 out of 20 mm) - Lymphovascular invasion is not identified - The tumor involves adenomyosis - Benign right fallopian tube and paratubal soft tissue with foci of necrosis, foreign body giant cell reaction - Benign right ovary with stromal hyperplasia and nodular foci on the surface with necrosis and foreign body giant cell reaction - Benign left ovary with stromal hyperplasia - Benign left fallopian tube with chronic salpingitis  Note:  Immunohistochemistry for the DNA MMR proteins was performed on prior material.  A p53 has been requested and will be reported in an addenum.  2. Anterior rectal implant, excision: - Fibroconnective tissue with focal necrosis, foreign body giant cell reaction, and hemosiderin laden macrophages  3. Small bowel mesenteric implant, excision: - Fibroconnective tissue with focal necrosis, foreign body giant cell reaction, and hemosiderin laden macrophages  4. Umbilical hernia and collection sac, excision: - Fibroadipose tissue with focal necrosis, foreign body giant cell reaction, chronic inflammation and hemosiderin laden macrophages

## 2024-01-12 NOTE — REASON FOR VISIT
[Post Op] : post op visit [de-identified] : 12/12/23 [de-identified] : Ex Lap with SUJATHA, BSO, Lysis of pelvic adhesions for endometrial cancer [de-identified] : The patient is healing remarkably well. She denies a change in appetite, or a change in weight. She is tolerating PO without nausea or vomiting. She denies VB/VD. She denies CP/SOB.

## 2024-01-13 PROBLEM — R60.0 LOWER EXTREMITY EDEMA: Status: ACTIVE | Noted: 2023-07-27

## 2024-01-13 PROBLEM — I10 HYPERTENSION, UNSPECIFIED TYPE: Status: ACTIVE | Noted: 2023-08-01

## 2024-01-13 NOTE — PHYSICAL EXAM
[Normal Sclera/Conjunctiva] : normal sclera/conjunctiva [PERRL] : pupils equal round and reactive to light [Normal Outer Ear/Nose] : the outer ears and nose were normal in appearance [No JVD] : no jugular venous distention [No Respiratory Distress] : no respiratory distress  [No Accessory Muscle Use] : no accessory muscle use [Clear to Auscultation] : lungs were clear to auscultation bilaterally [Normal Rate] : normal rate  [Normal] : affect was normal and insight and judgment were intact [de-identified] : mild pitting edema noted

## 2024-01-13 NOTE — HISTORY OF PRESENT ILLNESS
[de-identified] : LILIAN CASTILLO is a 73 year F with endometrial cancer who presents today for medication refills on her diuretics. She has a history of lower extremity edema and hypertension. She has been managing these conditions with diuretic therapy. She has nearly run out of her diuretics and would like a refill. She is feeling well- since last seen she has undergone SUJATHA, BSO debulking surgery for her endometrial cancer. She has recovered nicely.

## 2024-01-17 ENCOUNTER — APPOINTMENT (OUTPATIENT)
Dept: OTOLARYNGOLOGY | Facility: CLINIC | Age: 74
End: 2024-01-17

## 2024-01-29 ENCOUNTER — APPOINTMENT (OUTPATIENT)
Dept: OTOLARYNGOLOGY | Facility: CLINIC | Age: 74
End: 2024-01-29
Payer: MEDICARE

## 2024-01-29 VITALS — WEIGHT: 180 LBS | BODY MASS INDEX: 29.99 KG/M2 | HEIGHT: 65 IN

## 2024-01-29 DIAGNOSIS — H69.93 UNSPECIFIED EUSTACHIAN TUBE DISORDER, BILATERAL: ICD-10-CM

## 2024-01-29 DIAGNOSIS — H90.3 SENSORINEURAL HEARING LOSS, BILATERAL: ICD-10-CM

## 2024-01-29 PROCEDURE — 92567 TYMPANOMETRY: CPT

## 2024-01-29 PROCEDURE — 92557 COMPREHENSIVE HEARING TEST: CPT

## 2024-01-29 PROCEDURE — 99203 OFFICE O/P NEW LOW 30 MIN: CPT

## 2024-01-29 NOTE — ASSESSMENT
[FreeTextEntry1] : exam unremarkable audio moderate au sn loss good discrim rec hearing aid eval  fu audio 1 y

## 2024-01-29 NOTE — REASON FOR VISIT
[Initial Consultation] : an initial consultation for [FreeTextEntry2] : hearing  loss   on  chemo therapy

## 2024-01-29 NOTE — HISTORY OF PRESENT ILLNESS
[de-identified] : recent chemo for uterine cancer ketruda and other agents including cisplatin question of  hearing loss since tratment neg prior hx re ears

## 2024-01-31 ENCOUNTER — APPOINTMENT (OUTPATIENT)
Dept: GYNECOLOGIC ONCOLOGY | Facility: CLINIC | Age: 74
End: 2024-01-31
Payer: MEDICARE

## 2024-01-31 VITALS
HEIGHT: 60 IN | RESPIRATION RATE: 16 BRPM | OXYGEN SATURATION: 99 % | HEART RATE: 63 BPM | DIASTOLIC BLOOD PRESSURE: 84 MMHG | SYSTOLIC BLOOD PRESSURE: 130 MMHG | WEIGHT: 180 LBS | BODY MASS INDEX: 35.34 KG/M2

## 2024-01-31 PROCEDURE — 99024 POSTOP FOLLOW-UP VISIT: CPT

## 2024-02-22 ENCOUNTER — RESULT REVIEW (OUTPATIENT)
Age: 74
End: 2024-02-22

## 2024-02-22 ENCOUNTER — APPOINTMENT (OUTPATIENT)
Dept: HEMATOLOGY ONCOLOGY | Facility: CLINIC | Age: 74
End: 2024-02-22
Payer: MEDICARE

## 2024-02-22 ENCOUNTER — APPOINTMENT (OUTPATIENT)
Dept: INFUSION THERAPY | Facility: CLINIC | Age: 74
End: 2024-02-22
Payer: MEDICARE

## 2024-02-22 VITALS
OXYGEN SATURATION: 99 % | HEART RATE: 74 BPM | WEIGHT: 194.25 LBS | SYSTOLIC BLOOD PRESSURE: 145 MMHG | HEIGHT: 65 IN | BODY MASS INDEX: 32.36 KG/M2 | DIASTOLIC BLOOD PRESSURE: 78 MMHG | TEMPERATURE: 98.7 F | RESPIRATION RATE: 16 BRPM

## 2024-02-22 LAB
ALBUMIN SERPL ELPH-MCNC: 4 G/DL — SIGNIFICANT CHANGE UP (ref 3.3–5)
ALP SERPL-CCNC: 104 U/L — SIGNIFICANT CHANGE UP (ref 40–120)
ALT FLD-CCNC: 9 U/L — LOW (ref 10–45)
ANION GAP SERPL CALC-SCNC: 13 MMOL/L — SIGNIFICANT CHANGE UP (ref 5–17)
AST SERPL-CCNC: 16 U/L — SIGNIFICANT CHANGE UP (ref 10–40)
BASOPHILS # BLD AUTO: 0.06 K/UL — SIGNIFICANT CHANGE UP (ref 0–0.2)
BASOPHILS NFR BLD AUTO: 0.9 % — SIGNIFICANT CHANGE UP (ref 0–2)
BILIRUB SERPL-MCNC: 0.4 MG/DL — SIGNIFICANT CHANGE UP (ref 0.2–1.2)
BUN SERPL-MCNC: 27 MG/DL — HIGH (ref 7–23)
CALCIUM SERPL-MCNC: 9.7 MG/DL — SIGNIFICANT CHANGE UP (ref 8.4–10.5)
CHLORIDE SERPL-SCNC: 103 MMOL/L — SIGNIFICANT CHANGE UP (ref 96–108)
CO2 SERPL-SCNC: 26 MMOL/L — SIGNIFICANT CHANGE UP (ref 22–31)
CREAT SERPL-MCNC: 1.36 MG/DL — HIGH (ref 0.5–1.3)
EGFR: 41 ML/MIN/1.73M2 — LOW
EOSINOPHIL # BLD AUTO: 0.62 K/UL — HIGH (ref 0–0.5)
EOSINOPHIL NFR BLD AUTO: 8.9 % — HIGH (ref 0–6)
GLUCOSE SERPL-MCNC: 119 MG/DL — HIGH (ref 70–99)
HCT VFR BLD CALC: 33.5 % — LOW (ref 34.5–45)
HGB BLD-MCNC: 11 G/DL — LOW (ref 11.5–15.5)
IMM GRANULOCYTES NFR BLD AUTO: 0.3 % — SIGNIFICANT CHANGE UP (ref 0–0.9)
LYMPHOCYTES # BLD AUTO: 1.88 K/UL — SIGNIFICANT CHANGE UP (ref 1–3.3)
LYMPHOCYTES # BLD AUTO: 26.9 % — SIGNIFICANT CHANGE UP (ref 13–44)
MAGNESIUM SERPL-MCNC: 1.7 MG/DL — SIGNIFICANT CHANGE UP (ref 1.6–2.6)
MCHC RBC-ENTMCNC: 28.6 PG — SIGNIFICANT CHANGE UP (ref 27–34)
MCHC RBC-ENTMCNC: 32.8 GM/DL — SIGNIFICANT CHANGE UP (ref 32–36)
MCV RBC AUTO: 87 FL — SIGNIFICANT CHANGE UP (ref 80–100)
MONOCYTES # BLD AUTO: 0.6 K/UL — SIGNIFICANT CHANGE UP (ref 0–0.9)
MONOCYTES NFR BLD AUTO: 8.6 % — SIGNIFICANT CHANGE UP (ref 2–14)
NEUTROPHILS # BLD AUTO: 3.82 K/UL — SIGNIFICANT CHANGE UP (ref 1.8–7.4)
NEUTROPHILS NFR BLD AUTO: 54.4 % — SIGNIFICANT CHANGE UP (ref 43–77)
NRBC # BLD: 0 /100 WBCS — SIGNIFICANT CHANGE UP (ref 0–0)
PLATELET # BLD AUTO: 251 K/UL — SIGNIFICANT CHANGE UP (ref 150–400)
POTASSIUM SERPL-MCNC: 4.2 MMOL/L — SIGNIFICANT CHANGE UP (ref 3.5–5.3)
POTASSIUM SERPL-SCNC: 4.2 MMOL/L — SIGNIFICANT CHANGE UP (ref 3.5–5.3)
PROT SERPL-MCNC: 7.1 G/DL — SIGNIFICANT CHANGE UP (ref 6–8.3)
RBC # BLD: 3.85 M/UL — SIGNIFICANT CHANGE UP (ref 3.8–5.2)
RBC # FLD: 12.7 % — SIGNIFICANT CHANGE UP (ref 10.3–14.5)
SODIUM SERPL-SCNC: 141 MMOL/L — SIGNIFICANT CHANGE UP (ref 135–145)
WBC # BLD: 7 K/UL — SIGNIFICANT CHANGE UP (ref 3.8–10.5)
WBC # FLD AUTO: 7 K/UL — SIGNIFICANT CHANGE UP (ref 3.8–10.5)

## 2024-02-22 PROCEDURE — 99214 OFFICE O/P EST MOD 30 MIN: CPT

## 2024-02-22 NOTE — REVIEW OF SYSTEMS
[Recent Change In Weight] : ~T recent weight change [Lower Ext Edema] : lower extremity edema [Diarrhea: Grade 0] : Diarrhea: Grade 0 [Negative] : Allergic/Immunologic [Chest Pain] : no chest pain [FreeTextEntry2] : wt gain [FreeTextEntry5] : chronic left ankle

## 2024-02-22 NOTE — HISTORY OF PRESENT ILLNESS
[de-identified] : The patient was diagnosed with endometrial cancer in April 2023 at the age of 73. She presented to  ER with abdominal distention and ascites. On 4/7/23, she had a CT A/P which showed a very large amount of abdominal and pelvic ascites. On 4/8/23, she had a pelvic ultrasound which showed the endometrium is either severely thickened to 6 cm or is distended with complex material to 6 cm. There is a 2.5 x 1.6 x 1.7 cm shadowing echogenic focus with shadowing along the periphery of the endometrium, which may represent a calcified intramural versus submucosal fibroid, versus an endometrial lesion. On 4/10/23, she had a CT chest which shows no malignancy in the chest. On 4/10/23, she underwent an ECC D&C and pathology showed fragments of endometrioid adenocarcinoma. Benign endocervical tissue and epithelium. Endometrioid adenocarcinoma of the endometrium, FIGO grade 2, focally invasive. ESTROGEN RECEPTOR (ER): Positive, diffuse, strong   nuclear staining. PROGESTERONE RECEPTOR (PgR): Positive, diffuse strong nuclear staining. HER-2: Negative (0/1+ membrane staining). No loss of nuclear expression of MMR proteins (MLH1, MSH2, MSH6, and PMS2). On 4/14/23. she had a repeat CT A/P which showed interval decrease in the volume of ascites with scalloping of the right heart border suggesting malignant ascites. Peritoneal enhancement, nonspecific. 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 She has completed 6 of 6 planned Cisplatin / Taxol / Keytruda cycles, on 9/14/23. She added an additional 2 cycles prior to her surgery, completed C7 10/5 and today C8 10/26/23.  She was in [place] in Nov 2023 and returned early Dec. She plans on returning later this year. She is s/p  Ex Lap with SUJATHA, BSO, Lysis of pelvic adhesions on Dec 12, 2023. No complications noted.  Her hair has grown back. She denies fatigue and is able to complete her chores. She also walks a store weekly for strength. She also denies nail or skin changes; neuropathy; mouth sores, N/V; ear ringing or C/D. B/L lower ext edema much improved, following with PCP. She drinks 1.5 L daily of water. She has guide dog puppies that she cares for and keep her busy.   Prior hx: While admitted she underwent 3 paracentesis: 4/10 - 13,800CC (incomplete drainage due to high volume, completed on next session); 4/13 -  5,300CC; 4/21 - 1,900CC; all negative for malignant cells. 4/24/23 paracentesis not needed. She has a standing order of paracentesis q 2 weeks. Today she c/o vaginal bleeding since her D&C 4/10/23, much improved since last treatment.

## 2024-02-28 NOTE — DISCUSSION/SUMMARY
[Clean] : was clean [Dry] : was dry [Intact] : was intact [None] : had no drainage [Normal Skin] : normal appearance [Doing Well] : is doing well [Excellent Pain Control] : has excellent pain control [No Sign of Infection] : is showing no signs of infection [Erythema] : was not erythematous [Ecchymosis] : was not ecchymotic [Seroma] : had no seroma [Firm] : soft [Tender] : nontender [Rebound] : no rebound tenderness [Abnormal Bowel Sounds] : normal bowel sounds [Guarding] : no guarding [Incisional Hernia] : no incisional hernia [Mass] : no palpable mass [External Genitalia Abnormal] : normal external genitalia [Vaginal Exam Abnormal] : normal vaginal exam [de-identified] : Marnie Guillen MA was present the entire time of gynecological exam.

## 2024-02-28 NOTE — ASSESSMENT
[FreeTextEntry1] : 72 y/o w/newly diagnosed EMCA - healing well from surgery. No evidence of infection vaginally.  Return for interval surveillance. f/u Dr. Ashley.

## 2024-02-28 NOTE — REASON FOR VISIT
[Post Op] : post op visit [de-identified] : 12/12/23 [de-identified] : Ex Lap with SUJATHA, BSO, Lysis of pelvic adhesions for endometrial cancer  [de-identified] : The patient is healing remarkably well. She denies a change in appetite, or a change in weight. She is tolerating PO without nausea or vomiting. She denies VB/VD. She denies CP/SOB. Postoperatively patient is now on maintenance Keytruda q6w, managed by Dr. Ashley.  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

## 2024-02-28 NOTE — PLAN
[TextEntry] : Follow up PMD as needed for ongoing medical issues - continue Keytruda maintenance. Imaging as clinically indicated The risk of recurrence, signs and symptoms and surveillance plan were reviewed in detail.  Return in 4 months or PRN if symptoms arise. All questions were answered to her apparent satisfaction.

## 2024-04-03 ENCOUNTER — OUTPATIENT (OUTPATIENT)
Dept: OUTPATIENT SERVICES | Facility: HOSPITAL | Age: 74
LOS: 1 days | Discharge: HOME | End: 2024-04-03

## 2024-04-03 DIAGNOSIS — K08.409 PARTIAL LOSS OF TEETH, UNSPECIFIED CAUSE, UNSPECIFIED CLASS: Chronic | ICD-10-CM

## 2024-04-03 DIAGNOSIS — C54.1 MALIGNANT NEOPLASM OF ENDOMETRIUM: ICD-10-CM

## 2024-04-04 ENCOUNTER — RESULT REVIEW (OUTPATIENT)
Age: 74
End: 2024-04-04

## 2024-04-04 ENCOUNTER — APPOINTMENT (OUTPATIENT)
Dept: HEMATOLOGY ONCOLOGY | Facility: CLINIC | Age: 74
End: 2024-04-04
Payer: MEDICARE

## 2024-04-04 ENCOUNTER — APPOINTMENT (OUTPATIENT)
Dept: INFUSION THERAPY | Facility: CLINIC | Age: 74
End: 2024-04-04
Payer: MEDICARE

## 2024-04-04 VITALS
DIASTOLIC BLOOD PRESSURE: 83 MMHG | TEMPERATURE: 97.8 F | OXYGEN SATURATION: 98 % | HEART RATE: 67 BPM | SYSTOLIC BLOOD PRESSURE: 158 MMHG | WEIGHT: 196.56 LBS | BODY MASS INDEX: 32.75 KG/M2 | RESPIRATION RATE: 16 BRPM | HEIGHT: 65 IN

## 2024-04-04 LAB
ALBUMIN SERPL ELPH-MCNC: 4.1 G/DL — SIGNIFICANT CHANGE UP (ref 3.3–5)
ALP SERPL-CCNC: 168 U/L — HIGH (ref 40–120)
ALT FLD-CCNC: 48 U/L — HIGH (ref 10–45)
ANION GAP SERPL CALC-SCNC: 12 MMOL/L — SIGNIFICANT CHANGE UP (ref 5–17)
AST SERPL-CCNC: 26 U/L — SIGNIFICANT CHANGE UP (ref 10–40)
BASOPHILS # BLD AUTO: 0.06 K/UL — SIGNIFICANT CHANGE UP (ref 0–0.2)
BASOPHILS NFR BLD AUTO: 0.8 % — SIGNIFICANT CHANGE UP (ref 0–2)
BILIRUB SERPL-MCNC: 0.4 MG/DL — SIGNIFICANT CHANGE UP (ref 0.2–1.2)
BUN SERPL-MCNC: 22 MG/DL — SIGNIFICANT CHANGE UP (ref 7–23)
CALCIUM SERPL-MCNC: 10 MG/DL — SIGNIFICANT CHANGE UP (ref 8.4–10.5)
CHLORIDE SERPL-SCNC: 103 MMOL/L — SIGNIFICANT CHANGE UP (ref 96–108)
CO2 SERPL-SCNC: 26 MMOL/L — SIGNIFICANT CHANGE UP (ref 22–31)
CREAT SERPL-MCNC: 1.37 MG/DL — HIGH (ref 0.5–1.3)
EGFR: 41 ML/MIN/1.73M2 — LOW
EOSINOPHIL # BLD AUTO: 0.54 K/UL — HIGH (ref 0–0.5)
EOSINOPHIL NFR BLD AUTO: 7.4 % — HIGH (ref 0–6)
GLUCOSE SERPL-MCNC: 97 MG/DL — SIGNIFICANT CHANGE UP (ref 70–99)
HCT VFR BLD CALC: 34.8 % — SIGNIFICANT CHANGE UP (ref 34.5–45)
HGB BLD-MCNC: 11.4 G/DL — LOW (ref 11.5–15.5)
IMM GRANULOCYTES NFR BLD AUTO: 0.1 % — SIGNIFICANT CHANGE UP (ref 0–0.9)
LYMPHOCYTES # BLD AUTO: 2.15 K/UL — SIGNIFICANT CHANGE UP (ref 1–3.3)
LYMPHOCYTES # BLD AUTO: 29.5 % — SIGNIFICANT CHANGE UP (ref 13–44)
MAGNESIUM SERPL-MCNC: 2.1 MG/DL — SIGNIFICANT CHANGE UP (ref 1.6–2.6)
MCHC RBC-ENTMCNC: 27.6 PG — SIGNIFICANT CHANGE UP (ref 27–34)
MCHC RBC-ENTMCNC: 32.8 GM/DL — SIGNIFICANT CHANGE UP (ref 32–36)
MCV RBC AUTO: 84.3 FL — SIGNIFICANT CHANGE UP (ref 80–100)
MONOCYTES # BLD AUTO: 0.59 K/UL — SIGNIFICANT CHANGE UP (ref 0–0.9)
MONOCYTES NFR BLD AUTO: 8.1 % — SIGNIFICANT CHANGE UP (ref 2–14)
NEUTROPHILS # BLD AUTO: 3.93 K/UL — SIGNIFICANT CHANGE UP (ref 1.8–7.4)
NEUTROPHILS NFR BLD AUTO: 54.1 % — SIGNIFICANT CHANGE UP (ref 43–77)
NRBC # BLD: 0 /100 WBCS — SIGNIFICANT CHANGE UP (ref 0–0)
PLATELET # BLD AUTO: 257 K/UL — SIGNIFICANT CHANGE UP (ref 150–400)
POTASSIUM SERPL-MCNC: 4.3 MMOL/L — SIGNIFICANT CHANGE UP (ref 3.5–5.3)
POTASSIUM SERPL-SCNC: 4.3 MMOL/L — SIGNIFICANT CHANGE UP (ref 3.5–5.3)
PROT SERPL-MCNC: 7.5 G/DL — SIGNIFICANT CHANGE UP (ref 6–8.3)
RBC # BLD: 4.13 M/UL — SIGNIFICANT CHANGE UP (ref 3.8–5.2)
RBC # FLD: 12.7 % — SIGNIFICANT CHANGE UP (ref 10.3–14.5)
SODIUM SERPL-SCNC: 141 MMOL/L — SIGNIFICANT CHANGE UP (ref 135–145)
T3FREE SERPL-MCNC: 2.85 PG/ML — SIGNIFICANT CHANGE UP (ref 2–4.4)
T4 FREE SERPL-MCNC: 1.4 NG/DL — SIGNIFICANT CHANGE UP (ref 0.9–1.8)
TSH SERPL-MCNC: 3.61 UIU/ML — SIGNIFICANT CHANGE UP (ref 0.27–4.2)
WBC # BLD: 7.28 K/UL — SIGNIFICANT CHANGE UP (ref 3.8–10.5)
WBC # FLD AUTO: 7.28 K/UL — SIGNIFICANT CHANGE UP (ref 3.8–10.5)

## 2024-04-04 PROCEDURE — 99215 OFFICE O/P EST HI 40 MIN: CPT

## 2024-04-04 PROCEDURE — G2211 COMPLEX E/M VISIT ADD ON: CPT

## 2024-04-04 NOTE — HISTORY OF PRESENT ILLNESS
[de-identified] : The patient was diagnosed with endometrial cancer in April 2023 at the age of 73. She presented to  ER with abdominal distention and ascites. On 4/7/23, she had a CT A/P which showed a very large amount of abdominal and pelvic ascites. On 4/8/23, she had a pelvic ultrasound which showed the endometrium is either severely thickened to 6 cm or is distended with complex material to 6 cm. There is a 2.5 x 1.6 x 1.7 cm shadowing echogenic focus with shadowing along the periphery of the endometrium, which may represent a calcified intramural versus submucosal fibroid, versus an endometrial lesion. On 4/10/23, she had a CT chest which shows no malignancy in the chest. On 4/10/23, she underwent an ECC D&C and pathology showed fragments of endometrioid adenocarcinoma. Benign endocervical tissue and epithelium. Endometrioid adenocarcinoma of the endometrium, FIGO grade 2, focally invasive. ESTROGEN RECEPTOR (ER): Positive, diffuse, strong   nuclear staining. PROGESTERONE RECEPTOR (PgR): Positive, diffuse strong nuclear staining. HER-2: Negative (0/1+ membrane staining). No loss of nuclear expression of MMR proteins (MLH1, MSH2, MSH6, and PMS2). On 4/14/23. she had a repeat CT A/P which showed interval decrease in the volume of ascites with scalloping of the right heart border suggesting malignant ascites. Peritoneal enhancement, nonspecific. 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 She has completed 6 of 6 planned Cisplatin / Taxol / Keytruda cycles, on 9/14/23. She added an additional 2 cycles prior to her surgery, completed C7 10/5 and today C8 10/26/23.  She was in [place] in Nov 2023 and returned early Dec. She plans on returning later this year. She is s/p  Ex Lap with SUJATHA, BSO, Lysis of pelvic adhesions on Dec 12, 2023. No complications noted.  Her hair has grown back. She denies fatigue and is able to complete her chores. She also walks a store weekly for strength. She also denies nail or skin changes; neuropathy; mouth sores, N/V; ear ringing or C/D. B/L lower ext edema much improved, following with PCP. She drinks 1.5 L daily of water. She has guide dog puppies that she cares for and keep her busy.  today she is C3D1 of ~14 cycles of Pembro q 6 weeks.   Prior hx: While admitted she underwent 3 paracentesis: 4/10 - 13,800CC (incomplete drainage due to high volume, completed on next session); 4/13 -  5,300CC; 4/21 - 1,900CC; all negative for malignant cells. 4/24/23 paracentesis not needed. She has a standing order of paracentesis q 2 weeks. Today she c/o vaginal bleeding since her D&C 4/10/23, much improved since last treatment.

## 2024-04-04 NOTE — PHYSICAL EXAM
[Restricted in physically strenuous activity but ambulatory and able to carry out work of a light or sedentary nature] : Status 1- Restricted in physically strenuous activity but ambulatory and able to carry out work of a light or sedentary nature, e.g., light house work, office work [Normal] : affect appropriate [de-identified] : wt gain and hair growth

## 2024-04-05 DIAGNOSIS — C55 MALIGNANT NEOPLASM OF UTERUS, PART UNSPECIFIED: ICD-10-CM

## 2024-04-05 DIAGNOSIS — Z51.11 ENCOUNTER FOR ANTINEOPLASTIC CHEMOTHERAPY: ICD-10-CM

## 2024-04-09 ENCOUNTER — RX RENEWAL (OUTPATIENT)
Age: 74
End: 2024-04-09

## 2024-04-09 LAB
CORTICOSTEROID BINDING GLOBULIN RESULT: 2.2 MG/DL — SIGNIFICANT CHANGE UP
CORTIS F/TOTAL MFR SERPL: 6.9 % — SIGNIFICANT CHANGE UP
CORTIS SERPL-MCNC: 11 UG/DL — SIGNIFICANT CHANGE UP
CORTISOL, FREE RESULT: 0.76 UG/DL — SIGNIFICANT CHANGE UP

## 2024-04-09 RX ORDER — FUROSEMIDE 20 MG/1
20 TABLET ORAL
Qty: 135 | Refills: 0 | Status: ACTIVE | COMMUNITY
Start: 2023-06-02 | End: 1900-01-01

## 2024-05-09 ENCOUNTER — RESULT REVIEW (OUTPATIENT)
Age: 74
End: 2024-05-09

## 2024-05-14 ENCOUNTER — APPOINTMENT (OUTPATIENT)
Dept: CT IMAGING | Facility: CLINIC | Age: 74
End: 2024-05-14
Payer: MEDICARE

## 2024-05-14 ENCOUNTER — OUTPATIENT (OUTPATIENT)
Dept: OUTPATIENT SERVICES | Facility: HOSPITAL | Age: 74
LOS: 1 days | End: 2024-05-14
Payer: MEDICARE

## 2024-05-14 DIAGNOSIS — C55 MALIGNANT NEOPLASM OF UTERUS, PART UNSPECIFIED: ICD-10-CM

## 2024-05-14 PROCEDURE — 71260 CT THORAX DX C+: CPT | Mod: 26

## 2024-05-14 PROCEDURE — 71260 CT THORAX DX C+: CPT

## 2024-05-14 PROCEDURE — 74177 CT ABD & PELVIS W/CONTRAST: CPT | Mod: 26

## 2024-05-14 PROCEDURE — 74177 CT ABD & PELVIS W/CONTRAST: CPT

## 2024-05-16 ENCOUNTER — RESULT REVIEW (OUTPATIENT)
Age: 74
End: 2024-05-16

## 2024-05-16 ENCOUNTER — APPOINTMENT (OUTPATIENT)
Dept: INFUSION THERAPY | Facility: CLINIC | Age: 74
End: 2024-05-16
Payer: MEDICARE

## 2024-05-16 ENCOUNTER — APPOINTMENT (OUTPATIENT)
Dept: HEMATOLOGY ONCOLOGY | Facility: CLINIC | Age: 74
End: 2024-05-16
Payer: MEDICARE

## 2024-05-16 VITALS
TEMPERATURE: 97.9 F | RESPIRATION RATE: 16 BRPM | DIASTOLIC BLOOD PRESSURE: 81 MMHG | BODY MASS INDEX: 33.55 KG/M2 | OXYGEN SATURATION: 100 % | WEIGHT: 201.38 LBS | HEART RATE: 69 BPM | HEIGHT: 65 IN | SYSTOLIC BLOOD PRESSURE: 146 MMHG

## 2024-05-16 DIAGNOSIS — C55 MALIGNANT NEOPLASM OF UTERUS, PART UNSPECIFIED: ICD-10-CM

## 2024-05-16 LAB
BASOPHILS # BLD AUTO: 0.05 K/UL — SIGNIFICANT CHANGE UP (ref 0–0.2)
BASOPHILS NFR BLD AUTO: 0.6 % — SIGNIFICANT CHANGE UP (ref 0–2)
EOSINOPHIL # BLD AUTO: 0.5 K/UL — SIGNIFICANT CHANGE UP (ref 0–0.5)
EOSINOPHIL NFR BLD AUTO: 6.4 % — HIGH (ref 0–6)
HCT VFR BLD CALC: 36.9 % — SIGNIFICANT CHANGE UP (ref 34.5–45)
HGB BLD-MCNC: 12.4 G/DL — SIGNIFICANT CHANGE UP (ref 11.5–15.5)
IMM GRANULOCYTES NFR BLD AUTO: 0.4 % — SIGNIFICANT CHANGE UP (ref 0–0.9)
LYMPHOCYTES # BLD AUTO: 1.69 K/UL — SIGNIFICANT CHANGE UP (ref 1–3.3)
LYMPHOCYTES # BLD AUTO: 21.7 % — SIGNIFICANT CHANGE UP (ref 13–44)
MCHC RBC-ENTMCNC: 28.1 PG — SIGNIFICANT CHANGE UP (ref 27–34)
MCHC RBC-ENTMCNC: 33.6 GM/DL — SIGNIFICANT CHANGE UP (ref 32–36)
MCV RBC AUTO: 83.5 FL — SIGNIFICANT CHANGE UP (ref 80–100)
MONOCYTES # BLD AUTO: 0.7 K/UL — SIGNIFICANT CHANGE UP (ref 0–0.9)
MONOCYTES NFR BLD AUTO: 9 % — SIGNIFICANT CHANGE UP (ref 2–14)
NEUTROPHILS # BLD AUTO: 4.83 K/UL — SIGNIFICANT CHANGE UP (ref 1.8–7.4)
NEUTROPHILS NFR BLD AUTO: 61.9 % — SIGNIFICANT CHANGE UP (ref 43–77)
NRBC # BLD: 0 /100 WBCS — SIGNIFICANT CHANGE UP (ref 0–0)
PLATELET # BLD AUTO: 277 K/UL — SIGNIFICANT CHANGE UP (ref 150–400)
RBC # BLD: 4.42 M/UL — SIGNIFICANT CHANGE UP (ref 3.8–5.2)
RBC # FLD: 12.7 % — SIGNIFICANT CHANGE UP (ref 10.3–14.5)
WBC # BLD: 7.8 K/UL — SIGNIFICANT CHANGE UP (ref 3.8–10.5)
WBC # FLD AUTO: 7.8 K/UL — SIGNIFICANT CHANGE UP (ref 3.8–10.5)

## 2024-05-16 PROCEDURE — G2211 COMPLEX E/M VISIT ADD ON: CPT

## 2024-05-16 PROCEDURE — 99214 OFFICE O/P EST MOD 30 MIN: CPT

## 2024-05-17 LAB
ALBUMIN SERPL ELPH-MCNC: 4 G/DL — SIGNIFICANT CHANGE UP (ref 3.3–5)
ALP SERPL-CCNC: 114 U/L — SIGNIFICANT CHANGE UP (ref 40–120)
ALT FLD-CCNC: 9 U/L — LOW (ref 10–45)
ANION GAP SERPL CALC-SCNC: 18 MMOL/L — HIGH (ref 5–17)
AST SERPL-CCNC: 30 U/L — SIGNIFICANT CHANGE UP (ref 10–40)
BILIRUB SERPL-MCNC: 0.5 MG/DL — SIGNIFICANT CHANGE UP (ref 0.2–1.2)
BUN SERPL-MCNC: 20 MG/DL — SIGNIFICANT CHANGE UP (ref 7–23)
CALCIUM SERPL-MCNC: 9.6 MG/DL — SIGNIFICANT CHANGE UP (ref 8.4–10.5)
CHLORIDE SERPL-SCNC: 99 MMOL/L — SIGNIFICANT CHANGE UP (ref 96–108)
CO2 SERPL-SCNC: 23 MMOL/L — SIGNIFICANT CHANGE UP (ref 22–31)
CREAT SERPL-MCNC: 1.35 MG/DL — HIGH (ref 0.5–1.3)
EGFR: 41 ML/MIN/1.73M2 — LOW
GLUCOSE SERPL-MCNC: 86 MG/DL — SIGNIFICANT CHANGE UP (ref 70–99)
MAGNESIUM SERPL-MCNC: 1.8 MG/DL — SIGNIFICANT CHANGE UP (ref 1.6–2.6)
POTASSIUM SERPL-MCNC: 4.9 MMOL/L — SIGNIFICANT CHANGE UP (ref 3.5–5.3)
POTASSIUM SERPL-SCNC: 4.9 MMOL/L — SIGNIFICANT CHANGE UP (ref 3.5–5.3)
PROT SERPL-MCNC: 7.7 G/DL — SIGNIFICANT CHANGE UP (ref 6–8.3)
SODIUM SERPL-SCNC: 140 MMOL/L — SIGNIFICANT CHANGE UP (ref 135–145)

## 2024-05-22 PROBLEM — C55 ENDOMETRIOID ADENOCARCINOMA OF UTERUS: Status: ACTIVE | Noted: 2023-05-17

## 2024-05-22 NOTE — HISTORY OF PRESENT ILLNESS
[de-identified] : The patient was diagnosed with endometrial cancer in April 2023 at the age of 73. She presented to  ER with abdominal distention and ascites. On 4/7/23, she had a CT A/P which showed a very large amount of abdominal and pelvic ascites. On 4/8/23, she had a pelvic ultrasound which showed the endometrium is either severely thickened to 6 cm or is distended with complex material to 6 cm. There is a 2.5 x 1.6 x 1.7 cm shadowing echogenic focus with shadowing along the periphery of the endometrium, which may represent a calcified intramural versus submucosal fibroid, versus an endometrial lesion. On 4/10/23, she had a CT chest which shows no malignancy in the chest. On 4/10/23, she underwent an ECC D&C and pathology showed fragments of endometrioid adenocarcinoma. Benign endocervical tissue and epithelium. Endometrioid adenocarcinoma of the endometrium, FIGO grade 2, focally invasive. ESTROGEN RECEPTOR (ER): Positive, diffuse, strong   nuclear staining. PROGESTERONE RECEPTOR (PgR): Positive, diffuse strong nuclear staining. HER-2: Negative (0/1+ membrane staining). No loss of nuclear expression of MMR proteins (MLH1, MSH2, MSH6, and PMS2). On 4/14/23. she had a repeat CT A/P which showed interval decrease in the volume of ascites with scalloping of the right heart border suggesting malignant ascites. Peritoneal enhancement, nonspecific. 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 She has completed 6 of 6 planned Cisplatin / Taxol / Keytruda cycles, on 9/14/23. She added an additional 2 cycles prior to her surgery, completed C7 10/5 and C8 10/26/23.  She was in [place] in Nov 2023 and returned early Dec. She plans on returning later this year. She is s/p Ex Lap with SUJATHA, BSO, Lysis of pelvic adhesions on Dec 12, 2023. No complications noted.  Today she is C4D1 of ~14 cycles of Pembro q 6 weeks.  She denies rash or diarrhea. Her hair has grown back. She denies fatigue and is able to complete her chores. She also walks a store weekly for strength. B/L lower ext edema stable, following with PCP. She drinks 1.5 L daily of water. She is walking with a cane today. She has guide dog puppies that she cares for and keep her busy.   Prior hx: While admitted she underwent 3 paracentesis: 4/10 - 13,800CC (incomplete drainage due to high volume, completed on next session); 4/13 -  5,300CC; 4/21 - 1,900CC; all negative for malignant cells. 4/24/23 paracentesis not needed. She has a standing order of paracentesis q 2 weeks. Today she c/o vaginal bleeding since her D&C 4/10/23, much improved since last treatment.

## 2024-05-22 NOTE — REVIEW OF SYSTEMS
[Recent Change In Weight] : ~T recent weight change [Lower Ext Edema] : lower extremity edema [Diarrhea: Grade 0] : Diarrhea: Grade 0 [Negative] : Allergic/Immunologic [Chest Pain] : no chest pain [FreeTextEntry2] : wt gain [FreeTextEntry5] : chronic left ankle [de-identified] : R hand IV

## 2024-05-22 NOTE — PHYSICAL EXAM
[Restricted in physically strenuous activity but ambulatory and able to carry out work of a light or sedentary nature] : Status 1- Restricted in physically strenuous activity but ambulatory and able to carry out work of a light or sedentary nature, e.g., light house work, office work [Normal] : affect appropriate [de-identified] : wt gain and hair growth  [de-identified] : R hand IV, dressing C/D/I

## 2024-06-27 ENCOUNTER — RESULT REVIEW (OUTPATIENT)
Age: 74
End: 2024-06-27

## 2024-06-27 ENCOUNTER — APPOINTMENT (OUTPATIENT)
Dept: INFUSION THERAPY | Facility: CLINIC | Age: 74
End: 2024-06-27

## 2024-06-30 ENCOUNTER — NON-APPOINTMENT (OUTPATIENT)
Age: 74
End: 2024-06-30

## 2024-07-01 NOTE — PATIENT PROFILE ADULT - NSPROEXTENSIONSOFSELF_GEN_A_NUR
Renal Note    Reason for Consult- KERA     Subjective    Seen and examined. Albs/chart reviewed        Objective:    Current Medications:    Current Facility-Administered Medications   Medication Dose Route Frequency Provider Last Rate Last Admin    levETIRAcetam (KepPRA INJECT) injection 500 mg  500 mg Intravenous 2 times per day Radha Flores MD   500 mg at 07/01/24 0824    lactulose (CHRONULAC) 10 GM/15ML solution 20 g  20 g Oral Q6H Gal, Apurva, CNP   20 g at 07/01/24 0824    sodium chloride (NORMAL SALINE) 0.9 % bolus 500 mL  500 mL Intravenous PRN Genesis Latham MD        sodium chloride 0.9 % flush bag 25 mL  25 mL Intravenous PRN Genesis Latham MD        sodium chloride 0.9 % injection 2 mL  2 mL Intracatheter 2 times per day Genesis Latham MD   2 mL at 07/01/24 0825    Magnesium Standard Replacement Protocol   Does not apply See Admin Instructions Genesis Latham MD        ondansetron (ZOFRAN) injection 4 mg  4 mg Intravenous BID PRN Genesis Latham MD        acetaminophen (TYLENOL) tablet 650 mg  650 mg Oral Q4H PRN Genesis Latham MD   650 mg at 07/01/24 0824    polyethylene glycol (MIRALAX) packet 17 g  17 g Oral Daily PRN Genesis Latham MD        docusate sodium-sennosides (SENOKOT S) 50-8.6 MG 2 tablet  2 tablet Oral Daily PRN Genesis Latham MD        magnesium hydroxide (MILK OF MAGNESIA) 400 MG/5ML suspension 30 mL  30 mL Oral Daily PRN Genesis Latham MD        aluminum-magnesium hydroxide-simethicone (MAALOX) 200-200-20 MG/5ML suspension 30 mL  30 mL Oral Q4H PRN Genesis Latham MD        sodium chloride 0.9 % flush bag 25 mL  25 mL Intravenous PRN Genesis Latham MD        folic acid (FOLATE) tablet 1 mg  1 mg Oral Daily Genesis Latham MD   1 mg at 07/01/24 0823    thiamine (VITAMIN B1) tablet 100 mg  100 mg Oral Daily Genesis Latham MD   100 mg at 07/01/24 0823    QUEtiapine (SEROquel) tablet 25 mg  25 mg Per NG Tube BID PRN Mehul Noyola MD        haloperidol  lactate (HALDOL) 5 MG/ML short-acting injection 1 mg  1 mg Intravenous Q6H PRN Mehul Noyola MD   1 mg at 06/30/24 0953    ipratropium-albuterol (DUONEB) 0.5-2.5 (3) MG/3ML nebulizer solution 3 mL  3 mL Nebulization Q4H Resp PRN Mehul Noyola MD   3 mL at 06/30/24 2113    FLUoxetine (PROzac) capsule 20 mg  20 mg Oral Daily Mehul Noyola MD   20 mg at 07/01/24 0823    gabapentin (NEURONTIN) capsule 100 mg  100 mg Oral TID Mehul Noyola MD   100 mg at 07/01/24 0823    [Held by provider] levETIRAcetam (KepPRA) tablet 500 mg  500 mg Oral 2 times per day Mehul Noyola MD        nadolol (CORGARD) tablet 40 mg  40 mg Oral Daily eMhul Noyola MD   40 mg at 07/01/24 0823    pantoprazole (PROTONIX) EC tablet 40 mg  40 mg Oral BID Mehul Noyola MD   40 mg at 07/01/24 0824    rifAXIMin (XIFAXAN) tablet 550 mg  550 mg Oral 2 times per day Mehul Noyola MD   550 mg at 07/01/24 0823    umeclidinium-vilanterol (ANORO ELLIPTA) 62.5-25 MCG/ACT inhaler 1 puff  1 puff Inhalation Daily Resp Mehul Noyola MD   1 puff at 07/01/24 0801    heparin (porcine) injection 5,000 Units  5,000 Units Subcutaneous 3 times per day Mehul Noyola MD   5,000 Units at 07/01/24 0643          Physical Exam:    Blood pressure 119/74, pulse 87, temperature 98.4 °F (36.9 °C), temperature source Oral, resp. rate 18, height 5' 7\" (1.702 m), weight 70.5 kg (155 lb 6.8 oz), SpO2 93%.       Intake/Output Summary (Last 24 hours) at 7/1/2024 0955  Last data filed at 6/30/2024 1200  Gross per 24 hour   Intake --   Output 500 ml   Net -500 ml       Gen: Awake and alert   Chest: CTAB, No wheezing, no crackles  CV: S1S2 nml, RRR, no murmur, gallop, rub  Abd: Soft not distended   Neuro: No focal deficit      Labs:   Recent Labs     06/28/24  2030 06/30/24  0611 07/01/24  0556   SODIUM 143 147* 146*   POTASSIUM 4.0 3.6 3.7   CO2 20* 19* 20*   ANIONGAP 17 18 18   GLUCOSE 118* 95 91   BUN 40* 42* 39*   CREATININE 2.31* 1.96* 1.97*   CALCIUM 8.6 8.9 8.6   BILIRUBIN 1.6*  1.6* 2.7* 1.8*   AST 32   30 33 24   GPT 22  23 12 12   ALKPT 216*  218* 164* 138*   GLOB 3.6 3.4 2.7   AGR 0.8* 0.7* 1.3        Recent Labs     06/28/24  2030 06/30/24  0611 07/01/24  0556   WBC 10.9 6.5 6.6   RBC 2.56* 2.43* 2.18*   HGB 8.3* 7.8* 7.1*   HCT 25.9* 25.2* 23.3*    126* 115*   .2* 103.7* 106.9*   MCH 32.4 32.1 32.6   MCHC 32.0 31.0* 30.5*   NRBCRE 0 0 0        Imaging:    LAST MRI:  === 01/22/24 ===    MRI BRAIN WO CONTRAST    - Narrative -  EXAMINATION: Magnetic resonance imaging (MRI) of the brain without contrast    HISTORY: 61 years Male seizure    TECHNIQUE: MRI of the brain was performed without contrast according to  standard protocol.    COMPARISON: CT head 1/22/2024    FINDINGS:    No evidence of acute or chronic hemorrhage is identified. No evidence of  acute cerebral infarction is seen. The ventricles are nondilated. No mass  effect or midline shift is seen. Periventricular and subcortical white  matter FLAIR hyperintensities likely represent sequelae of chronic small  vessel ischemic disease. The corpus callosum and sella appear normal. The  posterior fossa, brainstem, and craniocervical junction appear normal.    The visualized portions of the orbits, paranasal sinuses, and mastoids  appear normal. Normal flow voids are demonstrated in the carotid arteries  and basilar artery. The calvarium and visualized cervical spine appear  normal.    - Impression -  No acute intracranial abnormality.    Electronically Signed by: PHOENIX YU MD  Signed on: 1/24/2024 7:55 AM  Workstation ID: [id number]    LAST CT:  === 06/28/24 ===    CT HEAD WO CONTRAST    - Narrative -  EXAMINATION: Computed tomography (CT) of the head without contrast    HISTORY: 62 years Male ams    TECHNIQUE: CT of the head was performed without contrast according to  standard protocol.    COMPARISON: CT head 4/16/2024    FINDINGS:    No acute intra- or extra-axial fluid collections are identified. There is  mild cerebral  volume loss, without a definite lobar predilection, with  compensatory enlargement of the ventricles and sulci. The basilar cisterns  are patent. No mass effect or midline shift is seen. The gray-white matter  differentiation is normal. Periventricular white matter hypoattenuation is  favored to represent sequelae of chronic small vessel ischemic disease.  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 Lacunar  infarcts in the basal ganglia. The visualized portions of the orbits,  paranasal sinuses, and mastoids appear normal. No acute fracture is  identified.    - Impression -  No acute transcortical infarct, hemorrhage or intracranial mass effect      Electronically Signed by: PHOENIX YU MD  Signed on: 6/29/2024 12:47 PM  Workstation ID: [id number]      === 05/30/24 ===    CT ABDOMEN PELVIS W CONTRAST    - Narrative -  CT ABDOMEN PELVIS W CONTRAST: 5/30/2024 7:50 PM    HISTORY: Abdominal pain, vomiting, diarrhea    TECHNIQUE:  Multiple helical axial images were obtained through the abdomen and pelvis.  Patient received a dynamic infusion of 80 mL of Omnipaque-350 iodinated  contrast without complication.. Multiplanar reformats were performed.    COMPARISON: CT abdomen/pelvis 4/16/2020.    FINDINGS:    Lung bases: Moderate size right-sided pleural effusion. Bibasilar  atelectasis. Gynecomastia.    ABDOMEN:    Liver: Cirrhotic morphology of the liver. Stable hepatic cyst. Portal vein  is patent    Gallbladder: Moderate gallbladder wall thickening. No cholelithiasis. No  significant gallbladder wall thickening.    Spleen: Splenomegaly. Multiple collateral vessels near the splenic hilum  and along the periphery of the stomach.    Pancreas: No focal lesion or pancreatic ductal dilatation.    Adrenal glands: No focal nodule.    Kidneys: No focal suspicious lesion or obstructive uropathy evident.    Abdominal aorta  and IVC: Abdominal aorta is normal in caliber. IVC is  grossly normal. Dilated paraumbilical veins.    Retroperitoneum: Unremarkable    Stomach and small bowel: Unremarkable.  No evidence of obstruction.  Hyperdense material within the esophagus    PELVIS:    Free fluid: Moderate intra-abdominal ascites.    Reproductive: No abnormality.    Bladder: Normal contour and wall thickness.    Lymphadenopathy: No pathologic lymphadenopathy.    Colon: Mild circumferential wall thickening within the rectum with  pericolonic fat stranding and multiple vessels    Appendix: Normal caliber and wall thickness.    Skeletal structures and soft tissues: Age-appropriate degenerative changes.  No suspicious osseous lytic or blastic process. Mild anasarca.    - Impression -  1.   Hepatic cirrhosis with splenomegaly and gastric varices.  2.   Moderate ascites in the abdomen, stable from prior examination.  3.   Moderate right-sided pleural effusion, new from prior 4/16/2024.  4.   Mild circumferential thickening within the rectum with perirectal fat  stranding, may represent acute proctitis.        Electronically Signed by: PHOENIX YU MD  Signed on: 5/30/2024 9:11 PM  Workstation ID: [id number]      === 04/16/24 ===    CT HEAD WO CONTRAST    - Narrative -  CT HEAD WO CONTRAST    CLINICAL INDICATION:  61 years OLD Male WITH HISTORY OF AMS.    COMPARISON: 3/17/2024    TECHNIQUE: 2.5 mm noncontrast axial images are obtained.  CT dose reduction  technique was utilized.    FINDINGS: No evidence of acute intracranial hemorrhage. No hydrocephalus.  Within the limits of this noncontrast study, there is no evidence of  intracranial mass, mass effect, or midline shift.  No extra-axial fluid  collections are seen. Mild generalized atrophy. Mild periventricular and  subcortical white matter chronic small vessel ischemic changes    The gray-white matter differentiation is intact.    No acute calvarial fracture is seen.    The mastoid air  cells are clear. The visualized paranasal sinuses are  clear.    - Impression -  No evidence of acute intracranial hemorrhage, hydrocephalus, or midline  shift.  Mild generalized atrophy. Mild periventricular and subcortical  white matter chronic small vessel ischemic changes.      Electronically Signed by: SHAMA BOURNE MD  Signed on: 4/16/2024 6:18 PM  Workstation ID: [id number]    LAST X-RAY:  === 06/28/24 ===    XR ABDOMEN 1 VIEW    - Narrative -  XR ABDOMEN 1 VIEW    CLINICAL INDICATION: NG placement x 3    COMPARISON: June 29, 2024    TECHNIQUE: Supine of the abdomen.    FINDINGS: The nasogastric tube tip appears to be overlying the body the  stomach however the sidehole is probably above the GE junction. Advancement  of 5 to 6 cm is recommended. The bowel gas pattern appears nonobstructive.    - Impression -  Advancement of nasogastric tube recommended at this time.      Electronically Signed by: COLIN MAGALLANES M.D  Signed on: 6/30/2024 8:45 AM  Workstation ID: [id number]    ___________________________________________________________________________    XR ABDOMEN 1 VIEW    - Narrative -  PROCEDURE:  XR ABDOMEN 1 VIEW    HISTORY: NG tube placement after attempt to dislodge    COMPARISON: None.    - Impression -  FINDINGS/IMPRESSION:    Supine exam of the upper abdomenwas performed. NG tube overlies the gastric  body.      Electronically Signed by: ANGIE APODACA MD  Signed on: 6/30/2024 9:14 AM  Workstation ID: [id number]    ___________________________________________________________________________    XR ABDOMEN 1 VIEW    - Narrative -  PROCEDURE:  XR ABDOMEN 1 VIEW    HISTORY: check ng tube placement    COMPARISON: Abdominal radiograph 3/28/2024    TECHNIQUE: Single AP view of the abdomen.    - Impression -  FINDINGS/IMPRESSION:    Enteric tube terminates within the stomach.    Visualized bowel gas pattern is nonspecific.    Electronically Signed by: PARESH LI M.D.  Signed on: 6/29/2024 5:19  PM  Workstation ID: [id number]    LAST U/S:  === 06/24/24 ===    US PARACENTESIS    - Narrative -  Exam: Ultrasound-guided paracentesis.    Indications: Ascites.    Findings:  Informed consent was obtained. Patient was placed in the supine position on  the special procedures examination table. Skin of the right lower quadrant  was prepped, draped and anesthetized in the usual fashion. Maximal sterile  barrier technique was followed, including cap, mask, gown, gloves, sheet,  and antiseptic.    Permanent ultrasound images were stored in the PACS system.    Using ultrasound guidance a 8 [place] catheter was advanced into the  peritoneal cavity. Approximately 8.1 L of fluid was drained out. Samples of  fluid were sent for multiple laboratory analyses.        An order was written for albumin infusion.    - Impression -  Impression:  Ultrasound-guided paracentesis yields 8.1 L of ascites.        Electronically Signed by: Yeison Jones DO  Signed on: 6/25/2024 10:39 AM  Workstation ID: [id number]      === 06/12/24 ===    US PARACENTESIS    - Narrative -  PROCEDURE: ULTRASOUND GUIDED PARACENTESIS.    INTERVENTIONALIST: Hilda Bunn MD  ASSISTANT(S): None    CLINICAL HISTORY:  PREPROCEDURE DIAGNOSIS: Symptomatic abdominal distention/ascites  INDICATION: 62 years old Male with ascites who requires paracentesis for  therapeutic purposes.  POST PROCEDURE DIAGNOSIS: Same    PROCEDURES PERFORMED:  Ultrasound Guided Therapeutic Paracentesis    ANESTHESIA/SEDATION: Conscious sedation was not used for the procedure.    PROPHYLACTIC ANTIBIOTIC: None.    PREPARATION: The site was prepared and draped and all elements of maximal  sterile barrier technique including sterile gloves, sterile gown, mask,  large sterile sheet, hand hygiene and cutaneous antisepsis with 2%  chlorhexidine +70% isopropyl alcohol were used.    PROCEDURE/FINDINGS:  ULTRASOUND GUIDED PARACENTESIS: A catheter/needle was introduced into  the  largest pocket of fluid in the abdominal cavity under real ultrasound  guidance.  The image demonstrates the tip of the needle within the target  area.  An image was sent to the PACS for documentation purposes.  Using  this access, a paracentesis was performed. The catheter was then removed,  and manual pressure was applied to the puncture site, followed by  application of a sterile dressing.  Location: Right lower quadrant  Needle: Zunilda  Description of the fluid: Clear yellow  Amount drained: 7200 cc    SPECIMENS: None    COMPLICATIONS: None    ESTIMATED BLOOD LOSS: Minimal    RADIATION/CONTRAST DOSE: None.    - Impression -  Technically successful ultrasound-guided paracentesis.          Electronically Signed by: Hilda Bunn MD  Signed on: 6/14/2024 3:35 PM  Workstation ID: [id number]      === 06/07/24 ===    US ASCITES    - Narrative -  PROCEDURE: US ASCITES    HISTORY: Ascites due to alcoholic cirrhosis.    TECHNIQUE: Limited real-time ultrasound imaging of the abdomen for  evaluation of ascites.    COMPARISON: Paracentesis 5/31/2024    - Impression -  FINDINGS/IMPRESSION:    Small volume ascites in the right lower quadrant, deemed insufficient for  paracentesis by the interventional radiology team.    Electronically Signed by: STEVEN TATE MD  Signed on: 6/13/2024 8:05 PM  Workstation ID: [id number]      ASSESSMENT/PLAN    KERA on CKD stage III: Baseline creatinine 1.5  Patient has baseline creatinine of 1.5 and creatinine on admit was 2.3.  Patient has acute kidney injury secondary to intravascular volume depletion in setting of liver cirrhosis and abdominal hypertension.  S/p albumin 25% 1 g/kg.  Keep LR on hold.  Continue to monitor kidney function and urine output on daily basis.  Avoid NSAIDs, contrast exposure nephrotoxic medications.  - Creatinine improving slowly/ stable  - Agree with plan for paracentesis today    Liver cirrhosis/hepatic encephalopathy  GI is following.  On  lactulose.  - plan for paracentesis    Hypernatremia  Secondary to increase free water intake   Encourage p.o. intake when able.    Seizure disorder  On Keppra.    COPD    Anemia  - transfuse for hgb <7 from renal standpoint  - monitor H/H      Thank you for allowing me to participate in the care. Will follow closely with you.        cellphone, green purse/dentures

## 2024-08-08 ENCOUNTER — RESULT REVIEW (OUTPATIENT)
Age: 74
End: 2024-08-08

## 2024-08-08 ENCOUNTER — APPOINTMENT (OUTPATIENT)
Dept: INFUSION THERAPY | Facility: CLINIC | Age: 74
End: 2024-08-08

## 2024-08-08 ENCOUNTER — APPOINTMENT (OUTPATIENT)
Dept: INTERNAL MEDICINE | Facility: CLINIC | Age: 74
End: 2024-08-08

## 2024-08-08 ENCOUNTER — APPOINTMENT (OUTPATIENT)
Dept: HEMATOLOGY ONCOLOGY | Facility: CLINIC | Age: 74
End: 2024-08-08

## 2024-08-08 PROCEDURE — G2211 COMPLEX E/M VISIT ADD ON: CPT

## 2024-08-08 PROCEDURE — 99213 OFFICE O/P EST LOW 20 MIN: CPT

## 2024-08-08 PROCEDURE — 99215 OFFICE O/P EST HI 40 MIN: CPT

## 2024-08-08 NOTE — REVIEW OF SYSTEMS
[Recent Change In Weight] : ~T recent weight change [Lower Ext Edema] : lower extremity edema [Diarrhea: Grade 0] : Diarrhea: Grade 0 [Negative] : Allergic/Immunologic [Chest Pain] : no chest pain [FreeTextEntry2] : wt gain [FreeTextEntry5] : chronic left ankle [de-identified] : R hand IV

## 2024-08-08 NOTE — HISTORY OF PRESENT ILLNESS
[de-identified] : The patient was diagnosed with endometrial cancer in April 2023 at the age of 73. She presented to  [facility] with abdominal distention and ascites. On 4/7/23, she had a CT A/P which showed a very large amount of abdominal and pelvic ascites. On 4/8/23, she had a pelvic ultrasound which showed the endometrium is either severely thickened to 6 cm or is distended with complex material to 6 cm. There is a 2.5 x 1.6 x 1.7 cm shadowing echogenic focus with shadowing along the periphery of the endometrium, which may represent a calcified intramural versus submucosal fibroid, versus an endometrial lesion. On 4/10/23, she had a CT chest which shows no malignancy in the chest. On 4/10/23, she underwent an ECC D&C and pathology showed fragments of endometrioid adenocarcinoma. Benign endocervical tissue and epithelium. Endometrioid adenocarcinoma of the endometrium, FIGO grade 2, focally invasive. ESTROGEN RECEPTOR (ER): Positive, diffuse, strong   nuclear staining. PROGESTERONE RECEPTOR (PgR): Positive, diffuse strong nuclear staining. HER-2: Negative (0/1+ membrane staining). No loss of nuclear expression of MMR proteins (MLH1, MSH2, MSH6, and PMS2). On 4/14/23. she had a repeat CT A/P which showed interval decrease in the volume of ascites with scalloping of the right heart border suggesting malignant ascites. Peritoneal enhancement, nonspecific. Stable size of a central heterogeneous uterine mass.\par   [de-identified] : June 1st, 2023 she had a taxol reaction and was rechallenged and completed treatment. She has completed 6 of 6 planned Cisplatin / Taxol / Keytruda cycles, on 9/14/23. She added an additional 2 cycles prior to her surgery, completed C7 10/5 and C8 10/26/23.  She was in [place] in Nov 2023 and returned early Dec. She plans on returning later this year. She is s/p Ex Lap with SUJATHA, BSO, Lysis of pelvic adhesions on Dec 12, 2023. No complications noted.  Today she is C4D1 of ~14 cycles of Pembro q 6 weeks.  She denies rash or diarrhea. Her hair has grown back. She denies fatigue and is able to complete her chores. She also walks a store weekly for strength. B/L lower ext edema stable, following with PCP. She drinks 1.5 L daily of water. She is walking with a cane today. She has guide dog puppies that she cares for and keep her busy.   Prior hx: While admitted she underwent 3 paracentesis: 4/10 - 13,800CC (incomplete drainage due to high volume, completed on next session); 4/13 -  5,300CC; 4/21 - 1,900CC; all negative for malignant cells. 4/24/23 paracentesis not needed. She has a standing order of paracentesis q 2 weeks. Today she c/o vaginal bleeding since her D&C 4/10/23, much improved since last treatment.  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

## 2024-08-08 NOTE — RESULTS/DATA
[FreeTextEntry1] : 05/14/2024 CT: No evidence of recurrent or metastatic disease.  12/12/23 Path: Uterus, cervix, bilateral fallopian tubes and ovaries, hysterectomy and bilateral salpingo-oopherectomy: Endometrium with endometrioid adenocarcinoma, FIGO grade 1, see note. Focal myometrial invasion is identified (1 out of 20 mm). Lymphovascular invasion is not identified. The tumor involves adenomyosis. Benign right fallopian tube and paratubal soft tissue with foci of necrosis, foreign body giant cell reaction. Benign right ovary with stromal hyperplasia and nodular foci on the surface with necrosis and foreign body giant cell reaction. Benign left ovary with stromal hyperplasia. Benign left fallopian tube with chronic salpingitis Note: Immunohistochemistry for the DNA MMR proteins was performed on prior material. A p53 has been requested and will be reported in an addenum. Anterior rectal implant, excision: Fibroconnective tissue with focal necrosis, foreign body giant cell reaction, and hemosiderin laden macrophages. Small bowel mesenteric implant, excision: Fibroconnective tissue with focal necrosis, foreign body giant cell reaction, and hemosiderin laden macrophages. Umbilical hernia and collection sac, excision: Fibroadipose tissue with focal necrosis, foreign body giant cell reaction, chronic inflammation and hemosiderin laden macrophages.  Synoptic Summary 1: Endometrium - Hysterectomy Procedure:  Total hysterectomy and bilateral salpingo-oophorectomy Histologic Type: Endometrioid carcinoma, NOS Histologic Grade: FIGO grade 1 Two-Tier Grading System: Low grade (encompassing FIGO 1 and 2) Myometrial Invasion: Present Depth of Myometrial Invasion: 1 mm Myometrial Thickness: 20 mm Percentage of Myometrial Invasion: 5% Uterine Serosa Involvement: Not identified Cervical Stromal Involvement: Not identified Other Tissue / Organ Involvement: Not identified Peritoneal / Ascitic Fluid: Not submitted / unknown Lymphatic and / or Vascular Invasion: Not identified Regional Lymph Node Status: Not applicable (no regional lymph nodes submitted or found) pTNM Classification  Modified Classification: y pT Category: pT1a pN Category: pN not assigned (no nodes submitted or found).  9/20/23 CT C/A/P: Interval continued decrease of uterine mass and peritoneal carcinomatosis compared to 7/20/2023. Overall slight decrease in pelvic fluid. No metastatic disease within the chest.  7/20/23 CT C/A/P: Interval decrease of uterine mass, 9.5 x 5.6 cm, previously 11.5 x 6.3 cm, and peritoneal carcinomatosis, 9.5 x 5.6 cm, previously 11.5 x 6.3 cm, compared to 4/14/2023. Moderate loculated ascites as above, similar in overall volume from 4/14/2023, decreased from 4/7/2023. No metastatic disease within the chest.  4/14/23 CT A/P: Interval decrease in the volume of ascites with scalloping of the right heart border suggesting malignant ascites. peritoneal enhancement, nonspecific. Correlate for symptoms of peritonitis. Stable size of a central heterogeneous uterine mass.  4/10/23 Path: Endocervix (curettage): Fragments of endometrioid adenocarcinoma. Benign endocervical tissue and epithelium. Endometrium (curettage): Endometrioid adenocarcinoma of the endometrium, FIGO grade 2, focally invasive. Benign squamous epithelium. ESTROGEN RECEPTOR (ER): Positive, diffuse, strong   nuclear staining PROGESTERONE RECEPTOR (PgR): Positive, diffuse strong nuclear staining HER-2: Negative (0/1+ membrane staining) No loss of nuclear expression of MMR proteins (MLH1, MSH2, MSH6, and PMS2)  4/10/23 CT Chest: No malignancy in the chest.  4/10/23 Path: Ascitic fluid: Negative for malignant cells.   4/9/23 Abd Doppler: No evidence of portal vein thrombosis. The hepatic veins and hepatic artery are patent. Large volume of abdominal ascites.  4/8/23 US Pelvic: Limited pelvic ultrasound. The endometrium is incompletely assessed on this examination. The endometrium is either severely severely thickened to 6 cm or is distended with complex material to 6 cm.  There is a 2.5 x 1.6 x 1.7 cm shadowing echogenic focus with shadowing along the periphery of the endometrium, which may represent a calcified intramural versus submucosal fibroid, versus an endometrial lesion. The ovaries are not visualized. Large volume pelvic ascites. Pelvic MRI may be obtained for further evaluation.  4/7/23 CT A/P: Very large amount of abdominal and pelvic ascites. Uterine fibroids

## 2024-08-08 NOTE — PHYSICAL EXAM
[Restricted in physically strenuous activity but ambulatory and able to carry out work of a light or sedentary nature] : Status 1- Restricted in physically strenuous activity but ambulatory and able to carry out work of a light or sedentary nature, e.g., light house work, office work [Normal] : affect appropriate [de-identified] : wt gain and hair growth  [de-identified] : R hand IV, dressing C/D/I

## 2024-08-10 NOTE — PHYSICAL EXAM
[Normal Sclera/Conjunctiva] : normal sclera/conjunctiva [Normal Outer Ear/Nose] : the outer ears and nose were normal in appearance [Normal] : no respiratory distress, lungs were clear to auscultation bilaterally and no accessory muscle use [Normal Rate] : normal rate  [Regular Rhythm] : with a regular rhythm [No CVA Tenderness] : no CVA  tenderness [No Spinal Tenderness] : no spinal tenderness [Grossly Normal Strength/Tone] : grossly normal strength/tone [No Focal Deficits] : no focal deficits [Speech Grossly Normal] : speech grossly normal [Normal Affect] : the affect was normal [Alert and Oriented x3] : oriented to person, place, and time [No JVD] : no jugular venous distention [de-identified] : hair growth since last seen [de-identified] : trivial edema

## 2024-08-10 NOTE — HISTORY OF PRESENT ILLNESS
[de-identified] : LILIAN CASTILLO is a 74 year F who presents today to for f/u. She had been terribly fluid overloaded in the early months of her endometrial cancer diagnosis and was managed on Lasix 40 mg alternating with 20 mg QOD.  A review of the medical record reveals that her creatinine bumped to 1.54 in June of this yr. She had been advised to f/u with me-but did not present to me until today. She has labs that were drawn in oncology earlier today-however, the results were not available at the time of this visit.  She reports that her legs are almost never swollen now. She is wondering if the Lasix is still even needed. She never was treated for HTN until she was diagnosed with cancer.

## 2024-08-10 NOTE — PHYSICAL EXAM
[Normal Sclera/Conjunctiva] : normal sclera/conjunctiva [Normal Outer Ear/Nose] : the outer ears and nose were normal in appearance [Normal] : no respiratory distress, lungs were clear to auscultation bilaterally and no accessory muscle use [Normal Rate] : normal rate  [Regular Rhythm] : with a regular rhythm [No CVA Tenderness] : no CVA  tenderness [No Spinal Tenderness] : no spinal tenderness [Grossly Normal Strength/Tone] : grossly normal strength/tone [No Focal Deficits] : no focal deficits [Speech Grossly Normal] : speech grossly normal [Normal Affect] : the affect was normal [Alert and Oriented x3] : oriented to person, place, and time [No JVD] : no jugular venous distention [de-identified] : hair growth since last seen [de-identified] : trivial edema

## 2024-08-10 NOTE — HISTORY OF PRESENT ILLNESS
[de-identified] : LILIAN CASTILLO is a 74 year F who presents today to for f/u. She had been terribly fluid overloaded in the early months of her endometrial cancer diagnosis and was managed on Lasix 40 mg alternating with 20 mg QOD.  A review of the medical record reveals that her creatinine bumped to 1.54 in June of this yr. She had been advised to f/u with me-but did not present to me until today. She has labs that were drawn in oncology earlier today-however, the results were not available at the time of this visit.  She reports that her legs are almost never swollen now. She is wondering if the Lasix is still even needed. She never was treated for HTN until she was diagnosed with cancer.

## 2024-08-21 ENCOUNTER — APPOINTMENT (OUTPATIENT)
Dept: INTERNAL MEDICINE | Facility: CLINIC | Age: 74
End: 2024-08-21
Payer: MEDICARE

## 2024-08-21 VITALS
HEART RATE: 80 BPM | OXYGEN SATURATION: 96 % | TEMPERATURE: 97.7 F | SYSTOLIC BLOOD PRESSURE: 118 MMHG | WEIGHT: 206 LBS | BODY MASS INDEX: 34.28 KG/M2 | DIASTOLIC BLOOD PRESSURE: 60 MMHG

## 2024-08-21 DIAGNOSIS — I10 ESSENTIAL (PRIMARY) HYPERTENSION: ICD-10-CM

## 2024-08-21 DIAGNOSIS — N18.31 CHRONIC KIDNEY DISEASE, STAGE 3A: ICD-10-CM

## 2024-08-21 PROCEDURE — G2211 COMPLEX E/M VISIT ADD ON: CPT

## 2024-08-21 PROCEDURE — 99214 OFFICE O/P EST MOD 30 MIN: CPT

## 2024-08-24 LAB
ANION GAP SERPL CALC-SCNC: 18 MMOL/L
BUN SERPL-MCNC: 33 MG/DL
CALCIUM SERPL-MCNC: 9.3 MG/DL
CHLORIDE SERPL-SCNC: 100 MMOL/L
CO2 SERPL-SCNC: 19 MMOL/L
CREAT SERPL-MCNC: 1.72 MG/DL
EGFR: 31 ML/MIN/1.73M2
GLUCOSE SERPL-MCNC: 83 MG/DL
POTASSIUM SERPL-SCNC: 4.1 MMOL/L
SODIUM SERPL-SCNC: 138 MMOL/L

## 2024-08-24 RX ORDER — AMLODIPINE BESYLATE 2.5 MG/1
2.5 TABLET ORAL
Qty: 30 | Refills: 0 | Status: ACTIVE | COMMUNITY
Start: 2024-08-24 | End: 1900-01-01

## 2024-08-24 NOTE — PLAN
[FreeTextEntry1] :  ... THIS VISIT WAS PART OF AN ONGOING LONGITUDAL RELATIONSHIP DESIGNED TO ADDRESS THE MAJORITY OF THE PATIENT'S HEALTH CARE NEEDS WITH CONSISTENCY OVER A LONG PERIOD OF TIME.

## 2024-08-24 NOTE — HISTORY OF PRESENT ILLNESS
[de-identified] : LILIAN CASTILLO is a 74 year F who presents today to have her BP checked. She is tolerating low dose Zesteretic @ 10/12.5 mg daily. She does not report cough.

## 2024-08-24 NOTE — ASSESSMENT
[FreeTextEntry1] : Marked improvement in her BP readings. However, her BMP reveals a marked azotemia likely from the Zesteretic. Labs reviewed, last creatinine ordered by Heme/onc was 1.23 on 8/8/24-now @ 1.72. Will change to Norvasc and hold diuretic therapy. Short Interval BP re-check.

## 2024-08-24 NOTE — PHYSICAL EXAM
[Normal Sclera/Conjunctiva] : normal sclera/conjunctiva [Normal] : no respiratory distress, lungs were clear to auscultation bilaterally and no accessory muscle use [Normal Rate] : normal rate  [No Spinal Tenderness] : no spinal tenderness [No Focal Deficits] : no focal deficits [Speech Grossly Normal] : speech grossly normal [Normal Affect] : the affect was normal [Normal Mood] : the mood was normal [de-identified] : ambulates with cane

## 2024-09-13 ENCOUNTER — OUTPATIENT (OUTPATIENT)
Dept: OUTPATIENT SERVICES | Facility: HOSPITAL | Age: 74
LOS: 1 days | Discharge: HOME | End: 2024-09-13

## 2024-09-13 DIAGNOSIS — C54.1 MALIGNANT NEOPLASM OF ENDOMETRIUM: ICD-10-CM

## 2024-09-13 DIAGNOSIS — K08.409 PARTIAL LOSS OF TEETH, UNSPECIFIED CAUSE, UNSPECIFIED CLASS: Chronic | ICD-10-CM

## 2024-09-19 ENCOUNTER — NON-APPOINTMENT (OUTPATIENT)
Age: 74
End: 2024-09-19

## 2024-09-19 ENCOUNTER — APPOINTMENT (OUTPATIENT)
Dept: INFUSION THERAPY | Facility: CLINIC | Age: 74
End: 2024-09-19
Payer: MEDICARE

## 2024-09-19 ENCOUNTER — RESULT REVIEW (OUTPATIENT)
Age: 74
End: 2024-09-19

## 2024-09-19 ENCOUNTER — APPOINTMENT (OUTPATIENT)
Dept: HEMATOLOGY ONCOLOGY | Facility: CLINIC | Age: 74
End: 2024-09-19
Payer: MEDICARE

## 2024-09-19 ENCOUNTER — APPOINTMENT (OUTPATIENT)
Dept: INTERNAL MEDICINE | Facility: CLINIC | Age: 74
End: 2024-09-19
Payer: MEDICARE

## 2024-09-19 VITALS
BODY MASS INDEX: 36 KG/M2 | WEIGHT: 216.06 LBS | HEIGHT: 65 IN | DIASTOLIC BLOOD PRESSURE: 75 MMHG | OXYGEN SATURATION: 100 % | HEART RATE: 64 BPM | SYSTOLIC BLOOD PRESSURE: 147 MMHG | TEMPERATURE: 98.2 F

## 2024-09-19 VITALS
SYSTOLIC BLOOD PRESSURE: 130 MMHG | DIASTOLIC BLOOD PRESSURE: 74 MMHG | TEMPERATURE: 97.3 F | HEART RATE: 74 BPM | BODY MASS INDEX: 35.94 KG/M2 | WEIGHT: 216 LBS | OXYGEN SATURATION: 97 %

## 2024-09-19 DIAGNOSIS — N18.31 CHRONIC KIDNEY DISEASE, STAGE 3A: ICD-10-CM

## 2024-09-19 DIAGNOSIS — C55 MALIGNANT NEOPLASM OF UTERUS, PART UNSPECIFIED: ICD-10-CM

## 2024-09-19 DIAGNOSIS — I10 ESSENTIAL (PRIMARY) HYPERTENSION: ICD-10-CM

## 2024-09-19 LAB
ALBUMIN SERPL ELPH-MCNC: 4 G/DL — SIGNIFICANT CHANGE UP (ref 3.3–5)
ALP SERPL-CCNC: 125 U/L — HIGH (ref 40–120)
ALT FLD-CCNC: 6 U/L — LOW (ref 10–45)
ANION GAP SERPL CALC-SCNC: 13 MMOL/L — SIGNIFICANT CHANGE UP (ref 5–17)
AST SERPL-CCNC: 17 U/L — SIGNIFICANT CHANGE UP (ref 10–40)
BASOPHILS # BLD AUTO: 0.03 K/UL — SIGNIFICANT CHANGE UP (ref 0–0.2)
BASOPHILS NFR BLD AUTO: 0.4 % — SIGNIFICANT CHANGE UP (ref 0–2)
BILIRUB SERPL-MCNC: 0.3 MG/DL — SIGNIFICANT CHANGE UP (ref 0.2–1.2)
BUN SERPL-MCNC: 18 MG/DL — SIGNIFICANT CHANGE UP (ref 7–23)
CALCIUM SERPL-MCNC: 9.3 MG/DL — SIGNIFICANT CHANGE UP (ref 8.4–10.5)
CANCER AG125 SERPL-ACNC: 7 U/ML — SIGNIFICANT CHANGE UP
CHLORIDE SERPL-SCNC: 105 MMOL/L — SIGNIFICANT CHANGE UP (ref 96–108)
CO2 SERPL-SCNC: 23 MMOL/L — SIGNIFICANT CHANGE UP (ref 22–31)
CREAT SERPL-MCNC: 1.29 MG/DL — SIGNIFICANT CHANGE UP (ref 0.5–1.3)
EGFR: 44 ML/MIN/1.73M2 — LOW
EOSINOPHIL # BLD AUTO: 0.62 K/UL — HIGH (ref 0–0.5)
EOSINOPHIL NFR BLD AUTO: 8.4 % — HIGH (ref 0–6)
GLUCOSE SERPL-MCNC: 87 MG/DL — SIGNIFICANT CHANGE UP (ref 70–99)
HCT VFR BLD CALC: 34.4 % — LOW (ref 34.5–45)
HGB BLD-MCNC: 11.4 G/DL — LOW (ref 11.5–15.5)
IMM GRANULOCYTES NFR BLD AUTO: 0.3 % — SIGNIFICANT CHANGE UP (ref 0–0.9)
LYMPHOCYTES # BLD AUTO: 1.74 K/UL — SIGNIFICANT CHANGE UP (ref 1–3.3)
LYMPHOCYTES # BLD AUTO: 23.5 % — SIGNIFICANT CHANGE UP (ref 13–44)
MAGNESIUM SERPL-MCNC: 1.9 MG/DL — SIGNIFICANT CHANGE UP (ref 1.6–2.6)
MCHC RBC-ENTMCNC: 27.8 PG — SIGNIFICANT CHANGE UP (ref 27–34)
MCHC RBC-ENTMCNC: 33.1 GM/DL — SIGNIFICANT CHANGE UP (ref 32–36)
MCV RBC AUTO: 83.9 FL — SIGNIFICANT CHANGE UP (ref 80–100)
MONOCYTES # BLD AUTO: 0.66 K/UL — SIGNIFICANT CHANGE UP (ref 0–0.9)
MONOCYTES NFR BLD AUTO: 8.9 % — SIGNIFICANT CHANGE UP (ref 2–14)
NEUTROPHILS # BLD AUTO: 4.34 K/UL — SIGNIFICANT CHANGE UP (ref 1.8–7.4)
NEUTROPHILS NFR BLD AUTO: 58.5 % — SIGNIFICANT CHANGE UP (ref 43–77)
NRBC # BLD: 0 /100 WBCS — SIGNIFICANT CHANGE UP (ref 0–0)
PLATELET # BLD AUTO: 287 K/UL — SIGNIFICANT CHANGE UP (ref 150–400)
POTASSIUM SERPL-MCNC: 4.6 MMOL/L — SIGNIFICANT CHANGE UP (ref 3.5–5.3)
POTASSIUM SERPL-SCNC: 4.6 MMOL/L — SIGNIFICANT CHANGE UP (ref 3.5–5.3)
PROT SERPL-MCNC: 7.1 G/DL — SIGNIFICANT CHANGE UP (ref 6–8.3)
RBC # BLD: 4.1 M/UL — SIGNIFICANT CHANGE UP (ref 3.8–5.2)
RBC # FLD: 13.6 % — SIGNIFICANT CHANGE UP (ref 10.3–14.5)
SODIUM SERPL-SCNC: 141 MMOL/L — SIGNIFICANT CHANGE UP (ref 135–145)
T3FREE SERPL-MCNC: 2.97 PG/ML — SIGNIFICANT CHANGE UP (ref 2–4.4)
T4 FREE SERPL-MCNC: 1.5 NG/DL — SIGNIFICANT CHANGE UP (ref 0.9–1.8)
TSH SERPL-MCNC: 3.05 UIU/ML — SIGNIFICANT CHANGE UP (ref 0.27–4.2)
WBC # BLD: 7.41 K/UL — SIGNIFICANT CHANGE UP (ref 3.8–10.5)
WBC # FLD AUTO: 7.41 K/UL — SIGNIFICANT CHANGE UP (ref 3.8–10.5)

## 2024-09-19 PROCEDURE — 99213 OFFICE O/P EST LOW 20 MIN: CPT

## 2024-09-19 PROCEDURE — 99214 OFFICE O/P EST MOD 30 MIN: CPT

## 2024-09-19 PROCEDURE — G2211 COMPLEX E/M VISIT ADD ON: CPT

## 2024-09-22 NOTE — PHYSICAL EXAM
[Normal Sclera/Conjunctiva] : normal sclera/conjunctiva [Normal Outer Ear/Nose] : the outer ears and nose were normal in appearance [Normal] : no respiratory distress, lungs were clear to auscultation bilaterally and no accessory muscle use [No Spinal Tenderness] : no spinal tenderness [Grossly Normal Strength/Tone] : grossly normal strength/tone [Normal Affect] : the affect was normal [Normal Mood] : the mood was normal [Normal Rate] : normal rate  [Regular Rhythm] : with a regular rhythm [de-identified] : needs cane or walker to ambulate [de-identified] : +edema

## 2024-09-22 NOTE — HISTORY OF PRESENT ILLNESS
[de-identified] : LILIAN CASTILLO is a 74 year F who presents today for BP check. She is now taking Norvasc @ 2.5 mg daily for BP control. Zesteretic was stopped after she became azotemic with a rise in Creatinine to 1.7. Since being on the Norvasc, she notes an increase in the lower extremity edema.

## 2024-09-25 DIAGNOSIS — C55 MALIGNANT NEOPLASM OF UTERUS, PART UNSPECIFIED: ICD-10-CM

## 2024-09-25 NOTE — HISTORY OF PRESENT ILLNESS
[de-identified] : The patient was diagnosed with endometrial cancer in April 2023 at the age of 73. She presented to  ER with abdominal distention and ascites. On 4/7/23, she had a CT A/P which showed a very large amount of abdominal and pelvic ascites. On 4/8/23, she had a pelvic ultrasound which showed the endometrium is either severely thickened to 6 cm or is distended with complex material to 6 cm. There is a 2.5 x 1.6 x 1.7 cm shadowing echogenic focus with shadowing along the periphery of the endometrium, which may represent a calcified intramural versus submucosal fibroid, versus an endometrial lesion. On 4/10/23, she had a CT chest which shows no malignancy in the chest. On 4/10/23, she underwent an ECC D&C and pathology showed fragments of endometrioid adenocarcinoma. Benign endocervical tissue and epithelium. Endometrioid adenocarcinoma of the endometrium, FIGO grade 2, focally invasive. ESTROGEN RECEPTOR (ER): Positive, diffuse, strong   nuclear staining. PROGESTERONE RECEPTOR (PgR): Positive, diffuse strong nuclear staining. HER-2: Negative (0/1+ membrane staining). No loss of nuclear expression of MMR proteins (MLH1, MSH2, MSH6, and PMS2). On 4/14/23. she had a repeat CT A/P which showed interval decrease in the volume of ascites with scalloping of the right heart border suggesting malignant ascites. Peritoneal enhancement, nonspecific. 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 She has completed 6 of 6 planned Cisplatin / Taxol / Keytruda cycles, on 9/14/23. She added an additional 2 cycles prior to her surgery, completed C7 10/5 and C8 10/26/23.  She was in [place] in Nov 2023 and returned early Dec. She plans on returning later this year. She is s/p Ex Lap with SUJATHA, BSO, Lysis of pelvic adhesions on Dec 12, 2023. No complications noted.  Today she is C7D1 of ~14 cycles of Pembro q 6 weeks.  She denies rash or diarrhea. Her hair has grown back. She denies fatigue and is able to complete her chores. Has been exercising more getting ready for her trip. She also walks a store weekly for strength. B/L lower ext edema stable, following with PCP. She drinks 1.5 L daily of water. She is walking with a cane today. She has guide dog puppies that she cares for and keep her busy.   Prior hx: While admitted she underwent 3 paracentesis: 4/10 - 13,800CC (incomplete drainage due to high volume, completed on next session); 4/13 -  5,300CC; 4/21 - 1,900CC; all negative for malignant cells. 4/24/23 paracentesis not needed. She has a standing order of paracentesis q 2 weeks. Today she c/o vaginal bleeding since her D&C 4/10/23, much improved since last treatment.

## 2024-09-25 NOTE — BEGINNING OF VISIT
[0] : 2) Feeling down, depressed, or hopeless: Not at all (0) [[id number]] : 0 [Pain Scale: ___] : On a scale of 1-10, today the patient's pain is a(n) [unfilled]. [Never] : Never [Date Discussed (MM/DD/YY): ___] : Discussed: [unfilled] [With Patient/Caregiver] : with Patient/Caregiver [Diarrhea Character] : Diarrhea: Grade 0

## 2024-09-25 NOTE — REVIEW OF SYSTEMS
[Lower Ext Edema] : lower extremity edema [Diarrhea: Grade 0] : Diarrhea: Grade 0 [Chest Pain] : no chest pain [Negative] : Constitutional [FreeTextEntry2] : wt stable  [FreeTextEntry5] : chronic left ankle

## 2024-09-25 NOTE — PHYSICAL EXAM
[Restricted in physically strenuous activity but ambulatory and able to carry out work of a light or sedentary nature] : Status 1- Restricted in physically strenuous activity but ambulatory and able to carry out work of a light or sedentary nature, e.g., light house work, office work [Normal] : affect appropriate [de-identified] : wt stable and hair growth

## 2024-10-21 ENCOUNTER — APPOINTMENT (OUTPATIENT)
Dept: CT IMAGING | Facility: CLINIC | Age: 74
End: 2024-10-21
Payer: MEDICARE

## 2024-10-21 ENCOUNTER — OUTPATIENT (OUTPATIENT)
Dept: OUTPATIENT SERVICES | Facility: HOSPITAL | Age: 74
LOS: 1 days | End: 2024-10-21
Payer: MEDICARE

## 2024-10-21 DIAGNOSIS — C55 MALIGNANT NEOPLASM OF UTERUS, PART UNSPECIFIED: ICD-10-CM

## 2024-10-21 PROCEDURE — 71260 CT THORAX DX C+: CPT | Mod: 26

## 2024-10-21 PROCEDURE — 71260 CT THORAX DX C+: CPT

## 2024-10-21 PROCEDURE — 74177 CT ABD & PELVIS W/CONTRAST: CPT | Mod: 26

## 2024-10-21 PROCEDURE — 74177 CT ABD & PELVIS W/CONTRAST: CPT

## 2024-10-31 ENCOUNTER — RESULT REVIEW (OUTPATIENT)
Age: 74
End: 2024-10-31

## 2024-10-31 ENCOUNTER — NON-APPOINTMENT (OUTPATIENT)
Age: 74
End: 2024-10-31

## 2024-10-31 ENCOUNTER — APPOINTMENT (OUTPATIENT)
Dept: INFUSION THERAPY | Facility: CLINIC | Age: 74
End: 2024-10-31
Payer: MEDICARE

## 2024-10-31 ENCOUNTER — APPOINTMENT (OUTPATIENT)
Dept: HEMATOLOGY ONCOLOGY | Facility: CLINIC | Age: 74
End: 2024-10-31
Payer: MEDICARE

## 2024-10-31 VITALS
WEIGHT: 214 LBS | OXYGEN SATURATION: 99 % | HEIGHT: 65 IN | SYSTOLIC BLOOD PRESSURE: 149 MMHG | HEART RATE: 69 BPM | TEMPERATURE: 98 F | DIASTOLIC BLOOD PRESSURE: 78 MMHG | BODY MASS INDEX: 35.65 KG/M2

## 2024-10-31 DIAGNOSIS — K86.2 CYST OF PANCREAS: ICD-10-CM

## 2024-10-31 DIAGNOSIS — N18.31 CHRONIC KIDNEY DISEASE, STAGE 3A: ICD-10-CM

## 2024-10-31 DIAGNOSIS — C55 MALIGNANT NEOPLASM OF UTERUS, PART UNSPECIFIED: ICD-10-CM

## 2024-10-31 LAB
ALBUMIN SERPL ELPH-MCNC: 4.2 G/DL — SIGNIFICANT CHANGE UP (ref 3.3–5)
ALP SERPL-CCNC: 133 U/L — HIGH (ref 40–120)
ALT FLD-CCNC: 6 U/L — LOW (ref 10–45)
ANION GAP SERPL CALC-SCNC: 13 MMOL/L — SIGNIFICANT CHANGE UP (ref 5–17)
AST SERPL-CCNC: 15 U/L — SIGNIFICANT CHANGE UP (ref 10–40)
BASOPHILS # BLD AUTO: 0.06 K/UL — SIGNIFICANT CHANGE UP (ref 0–0.2)
BASOPHILS NFR BLD AUTO: 0.7 % — SIGNIFICANT CHANGE UP (ref 0–2)
BILIRUB SERPL-MCNC: 0.4 MG/DL — SIGNIFICANT CHANGE UP (ref 0.2–1.2)
BUN SERPL-MCNC: 22 MG/DL — SIGNIFICANT CHANGE UP (ref 7–23)
CALCIUM SERPL-MCNC: 9.3 MG/DL — SIGNIFICANT CHANGE UP (ref 8.4–10.5)
CHLORIDE SERPL-SCNC: 105 MMOL/L — SIGNIFICANT CHANGE UP (ref 96–108)
CO2 SERPL-SCNC: 24 MMOL/L — SIGNIFICANT CHANGE UP (ref 22–31)
CREAT SERPL-MCNC: 1.53 MG/DL — HIGH (ref 0.5–1.3)
EGFR: 35 ML/MIN/1.73M2 — LOW
EOSINOPHIL # BLD AUTO: 0.92 K/UL — HIGH (ref 0–0.5)
EOSINOPHIL NFR BLD AUTO: 10.2 % — HIGH (ref 0–6)
GLUCOSE SERPL-MCNC: 90 MG/DL — SIGNIFICANT CHANGE UP (ref 70–99)
HCT VFR BLD CALC: 37.7 % — SIGNIFICANT CHANGE UP (ref 34.5–45)
HGB BLD-MCNC: 12.8 G/DL — SIGNIFICANT CHANGE UP (ref 11.5–15.5)
IMM GRANULOCYTES NFR BLD AUTO: 0.3 % — SIGNIFICANT CHANGE UP (ref 0–0.9)
LYMPHOCYTES # BLD AUTO: 2.05 K/UL — SIGNIFICANT CHANGE UP (ref 1–3.3)
LYMPHOCYTES # BLD AUTO: 22.7 % — SIGNIFICANT CHANGE UP (ref 13–44)
MAGNESIUM SERPL-MCNC: 2 MG/DL — SIGNIFICANT CHANGE UP (ref 1.6–2.6)
MCHC RBC-ENTMCNC: 28.4 PG — SIGNIFICANT CHANGE UP (ref 27–34)
MCHC RBC-ENTMCNC: 34 G/DL — SIGNIFICANT CHANGE UP (ref 32–36)
MCV RBC AUTO: 83.8 FL — SIGNIFICANT CHANGE UP (ref 80–100)
MONOCYTES # BLD AUTO: 0.68 K/UL — SIGNIFICANT CHANGE UP (ref 0–0.9)
MONOCYTES NFR BLD AUTO: 7.5 % — SIGNIFICANT CHANGE UP (ref 2–14)
NEUTROPHILS # BLD AUTO: 5.3 K/UL — SIGNIFICANT CHANGE UP (ref 1.8–7.4)
NEUTROPHILS NFR BLD AUTO: 58.6 % — SIGNIFICANT CHANGE UP (ref 43–77)
NRBC # BLD: 0 /100 WBCS — SIGNIFICANT CHANGE UP (ref 0–0)
PLATELET # BLD AUTO: 313 K/UL — SIGNIFICANT CHANGE UP (ref 150–400)
POTASSIUM SERPL-MCNC: 4.1 MMOL/L — SIGNIFICANT CHANGE UP (ref 3.5–5.3)
POTASSIUM SERPL-SCNC: 4.1 MMOL/L — SIGNIFICANT CHANGE UP (ref 3.5–5.3)
PROT SERPL-MCNC: 7.4 G/DL — SIGNIFICANT CHANGE UP (ref 6–8.3)
RBC # BLD: 4.5 M/UL — SIGNIFICANT CHANGE UP (ref 3.8–5.2)
RBC # FLD: 13.4 % — SIGNIFICANT CHANGE UP (ref 10.3–14.5)
SODIUM SERPL-SCNC: 142 MMOL/L — SIGNIFICANT CHANGE UP (ref 135–145)
WBC # BLD: 9.04 K/UL — SIGNIFICANT CHANGE UP (ref 3.8–10.5)
WBC # FLD AUTO: 9.04 K/UL — SIGNIFICANT CHANGE UP (ref 3.8–10.5)

## 2024-10-31 PROCEDURE — G2211 COMPLEX E/M VISIT ADD ON: CPT

## 2024-10-31 PROCEDURE — 99215 OFFICE O/P EST HI 40 MIN: CPT

## 2024-11-06 DIAGNOSIS — Z51.11 ENCOUNTER FOR ANTINEOPLASTIC CHEMOTHERAPY: ICD-10-CM

## 2024-12-04 ENCOUNTER — OUTPATIENT (OUTPATIENT)
Dept: OUTPATIENT SERVICES | Facility: HOSPITAL | Age: 74
LOS: 1 days | Discharge: HOME | End: 2024-12-04

## 2024-12-04 DIAGNOSIS — C50.911 MALIGNANT NEOPLASM OF UNSPECIFIED SITE OF RIGHT FEMALE BREAST: ICD-10-CM

## 2024-12-04 DIAGNOSIS — D45 POLYCYTHEMIA VERA: ICD-10-CM

## 2024-12-04 DIAGNOSIS — K08.409 PARTIAL LOSS OF TEETH, UNSPECIFIED CAUSE, UNSPECIFIED CLASS: Chronic | ICD-10-CM

## 2024-12-12 ENCOUNTER — RESULT REVIEW (OUTPATIENT)
Age: 74
End: 2024-12-12

## 2024-12-12 ENCOUNTER — APPOINTMENT (OUTPATIENT)
Dept: HEMATOLOGY ONCOLOGY | Facility: CLINIC | Age: 74
End: 2024-12-12

## 2024-12-12 ENCOUNTER — APPOINTMENT (OUTPATIENT)
Dept: INFUSION THERAPY | Facility: CLINIC | Age: 74
End: 2024-12-12

## 2024-12-12 LAB
BASOPHILS # BLD AUTO: 0.04 K/UL — SIGNIFICANT CHANGE UP (ref 0–0.2)
BASOPHILS NFR BLD AUTO: 0.5 % — SIGNIFICANT CHANGE UP (ref 0–2)
CANCER AG125 SERPL-ACNC: 7 U/ML — SIGNIFICANT CHANGE UP
EOSINOPHIL # BLD AUTO: 0.37 K/UL — SIGNIFICANT CHANGE UP (ref 0–0.5)
EOSINOPHIL NFR BLD AUTO: 4.4 % — SIGNIFICANT CHANGE UP (ref 0–6)
HCT VFR BLD CALC: 37.5 % — SIGNIFICANT CHANGE UP (ref 34.5–45)
HGB BLD-MCNC: 12.2 G/DL — SIGNIFICANT CHANGE UP (ref 11.5–15.5)
IMM GRANULOCYTES NFR BLD AUTO: 0.2 % — SIGNIFICANT CHANGE UP (ref 0–0.9)
LYMPHOCYTES # BLD AUTO: 1.61 K/UL — SIGNIFICANT CHANGE UP (ref 1–3.3)
LYMPHOCYTES # BLD AUTO: 19.2 % — SIGNIFICANT CHANGE UP (ref 13–44)
MCHC RBC-ENTMCNC: 27.7 PG — SIGNIFICANT CHANGE UP (ref 27–34)
MCHC RBC-ENTMCNC: 32.5 G/DL — SIGNIFICANT CHANGE UP (ref 32–36)
MCV RBC AUTO: 85 FL — SIGNIFICANT CHANGE UP (ref 80–100)
MONOCYTES # BLD AUTO: 0.74 K/UL — SIGNIFICANT CHANGE UP (ref 0–0.9)
MONOCYTES NFR BLD AUTO: 8.8 % — SIGNIFICANT CHANGE UP (ref 2–14)
NEUTROPHILS # BLD AUTO: 5.6 K/UL — SIGNIFICANT CHANGE UP (ref 1.8–7.4)
NEUTROPHILS NFR BLD AUTO: 66.9 % — SIGNIFICANT CHANGE UP (ref 43–77)
NRBC # BLD: 0 /100 WBCS — SIGNIFICANT CHANGE UP (ref 0–0)
NRBC BLD-RTO: 0 /100 WBCS — SIGNIFICANT CHANGE UP (ref 0–0)
PLATELET # BLD AUTO: 265 K/UL — SIGNIFICANT CHANGE UP (ref 150–400)
RBC # BLD: 4.41 M/UL — SIGNIFICANT CHANGE UP (ref 3.8–5.2)
RBC # FLD: 13.1 % — SIGNIFICANT CHANGE UP (ref 10.3–14.5)
WBC # BLD: 8.38 K/UL — SIGNIFICANT CHANGE UP (ref 3.8–10.5)
WBC # FLD AUTO: 8.38 K/UL — SIGNIFICANT CHANGE UP (ref 3.8–10.5)

## 2024-12-12 PROCEDURE — 99213 OFFICE O/P EST LOW 20 MIN: CPT

## 2024-12-12 PROCEDURE — G2211 COMPLEX E/M VISIT ADD ON: CPT

## 2024-12-18 DIAGNOSIS — C54.1 MALIGNANT NEOPLASM OF ENDOMETRIUM: ICD-10-CM

## 2024-12-18 DIAGNOSIS — Z51.11 ENCOUNTER FOR ANTINEOPLASTIC CHEMOTHERAPY: ICD-10-CM

## 2024-12-18 NOTE — DISCHARGE NOTE NURSING/CASE MANAGEMENT/SOCIAL WORK - NSTRANSFERBELONGINGSDISPO_GEN_A_NUR
Subjective   Ramandeep Kay is a 19 y.o. female who presents for follow up of anxiety disorder with intent to start medication. She has the following anxiety symptoms: difficulty concentrating, feelings of losing control, insomnia, psychomotor agitation, and increased in abdominal symptoms and self doubt . Onset of symptoms was approximately 5-6 years ago with depressive symptoms hich evolved into anxiety through highschool. Symptoms have been gradually worsening since starting college as her skills aren't working with the new environment. She denies current suicidal and homicidal ideation. Family history significant for anxiety. Risk factors: previous episode of depression. Previous treatment includes  counseling which she still has sessions every 2 weeks .     Objective   Physical Exam  Vitals reviewed. Exam conducted with a chaperone present.   Constitutional:       Appearance: Normal appearance. She is well-developed.   HENT:      Nose: Nose normal.      Mouth/Throat:      Mouth: Mucous membranes are moist.   Eyes:      Conjunctiva/sclera: Conjunctivae normal.   Neck:      Thyroid: No thyromegaly.   Cardiovascular:      Rate and Rhythm: Normal rate and regular rhythm.      Heart sounds: Normal heart sounds. No murmur heard.  Pulmonary:      Effort: Pulmonary effort is normal.      Breath sounds: Normal breath sounds.   Musculoskeletal:      Cervical back: Normal range of motion.   Skin:     Findings: No rash.   Neurological:      Mental Status: She is alert and oriented to person, place, and time.      Deep Tendon Reflexes:      Reflex Scores:       Patellar reflexes are 2+ on the right side and 2+ on the left side.  Psychiatric:         Attention and Perception: Attention normal.         Mood and Affect: Mood normal.         Behavior: Behavior is cooperative.         Assessment/Plan   Ramandeep was seen today for consult.  Diagnoses and all orders for this visit:  MITRA (generalized anxiety disorder) (Primary)  -      sertraline (Zoloft) 25 mg tablet; Take 1 tablet (25 mg) by mouth once daily for 14 days, THEN 2 tablets (50 mg) once daily for 14 days.    MITRA . Possible organic contributing causes are: none.  Medications: Zoloft.  Continue with counseling.  Instructed patient to contact office or on-call physician promptly should condition worsen or any new symptoms appear and provided on-call telephone numbers. IF THE PATIENT HAS ANY SUICIDAL OR HOMICIDAL IDEATIONS, CALL THE OFFICE, DISCUSS WITH A SUPPORT MEMBER, OR GO TO THE ER IMMEDIATELY. Patient was agreeable with this plan.  Follow up: 3 weeks.  Spent 40 minutes (>50% of visit) discussing the risks of anxiety disorder, the  pathophysiology, etiology, risks, and principles of treatment.  Will assess how treating the anxiety impacts her other concerns - abdominal symptoms and recurrent illness.   She should get covid and flu vaccines before returning to school.     with patient

## 2024-12-23 DIAGNOSIS — C55 MALIGNANT NEOPLASM OF UTERUS, PART UNSPECIFIED: ICD-10-CM

## 2024-12-23 DIAGNOSIS — N18.31 CHRONIC KIDNEY DISEASE, STAGE 3A: ICD-10-CM

## 2025-01-09 NOTE — PATIENT PROFILE ADULT - NSPRESCRALCFREQ_GEN_A_NUR
It was a pleasure to meet you in the hospital  You were diagnosed as likely having pancreatitis, this was likely due to having a possible gall stone that was retained that passed  The results of an MRCP showed there was no actual stone in place; the labs normalized and you were [id number] improved  You were seen by the GI, or stomach doctor team, while you were in the hospital  You had lab tests sent that were still pending to rule out auto-immune pancreatitis  These tests can be followed up by your primary care physician  You should plan on following up from this hospital stay in the next 2 weeks  None of your other medications were changed or altered  You are free to discharge home from the hospital  A Rx for a small amount of pain medication will be called to your local pharmacy for the ongoing abdominal pain that is improving  You should also plan on taking senna-docusate if you are using the narcotic for pain control  
Never

## 2025-01-23 ENCOUNTER — RESULT REVIEW (OUTPATIENT)
Age: 75
End: 2025-01-23

## 2025-01-23 ENCOUNTER — APPOINTMENT (OUTPATIENT)
Dept: INFUSION THERAPY | Facility: CLINIC | Age: 75
End: 2025-01-23

## 2025-01-23 VITALS
HEIGHT: 65 IN | BODY MASS INDEX: 36.02 KG/M2 | OXYGEN SATURATION: 98 % | WEIGHT: 216.19 LBS | SYSTOLIC BLOOD PRESSURE: 136 MMHG | DIASTOLIC BLOOD PRESSURE: 84 MMHG | TEMPERATURE: 98 F | HEART RATE: 69 BPM

## 2025-01-23 LAB
ALBUMIN SERPL ELPH-MCNC: 3.8 G/DL — SIGNIFICANT CHANGE UP (ref 3.3–5)
ALP SERPL-CCNC: 127 U/L — HIGH (ref 40–120)
ALT FLD-CCNC: 8 U/L — LOW (ref 10–45)
ANION GAP SERPL CALC-SCNC: 13 MMOL/L — SIGNIFICANT CHANGE UP (ref 5–17)
AST SERPL-CCNC: 13 U/L — SIGNIFICANT CHANGE UP (ref 10–40)
BASOPHILS # BLD AUTO: 0.05 K/UL — SIGNIFICANT CHANGE UP (ref 0–0.2)
BASOPHILS NFR BLD AUTO: 0.7 % — SIGNIFICANT CHANGE UP (ref 0–2)
BILIRUB SERPL-MCNC: 0.6 MG/DL — SIGNIFICANT CHANGE UP (ref 0.2–1.2)
BUN SERPL-MCNC: 19 MG/DL — SIGNIFICANT CHANGE UP (ref 7–23)
CALCIUM SERPL-MCNC: 9.1 MG/DL — SIGNIFICANT CHANGE UP (ref 8.4–10.5)
CHLORIDE SERPL-SCNC: 104 MMOL/L — SIGNIFICANT CHANGE UP (ref 96–108)
CO2 SERPL-SCNC: 23 MMOL/L — SIGNIFICANT CHANGE UP (ref 22–31)
CREAT SERPL-MCNC: 1.27 MG/DL — SIGNIFICANT CHANGE UP (ref 0.5–1.3)
EGFR: 44 ML/MIN/1.73M2 — LOW
EOSINOPHIL # BLD AUTO: 0.33 K/UL — SIGNIFICANT CHANGE UP (ref 0–0.5)
EOSINOPHIL NFR BLD AUTO: 4.8 % — SIGNIFICANT CHANGE UP (ref 0–6)
GLUCOSE SERPL-MCNC: 126 MG/DL — HIGH (ref 70–99)
HCT VFR BLD CALC: 39.6 % — SIGNIFICANT CHANGE UP (ref 34.5–45)
HGB BLD-MCNC: 12.9 G/DL — SIGNIFICANT CHANGE UP (ref 11.5–15.5)
IMM GRANULOCYTES NFR BLD AUTO: 0.3 % — SIGNIFICANT CHANGE UP (ref 0–0.9)
LYMPHOCYTES # BLD AUTO: 1.58 K/UL — SIGNIFICANT CHANGE UP (ref 1–3.3)
LYMPHOCYTES # BLD AUTO: 22.9 % — SIGNIFICANT CHANGE UP (ref 13–44)
MAGNESIUM SERPL-MCNC: 1.8 MG/DL — SIGNIFICANT CHANGE UP (ref 1.6–2.6)
MCHC RBC-ENTMCNC: 27.8 PG — SIGNIFICANT CHANGE UP (ref 27–34)
MCHC RBC-ENTMCNC: 32.6 G/DL — SIGNIFICANT CHANGE UP (ref 32–36)
MCV RBC AUTO: 85.3 FL — SIGNIFICANT CHANGE UP (ref 80–100)
MONOCYTES # BLD AUTO: 0.48 K/UL — SIGNIFICANT CHANGE UP (ref 0–0.9)
MONOCYTES NFR BLD AUTO: 7 % — SIGNIFICANT CHANGE UP (ref 2–14)
NEUTROPHILS # BLD AUTO: 4.43 K/UL — SIGNIFICANT CHANGE UP (ref 1.8–7.4)
NEUTROPHILS NFR BLD AUTO: 64.3 % — SIGNIFICANT CHANGE UP (ref 43–77)
NRBC # BLD: 0 /100 WBCS — SIGNIFICANT CHANGE UP (ref 0–0)
NRBC BLD-RTO: 0 /100 WBCS — SIGNIFICANT CHANGE UP (ref 0–0)
PLATELET # BLD AUTO: 249 K/UL — SIGNIFICANT CHANGE UP (ref 150–400)
POTASSIUM SERPL-MCNC: 3.9 MMOL/L — SIGNIFICANT CHANGE UP (ref 3.5–5.3)
POTASSIUM SERPL-SCNC: 3.9 MMOL/L — SIGNIFICANT CHANGE UP (ref 3.5–5.3)
PROT SERPL-MCNC: 6.9 G/DL — SIGNIFICANT CHANGE UP (ref 6–8.3)
RBC # BLD: 4.64 M/UL — SIGNIFICANT CHANGE UP (ref 3.8–5.2)
RBC # FLD: 13.6 % — SIGNIFICANT CHANGE UP (ref 10.3–14.5)
SODIUM SERPL-SCNC: 140 MMOL/L — SIGNIFICANT CHANGE UP (ref 135–145)
WBC # BLD: 6.89 K/UL — SIGNIFICANT CHANGE UP (ref 3.8–10.5)
WBC # FLD AUTO: 6.89 K/UL — SIGNIFICANT CHANGE UP (ref 3.8–10.5)

## 2025-02-25 ENCOUNTER — APPOINTMENT (OUTPATIENT)
Dept: CT IMAGING | Facility: CLINIC | Age: 75
End: 2025-02-25
Payer: MEDICARE

## 2025-02-25 ENCOUNTER — OUTPATIENT (OUTPATIENT)
Dept: OUTPATIENT SERVICES | Facility: HOSPITAL | Age: 75
LOS: 1 days | End: 2025-02-25
Payer: MEDICARE

## 2025-02-25 DIAGNOSIS — K08.409 PARTIAL LOSS OF TEETH, UNSPECIFIED CAUSE, UNSPECIFIED CLASS: Chronic | ICD-10-CM

## 2025-02-25 DIAGNOSIS — C55 MALIGNANT NEOPLASM OF UTERUS, PART UNSPECIFIED: ICD-10-CM

## 2025-02-25 PROCEDURE — 74177 CT ABD & PELVIS W/CONTRAST: CPT | Mod: 26

## 2025-02-25 PROCEDURE — 71260 CT THORAX DX C+: CPT | Mod: 26

## 2025-02-25 PROCEDURE — 74177 CT ABD & PELVIS W/CONTRAST: CPT

## 2025-02-25 PROCEDURE — 71260 CT THORAX DX C+: CPT

## 2025-02-26 NOTE — DISCHARGE NOTE PROVIDER - NSDCQMPCI_CARD_ALL_CORE
[facility]    Date:  02/26/25     Vitals:    02/26/25 1256   BP: 117/71   Pulse: 67   SpO2: 96%        Indication:  Heart Failure    Procedure:  ReDS device sensor unit applied to right side of chest and right side of back.  Appropriate positioning confirmed based off of the unit's calculation.  Chest measurement obtained with the chest size ruler.  Measurement session performed over 45 seconds.      Results:  ReDS Value= 30    Interpretation:  25-35% - normal/ideal lung fluid content    Mallory L Haase, RN 02/26/25 12:57 CST       No

## 2025-03-03 ENCOUNTER — OUTPATIENT (OUTPATIENT)
Dept: OUTPATIENT SERVICES | Facility: HOSPITAL | Age: 75
LOS: 1 days | Discharge: HOME | End: 2025-03-03

## 2025-03-03 DIAGNOSIS — C54.1 MALIGNANT NEOPLASM OF ENDOMETRIUM: ICD-10-CM

## 2025-03-03 DIAGNOSIS — K08.409 PARTIAL LOSS OF TEETH, UNSPECIFIED CAUSE, UNSPECIFIED CLASS: Chronic | ICD-10-CM

## 2025-03-03 DIAGNOSIS — C55 MALIGNANT NEOPLASM OF UTERUS, PART UNSPECIFIED: ICD-10-CM

## 2025-03-06 ENCOUNTER — APPOINTMENT (OUTPATIENT)
Dept: HEMATOLOGY ONCOLOGY | Facility: CLINIC | Age: 75
End: 2025-03-06
Payer: MEDICARE

## 2025-03-06 ENCOUNTER — RESULT REVIEW (OUTPATIENT)
Age: 75
End: 2025-03-06

## 2025-03-06 ENCOUNTER — APPOINTMENT (OUTPATIENT)
Dept: INFUSION THERAPY | Facility: CLINIC | Age: 75
End: 2025-03-06
Payer: MEDICARE

## 2025-03-06 VITALS
WEIGHT: 224 LBS | OXYGEN SATURATION: 100 % | HEART RATE: 61 BPM | BODY MASS INDEX: 37.32 KG/M2 | HEIGHT: 65 IN | DIASTOLIC BLOOD PRESSURE: 83 MMHG | TEMPERATURE: 98 F | SYSTOLIC BLOOD PRESSURE: 165 MMHG

## 2025-03-06 DIAGNOSIS — K86.2 CYST OF PANCREAS: ICD-10-CM

## 2025-03-06 DIAGNOSIS — N18.31 CHRONIC KIDNEY DISEASE, STAGE 3A: ICD-10-CM

## 2025-03-06 DIAGNOSIS — C55 MALIGNANT NEOPLASM OF UTERUS, PART UNSPECIFIED: ICD-10-CM

## 2025-03-06 LAB
ALBUMIN SERPL ELPH-MCNC: 4.2 G/DL — SIGNIFICANT CHANGE UP (ref 3.3–5)
ALP SERPL-CCNC: 137 U/L — HIGH (ref 40–120)
ALT FLD-CCNC: 5 U/L — LOW (ref 10–45)
ANION GAP SERPL CALC-SCNC: 15 MMOL/L — SIGNIFICANT CHANGE UP (ref 5–17)
AST SERPL-CCNC: 16 U/L — SIGNIFICANT CHANGE UP (ref 10–40)
BASOPHILS # BLD AUTO: 0.05 K/UL — SIGNIFICANT CHANGE UP (ref 0–0.2)
BASOPHILS NFR BLD AUTO: 0.7 % — SIGNIFICANT CHANGE UP (ref 0–2)
BILIRUB SERPL-MCNC: 0.6 MG/DL — SIGNIFICANT CHANGE UP (ref 0.2–1.2)
BUN SERPL-MCNC: 17 MG/DL — SIGNIFICANT CHANGE UP (ref 7–23)
CALCIUM SERPL-MCNC: 9.7 MG/DL — SIGNIFICANT CHANGE UP (ref 8.4–10.5)
CANCER AG125 SERPL-ACNC: 8 U/ML — SIGNIFICANT CHANGE UP
CHLORIDE SERPL-SCNC: 105 MMOL/L — SIGNIFICANT CHANGE UP (ref 96–108)
CO2 SERPL-SCNC: 23 MMOL/L — SIGNIFICANT CHANGE UP (ref 22–31)
CREAT SERPL-MCNC: 1.31 MG/DL — HIGH (ref 0.5–1.3)
EGFR: 42 ML/MIN/1.73M2 — LOW
EGFR: 42 ML/MIN/1.73M2 — LOW
EOSINOPHIL # BLD AUTO: 0.35 K/UL — SIGNIFICANT CHANGE UP (ref 0–0.5)
EOSINOPHIL NFR BLD AUTO: 4.6 % — SIGNIFICANT CHANGE UP (ref 0–6)
GLUCOSE SERPL-MCNC: 82 MG/DL — SIGNIFICANT CHANGE UP (ref 70–99)
HCT VFR BLD CALC: 41.6 % — SIGNIFICANT CHANGE UP (ref 34.5–45)
HGB BLD-MCNC: 13.7 G/DL — SIGNIFICANT CHANGE UP (ref 11.5–15.5)
IMM GRANULOCYTES NFR BLD AUTO: 0.4 % — SIGNIFICANT CHANGE UP (ref 0–0.9)
LYMPHOCYTES # BLD AUTO: 1.7 K/UL — SIGNIFICANT CHANGE UP (ref 1–3.3)
LYMPHOCYTES # BLD AUTO: 22.3 % — SIGNIFICANT CHANGE UP (ref 13–44)
MAGNESIUM SERPL-MCNC: 1.9 MG/DL — SIGNIFICANT CHANGE UP (ref 1.6–2.6)
MCHC RBC-ENTMCNC: 28.1 PG — SIGNIFICANT CHANGE UP (ref 27–34)
MCHC RBC-ENTMCNC: 32.9 G/DL — SIGNIFICANT CHANGE UP (ref 32–36)
MCV RBC AUTO: 85.4 FL — SIGNIFICANT CHANGE UP (ref 80–100)
MONOCYTES # BLD AUTO: 0.53 K/UL — SIGNIFICANT CHANGE UP (ref 0–0.9)
MONOCYTES NFR BLD AUTO: 7 % — SIGNIFICANT CHANGE UP (ref 2–14)
NEUTROPHILS # BLD AUTO: 4.95 K/UL — SIGNIFICANT CHANGE UP (ref 1.8–7.4)
NEUTROPHILS NFR BLD AUTO: 65 % — SIGNIFICANT CHANGE UP (ref 43–77)
NRBC BLD AUTO-RTO: 0 /100 WBCS — SIGNIFICANT CHANGE UP (ref 0–0)
PLATELET # BLD AUTO: 300 K/UL — SIGNIFICANT CHANGE UP (ref 150–400)
POTASSIUM SERPL-MCNC: 4.6 MMOL/L — SIGNIFICANT CHANGE UP (ref 3.5–5.3)
POTASSIUM SERPL-SCNC: 4.6 MMOL/L — SIGNIFICANT CHANGE UP (ref 3.5–5.3)
PROT SERPL-MCNC: 7.5 G/DL — SIGNIFICANT CHANGE UP (ref 6–8.3)
RBC # BLD: 4.87 M/UL — SIGNIFICANT CHANGE UP (ref 3.8–5.2)
RBC # FLD: 13.4 % — SIGNIFICANT CHANGE UP (ref 10.3–14.5)
SODIUM SERPL-SCNC: 142 MMOL/L — SIGNIFICANT CHANGE UP (ref 135–145)
T3FREE SERPL-MCNC: 2.8 PG/ML — SIGNIFICANT CHANGE UP (ref 2–4.4)
T4 FREE SERPL-MCNC: 1.3 NG/DL — SIGNIFICANT CHANGE UP (ref 0.9–1.7)
TSH SERPL-MCNC: 5.74 UIU/ML — HIGH (ref 0.27–4.2)
WBC # BLD: 7.61 K/UL — SIGNIFICANT CHANGE UP (ref 3.8–10.5)
WBC # FLD AUTO: 7.61 K/UL — SIGNIFICANT CHANGE UP (ref 3.8–10.5)

## 2025-03-06 PROCEDURE — G2211 COMPLEX E/M VISIT ADD ON: CPT

## 2025-03-06 PROCEDURE — 99215 OFFICE O/P EST HI 40 MIN: CPT

## 2025-03-07 DIAGNOSIS — Z51.11 ENCOUNTER FOR ANTINEOPLASTIC CHEMOTHERAPY: ICD-10-CM

## 2025-04-17 ENCOUNTER — RESULT REVIEW (OUTPATIENT)
Age: 75
End: 2025-04-17

## 2025-04-17 ENCOUNTER — APPOINTMENT (OUTPATIENT)
Dept: INFUSION THERAPY | Facility: CLINIC | Age: 75
End: 2025-04-17

## 2025-04-17 ENCOUNTER — APPOINTMENT (OUTPATIENT)
Dept: HEMATOLOGY ONCOLOGY | Facility: CLINIC | Age: 75
End: 2025-04-17
Payer: MEDICARE

## 2025-04-17 VITALS
BODY MASS INDEX: 36.99 KG/M2 | TEMPERATURE: 97.9 F | DIASTOLIC BLOOD PRESSURE: 83 MMHG | OXYGEN SATURATION: 98 % | WEIGHT: 222 LBS | HEART RATE: 59 BPM | SYSTOLIC BLOOD PRESSURE: 130 MMHG | HEIGHT: 65 IN

## 2025-04-17 DIAGNOSIS — C55 MALIGNANT NEOPLASM OF UTERUS, PART UNSPECIFIED: ICD-10-CM

## 2025-04-17 DIAGNOSIS — N18.31 CHRONIC KIDNEY DISEASE, STAGE 3A: ICD-10-CM

## 2025-04-17 LAB
ALBUMIN SERPL ELPH-MCNC: 3.8 G/DL — SIGNIFICANT CHANGE UP (ref 3.3–5)
ALP SERPL-CCNC: 138 U/L — HIGH (ref 40–120)
ALT FLD-CCNC: 7 U/L — LOW (ref 10–45)
ANION GAP SERPL CALC-SCNC: 13 MMOL/L — SIGNIFICANT CHANGE UP (ref 5–17)
AST SERPL-CCNC: 18 U/L — SIGNIFICANT CHANGE UP (ref 10–40)
BASOPHILS # BLD AUTO: 0.04 K/UL — SIGNIFICANT CHANGE UP (ref 0–0.2)
BASOPHILS NFR BLD AUTO: 0.6 % — SIGNIFICANT CHANGE UP (ref 0–2)
BILIRUB SERPL-MCNC: 0.4 MG/DL — SIGNIFICANT CHANGE UP (ref 0.2–1.2)
BUN SERPL-MCNC: 21 MG/DL — SIGNIFICANT CHANGE UP (ref 7–23)
CALCIUM SERPL-MCNC: 9.5 MG/DL — SIGNIFICANT CHANGE UP (ref 8.4–10.5)
CHLORIDE SERPL-SCNC: 103 MMOL/L — SIGNIFICANT CHANGE UP (ref 96–108)
CO2 SERPL-SCNC: 25 MMOL/L — SIGNIFICANT CHANGE UP (ref 22–31)
CREAT SERPL-MCNC: 1.35 MG/DL — HIGH (ref 0.5–1.3)
EGFR: 41 ML/MIN/1.73M2 — LOW
EGFR: 41 ML/MIN/1.73M2 — LOW
EOSINOPHIL # BLD AUTO: 0.31 K/UL — SIGNIFICANT CHANGE UP (ref 0–0.5)
EOSINOPHIL NFR BLD AUTO: 4.5 % — SIGNIFICANT CHANGE UP (ref 0–6)
GLUCOSE SERPL-MCNC: 90 MG/DL — SIGNIFICANT CHANGE UP (ref 70–99)
HCT VFR BLD CALC: 38.6 % — SIGNIFICANT CHANGE UP (ref 34.5–45)
HGB BLD-MCNC: 12.7 G/DL — SIGNIFICANT CHANGE UP (ref 11.5–15.5)
IMM GRANULOCYTES NFR BLD AUTO: 0.1 % — SIGNIFICANT CHANGE UP (ref 0–0.9)
LYMPHOCYTES # BLD AUTO: 1.56 K/UL — SIGNIFICANT CHANGE UP (ref 1–3.3)
LYMPHOCYTES # BLD AUTO: 22.6 % — SIGNIFICANT CHANGE UP (ref 13–44)
MAGNESIUM SERPL-MCNC: 1.9 MG/DL — SIGNIFICANT CHANGE UP (ref 1.6–2.6)
MCHC RBC-ENTMCNC: 27.7 PG — SIGNIFICANT CHANGE UP (ref 27–34)
MCHC RBC-ENTMCNC: 32.9 G/DL — SIGNIFICANT CHANGE UP (ref 32–36)
MCV RBC AUTO: 84.3 FL — SIGNIFICANT CHANGE UP (ref 80–100)
MONOCYTES # BLD AUTO: 0.57 K/UL — SIGNIFICANT CHANGE UP (ref 0–0.9)
MONOCYTES NFR BLD AUTO: 8.3 % — SIGNIFICANT CHANGE UP (ref 2–14)
NEUTROPHILS # BLD AUTO: 4.4 K/UL — SIGNIFICANT CHANGE UP (ref 1.8–7.4)
NEUTROPHILS NFR BLD AUTO: 63.9 % — SIGNIFICANT CHANGE UP (ref 43–77)
NRBC BLD AUTO-RTO: 0 /100 WBCS — SIGNIFICANT CHANGE UP (ref 0–0)
PLATELET # BLD AUTO: 248 K/UL — SIGNIFICANT CHANGE UP (ref 150–400)
POTASSIUM SERPL-MCNC: 4.3 MMOL/L — SIGNIFICANT CHANGE UP (ref 3.5–5.3)
POTASSIUM SERPL-SCNC: 4.3 MMOL/L — SIGNIFICANT CHANGE UP (ref 3.5–5.3)
PROT SERPL-MCNC: 7.2 G/DL — SIGNIFICANT CHANGE UP (ref 6–8.3)
RBC # BLD: 4.58 M/UL — SIGNIFICANT CHANGE UP (ref 3.8–5.2)
RBC # FLD: 12.6 % — SIGNIFICANT CHANGE UP (ref 10.3–14.5)
SODIUM SERPL-SCNC: 140 MMOL/L — SIGNIFICANT CHANGE UP (ref 135–145)
WBC # BLD: 6.89 K/UL — SIGNIFICANT CHANGE UP (ref 3.8–10.5)
WBC # FLD AUTO: 6.89 K/UL — SIGNIFICANT CHANGE UP (ref 3.8–10.5)

## 2025-04-17 PROCEDURE — 99213 OFFICE O/P EST LOW 20 MIN: CPT

## 2025-04-17 PROCEDURE — G2211 COMPLEX E/M VISIT ADD ON: CPT

## 2025-05-23 ENCOUNTER — OUTPATIENT (OUTPATIENT)
Dept: OUTPATIENT SERVICES | Facility: HOSPITAL | Age: 75
LOS: 1 days | Discharge: HOME | End: 2025-05-23

## 2025-05-23 DIAGNOSIS — C54.1 MALIGNANT NEOPLASM OF ENDOMETRIUM: ICD-10-CM

## 2025-05-23 DIAGNOSIS — K08.409 PARTIAL LOSS OF TEETH, UNSPECIFIED CAUSE, UNSPECIFIED CLASS: Chronic | ICD-10-CM

## 2025-05-29 ENCOUNTER — RESULT REVIEW (OUTPATIENT)
Age: 75
End: 2025-05-29

## 2025-05-29 ENCOUNTER — APPOINTMENT (OUTPATIENT)
Dept: HEMATOLOGY ONCOLOGY | Facility: CLINIC | Age: 75
End: 2025-05-29

## 2025-05-29 ENCOUNTER — APPOINTMENT (OUTPATIENT)
Dept: INFUSION THERAPY | Facility: CLINIC | Age: 75
End: 2025-05-29

## 2025-05-29 DIAGNOSIS — Z51.11 ENCOUNTER FOR ANTINEOPLASTIC CHEMOTHERAPY: ICD-10-CM

## 2025-05-29 LAB
ALBUMIN SERPL ELPH-MCNC: 4 G/DL — SIGNIFICANT CHANGE UP (ref 3.3–5)
ALP SERPL-CCNC: 134 U/L — HIGH (ref 40–120)
ALT FLD-CCNC: 10 U/L — SIGNIFICANT CHANGE UP (ref 10–40)
ANION GAP SERPL CALC-SCNC: 14 MMOL/L — SIGNIFICANT CHANGE UP (ref 5–17)
AST SERPL-CCNC: 19 U/L — SIGNIFICANT CHANGE UP (ref 10–35)
BASOPHILS # BLD AUTO: 0.05 K/UL — SIGNIFICANT CHANGE UP (ref 0–0.2)
BASOPHILS NFR BLD AUTO: 0.7 % — SIGNIFICANT CHANGE UP (ref 0–2)
BILIRUB SERPL-MCNC: 0.5 MG/DL — SIGNIFICANT CHANGE UP (ref 0.2–1.2)
BUN SERPL-MCNC: 27 MG/DL — HIGH (ref 7–23)
CALCIUM SERPL-MCNC: 9.2 MG/DL — SIGNIFICANT CHANGE UP (ref 8.4–10.5)
CANCER AG125 SERPL-ACNC: 8 U/ML — SIGNIFICANT CHANGE UP
CHLORIDE SERPL-SCNC: 102 MMOL/L — SIGNIFICANT CHANGE UP (ref 96–108)
CO2 SERPL-SCNC: 22 MMOL/L — SIGNIFICANT CHANGE UP (ref 22–31)
CREAT SERPL-MCNC: 1.34 MG/DL — HIGH (ref 0.5–1.3)
EGFR: 41 ML/MIN/1.73M2 — LOW
EGFR: 41 ML/MIN/1.73M2 — LOW
EOSINOPHIL # BLD AUTO: 0.55 K/UL — HIGH (ref 0–0.5)
EOSINOPHIL NFR BLD AUTO: 7.2 % — HIGH (ref 0–6)
GLUCOSE SERPL-MCNC: 78 MG/DL — SIGNIFICANT CHANGE UP (ref 70–99)
HCT VFR BLD CALC: 39.5 % — SIGNIFICANT CHANGE UP (ref 34.5–45)
HGB BLD-MCNC: 13.2 G/DL — SIGNIFICANT CHANGE UP (ref 11.5–15.5)
IMM GRANULOCYTES NFR BLD AUTO: 0.3 % — SIGNIFICANT CHANGE UP (ref 0–0.9)
LYMPHOCYTES # BLD AUTO: 1.68 K/UL — SIGNIFICANT CHANGE UP (ref 1–3.3)
LYMPHOCYTES # BLD AUTO: 21.9 % — SIGNIFICANT CHANGE UP (ref 13–44)
MAGNESIUM SERPL-MCNC: 2 MG/DL — SIGNIFICANT CHANGE UP (ref 1.6–2.6)
MCHC RBC-ENTMCNC: 27.7 PG — SIGNIFICANT CHANGE UP (ref 27–34)
MCHC RBC-ENTMCNC: 33.4 G/DL — SIGNIFICANT CHANGE UP (ref 32–36)
MCV RBC AUTO: 83 FL — SIGNIFICANT CHANGE UP (ref 80–100)
MONOCYTES # BLD AUTO: 0.54 K/UL — SIGNIFICANT CHANGE UP (ref 0–0.9)
MONOCYTES NFR BLD AUTO: 7 % — SIGNIFICANT CHANGE UP (ref 2–14)
NEUTROPHILS # BLD AUTO: 4.83 K/UL — SIGNIFICANT CHANGE UP (ref 1.8–7.4)
NEUTROPHILS NFR BLD AUTO: 62.9 % — SIGNIFICANT CHANGE UP (ref 43–77)
NRBC BLD AUTO-RTO: 0 /100 WBCS — SIGNIFICANT CHANGE UP (ref 0–0)
PLATELET # BLD AUTO: 278 K/UL — SIGNIFICANT CHANGE UP (ref 150–400)
POTASSIUM SERPL-MCNC: 3.7 MMOL/L — SIGNIFICANT CHANGE UP (ref 3.5–5.3)
POTASSIUM SERPL-SCNC: 3.7 MMOL/L — SIGNIFICANT CHANGE UP (ref 3.5–5.3)
PROT SERPL-MCNC: 7.3 G/DL — SIGNIFICANT CHANGE UP (ref 6–8.3)
RBC # BLD: 4.76 M/UL — SIGNIFICANT CHANGE UP (ref 3.8–5.2)
RBC # FLD: 13.4 % — SIGNIFICANT CHANGE UP (ref 10.3–14.5)
SODIUM SERPL-SCNC: 137 MMOL/L — SIGNIFICANT CHANGE UP (ref 135–145)
WBC # BLD: 7.67 K/UL — SIGNIFICANT CHANGE UP (ref 3.8–10.5)
WBC # FLD AUTO: 7.67 K/UL — SIGNIFICANT CHANGE UP (ref 3.8–10.5)

## 2025-05-29 PROCEDURE — G2211 COMPLEX E/M VISIT ADD ON: CPT

## 2025-05-29 PROCEDURE — 99214 OFFICE O/P EST MOD 30 MIN: CPT

## 2025-06-18 DIAGNOSIS — C55 MALIGNANT NEOPLASM OF UTERUS, PART UNSPECIFIED: ICD-10-CM

## 2025-06-26 NOTE — DISCHARGE NOTE PROVIDER - NSDCQMSTAIRS_GEN_ALL_CORE
Gabapentin      Last Written Prescription Date:  6.3.25  Last Fill Quantity: #60,   # refills: 0  Last Office Visit: 6.3.25  Future Office visit:    Next 5 appointments (look out 90 days)      Jul 01, 2025 10:45 AM  (Arrive by 10:30 AM)  Return Visit with Lexi Felix DPM  [facility] ([facility] ) [address] [place] [id number] [phone]     Jul 07, 2025 3:30 PM  (Arrive by 3:15 PM)  Provider Visit with GUZMAN Rosa CNP  [facility] ([facility] ) [address] [place] [id number]  [phone]             Routing refill request to provider for review/approval because:  Drug not on the [facility], [facility] or  Health refill protocol or controlled substance     No

## 2025-07-10 ENCOUNTER — RESULT REVIEW (OUTPATIENT)
Age: 75
End: 2025-07-10

## 2025-07-10 ENCOUNTER — APPOINTMENT (OUTPATIENT)
Dept: INFUSION THERAPY | Facility: CLINIC | Age: 75
End: 2025-07-10

## 2025-07-10 VITALS
SYSTOLIC BLOOD PRESSURE: 117 MMHG | DIASTOLIC BLOOD PRESSURE: 77 MMHG | BODY MASS INDEX: 36.78 KG/M2 | TEMPERATURE: 98 F | OXYGEN SATURATION: 98 % | HEART RATE: 62 BPM | WEIGHT: 221 LBS

## 2025-07-10 LAB
ALBUMIN SERPL ELPH-MCNC: 4.2 G/DL — SIGNIFICANT CHANGE UP (ref 3.3–5)
ALP SERPL-CCNC: 117 U/L — SIGNIFICANT CHANGE UP (ref 40–120)
ALT FLD-CCNC: 9 U/L — LOW (ref 10–40)
ANION GAP SERPL CALC-SCNC: 16 MMOL/L — SIGNIFICANT CHANGE UP (ref 5–17)
AST SERPL-CCNC: 27 U/L — SIGNIFICANT CHANGE UP (ref 10–35)
BASOPHILS # BLD AUTO: 0.04 K/UL — SIGNIFICANT CHANGE UP (ref 0–0.2)
BASOPHILS NFR BLD AUTO: 0.6 % — SIGNIFICANT CHANGE UP (ref 0–2)
BILIRUB SERPL-MCNC: 0.7 MG/DL — SIGNIFICANT CHANGE UP (ref 0.2–1.2)
BUN SERPL-MCNC: 15 MG/DL — SIGNIFICANT CHANGE UP (ref 7–23)
CALCIUM SERPL-MCNC: 9.8 MG/DL — SIGNIFICANT CHANGE UP (ref 8.4–10.5)
CHLORIDE SERPL-SCNC: 99 MMOL/L — SIGNIFICANT CHANGE UP (ref 96–108)
CO2 SERPL-SCNC: 22 MMOL/L — SIGNIFICANT CHANGE UP (ref 22–31)
CREAT SERPL-MCNC: 1.28 MG/DL — SIGNIFICANT CHANGE UP (ref 0.5–1.3)
EGFR: 44 ML/MIN/1.73M2 — LOW
EGFR: 44 ML/MIN/1.73M2 — LOW
EOSINOPHIL # BLD AUTO: 0.35 K/UL — SIGNIFICANT CHANGE UP (ref 0–0.5)
EOSINOPHIL NFR BLD AUTO: 5.2 % — SIGNIFICANT CHANGE UP (ref 0–6)
GLUCOSE SERPL-MCNC: 102 MG/DL — HIGH (ref 70–99)
HCT VFR BLD CALC: 41.4 % — SIGNIFICANT CHANGE UP (ref 34.5–45)
HGB BLD-MCNC: 13.3 G/DL — SIGNIFICANT CHANGE UP (ref 11.5–15.5)
IMM GRANULOCYTES # BLD AUTO: 0.01 K/UL — SIGNIFICANT CHANGE UP (ref 0–0.07)
IMM GRANULOCYTES NFR BLD AUTO: 0.1 % — SIGNIFICANT CHANGE UP (ref 0–0.9)
LYMPHOCYTES # BLD AUTO: 1.7 K/UL — SIGNIFICANT CHANGE UP (ref 1–3.3)
LYMPHOCYTES NFR BLD AUTO: 25.4 % — SIGNIFICANT CHANGE UP (ref 13–44)
MAGNESIUM SERPL-MCNC: 2.1 MG/DL — SIGNIFICANT CHANGE UP (ref 1.6–2.6)
MCHC RBC-ENTMCNC: 27.4 PG — SIGNIFICANT CHANGE UP (ref 27–34)
MCHC RBC-ENTMCNC: 32.1 G/DL — SIGNIFICANT CHANGE UP (ref 32–36)
MCV RBC AUTO: 85.4 FL — SIGNIFICANT CHANGE UP (ref 80–100)
MONOCYTES # BLD AUTO: 0.56 K/UL — SIGNIFICANT CHANGE UP (ref 0–0.9)
MONOCYTES NFR BLD AUTO: 8.4 % — SIGNIFICANT CHANGE UP (ref 2–14)
NEUTROPHILS # BLD AUTO: 4.03 K/UL — SIGNIFICANT CHANGE UP (ref 1.8–7.4)
NEUTROPHILS NFR BLD AUTO: 60.3 % — SIGNIFICANT CHANGE UP (ref 43–77)
NRBC # BLD AUTO: 0 K/UL — SIGNIFICANT CHANGE UP (ref 0–0)
NRBC # FLD: 0 K/UL — SIGNIFICANT CHANGE UP (ref 0–0)
NRBC BLD AUTO-RTO: 0 /100 WBCS — SIGNIFICANT CHANGE UP (ref 0–0)
PLATELET # BLD AUTO: 245 K/UL — SIGNIFICANT CHANGE UP (ref 150–400)
PMV BLD: 10.2 FL — SIGNIFICANT CHANGE UP (ref 7–13)
POTASSIUM SERPL-MCNC: 4.2 MMOL/L — SIGNIFICANT CHANGE UP (ref 3.5–5.3)
POTASSIUM SERPL-SCNC: 4.2 MMOL/L — SIGNIFICANT CHANGE UP (ref 3.5–5.3)
PROT SERPL-MCNC: 8 G/DL — SIGNIFICANT CHANGE UP (ref 6–8.3)
RBC # BLD: 4.85 M/UL — SIGNIFICANT CHANGE UP (ref 3.8–5.2)
RBC # FLD: 13.8 % — SIGNIFICANT CHANGE UP (ref 10.3–14.5)
SODIUM SERPL-SCNC: 138 MMOL/L — SIGNIFICANT CHANGE UP (ref 135–145)
T3FREE SERPL-MCNC: 3.29 PG/ML — SIGNIFICANT CHANGE UP (ref 2–4.4)
T4 FREE SERPL-MCNC: 1.6 NG/DL — SIGNIFICANT CHANGE UP (ref 0.9–1.8)
TSH SERPL-MCNC: 2.8 UIU/ML — SIGNIFICANT CHANGE UP (ref 0.27–4.2)
WBC # BLD: 6.69 K/UL — SIGNIFICANT CHANGE UP (ref 3.8–10.5)
WBC # FLD AUTO: 6.69 K/UL — SIGNIFICANT CHANGE UP (ref 3.8–10.5)

## 2025-07-11 NOTE — H&P PST ADULT - BSA (M2)
Get BMP lab done today:    When kidney function improves (GFR >/= 30), will restart Metformin, no higher than 1000mg day.      If insurance covers Mounjaro:  check BS 2-4x/day.      Call to discuss how are BS doing with Lantus and Glimiperide for adjustments.      Please call or email me via Prosper DOUGLAS  to discuss your RESULTS on Tuesday.    Our office will discuss your results that week UNLESS I've asked you to contact me sooner/otherwise or there are unusual circumstances.       **Visit LIVE WELL with Ipp[employer] DOUGLAS soon and often.  Sign up, take a quick tour, view important information, and stay in touch:  We're There When You Need Us.    If you've had a favorable clinic experience today, please complete your SURVEY when asked.  If you have not had a favorable experience, please share with me  and we'll work on improving our comprehensive customer service.      It is our [place] and Sherry to to work with you as your physician, advocate and care team towards your Comprehensive Health.      With Gratitude, Optimism and Sherry,    Dr. Steven and staff           Medication Cost Resource:    [employer] [facility] and [employer] [address] [place]:  A great way to save $ for prescription and over-the-counter medicines.   Consider these pharmacies:  [facility], [facility], [facility]      [facility]:  Can get free meds when No insurance/your insurance doesn't cover them.      **Go directly by Internet to the medication's specific PHARMACEUTICAL company ON-LINE COUPONS:     May have the BEST discounts.          Hemoglobin A1C, POC (%)   Date Value   12/06/2024 9.5 (A)         Last Lab A1C:  Hemoglobin A1C (%)   Date Value   06/25/2025 8.1 (H)           REMEMBER, EXERCISE/MOVEMENT improves diabetes control:    15 chair SQUATS an hour for 8 hrs exceeds benefits of a 30 minute walk!      CALF RAISES while sitting and 5-10 minutes after eating or walking 5-10 minutes after meals          Please call or email me via [employer]  Lang Ma DOUGLAS  to discuss your RESULTS on if you haven't heard from our office in TWO WEEKS.     Our office will discuss your results that week unless there are unusual circumstances.       **Visit LIVE WELL with [facility] Lang [facility] DOUGLAS soon and often.  Sign up, take a quick tour, view important information, and stay in touch:  We're There When You Need Us.    If you've had a favorable clinic experience today, please complete your SURVEY when asked.  If you have not had a favorable experience, please share with me  and we'll work on improving our comprehensive customer service.      It is our [place] and Sherry to to work with you as your physician, advocate and care team towards your Comprehensive Health.      With Gratitude, Optimism and Sherry,    Dr. Steven and [facility] Staff              BOOKS I recommend:     DR. VILLALBA:    I'm So Effing Tired  I'm So Effing Hungry      Medication Cost Resource:      [facility] and [employer] [place]:  A great way to save $ for prescription and over-the-counter medicines.   Consider these pharmacies: [facility]    [facility]:  Can get free meds when  No insurance/your insurance doesn't cover them.      **Go directly by Internet to the medication's specific pharmaceutical company: may have the BEST discounts.      DIABETES:  Check Blood Sugar  daily  (fasting, pre-lunch or pre-dinner:  or 2 hrs after a meal: <180).   Cut out all Sodas/juices/desserts/french fries/unrefined carbs.    Limit only 1/4 of the plate to carbs per meal, 40-60 gm/meal.   Bring glucometer to every appt.   Healthy eating and at least 150 mins of weekly exercise.  See Opthalmologist and Podiatrist yearly and as recommended:  Call if you need a referral for the FIRST time.    See Dentist every 6 months          A1C : NON DIABETIC <6%;   EXCELLENT CONTROL 6-7%,   GOOD TO FAIR CONTROL >7-8,   SUBOPTIMAL GLYCEMIC CONTROL >8%  A1C% eAG mg/dL  6.0 126  6.5 140  7.0 154  7.5 169  8.0 183  8.5 197  9.0 212  9.5  226  10.0 240          Turkish Walkin minutes daily    SQUATS 15 every hour for 8 hrs daily    Weight Resistance Training 2-3x/week    EAT during 6-8 hr window daily:   I.e. FAST for 12-18 hrs/day.      Eats PLANTS first, and eat carbs LAST: less insulin/glucose spikes: loose stored fat easier.      FIBER supplement options:    Use with liquid BEFORE meals:    CLEAR METAMUCIL:  start with 1/2 serving size and increase every few days until you know you do well with 2 -3 teaspoons in water/coffee/team      [place]:   Super Fiber+fruit Psyllium Supplement: 5 grams before meals 2-3x/day.      If you've had a favorable clinic experience today, please complete your SURVEY when asked.  If you have not had a favorable experience, please share with me and we'll work on improving our comprehensive customer service.      It is our [place] and Sherry to to work with you as your physician, advocate and care team towards your Comprehensive Health.      With Gratitude, Optimism and Sherry,    Dr. Steven and staff       WEIGHT LOSS/GETTING FITTER:  ONE DAY AT A TIME RESOURCES:         *WATER: >64 oz/day (you may need more: up to your weight in lbs divided by 2)      *FIBER 30 grams for women, 38 grams for men  daily          PLANTS: Eat 30 different types per week.         BEANS: great source of protein/fiber: black/red/navy, etc.        AZIZA/FLAXEED Seeds: can be put in water/Greek Yogurt/smoothies, etc.      If you are *constipated:  increase above.      CARBS:  limit to 1/4 cup/meal    **Limit \"ADDED SUGARS\" to 10-20grams/day      PROTEIN 100 grams/day minimum        Beans/Lentils/Fish/Chicken/Beef/Eggs/Protein Shakes        GOOD FATS: NUTS: 30 grams/1 oz daily (NOT if nut allergy).                      And see below    Smoothie:        VEGGIES (1 cup or more),  FRUIT mixed berries (1/4 cup max), add PROTEIN,       add GOOD FATS.         Stress Reduction: [employer]: \"Unwind Your Mind\"    Optimal/Quality Sleep:  7-8 hrs for most  people.      BOOKS:     Eat Fat, Get Thin: Dr. Placido Yo    The Full Body Fat Fix: Santos HOLMAN:     Instagram:  Fasting MD    I'm So Effing Tired  I'm So Effing Hungry        6 WEEKS PLANT BASED DIET: 2 days a week:  2 cheat meats/week (FISH/CHICKEN/TURKEY)       **NET[employer]IX:    \"Live To 100\"  \"The Game Changers\"   \"You Are What You EAT\"  \"WHAT THE HEALTH\"      Please take vit D3 2000 international units daily (unless you've been advised otherwise).        [employer] [place] DOUGLAS    Lose It [place]    Dionicio Peter:  The Best Life Diet  Doris Whitney:  The Me I Knew I Could Be   *ChooseMyPlate.gov  Weight Watchers: On-Line and Meeting Options  My Fitness Pal      Loose weight/exercise:     Consider:  Mediterranean diet indefinitely for your comprehensive health.     [employer] Appeal: On-line Nutrition Fitness Coaching:  FREE      Adequate Sleep/Stress reduction    Adequate Hydration    Fiber 30 grams/day (women)/38 grams/day (men):       AZIZA/FLAXEED Seeds    Protein minimum 60 grams/day: some need more    Probiotics 3 serving/day    Good Fats: (can add Omega 3 Fatty acids: daily:  1000mg daily)      LIMIT CARBOHYDRATES to no more than 40-60 grams/meal (including what you eat and drink)    Increase PROTEIN to 20-30 grams/meal: look up Healthy Protein foods:   minimum 60 grams/day: some need more      Increase MONOUNSATURATED FATTY ACIDS called \"GOOD FATS\" to your meals at least 3 times a day: Examples include a handful of nuts, salmon, tuna, macro fish, dark chocolate, avocado or even Fish Oil Tablets 1 gram (only if not allergic).      Daily Workout FREE  Douglas    Lose It Douglas    My Fitness pal [place]     LuxolaPlate.gov Smart Tracker      Healthy eating and at least 150 mins of weekly exercise    Weight Resistant exercise at least 2-3x/week.    As applicable:   6 week weight loss goal:6#    12 week weight loss goal:12#    6 month weight loss goal: 24#          Patient Instructions/Information    TIPS FOR WEIGHT CONTROL    1.   EXERCISE MORE!  At least 3x/week for a minimum of half an hour each time.  This could include walking, biking, jogging, aerobics, swimming, basketball, or any other \"aerobic\" activity.  This not only helps to burn-off calories, but it also helps you look and feel better!    2.  EAT 3 MEALS/DAY - Do Not Skip Meals! Skipping meals may lead to excess hunger at the next meal.  It also leads to bingeing or \"pigging-out\"!    3.  PACK A LUNCH instead of going out for lunch or eating in the cafeteria.  Eating out often means eating foods higher in fat.  Packing a lunch will help you stay in control.    4.  SNACKS - Avoid chips, cakes, cookies, ice cream, and candy.  Try fresh fruit, raw vegetables, or plain popcorn.  Drink diet soda, diet Israel-Aid or WATER.  Use 1% or skim milk.  Limit use of fruit juice as a beverage.    5.  KEEP A FOOD DIARY.  Record the type and amount of food you eat.    6.  STAY AWAY FROM FRIED FOODS such as french fries, fried chicken, potato chips, etc.    7.  WHEN GOING OUT FOR FAST FOOD, have a plain hamburger or salad with low-calorie dressing.  Milkshakes are high in calories.  Instead, have a diet soda or a carton of low-fat milk.    8.  PLAN TO LOSE WEIGHT SLOWLY! One to two pounds per week is considered average.  Weigh yourself only once each week.  Take your measurements at the beginning of your diet.  Sometimes you will lose inches without losing weight.    9.  IF POSSIBLE, WALK, JOG, OR RIDE YOUR BIKE to school, work, or the store instead of taking the car or bus.  Take the stairs instead of the elevator. Any extra activity helps.    10. PLAN MEALS THE DAY BEFORE so you know what you'll be eating.  If you are going to a party at night, cut back a little at breakfast and lunch.       Learning About Serving and Portion Sizes  Servings and portions. What’s the difference? These terms can be very confusing. But learning to measure serving sizes can help you figure out how many carbohydrates  (“carbs”) and other foods you eat each day. They are also powerful tools for managing your weight.  Servings and Portions     A good rule of thumb: Devote half your plate to vegetables and green salad. Split the other half between protein and starchy carbohydrates. Fruit makes a good dessert.   Many different words are used to describe amounts of food. If your healthcare provider uses a term you’re not sure of, don’t be afraid to ask. It helps to know the difference between servings and portions.  A serving size is a fixed size. Food producers use this term to describe their products. For example, the label on a cereal box could say that 1 cup of dry cereal = 1 serving.  A portion (also called a “helping”) is how much you eat or how much you put on your plate at a meal. For example, you might eat 2 cups of cereal at breakfast.  Using Serving Information  The portion you choose to eat (such as 2 cups of cereal) may be more than one serving as listed on the food label (such as 1 cup of cereal). That’s why it helps to measure or weigh the food you eat. Because the food label values are based on servings, you’ll need to know how many servings you eat at one sitting.     Ounces: 2 to 3 ounces is about the size of your palm.       1 Cup: 1 cup (or a medium-sized piece) is about the size of your fist.       1/2 Cup: 1/2 cup is about the size of your cupped hand.      Keeping Track of Serving Sizes  When you’re planning for a snack or a meal, keep servings in mind. If you don’t have measuring cups or a scale handy, there are ways to “eyeball” serving sizes.  Managing Portion Sizes  If your weight is a concern, reducing your portions can help. You can eat more than one serving of a food at once. But to keep from eating too much at one meal, learn how to manage your portions. A portion is the amount of each type of food on your plate. See the plate diagram for an example of balanced portions.  © 4576-3513 [address]  [place] [address]. All rights reserved. This information is not intended as a substitute for professional medical care. Always follow your healthcare professional's instructions.                 TREATING HYPOGLYCEMIA    Treat a low blood sugar (hypoglycemia) with the  \"15/15 RULE\".    Take 15 grams of carbohydrate, and wait 15 minutes.  Check your blood glucose level and make sure is has come up.  If it is still less than 70 mg/dl, then repeat the 15 grams of carb's and wait another 15 minutes.  Then recheck your blood glucose level.    Examples of 15 grams of carbohydrate are:  4 to 6 ounces of fruit juice  4 to 6 ounces of regular soda (not diet)  6 small hard candies (peppermints or lifesavers)  Small box of raisins (as used in children's lunches)  Glucose tablets (see the label and the dose)  Glucose gel (see the label and the dose)    Do not drive until you have checked your blood glucose to make sure it is in a safe range.  Always carry a soda, candy, or glucose tablets where you can reach them in your car.             1.91

## 2025-07-21 ENCOUNTER — APPOINTMENT (OUTPATIENT)
Dept: CT IMAGING | Facility: CLINIC | Age: 75
End: 2025-07-21
Payer: MEDICARE

## 2025-07-21 ENCOUNTER — OUTPATIENT (OUTPATIENT)
Dept: OUTPATIENT SERVICES | Facility: HOSPITAL | Age: 75
LOS: 1 days | End: 2025-07-21
Payer: MEDICARE

## 2025-07-21 DIAGNOSIS — K08.409 PARTIAL LOSS OF TEETH, UNSPECIFIED CAUSE, UNSPECIFIED CLASS: Chronic | ICD-10-CM

## 2025-07-21 DIAGNOSIS — C55 MALIGNANT NEOPLASM OF UTERUS, PART UNSPECIFIED: ICD-10-CM

## 2025-07-21 PROCEDURE — 71260 CT THORAX DX C+: CPT | Mod: 26

## 2025-07-21 PROCEDURE — 74177 CT ABD & PELVIS W/CONTRAST: CPT | Mod: 26

## 2025-07-21 PROCEDURE — 71260 CT THORAX DX C+: CPT

## 2025-07-21 PROCEDURE — 74177 CT ABD & PELVIS W/CONTRAST: CPT

## 2025-07-28 ENCOUNTER — APPOINTMENT (OUTPATIENT)
Dept: HEMATOLOGY ONCOLOGY | Facility: CLINIC | Age: 75
End: 2025-07-28
Payer: MEDICARE

## 2025-07-28 DIAGNOSIS — C55 MALIGNANT NEOPLASM OF UTERUS, PART UNSPECIFIED: ICD-10-CM

## 2025-07-28 PROCEDURE — 99215 OFFICE O/P EST HI 40 MIN: CPT | Mod: 93

## (undated) DEVICE — SUT PDS II 1 48" TP-1

## (undated) DEVICE — DRAPE FLUID WARMER 44 X 44"

## (undated) DEVICE — TRAP SPECIMEN SPUTUM 40CC

## (undated) DEVICE — ELCTR GROUNDING PAD ADULT COVIDIEN

## (undated) DEVICE — DRAPE INSTRUMENT POUCH 6.75" X 11"

## (undated) DEVICE — ABDOMINAL BINDER MED/LG 12" X 36"-54"

## (undated) DEVICE — STAPLER SKIN MULTI DIRECTION W35

## (undated) DEVICE — ELCTR BOVIE TIP BLADE INSULATED 6.5" EDGE

## (undated) DEVICE — ELCTR BOVIE TIP BLADE INSULATED 2.75" EDGE

## (undated) DEVICE — SUT VICRYL 2-0 27" CT-2 UNDYED

## (undated) DEVICE — SUT VICRYL 0 36" CT-1 UNDYED

## (undated) DEVICE — DRSG MEPILEX 10 X 20CM (4 X 8") WHITE

## (undated) DEVICE — SPONGE PEANUT AUTO COUNT

## (undated) DEVICE — GLV 6.5 PROTEXIS (WHITE)

## (undated) DEVICE — SUT VICRYL 2-0 27" SH UNDYED

## (undated) DEVICE — SUT VICRYL 0 18" TIES UNDYED

## (undated) DEVICE — PACK MAJOR ABDOMINAL WITH LAP

## (undated) DEVICE — WARMING BLANKET FULL ADULT

## (undated) DEVICE — DRAPE LEGGINGS 6" CUFF 28X43"

## (undated) DEVICE — SUT STRATAFIX SPIRAL MONOCRYL PLUS 4-0 30CM PS-2 UNDYED

## (undated) DEVICE — DRAPE LAPAROTOMY T-DRAPE 102" X 121"

## (undated) DEVICE — SUT VICRYL 3-0 27" SH UNDYED

## (undated) DEVICE — FOLEY TRAY 16FR 5CC LF UMETER CLOSED

## (undated) DEVICE — VENODYNE/SCD SLEEVE CALF MEDIUM

## (undated) DEVICE — GLV 7 PROTEXIS (BLUE)

## (undated) DEVICE — DRAPE LAPAROTOMY W VELCRO CORD TABS

## (undated) DEVICE — VESSEL LOOP MAXI-YELLOW  0.120" X 16"

## (undated) DEVICE — LIGASURE IMPACT

## (undated) DEVICE — PREP BETADINE KIT